# Patient Record
Sex: MALE | Race: WHITE | Employment: UNEMPLOYED | URBAN - NONMETROPOLITAN AREA
[De-identification: names, ages, dates, MRNs, and addresses within clinical notes are randomized per-mention and may not be internally consistent; named-entity substitution may affect disease eponyms.]

---

## 2020-05-04 ENCOUNTER — HOSPITAL ENCOUNTER (EMERGENCY)
Age: 60
Discharge: HOME | End: 2020-05-04
Attending: EMERGENCY MEDICINE

## 2020-05-04 VITALS
DIASTOLIC BLOOD PRESSURE: 72 MMHG | TEMPERATURE: 96.5 F | OXYGEN SATURATION: 100 % | WEIGHT: 185 LBS | BODY MASS INDEX: 25.9 KG/M2 | HEIGHT: 71 IN | HEART RATE: 99 BPM | SYSTOLIC BLOOD PRESSURE: 118 MMHG | RESPIRATION RATE: 16 BRPM

## 2020-05-04 PROCEDURE — 99282 EMERGENCY DEPT VISIT SF MDM: CPT

## 2020-05-04 ASSESSMENT — ENCOUNTER SYMPTOMS
EYE PAIN: 0
SHORTNESS OF BREATH: 0
RHINORRHEA: 0
ABDOMINAL PAIN: 0

## 2020-05-04 ASSESSMENT — PAIN SCALES - GENERAL
PAINLEVEL_OUTOF10: 0
PAINLEVEL_OUTOF10: 1

## 2020-05-04 ASSESSMENT — PAIN DESCRIPTION - LOCATION: LOCATION: MOUTH

## 2020-05-04 ASSESSMENT — PAIN DESCRIPTION - PAIN TYPE: TYPE: ACUTE PAIN

## 2020-05-04 NOTE — ED PROVIDER NOTES
Conjunctiva/sclera: Conjunctivae normal.   Neck:      Musculoskeletal: Neck supple. Cardiovascular:      Rate and Rhythm: Normal rate and regular rhythm. Pulmonary:      Effort: Pulmonary effort is normal. No respiratory distress. Breath sounds: No stridor. Abdominal:      General: There is no distension. Palpations: Abdomen is soft. Musculoskeletal:         General: No tenderness. Skin:     General: Skin is warm and dry. Findings: No erythema. Neurological:      Mental Status: He is alert and oriented to person, place, and time. Comments: Patient is alert and oriented ×3. Extraocular movements intact. Pupils are equal and reactive to light bilaterally. Speech is normal.  No word finding difficulties noted. Eyebrows raise symmetrically. Patient is able to puff out cheeks symmetrically with no issues. Tongue protrudes to midline. Shoulder shrug is symmetrical.  Right upper extremity: 5/5 strength with finger abduction, flexion and extension of elbow. Left upper extremity: 5/5 strength with finger abduction, flexion and extension of elbow. Sensation intact to upper extremities. Patient able to lift both legs off the bed and hold for 5 seconds without issues. 5/5 strength with big toe dorsiflexion bilaterally. Sensation to light touch intact to bilateral lower extremities. Normal gait         DIAGNOSTIC RESULTS     RADIOLOGY: (none if blank)   Interpretation per theRadiologist below, if available at the time of this note:    No orders to display       LABS:  Labs Reviewed - No data to display    All other labs were within normal range or not returned as of this dictation. EMERGENCY DEPARTMENT COURSE and Medical Decision Making: On evaluation, patient is in no distress. Patient has normal neuro examination with no loss of consciousness. Fall was from standing. No blood thinning medications. Patient does have multiple abrasions to the nose and lower lip.   There is 1

## 2020-12-04 ENCOUNTER — APPOINTMENT (OUTPATIENT)
Dept: GENERAL RADIOLOGY | Age: 60
DRG: 201 | End: 2020-12-04
Payer: MEDICARE

## 2020-12-04 ENCOUNTER — HOSPITAL ENCOUNTER (INPATIENT)
Age: 60
LOS: 1 days | Discharge: HOME | DRG: 201 | End: 2020-12-05
Attending: EMERGENCY MEDICINE | Admitting: FAMILY MEDICINE
Payer: MEDICARE

## 2020-12-04 PROBLEM — I48.91 NEW ONSET ATRIAL FIBRILLATION (HCC): Status: ACTIVE | Noted: 2020-12-04

## 2020-12-04 LAB
ABSOLUTE EOS #: 0.12 K/UL (ref 0–0.44)
ABSOLUTE IMMATURE GRANULOCYTE: 0.03 K/UL (ref 0–0.3)
ABSOLUTE LYMPH #: 2.82 K/UL (ref 1.1–3.7)
ABSOLUTE MONO #: 0.8 K/UL (ref 0.1–1.2)
ANION GAP SERPL CALCULATED.3IONS-SCNC: 6 MMOL/L (ref 9–17)
BASOPHILS # BLD: 1 % (ref 0–2)
BASOPHILS ABSOLUTE: 0.09 K/UL (ref 0–0.2)
BNP INTERPRETATION: ABNORMAL
BUN BLDV-MCNC: 24 MG/DL (ref 8–23)
BUN/CREAT BLD: 24 (ref 9–20)
CALCIUM SERPL-MCNC: 9.4 MG/DL (ref 8.6–10.4)
CHLORIDE BLD-SCNC: 103 MMOL/L (ref 98–107)
CO2: 26 MMOL/L (ref 20–31)
CREAT SERPL-MCNC: 1 MG/DL (ref 0.7–1.2)
D-DIMER QUANTITATIVE: 0.47 MG/L FEU (ref 0–0.59)
DIFFERENTIAL TYPE: NORMAL
EOSINOPHILS RELATIVE PERCENT: 1 % (ref 1–4)
GFR AFRICAN AMERICAN: >60 ML/MIN
GFR NON-AFRICAN AMERICAN: >60 ML/MIN
GFR SERPL CREATININE-BSD FRML MDRD: ABNORMAL ML/MIN/{1.73_M2}
GFR SERPL CREATININE-BSD FRML MDRD: ABNORMAL ML/MIN/{1.73_M2}
GLUCOSE BLD-MCNC: 106 MG/DL (ref 70–99)
HCT VFR BLD CALC: 48.5 % (ref 40.7–50.3)
HEMOGLOBIN: 15.4 G/DL (ref 13–17)
IMMATURE GRANULOCYTES: 0 %
INR BLD: 1.1
LYMPHOCYTES # BLD: 28 % (ref 24–43)
MCH RBC QN AUTO: 28.7 PG (ref 25.2–33.5)
MCHC RBC AUTO-ENTMCNC: 31.8 G/DL (ref 28.4–34.8)
MCV RBC AUTO: 90.3 FL (ref 82.6–102.9)
MONOCYTES # BLD: 8 % (ref 3–12)
NRBC AUTOMATED: 0 PER 100 WBC
PDW BLD-RTO: 13.1 % (ref 11.8–14.4)
PHOSPHORUS: 3.1 MG/DL (ref 2.5–4.5)
PLATELET # BLD: 276 K/UL (ref 138–453)
PLATELET ESTIMATE: NORMAL
PMV BLD AUTO: 9.1 FL (ref 8.1–13.5)
POTASSIUM SERPL-SCNC: 4.2 MMOL/L (ref 3.7–5.3)
PRO-BNP: 1387 PG/ML
PROTHROMBIN TIME: 14 SEC (ref 11.5–14.2)
RBC # BLD: 5.37 M/UL (ref 4.21–5.77)
RBC # BLD: NORMAL 10*6/UL
SEG NEUTROPHILS: 62 % (ref 36–65)
SEGMENTED NEUTROPHILS ABSOLUTE COUNT: 6.07 K/UL (ref 1.5–8.1)
SODIUM BLD-SCNC: 135 MMOL/L (ref 135–144)
TROPONIN INTERP: NORMAL
TROPONIN INTERP: NORMAL
TROPONIN T: NORMAL NG/ML
TROPONIN T: NORMAL NG/ML
TROPONIN, HIGH SENSITIVITY: 9 NG/L (ref 0–22)
TROPONIN, HIGH SENSITIVITY: 9 NG/L (ref 0–22)
TSH SERPL DL<=0.05 MIU/L-ACNC: 1.19 MIU/L (ref 0.3–5)
WBC # BLD: 9.9 K/UL (ref 3.5–11.3)
WBC # BLD: NORMAL 10*3/UL

## 2020-12-04 PROCEDURE — 84100 ASSAY OF PHOSPHORUS: CPT

## 2020-12-04 PROCEDURE — 80048 BASIC METABOLIC PNL TOTAL CA: CPT

## 2020-12-04 PROCEDURE — 2500000003 HC RX 250 WO HCPCS: Performed by: EMERGENCY MEDICINE

## 2020-12-04 PROCEDURE — 96374 THER/PROPH/DIAG INJ IV PUSH: CPT

## 2020-12-04 PROCEDURE — 6370000000 HC RX 637 (ALT 250 FOR IP): Performed by: EMERGENCY MEDICINE

## 2020-12-04 PROCEDURE — 84443 ASSAY THYROID STIM HORMONE: CPT

## 2020-12-04 PROCEDURE — 6360000002 HC RX W HCPCS: Performed by: EMERGENCY MEDICINE

## 2020-12-04 PROCEDURE — 83880 ASSAY OF NATRIURETIC PEPTIDE: CPT

## 2020-12-04 PROCEDURE — 85379 FIBRIN DEGRADATION QUANT: CPT

## 2020-12-04 PROCEDURE — G0378 HOSPITAL OBSERVATION PER HR: HCPCS

## 2020-12-04 PROCEDURE — 71046 X-RAY EXAM CHEST 2 VIEWS: CPT

## 2020-12-04 PROCEDURE — 99284 EMERGENCY DEPT VISIT MOD MDM: CPT

## 2020-12-04 PROCEDURE — 1200000000 HC SEMI PRIVATE

## 2020-12-04 PROCEDURE — 85025 COMPLETE CBC W/AUTO DIFF WBC: CPT

## 2020-12-04 PROCEDURE — 96376 TX/PRO/DX INJ SAME DRUG ADON: CPT

## 2020-12-04 PROCEDURE — 93005 ELECTROCARDIOGRAM TRACING: CPT | Performed by: EMERGENCY MEDICINE

## 2020-12-04 PROCEDURE — 85610 PROTHROMBIN TIME: CPT

## 2020-12-04 PROCEDURE — 96372 THER/PROPH/DIAG INJ SC/IM: CPT

## 2020-12-04 PROCEDURE — 84484 ASSAY OF TROPONIN QUANT: CPT

## 2020-12-04 PROCEDURE — 36415 COLL VENOUS BLD VENIPUNCTURE: CPT

## 2020-12-04 RX ORDER — DILTIAZEM HYDROCHLORIDE 5 MG/ML
10 INJECTION INTRAVENOUS ONCE
Status: COMPLETED | OUTPATIENT
Start: 2020-12-04 | End: 2020-12-04

## 2020-12-04 RX ORDER — ONDANSETRON 2 MG/ML
4 INJECTION INTRAMUSCULAR; INTRAVENOUS EVERY 6 HOURS PRN
Status: DISCONTINUED | OUTPATIENT
Start: 2020-12-04 | End: 2020-12-05 | Stop reason: HOSPADM

## 2020-12-04 RX ORDER — SODIUM CHLORIDE 0.9 % (FLUSH) 0.9 %
10 SYRINGE (ML) INJECTION EVERY 12 HOURS SCHEDULED
Status: DISCONTINUED | OUTPATIENT
Start: 2020-12-05 | End: 2020-12-05 | Stop reason: HOSPADM

## 2020-12-04 RX ORDER — ASPIRIN 81 MG/1
324 TABLET, CHEWABLE ORAL ONCE
Status: COMPLETED | OUTPATIENT
Start: 2020-12-04 | End: 2020-12-04

## 2020-12-04 RX ORDER — DILTIAZEM HYDROCHLORIDE 5 MG/ML
5 INJECTION INTRAVENOUS ONCE
Status: COMPLETED | OUTPATIENT
Start: 2020-12-04 | End: 2020-12-04

## 2020-12-04 RX ORDER — PROMETHAZINE HYDROCHLORIDE 25 MG/1
12.5 TABLET ORAL EVERY 6 HOURS PRN
Status: DISCONTINUED | OUTPATIENT
Start: 2020-12-04 | End: 2020-12-05 | Stop reason: HOSPADM

## 2020-12-04 RX ORDER — METOPROLOL TARTRATE 5 MG/5ML
5 INJECTION INTRAVENOUS EVERY 6 HOURS PRN
Status: DISCONTINUED | OUTPATIENT
Start: 2020-12-04 | End: 2020-12-05 | Stop reason: HOSPADM

## 2020-12-04 RX ORDER — POLYETHYLENE GLYCOL 3350 17 G/17G
17 POWDER, FOR SOLUTION ORAL DAILY PRN
Status: DISCONTINUED | OUTPATIENT
Start: 2020-12-04 | End: 2020-12-05 | Stop reason: HOSPADM

## 2020-12-04 RX ORDER — DILTIAZEM HYDROCHLORIDE 120 MG/1
120 CAPSULE, COATED, EXTENDED RELEASE ORAL DAILY
Status: DISCONTINUED | OUTPATIENT
Start: 2020-12-05 | End: 2020-12-05 | Stop reason: HOSPADM

## 2020-12-04 RX ORDER — SODIUM CHLORIDE 0.9 % (FLUSH) 0.9 %
10 SYRINGE (ML) INJECTION PRN
Status: DISCONTINUED | OUTPATIENT
Start: 2020-12-04 | End: 2020-12-05 | Stop reason: HOSPADM

## 2020-12-04 RX ORDER — ACETAMINOPHEN 650 MG/1
650 SUPPOSITORY RECTAL EVERY 6 HOURS PRN
Status: DISCONTINUED | OUTPATIENT
Start: 2020-12-04 | End: 2020-12-05 | Stop reason: HOSPADM

## 2020-12-04 RX ORDER — ACETAMINOPHEN 325 MG/1
650 TABLET ORAL EVERY 6 HOURS PRN
Status: DISCONTINUED | OUTPATIENT
Start: 2020-12-04 | End: 2020-12-05 | Stop reason: HOSPADM

## 2020-12-04 RX ADMIN — DILTIAZEM HYDROCHLORIDE 5 MG: 5 INJECTION INTRAVENOUS at 20:57

## 2020-12-04 RX ADMIN — ENOXAPARIN SODIUM 90 MG: 100 INJECTION SUBCUTANEOUS at 20:33

## 2020-12-04 RX ADMIN — DILTIAZEM HYDROCHLORIDE 10 MG: 5 INJECTION INTRAVENOUS at 19:50

## 2020-12-04 RX ADMIN — ASPIRIN 81 MG CHEWABLE TABLET 324 MG: 81 TABLET CHEWABLE at 19:50

## 2020-12-04 ASSESSMENT — ENCOUNTER SYMPTOMS
SHORTNESS OF BREATH: 0
CHEST TIGHTNESS: 0
COLOR CHANGE: 0
NAUSEA: 0
VOMITING: 0
ABDOMINAL PAIN: 0
RHINORRHEA: 0

## 2020-12-04 ASSESSMENT — PAIN SCALES - GENERAL: PAINLEVEL_OUTOF10: 0

## 2020-12-05 VITALS
RESPIRATION RATE: 16 BRPM | DIASTOLIC BLOOD PRESSURE: 90 MMHG | HEART RATE: 92 BPM | OXYGEN SATURATION: 99 % | BODY MASS INDEX: 26 KG/M2 | HEIGHT: 71 IN | SYSTOLIC BLOOD PRESSURE: 133 MMHG | TEMPERATURE: 97.5 F | WEIGHT: 185.7 LBS

## 2020-12-05 PROBLEM — I10 ESSENTIAL HYPERTENSION: Status: ACTIVE | Noted: 2020-12-05

## 2020-12-05 LAB
ALBUMIN SERPL-MCNC: 4.1 G/DL (ref 3.5–5.2)
ALBUMIN/GLOBULIN RATIO: 1.6 (ref 1–2.5)
ALP BLD-CCNC: 72 U/L (ref 40–129)
ALT SERPL-CCNC: 16 U/L (ref 5–41)
ANION GAP SERPL CALCULATED.3IONS-SCNC: 8 MMOL/L (ref 9–17)
AST SERPL-CCNC: 20 U/L
BILIRUB SERPL-MCNC: 0.63 MG/DL (ref 0.3–1.2)
BNP INTERPRETATION: ABNORMAL
BUN BLDV-MCNC: 21 MG/DL (ref 8–23)
BUN/CREAT BLD: 23 (ref 9–20)
CALCIUM SERPL-MCNC: 9 MG/DL (ref 8.6–10.4)
CHLORIDE BLD-SCNC: 100 MMOL/L (ref 98–107)
CO2: 26 MMOL/L (ref 20–31)
CREAT SERPL-MCNC: 0.92 MG/DL (ref 0.7–1.2)
EKG ATRIAL RATE: 120 BPM
EKG ATRIAL RATE: 138 BPM
EKG Q-T INTERVAL: 344 MS
EKG Q-T INTERVAL: 376 MS
EKG QRS DURATION: 86 MS
EKG QRS DURATION: 88 MS
EKG QTC CALCULATION (BAZETT): 474 MS
EKG QTC CALCULATION (BAZETT): 501 MS
EKG R AXIS: 23 DEGREES
EKG R AXIS: 46 DEGREES
EKG T AXIS: 32 DEGREES
EKG T AXIS: 44 DEGREES
EKG VENTRICULAR RATE: 107 BPM
EKG VENTRICULAR RATE: 114 BPM
GFR AFRICAN AMERICAN: >60 ML/MIN
GFR NON-AFRICAN AMERICAN: >60 ML/MIN
GFR SERPL CREATININE-BSD FRML MDRD: ABNORMAL ML/MIN/{1.73_M2}
GFR SERPL CREATININE-BSD FRML MDRD: ABNORMAL ML/MIN/{1.73_M2}
GLUCOSE BLD-MCNC: 91 MG/DL (ref 70–99)
HCT VFR BLD CALC: 43.9 % (ref 40.7–50.3)
HEMOGLOBIN: 14.1 G/DL (ref 13–17)
MCH RBC QN AUTO: 29.3 PG (ref 25.2–33.5)
MCHC RBC AUTO-ENTMCNC: 32.1 G/DL (ref 28.4–34.8)
MCV RBC AUTO: 91.3 FL (ref 82.6–102.9)
NRBC AUTOMATED: 0 PER 100 WBC
PDW BLD-RTO: 13.3 % (ref 11.8–14.4)
PLATELET # BLD: 247 K/UL (ref 138–453)
PMV BLD AUTO: 9.3 FL (ref 8.1–13.5)
POTASSIUM SERPL-SCNC: 4 MMOL/L (ref 3.7–5.3)
PRO-BNP: 1438 PG/ML
RBC # BLD: 4.81 M/UL (ref 4.21–5.77)
SODIUM BLD-SCNC: 134 MMOL/L (ref 135–144)
TOTAL PROTEIN: 6.7 G/DL (ref 6.4–8.3)
TROPONIN INTERP: NORMAL
TROPONIN T: NORMAL NG/ML
TROPONIN, HIGH SENSITIVITY: 9 NG/L (ref 0–22)
WBC # BLD: 7.3 K/UL (ref 3.5–11.3)

## 2020-12-05 PROCEDURE — 85027 COMPLETE CBC AUTOMATED: CPT

## 2020-12-05 PROCEDURE — 6370000000 HC RX 637 (ALT 250 FOR IP): Performed by: FAMILY MEDICINE

## 2020-12-05 PROCEDURE — 93010 ELECTROCARDIOGRAM REPORT: CPT | Performed by: INTERNAL MEDICINE

## 2020-12-05 PROCEDURE — G0378 HOSPITAL OBSERVATION PER HR: HCPCS

## 2020-12-05 PROCEDURE — 2580000003 HC RX 258: Performed by: FAMILY MEDICINE

## 2020-12-05 PROCEDURE — 99254 IP/OBS CNSLTJ NEW/EST MOD 60: CPT | Performed by: INTERNAL MEDICINE

## 2020-12-05 PROCEDURE — 6360000002 HC RX W HCPCS: Performed by: FAMILY MEDICINE

## 2020-12-05 PROCEDURE — 84484 ASSAY OF TROPONIN QUANT: CPT

## 2020-12-05 PROCEDURE — 96372 THER/PROPH/DIAG INJ SC/IM: CPT

## 2020-12-05 PROCEDURE — 80053 COMPREHEN METABOLIC PANEL: CPT

## 2020-12-05 PROCEDURE — 6370000000 HC RX 637 (ALT 250 FOR IP): Performed by: INTERNAL MEDICINE

## 2020-12-05 PROCEDURE — 36415 COLL VENOUS BLD VENIPUNCTURE: CPT

## 2020-12-05 PROCEDURE — 83880 ASSAY OF NATRIURETIC PEPTIDE: CPT

## 2020-12-05 PROCEDURE — 93005 ELECTROCARDIOGRAM TRACING: CPT | Performed by: FAMILY MEDICINE

## 2020-12-05 RX ORDER — DILTIAZEM HYDROCHLORIDE 120 MG/1
120 CAPSULE, COATED, EXTENDED RELEASE ORAL DAILY
Qty: 30 CAPSULE | Refills: 3 | Status: SHIPPED | OUTPATIENT
Start: 2020-12-06 | End: 2020-12-07 | Stop reason: ALTCHOICE

## 2020-12-05 RX ADMIN — Medication 10 ML: at 09:30

## 2020-12-05 RX ADMIN — DILTIAZEM HYDROCHLORIDE 120 MG: 120 CAPSULE, COATED, EXTENDED RELEASE ORAL at 08:18

## 2020-12-05 RX ADMIN — ENOXAPARIN SODIUM 80 MG: 80 INJECTION SUBCUTANEOUS at 08:18

## 2020-12-05 RX ADMIN — METOPROLOL TARTRATE 25 MG: 25 TABLET, FILM COATED ORAL at 11:14

## 2020-12-05 RX ADMIN — RIVAROXABAN 20 MG: 20 TABLET, FILM COATED ORAL at 11:14

## 2020-12-05 ASSESSMENT — PAIN SCALES - GENERAL: PAINLEVEL_OUTOF10: 0

## 2020-12-05 NOTE — PLAN OF CARE
Problem: Activity:  Goal: Ability to tolerate increased activity will improve  Description: Ability to tolerate increased activity will improve  12/5/2020 1130 by Darrel Alvarez RN  Outcome: Ongoing  12/5/2020 0321 by Mukund Rudolph RN  Outcome: Ongoing  Note: Patient moves around independently in room and tolerates well. Will continue to monitor. Goal: Expression of feelings of increased energy will increase  Description: Expression of feelings of increased energy will increase  12/5/2020 0321 by Mukund Rudolph RN  Outcome: Ongoing     Problem: Cardiac:  Goal: Ability to maintain an adequate cardiac output will improve  Description: Ability to maintain an adequate cardiac output will improve  12/5/2020 1130 by Darrel Alvarez RN  Outcome: Ongoing  12/5/2020 0321 by Mukund Rudolph RN  Outcome: Ongoing  Goal: Complications related to the disease process, condition or treatment will be avoided or minimized  Description: Complications related to the disease process, condition or treatment will be avoided or minimized  12/5/2020 0321 by Mukund Rudolph RN  Outcome: Ongoing     Problem: Coping:  Goal: Level of anxiety will decrease  Description: Level of anxiety will decrease  12/5/2020 1130 by Darrel Alvarez RN  Outcome: Ongoing  12/5/2020 0321 by Mukund Rudolph RN  Outcome: Ongoing  Note: Patient expresses concern over being in a hospital with covid patients. Educated patient on all the precautions in place to prevent transmission of virus to otherwise healthy patient. Patient verbalized feeling less anxious about the situation.    Goal: General experience of comfort will improve  Description: General experience of comfort will improve  12/5/2020 0321 by Mukund Rudolph RN  Outcome: Ongoing     Problem: Health Behavior:  Goal: Ability to manage health-related needs will improve  Description: Ability to manage health-related needs will improve  12/5/2020 1130 by Darrel Alvarez RN  Outcome: Ongoing  12/5/2020 0321 by Mackenzie Garland RN  Outcome: Ongoing     Problem: Safety:  Goal: Ability to remain free from injury will improve  Description: Ability to remain free from injury will improve  12/5/2020 1130 by Isabella Hayes RN  Outcome: Ongoing  12/5/2020 0321 by Mackenzie Garland RN  Outcome: Ongoing  Note: Patient free from injury at this time.

## 2020-12-05 NOTE — CONSULTS
Patient: Pina Waller  : 1960  Date of Admission: 2020  Today's Date: 2020    REASON FOR CONSULTATION: Irregular Heart Beat (Patient was at pcp today and they suspect that he has afib)      HPI: I had the pleasure of seeing Pina Waller in consultation today. As you know, Mr. Chasity Whalen is a 56 y.o. male who was admitted with new onset atrial fibrillation with RVR leading to this consultation. Mr. Chasity Whalen does report a history of having palpitations for the past 6 months. He went to an urgent care and found to have atrial fibrillation with rapid ventricular response so he was directed to our emergency room. With regard to his symptoms over the past 6-month, he reported feeling tired and fatigue along with the palpitations. Minimal dizziness and minimal shortness of breath on exertion. He absolutely denies having any chest pain, pressure or tightness. His breathing stayed stable over the past several weeks. No orthopnea or PND but when I asked him about possible symptoms of obstructive sleep apnea syndrome he said he is likely have it. He has poor sleep and wakes up tired in the morning and some times requires naps during the day. I told him we will arrange for sleep apnea study as an outpatient. He denies any presyncope or syncopal episodes. No fever or cough. No abdominal pain, nausea or vomiting. He reported having history of borderline hypertension but he does not have a primary care doctor. He denied any history of diabetes or dyslipidemia. He is lifelong non-smoker. With regard to his family history, he reported that his father and mother had a heart attack in their old age. I carefully reviewed the chart including chest x-ray that showed no acute cardiopulmonary abnormalities apart from cardiomegaly. Blood work is good except for elevated BNP of 1300. Serum troponin negative x2. EKG is reviewed, atrial fibrillation with rapid ventricular response. No ischemic changes. His heart rate is fairly controlled on oral diltiazem 120 mg daily. Mr. Axel Weaver denies any known history of other heart problems in the past including a heart attack, heart failure or other arrhythmias. He denies symptoms of palpitations, lightheadedness, dizziness, syncope or near-syncope. Mr. Axel Weaver denies any known history of bleeding problems including blood in his stool, black tarry stools, blood in his urine, or other bleeding problems. He is feeling really good this morning. He said he has no complaint. He is really anxious to get discharged. I told him it is probably safe to discharge him on rate control and anticoagulation but he will need extensive work-up to figure out why he went in atrial fibrillation. I told him we can discharge him today and I will arrange for an echo Monday and an office visit to discuss further management. I asked the nurse to give him my cell phone number so he can call me if he has any concern about medications or symptoms. I extremely expressed my concern about the fact that although atrial fibrillation is usually a benign illness it can be only a sign of a severe underlying heart disease. I even offered him to stay in the hospital and do this work as an inpatient but he does not want want to stay in the hospital around sick people during this COVID-19 pandemic and he prefers to do the work-up as an outpatient. Past Medical History:   Diagnosis Date    Hypertension        CURRENT ALLERGIES: Pcn [penicillins] REVIEW OF SYSTEMS: 14 systems were reviewed. Pertinent positives and negatives as above, all else negative. History reviewed. No pertinent surgical history.  Social History:  Social History     Tobacco Use    Smoking status: Never Smoker    Smokeless tobacco: Never Used   Substance Use Topics    Alcohol use: Yes     Binge frequency: Less than monthly     Comment: rarely    Drug use: Never        CURRENT MEDICATIONS:  Outpatient Medications Marked as Taking for the 12/4/20 encounter [facility] Encounter)   Medication Sig Dispense Refill    rivaroxaban (XARELTO) 20 MG TABS tablet Take 1 tablet by mouth daily (with breakfast) 30 tablet 0    metoprolol tartrate (LOPRESSOR) 25 MG tablet Take 1 tablet by mouth 2 times daily 60 tablet 3    [START ON 12/6/2020] dilTIAZem (CARDIZEM CD) 120 MG extended release capsule Take 1 capsule by mouth daily 30 capsule 3       FAMILY HISTORY: family history includes Coronary Art Dis in his father and mother. PHYSICAL EXAM:   [ INSTRUCTIONS:  \"[x]\" Indicates a positive item  \"[]\" Indicates a negative item   Vital Signs: (As obtained by patient/caregiver or practitioner observation)    Blood pressure-  Heart rate-    Respiratory rate-    Temperature-  Pulse oximetry-     Constitutional: [x] Appears well-developed and well-nourished [x] No apparent distress      [] Abnormal-   Mental status  [x] Alert and awake  [x] Oriented to person/place/time [x]Able to follow commands      Eyes:  EOM    [x]  Normal  [] Abnormal-  Sclera  [x]  Normal  [] Abnormal -         Discharge [x]  None visible  [] Abnormal -    HENT:   [x] Normocephalic, atraumatic.   [] Abnormal   [] Mouth/Throat: Mucous membranes are moist.     External Ears [x] Normal  [] Abnormal-     Neck: [x] No visualized mass     Pulmonary/Chest: [x] Respiratory effort normal.  [x] No visualized signs of difficulty breathing or respiratory distress        [] Abnormal-     Neurological:        [x] No Facial Asymmetry (Cranial nerve 7 motor function) (limited exam to video visit)          [] No gaze palsy        [] Abnormal-         Skin:        [x] No significant exanthematous lesions or discoloration noted on facial skin         [] Abnormal-            Psychiatric:       [x] Normal Affect [] No Hallucinations        [] Abnormal-     Other pertinent observable physical exam findings: None      MOST RECENT LABS ON RECORD:   Lab Results   Component Value Date    WBC 7.3 12/05/2020    HGB 14.1 12/05/2020    HCT 43.9 12/05/2020     12/05/2020    ALT 16 12/05/2020    AST 20 12/05/2020     (L) 12/05/2020    K 4.0 12/05/2020     12/05/2020    CREATININE 0.92 12/05/2020    BUN 21 12/05/2020    CO2 26 12/05/2020    TSH 1.19 12/04/2020    INR 1.1 12/04/2020         ASSESSMENT:  Patient Active Problem List    Diagnosis Date Noted    Essential hypertension 12/05/2020     Priority: Low    New onset atrial fibrillation ([facility] [address] 12/04/2020     Priority: Low       PLAN:  I had an extensive discussion with the patient regarding the management of his new onset atrial fibrillation. He has stable symptoms of palpitations, fatigue and tiredness and minimal shortness of breath on exertion for the past several weeks. He thinks that his heart is out of rhythm for about 6 months. He does not have a primary care doctor and finally decided to go to an urgent care for an evaluation. Found to have atrial fibrillation leading to this current admission. He absolutely denies any chest pain, pressure or tightness. He works part-time 6 hours a day and there is no change in his physical activities over the past several months. He has borderline hypertension. No history of dyslipidemia or diabetes. He is lifelong non-smoker. Family history of heart disease in both mother and father at old age. I reviewed his EKGs, atrial fibrillation with rapid ventricular response. No acute ischemic changes. According to his vitals, heart rate is in the low 100s beats per minute only on oral diltiazem 120 mg daily. He is feeling great this morning. I did explain to him that atrial fibrillation has multiple etiologies, sometimes it is idiopathic, sometimes it is caused by thyroid abnormalities but his TSH is normal.  It can be also caused by stimulant like caffeinated beverages and sometimes by alcohol intake.       The most important part of  atrial fibrillation is that it can be caused by heart muscle disease or heart valve disease. Although unusual, it can also be caused by coronary artery disease, this is less likely in his case giving absence of chest pain or ischemic ECG changes. Also his only risk factors for CAD are borderline hypertension and questionable family history of heart disease. Despite these facts, it is a mandatory work-up for atrial fibrillation to include an echo to assess heart function, valvular abnormalities and left atrial size and possibly Lexiscan stress test.      I told him since he is feeling fine today we can discharge him on rate control medications and oral anticoagulation and I can bring him back Monday for an echo and an office visit to discuss further plan of care. Patient preferred this because he does not want to stay in the hospital just for an observation over the weekend particularly during this pandemic and pandemic. He is insisting that his symptoms are going on for the past several months and are not getting any worse. He just got worried because of the COVID-19 pandemic and ended up in urgent care for evaluation. I asked the nurse to give him my cell phone number to call me if he has any symptoms and I stressed the fact that if his symptoms got any worse he must come to the emergency room immediately because we do not know why he developed atrial fibrillation. Discussed possible side effects of the current medications with particular attention to bleeding risk from the Xarelto 20 mg daily. This is given because we may end up doing a cardioversion for him next week or after 4 weeks. I did explain to him that he has an elevated BNP and this can be an indication of weak heart muscle secondary to the tachycardia. That is why we are arranging for an echo as early as Monday morning. Sincerely,  Parvin Stratton MD, MS, F.A.C.C.   [facility]) [facility], [address]  [address], [place], [address] [place] [address]  Phone: [phone], Fax: [phone]      Nila Plasencia is a 56 y.o. male being evaluated by a Virtual Visit (video visit) encounter to address concerns as mentioned above. A caregiver was present when appropriate. Due to this being a TeleHealth encounter ([facility]), evaluation of the following organ systems was limited: Vitals/Constitutional/EENT/Resp/CV/GI//MS/Neuro/Skin/Heme-Lymph-Imm. Pursuant to the emergency declaration under the [address] [place] [address] authority and the [employer] and [employer] Act, this Virtual Visit was conducted with patient's (and/or legal guardian's) consent, to reduce the patient's risk of exposure to COVID-19 and provide necessary medical care. The patient (and/or legal guardian) has also been advised to contact this office for worsening conditions or problems, and seek emergency medical treatment and/or call 911 if deemed necessary. Services were provided through a video synchronous discussion virtually to substitute for in-person visit. Mr. Wade Go was located in the patients hospital room in [address] [place] [address] performed the visit from my home in [facility]    --Cheri Daniels MD on 12/5/2020 at 11:05 AM    An electronic signature was used to authenticate this note.        I believe the potential risk of significant complications, morbidity, or mortality from the patients current medical problems are: high

## 2020-12-05 NOTE — ED PROVIDER NOTES
use: Yes     Comment: rarely    Drug use: Never    Sexual activity: Not on file   Lifestyle    Physical activity     Days per week: Not on file     Minutes per session: Not on file    Stress: Not on file   Relationships    Social connections     Talks on phone: Not on file     Gets together: Not on file     Attends [employer] service: Not on file     Active member of club or organization: Not on file     Attends meetings of clubs or organizations: Not on file     Relationship status: Not on file    Intimate partner violence     Fear of current or ex partner: Not on file     Emotionally abused: Not on file     Physically abused: Not on file     Forced sexual activity: Not on file   Other Topics Concern    Not on file   Social History Narrative    Not on file       History reviewed. No pertinent family history. Allergies:  Pcn [penicillins]    Home Medications:  Prior to Admission medications    Not on File       REVIEW OF SYSTEMS    (2-9 systems for level 4, 10 or more for level 5)      Review of Systems   Constitutional: Negative for chills and fever. HENT: Negative for congestion and rhinorrhea. Respiratory: Negative for chest tightness and shortness of breath. Cardiovascular: Positive for palpitations. Negative for chest pain. Gastrointestinal: Negative for abdominal pain, nausea and vomiting. Genitourinary: Negative for dysuria. Skin: Negative for color change. Neurological: Negative for weakness and numbness. Psychiatric/Behavioral: Negative for confusion. PHYSICAL EXAM   (up to 7 for level 4, 8 or more for level 5)      INITIAL VITALS:   BP (!) 136/94   Pulse 85   Temp 97.6 °F (36.4 °C) (Temporal)   Resp 16   Ht 5' 11\" (1.803 m)   Wt 185 lb 11.2 oz (84.2 kg)   SpO2 98%   BMI 25.90 kg/m²     Physical Exam  Vitals signs reviewed. Constitutional:       General: He is not in acute distress. Appearance: He is well-developed.    HENT:      Head: Normocephalic and atraumatic. Eyes:      General:         Right eye: No discharge. Left eye: No discharge. Conjunctiva/sclera: Conjunctivae normal.   Cardiovascular:      Rate and Rhythm: Tachycardia present. Rhythm irregular. Heart sounds: Normal heart sounds. No murmur. No friction rub. No gallop. Pulmonary:      Effort: Pulmonary effort is normal. No respiratory distress. Breath sounds: Normal breath sounds. No wheezing or rales. Abdominal:      General: There is no distension. Palpations: Abdomen is soft. Tenderness: There is no abdominal tenderness. There is no guarding or rebound. Musculoskeletal:         General: No swelling or tenderness. Comments: No asymmetrical leg swelling, no calf tenderness on exam.   Skin:     General: Skin is warm and dry. Findings: No erythema.          DIFFERENTIAL  DIAGNOSIS     PLAN (LABS / IMAGING / EKG):  Orders Placed This Encounter   Procedures    XR CHEST (2 VW)    CBC Auto Differential    Basic Metabolic Panel w/ Reflex to MG    Troponin    Brain Natriuretic Peptide    Protime-INR    TSH with Reflex    Phosphorus    D-Dimer, Quantitative    Comprehensive Metabolic Panel    CBC    DIET LOW SODIUM 2 GM;    Vital signs per unit routine    Telemetry monitoring - 24 hour duration    Up as tolerated    Full Code    EKG 12 Lead    PATIENT STATUS (FROM ED OR OR/PROCEDURAL) Inpatient       MEDICATIONS ORDERED:  Orders Placed This Encounter   Medications    aspirin chewable tablet 324 mg    dilTIAZem injection 10 mg    enoxaparin (LOVENOX) injection 90 mg    DISCONTD: enoxaparin (LOVENOX) injection 90 mg    dilTIAZem injection 5 mg    sodium chloride flush 0.9 % injection 10 mL    sodium chloride flush 0.9 % injection 10 mL    OR Linked Order Group     acetaminophen (TYLENOL) tablet 650 mg     acetaminophen (TYLENOL) suppository 650 mg    polyethylene glycol (GLYCOLAX) packet 17 g    OR Linked Order Group    [facility] promethazine (PHENERGAN) tablet 12.5 mg     ondansetron (ZOFRAN) injection 4 mg    metoprolol (LOPRESSOR) injection 5 mg    dilTIAZem (CARDIZEM CD) extended release capsule 120 mg    enoxaparin (LOVENOX) injection 80 mg       DDX: A. fib with RVR versus electrolyte dysfunction versus NSTEMI versus atypical ACS versus PE    DIAGNOSTIC RESULTS / EMERGENCY DEPARTMENT COURSE / MDM   :  Results for orders placed or performed during the hospital encounter of 12/04/20   CBC Auto Differential   Result Value Ref Range    WBC 9.9 3.5 - 11.3 k/uL    RBC 5.37 4.21 - 5.77 m/uL    Hemoglobin 15.4 13.0 - 17.0 g/dL    Hematocrit 48.5 40.7 - 50.3 %    MCV 90.3 82.6 - 102.9 fL    MCH 28.7 25.2 - 33.5 pg    MCHC 31.8 28.4 - 34.8 g/dL    RDW 13.1 11.8 - 14.4 %    Platelets 407 134 - 443 k/uL    MPV 9.1 8.1 - 13.5 fL    NRBC Automated 0.0 0.0 per 100 WBC    Differential Type NOT REPORTED     Seg Neutrophils 62 36 - 65 %    Lymphocytes 28 24 - 43 %    Monocytes 8 3 - 12 %    Eosinophils % 1 1 - 4 %    Basophils 1 0 - 2 %    Immature Granulocytes 0 0 %    Segs Absolute 6.07 1.50 - 8.10 k/uL    Absolute Lymph # 2.82 1.10 - 3.70 k/uL    Absolute Mono # 0.80 0.10 - 1.20 k/uL    Absolute Eos # 0.12 0.00 - 0.44 k/uL    Basophils Absolute 0.09 0.00 - 0.20 k/uL    Absolute Immature Granulocyte 0.03 0.00 - 0.30 k/uL    WBC Morphology NOT REPORTED     RBC Morphology NOT REPORTED     Platelet Estimate NOT REPORTED    Basic Metabolic Panel w/ Reflex to MG   Result Value Ref Range    Glucose 106 (H) 70 - 99 mg/dL    BUN 24 (H) 8 - 23 mg/dL    CREATININE 1.00 0.70 - 1.20 mg/dL    Bun/Cre Ratio 24 (H) 9 - 20    Calcium 9.4 8.6 - 10.4 mg/dL    Sodium 135 135 - 144 mmol/L    Potassium 4.2 3.7 - 5.3 mmol/L    Chloride 103 98 - 107 mmol/L    CO2 26 20 - 31 mmol/L    Anion Gap 6 (L) 9 - 17 mmol/L    GFR Non-African American >60 >60 mL/min    GFR African American >60 >60 mL/min    GFR Comment          GFR Staging         Troponin   Result Value Ref Range Troponin, High Sensitivity 9 0 - 22 ng/L    Troponin T NOT REPORTED <0.03 ng/mL    Troponin Interp NOT REPORTED    Troponin   Result Value Ref Range    Troponin, High Sensitivity 9 0 - 22 ng/L    Troponin T NOT REPORTED <0.03 ng/mL    Troponin Interp NOT REPORTED    Brain Natriuretic Peptide   Result Value Ref Range    Pro-BNP 1,387 (H) <300 pg/mL    BNP Interpretation Pro-BNP Reference Range:    Protime-INR   Result Value Ref Range    Protime 14.0 11.5 - 14.2 sec    INR 1.1    TSH with Reflex   Result Value Ref Range    TSH 1.19 0.30 - 5.00 mIU/L   Phosphorus   Result Value Ref Range    Phosphorus 3.1 2.5 - 4.5 mg/dL   D-Dimer, Quantitative   Result Value Ref Range    D-Dimer, Quant 0.47 0.00 - 0.59 mg/L FEU   EKG 12 Lead   Result Value Ref Range    Ventricular Rate 114 BPM    Atrial Rate 138 BPM    QRS Duration 88 ms    Q-T Interval 344 ms    QTc Calculation (Bazett) 474 ms    R Axis 46 degrees    T Axis 32 degrees       IMPRESSION: 25-year-old male who presents to the emergency department secondary to palpitations that have been ongoing for a few days. He does present with A. fib with RVR, heart rate mostly in the 120s to 130s. No chest pain shortness of breath difficulty breathing, pulse ox of 100% which decreases clinical concern for a pulmonary embolus at this time. Plan to slow down the heart rate, give a dose of diltiazem, start Lovenox, do a cardiac work-up including CBC, BMP, troponin, chest x-ray and reassess. RADIOLOGY:    Xr Chest (2 Vw)    Result Date: 12/4/2020  EXAMINATION: TWO XRAY VIEWS OF THE CHEST 12/4/2020 4:59 pm COMPARISON: None. HISTORY: ORDERING SYSTEM PROVIDED HISTORY: Chest pain TECHNOLOGIST PROVIDED HISTORY: Chest pain FINDINGS: Lungs are clear. No pneumothorax or pleural effusion. Cardiomegaly. Age-indeterminate compression fracture at T5. Degenerative changes throughout the thoracic spine. No acute pulmonary findings.  Cardiomegaly, the stability of which is unable to be determined in the absence of prior comparison imaging. Age-indeterminate compression fracture at T5. Correlate for any clinical symptoms. EKG    EKG Interpretation    Interpreted by me    Rhythm: A fib   Rate: Tachy 114  Axis: normal  Ectopy: none  Conduction: normal  ST Segments: no acute change  T Waves: no acute change  Q Waves: none    Clinical Impression: A fib RVR    All EKG's are interpreted by the Emergency Department Physician who either signs or Co-signs this chart in the absence of a cardiologist.    EMERGENCY DEPARTMENT COURSE:    Given 2 separate boluses of Cardizem, after which the patient was rate controlled. I also gave him a dose of Lovenox 1 mg/kg. I talked with cardiology on-call Dr. Diego Rey, at this time he is leaning towards keeping the patient admitted to the hospital for A. fib with RVR and plans on starting the patient on Xarelto and 120 mg of Cardizem extended release. Discussed with Dr. Chelsea French, patient accepted to his service. PROCEDURES:  None    CONSULTS:  None    CRITICAL CARE:  None    FINAL IMPRESSION      1. Atrial fibrillation with RVR ([facility])          DISPOSITION / PLAN     DISPOSITION  Admission      PATIENT REFERRED TO:  No follow-up provider specified. DISCHARGE MEDICATIONS:  There are no discharge medications for this patient.       Morgan Mullins MD  Emergency Medicine Attending    (Please note that portions of thisnote were completed with a voice recognition program.  Efforts were made to edit the dictations but occasionally words are mis-transcribed.)       Morgan Mullins MD  12/05/20 1223

## 2020-12-05 NOTE — PROGRESS NOTES
Dr. Abigail Velez updated on new orders received from both Dr. Seamus Brown and Dr. Daren Espinoza. Also made aware of cardiology plan of care for patient and ok to discharge home today, follow up as outpatient on Monday.

## 2020-12-05 NOTE — PROGRESS NOTES
Pt arrived to floor from ED at 2300, admitted by Dr. Lanita Schwab as hospitalist. Pt transferred to bed independently upon arrival. Pt oriented to room and call light. Pt is A&Ox4, answers questions appropriately. VS obtained and assessment done as charted in flow sheet. Manual /96, will have next nurse recheck throughout the shift. Pt denies any pain. Telemetry placed, Afib noted with rate range of . Navigator completed. PHI form signed, no restrictions. Dr. Lanita Schwab contacted, orders received. [place] ([facility]) meal and water provided for patient. Call light within reach. Pt denies any other needs at this time. Pt updated that new nurse would be taking over at midnight, verbalizes understanding. Will monitor.

## 2020-12-05 NOTE — DISCHARGE SUMMARY
Physician Discharge Summary  Richard Barone MD       Patient ID:  Gae Faster  [id number]  1960    Admission date: 12/4/2020    Discharge date: 12/5/2020     Admitting Physician: Chepe Akbar MD     Primary Care Physician: No primary care provider on file. Primary Discharge Diagnoses:   Patient Active Problem List    Diagnosis Date Noted    Essential hypertension 12/05/2020    New onset atrial fibrillation ([address] 12/04/2020       Additional Diagnoses:       Diagnosis Date    Hypertension          Review of Systems:  Constitutional: negative for fevers or chills  Eyes: negative for visual disturbance   ENT: negative for sore throat or nasal congestion  Respiratory: negative for shortness of breath or cough  Cardiovascular: negative for chest pain ,palpitations,pnd,syncope  Gastrointestinal: negative for abd pain, nausea, vomiting, diarrhea , constipation,hemetemesis,mario alberto,blood in stool  Genitourinary: negative for dysuria, urgency ,frequency,hematuria  Integument/breast: negative for skin rash or lesions  Neurological: negative for unilateral weakness, numbness or tingling. Skeletal Muscular: no joint pain,jont swelling,back pain              Physical exam:      -----------------------------------------------------------------  Exam:  GEN:   A & O x3, no apparent distress  EYES: No gross abnormalities. NECK: normal, supple, no lymphadenopathy,  no carotid bruits  PULM: clear to auscultation bilaterally- no wheezes, rales or rhonchi, normal air movement, no respiratory distress  COR: no murmurs, no gallops and irregularly irregular,rate in 90's  ABD:  soft, non-tender, non-distended, normal bowel sounds, no masses or organomegaly  EXT:   no cyanosis, clubbing or edema present    NEURO: negative  SKIN:  no rashes or significant lesions  -----------------------------------------------------------------          Hospital Course: The patient was admitted for the above.   He was treated with cardizem and improved over the course of his hospitalization. His heart rate in 90's,remains in afib but has no shortness of breath. Cardiology consult obtained and discharged home on oral meds    Consultants:    · cardiology    Procedures:    · none    Complications:   · none    Significant Diagnostic Studies:   Xr Chest (2 Vw)    Result Date: 12/4/2020  EXAMINATION: TWO XRAY VIEWS OF THE CHEST 12/4/2020 4:59 pm COMPARISON: None. HISTORY: ORDERING SYSTEM PROVIDED HISTORY: Chest pain TECHNOLOGIST PROVIDED HISTORY: Chest pain FINDINGS: Lungs are clear. No pneumothorax or pleural effusion. Cardiomegaly. Age-indeterminate compression fracture at T5. Degenerative changes throughout the thoracic spine. No acute pulmonary findings. Cardiomegaly, the stability of which is unable to be determined in the absence of prior comparison imaging. Age-indeterminate compression fracture at T5. Correlate for any clinical symptoms.      Recent Results (from the past 96 hour(s))   EKG 12 Lead    Collection Time: 12/04/20  6:45 PM   Result Value Ref Range    Ventricular Rate 114 BPM    Atrial Rate 138 BPM    QRS Duration 88 ms    Q-T Interval 344 ms    QTc Calculation (Bazett) 474 ms    R Axis 46 degrees    T Axis 32 degrees   CBC Auto Differential    Collection Time: 12/04/20  7:38 PM   Result Value Ref Range    WBC 9.9 3.5 - 11.3 k/uL    RBC 5.37 4.21 - 5.77 m/uL    Hemoglobin 15.4 13.0 - 17.0 g/dL    Hematocrit 48.5 40.7 - 50.3 %    MCV 90.3 82.6 - 102.9 fL    MCH 28.7 25.2 - 33.5 pg    MCHC 31.8 28.4 - 34.8 g/dL    RDW 13.1 11.8 - 14.4 %    Platelets 680 888 - 641 k/uL    MPV 9.1 8.1 - 13.5 fL    NRBC Automated 0.0 0.0 per 100 WBC    Differential Type NOT REPORTED     Seg Neutrophils 62 36 - 65 %    Lymphocytes 28 24 - 43 %    Monocytes 8 3 - 12 %    Eosinophils % 1 1 - 4 %    Basophils 1 0 - 2 %    Immature Granulocytes 0 0 %    Segs Absolute 6.07 1.50 - 8.10 k/uL    Absolute Lymph # 2.82 1.10 - 3.70 k/uL    Absolute Mono # 0. 80 0.10 - 1.20 k/uL    Absolute Eos # 0.12 0.00 - 0.44 k/uL    Basophils Absolute 0.09 0.00 - 0.20 k/uL    Absolute Immature Granulocyte 0.03 0.00 - 0.30 k/uL    WBC Morphology NOT REPORTED     RBC Morphology NOT REPORTED     Platelet Estimate NOT REPORTED    Basic Metabolic Panel w/ Reflex to MG    Collection Time: 12/04/20  7:38 PM   Result Value Ref Range    Glucose 106 (H) 70 - 99 mg/dL    BUN 24 (H) 8 - 23 mg/dL    CREATININE 1.00 0.70 - 1.20 mg/dL    Bun/Cre Ratio 24 (H) 9 - 20    Calcium 9.4 8.6 - 10.4 mg/dL    Sodium 135 135 - 144 mmol/L    Potassium 4.2 3.7 - 5.3 mmol/L    Chloride 103 98 - 107 mmol/L    CO2 26 20 - 31 mmol/L    Anion Gap 6 (L) 9 - 17 mmol/L    GFR Non-African American >60 >60 mL/min    GFR African American >60 >60 mL/min    GFR Comment          GFR Staging         Troponin    Collection Time: 12/04/20  7:38 PM   Result Value Ref Range    Troponin, High Sensitivity 9 0 - 22 ng/L    Troponin T NOT REPORTED <0.03 ng/mL    Troponin Interp NOT REPORTED    Brain Natriuretic Peptide    Collection Time: 12/04/20  7:38 PM   Result Value Ref Range    Pro-BNP 1,387 (H) <300 pg/mL    BNP Interpretation Pro-BNP Reference Range:    Protime-INR    Collection Time: 12/04/20  7:38 PM   Result Value Ref Range    Protime 14.0 11.5 - 14.2 sec    INR 1.1    TSH with Reflex    Collection Time: 12/04/20  7:38 PM   Result Value Ref Range    TSH 1.19 0.30 - 5.00 mIU/L   Phosphorus    Collection Time: 12/04/20  7:38 PM   Result Value Ref Range    Phosphorus 3.1 2.5 - 4.5 mg/dL   D-Dimer, Quantitative    Collection Time: 12/04/20  7:38 PM   Result Value Ref Range    D-Dimer, Quant 0.47 0.00 - 0.59 mg/L FEU   Troponin    Collection Time: 12/04/20 10:18 PM   Result Value Ref Range    Troponin, High Sensitivity 9 0 - 22 ng/L    Troponin T NOT REPORTED <0.03 ng/mL    Troponin Interp NOT REPORTED    Comprehensive Metabolic Panel    Collection Time: 12/05/20  7:29 AM   Result Value Ref Range    Glucose 91 70 - 99 mg/dL BUN 21 8 - 23 mg/dL    CREATININE 0.92 0.70 - 1.20 mg/dL    Bun/Cre Ratio 23 (H) 9 - 20    Calcium 9.0 8.6 - 10.4 mg/dL    Sodium 134 (L) 135 - 144 mmol/L    Potassium 4.0 3.7 - 5.3 mmol/L    Chloride 100 98 - 107 mmol/L    CO2 26 20 - 31 mmol/L    Anion Gap 8 (L) 9 - 17 mmol/L    Alkaline Phosphatase 72 40 - 129 U/L    ALT 16 5 - 41 U/L    AST 20 <40 U/L    Total Bilirubin 0.63 0.3 - 1.2 mg/dL    Total Protein 6.7 6.4 - 8.3 g/dL    Alb 4.1 3.5 - 5.2 g/dL    Albumin/Globulin Ratio 1.6 1.0 - 2.5    GFR Non-African American >60 >60 mL/min    GFR African American >60 >60 mL/min    GFR Comment          GFR Staging         CBC    Collection Time: 12/05/20  7:29 AM   Result Value Ref Range    WBC 7.3 3.5 - 11.3 k/uL    RBC 4.81 4.21 - 5.77 m/uL    Hemoglobin 14.1 13.0 - 17.0 g/dL    Hematocrit 43.9 40.7 - 50.3 %    MCV 91.3 82.6 - 102.9 fL    MCH 29.3 25.2 - 33.5 pg    MCHC 32.1 28.4 - 34.8 g/dL    RDW 13.3 11.8 - 14.4 %    Platelets 139 208 - 071 k/uL    MPV 9.3 8.1 - 13.5 fL    NRBC Automated 0.0 0.0 per 100 WBC   EKG 12 Lead    Collection Time: 12/05/20  9:43 AM   Result Value Ref Range    Ventricular Rate 107 BPM    Atrial Rate 120 BPM    QRS Duration 86 ms    Q-T Interval 376 ms    QTc Calculation (Bazett) 501 ms    R Axis 23 degrees    T Axis 44 degrees     ·     Discharge Condition:   · stable    Disposition:   · home    Discharge Medications:     Suni Alford   Home Medication Instructions GVO:[id number]    Printed on:12/05/20 1030   Medication Information                      dilTIAZem (CARDIZEM CD) 120 MG extended release capsule  Take 1 capsule by mouth daily             metoprolol tartrate (LOPRESSOR) 25 MG tablet  Take 1 tablet by mouth 2 times daily             rivaroxaban (XARELTO) 20 MG TABS tablet  Take 1 tablet by mouth daily (with breakfast)                 · Resume all home medications unless otherwise directed  · Add as above  · Stop taking     Patient Instructions:   · Activity: activity as tolerated  · Diet: cardiac diet  · Wound Care: none needed  · Other:   · Follow up with Dr Daren Espinoza in 2 days as directed      Time Spent on discharge services is 25 minutes in the examination, evaluation, counseling and review of medications and discharge plan.     Signed:  Kait Cummings M.D.  12/5/2020  10:30 AM

## 2020-12-05 NOTE — PROGRESS NOTES
Dr. Ollie Alvarado contacted at this time regarding consult, message left per Dr. Stacey Balderas request. Dr. Ollie Alvarado previously consulted per ED for this patient. Awaiting response.

## 2020-12-05 NOTE — PROGRESS NOTES
Discharge instructions reviewed with patient. Education provided regarding new medications, plan of care, and follow up appointments. Patient voices understanding of all, denies questions or concerns at this time.

## 2020-12-05 NOTE — PLAN OF CARE
Problem: Activity:  Goal: Ability to tolerate increased activity will improve  Description: Ability to tolerate increased activity will improve  Outcome: Ongoing  Note: Patient moves around independently in room and tolerates well. Will continue to monitor. Goal: Expression of feelings of increased energy will increase  Description: Expression of feelings of increased energy will increase  Outcome: Ongoing     Problem: Cardiac:  Goal: Ability to maintain an adequate cardiac output will improve  Description: Ability to maintain an adequate cardiac output will improve  Outcome: Ongoing  Goal: Complications related to the disease process, condition or treatment will be avoided or minimized  Description: Complications related to the disease process, condition or treatment will be avoided or minimized  Outcome: Ongoing     Problem: Coping:  Goal: Level of anxiety will decrease  Description: Level of anxiety will decrease  Outcome: Ongoing  Note: Patient expresses concern over being in a hospital with covid patients. Educated patient on all the precautions in place to prevent transmission of virus to otherwise healthy patient. Patient verbalized feeling less anxious about the situation. Goal: General experience of comfort will improve  Description: General experience of comfort will improve  Outcome: Ongoing     Problem: Health Behavior:  Goal: Ability to manage health-related needs will improve  Description: Ability to manage health-related needs will improve  Outcome: Ongoing     Problem: Safety:  Goal: Ability to remain free from injury will improve  Description: Ability to remain free from injury will improve  Outcome: Ongoing  Note: Patient free from injury at this time.

## 2020-12-05 NOTE — H&P
°F (36.4 °C)  BP: (!) 133/90  Resp: 16  Pulse: 92  SpO2: 99 %  -----------------------------------------------------------------  Exam:  GEN:    Awake, alert and oriented x3. EYES:  EOMI, pupils equal   NECK: Supple. No lymphadenopathy. No carotid bruit  CVS:    irregularly irregular, no audible murmur, rate in 90's to 110  PULM:  CTA, no wheezes, rales or rhonchi, no acute respiratory distress  ABD:    Bowels sounds normal.  Abdomen is soft. No distention. no tenderness to palpation. EXT:   no edema bilaterally . No calf tenderness. NEURO: Moves all extremities. Motor and sensory are grossly intact  SKIN:  No rashes. No skin lesions.    -----------------------------------------------------------------  Diagnostic Data:   · All diagnostic data was reviewed  Lab Results   Component Value Date    WBC 7.3 12/05/2020    HGB 14.1 12/05/2020    MCV 91.3 12/05/2020     12/05/2020      Lab Results   Component Value Date    GLUCOSE 91 12/05/2020    BUN 21 12/05/2020    CREATININE 0.92 12/05/2020     (L) 12/05/2020    K 4.0 12/05/2020    CALCIUM 9.0 12/05/2020     12/05/2020    CO2 26 12/05/2020     No results found for: [address], RBCUA, EPITHUA, LEUKOCYTESUR, SPECGRAV, GLUCOSEU, KETUA, PROTEINU, HGBUR, CASTUA, CRYSTUA, BACTERIA, YEAST    Assessment:     Active Problems:    New onset atrial fibrillation ([address]    Essential hypertension  Resolved Problems:    * No resolved hospital problems.  *  [id number] Score for Atrial Fibrillation Stroke Risk   Risk   Factors  Component Value   C CHF No 0   H HTN Yes 1   A2 Age >= 76 No,  (62 y.o.) 0   D DM No 0   S2 Prior Stroke/TIA No 0   V Vascular Disease No 0   A Age 74-69 No,  (62 y.o.) 0   Sc Sex male 0    [id number]  Score  1   Score last updated 12/5/20 3:95 AM EST    Click here for a link to the UpToDate guideline \"Atrial Fibrillation: Anticoagulation therapy to prevent embolization    Disclaimer: Risk Score calculation is dependent on accuracy of patient problem list and past encounter diagnosis. Plan:     · This patient requires overnight observation because of new onset of afib for rate control,anticoagulation. His score is 1 -will have cardiology input for long term anticoagulation  · Factors affecting the medical complexity of this patient include htn  · Estimated length of stay is 1 days  · Discussed patient's symptoms and data results including labs and imaging studies with the ER MD at time of admission  · High risk drug monitoring: none  · Cardiology consult    CORE MEASURES  · DVT prophylaxis: Lovenox  · Decubitus ulcer present on admission: No  · CODE STATUS: FULL CODE  · Nutrition Status: good   · Physical therapy: NA   · Old Charts reviewed: Yes  · EKG Reviewed:  Yes  · Advance Directive Addressed: Yes    Adam Armstrong M.D.  12/5/2020  9:08 AM

## 2020-12-07 ENCOUNTER — HOSPITAL ENCOUNTER (OUTPATIENT)
Dept: NON INVASIVE DIAGNOSTICS | Age: 60
Discharge: HOME | End: 2020-12-07
Payer: MEDICARE

## 2020-12-07 ENCOUNTER — OFFICE VISIT (OUTPATIENT)
Dept: CARDIOLOGY | Age: 60
End: 2020-12-07
Payer: MEDICARE

## 2020-12-07 VITALS
RESPIRATION RATE: 18 BRPM | BODY MASS INDEX: 26.32 KG/M2 | WEIGHT: 188 LBS | SYSTOLIC BLOOD PRESSURE: 122 MMHG | DIASTOLIC BLOOD PRESSURE: 89 MMHG | HEIGHT: 71 IN | OXYGEN SATURATION: 90 % | HEART RATE: 63 BPM

## 2020-12-07 LAB
LV EF: 40 %
LVEF MODALITY: NORMAL

## 2020-12-07 PROCEDURE — 93306 TTE W/DOPPLER COMPLETE: CPT

## 2020-12-07 PROCEDURE — 1111F DSCHRG MED/CURRENT MED MERGE: CPT | Performed by: INTERNAL MEDICINE

## 2020-12-07 PROCEDURE — G8484 FLU IMMUNIZE NO ADMIN: HCPCS | Performed by: INTERNAL MEDICINE

## 2020-12-07 PROCEDURE — A9500 TC99M SESTAMIBI: HCPCS | Performed by: INTERNAL MEDICINE

## 2020-12-07 PROCEDURE — 93017 CV STRESS TEST TRACING ONLY: CPT

## 2020-12-07 PROCEDURE — 6360000004 HC RX CONTRAST MEDICATION: Performed by: INTERNAL MEDICINE

## 2020-12-07 PROCEDURE — G8419 CALC BMI OUT NRM PARAM NOF/U: HCPCS | Performed by: INTERNAL MEDICINE

## 2020-12-07 PROCEDURE — 3430000000 HC RX DIAGNOSTIC RADIOPHARMACEUTICAL: Performed by: INTERNAL MEDICINE

## 2020-12-07 PROCEDURE — G8427 DOCREV CUR MEDS BY ELIG CLIN: HCPCS | Performed by: INTERNAL MEDICINE

## 2020-12-07 PROCEDURE — 78452 HT MUSCLE IMAGE SPECT MULT: CPT

## 2020-12-07 PROCEDURE — 1036F TOBACCO NON-USER: CPT | Performed by: INTERNAL MEDICINE

## 2020-12-07 PROCEDURE — 6360000002 HC RX W HCPCS: Performed by: INTERNAL MEDICINE

## 2020-12-07 PROCEDURE — 99214 OFFICE O/P EST MOD 30 MIN: CPT | Performed by: INTERNAL MEDICINE

## 2020-12-07 PROCEDURE — 3017F COLORECTAL CA SCREEN DOC REV: CPT | Performed by: INTERNAL MEDICINE

## 2020-12-07 RX ORDER — DIGOXIN 125 MCG
125 TABLET ORAL DAILY
Qty: 90 TABLET | Refills: 3 | Status: ON HOLD | OUTPATIENT
Start: 2020-12-07 | End: 2021-01-14 | Stop reason: HOSPADM

## 2020-12-07 RX ORDER — METOPROLOL SUCCINATE 50 MG/1
50 TABLET, EXTENDED RELEASE ORAL DAILY
Qty: 90 TABLET | Refills: 3 | Status: SHIPPED | OUTPATIENT
Start: 2020-12-07 | End: 2021-01-22 | Stop reason: DRUGHIGH

## 2020-12-07 RX ORDER — LOSARTAN POTASSIUM 25 MG/1
25 TABLET ORAL DAILY
Qty: 90 TABLET | Refills: 3 | Status: SHIPPED | OUTPATIENT
Start: 2020-12-07 | End: 2021-12-14

## 2020-12-07 RX ADMIN — REGADENOSON 0.4 MG: 0.08 INJECTION, SOLUTION INTRAVENOUS at 11:06

## 2020-12-07 RX ADMIN — Medication 30 MILLICURIE: at 11:07

## 2020-12-07 RX ADMIN — Medication 10 MILLICURIE: at 09:32

## 2020-12-07 RX ADMIN — PERFLUTREN 2.2 MG: 6.52 INJECTION, SUSPENSION INTRAVENOUS at 09:30

## 2020-12-07 NOTE — PROGRESS NOTES
Alexx Barrera am scribing for and in the presence of Eddie Irene MD, F.A.C.C. Patient: Ernesto Nelson  : 1960  Date of Admission: (Not on file)  Today's Date: 2020    REASON FOR CONSULTATION: Follow-Up from Hospital (HX: New Onset A-Fib, HTN. Pt was in hospital on  for irregular HB. C/o: lighteaded/dizzines. Denies: Cp, Palpitations, SOB.)      HPI: I had the pleasure of seeing Ernesto Nelson in consultation today. As you know, Mr. Rinku Jarvis is a 56 y.o. male who was admitted on 2020 with new onset atrial fibrillation with RVR leading to this consultation. Mr. Rinku Jarvis does report a history of having palpitations for the past 6 months. He went to an urgent care and found to have atrial fibrillation with rapid ventricular response so he was directed to our emergency room. With regard to his symptoms over the past 6-month, he reported feeling tired and fatigue along with the palpitations. Minimal dizziness and minimal shortness of breath on exertion. He absolutely denies having any chest pain, pressure or tightness. His breathing stayed stable over the past several weeks. No orthopnea or PND but when I asked him about possible symptoms of obstructive sleep apnea syndrome he said he is likely have it. He has poor sleep and wakes up tired in the morning and some times requires naps during the day. I told him we will arrange for sleep apnea study as an outpatient. He denies any presyncope or syncopal episodes. No fever or cough. No abdominal pain, nausea or vomiting. He reported having history of borderline hypertension but he does not have a primary care doctor. He denied any history of diabetes or dyslipidemia. He is lifelong non-smoker. With regard to his family history, he reported that his father and mother had a heart attack in their old age.       Mr. Rinku Jarvis denies any known history of other heart problems in the past including a heart attack, heart failure or other arrhythmias. He denies any syncope or near-syncope in the past.    Mr. Ulices Robledo is here today for follow up after his hospital visit from 12/4/2020 for an new onset atrial fibrillation. Today he had his stress and echo done to better assess the reasons why his heart went into atrial fibrillation. I reviewed these test results with him in detail as below. His echo showed ejection fraction of 40%. No regional wall motion abnormalities. There is some shadowing in the apex but I think this is origin of the papillary muscles. He is anticoagulated anyway. Lexiscan stress test showed low ejection fraction on gated SPECT with ejection fraction being 42% but no perfusion abnormalities favoring nonischemic cardiomyopathy. Again he denies any chest pain, pressure or tightness. He feels better after starting his heart rate control agents. He is taking his Xarelto and I gave him a card and samples today. He does not sleep as well as he thinks he should. He lives alone and is not sure if he snore at night however he is tired through out his day. He does have two parrots that keep him company at his house. He avoids eating late at night to prevent any acid reflux issues that he has had in the past.  He does get mild dizziness at times however no falls or near falls. He does think he keeps himself well hydrated. He works part-time 6 hours a day and there is no change in his physical activities over the past several months. Exercise Tolerance: Mr. Ulices Robledo reports that he has a fairly good exercise tolerance. His says that he could walk 1 mile without developing chest discomfort or significant shortness of breath. Bleeding Risks: Mr. Ulices Robledo denies any current or recent bleeding problems including a history of a GI bleed, ulcers, recent or upcoming surgeries, blood in his stool or black tarry stools or blood in his urine.      Past Medical History:   Diagnosis Date    Hypertension CURRENT ALLERGIES: Pcn [penicillins] REVIEW OF SYSTEMS: 14 systems were reviewed. Pertinent positives and negatives as above, all else negative. No past surgical history on file. Social History:  Social History     Tobacco Use    Smoking status: Never Smoker    Smokeless tobacco: Never Used   Substance Use Topics    Alcohol use: Yes     Binge frequency: Less than monthly     Comment: rarely    Drug use: Never        CURRENT MEDICATIONS:  Outpatient Medications Marked as Taking for the 12/7/20 encounter (Office Visit) with Paige Olvera MD   Medication Sig Dispense Refill    rivaroxaban (XARELTO) 20 MG TABS tablet Take 1 tablet by mouth daily (with breakfast) 30 tablet 0    metoprolol tartrate (LOPRESSOR) 25 MG tablet Take 1 tablet by mouth 2 times daily 60 tablet 3    dilTIAZem (CARDIZEM CD) 120 MG extended release capsule Take 1 capsule by mouth daily 30 capsule 3       FAMILY HISTORY: family history includes Coronary Art Dis in his father and mother. PHYSICAL EXAM:   Physical Examination:     /89 (Site: Left Upper Arm, Position: Sitting, Cuff Size: Medium Adult)   Pulse 63   Resp 18   Ht 5' 11\" (1.803 m)   Wt 188 lb (85.3 kg)   SpO2 90%   BMI 26.22 kg/m²  Body mass index is 26.22 kg/m². Constitutional: He appeared oriented to person and place. He appears well-developed and well-nourished. In no acute distress. HEENT: Normocephalic and atraumatic. No JVD present. Carotid bruit is not present. No mass and no thyromegaly present. No lymphadenopathy noted. Cardiovascular: Normal rate, regularly irregular rhythm, normal heart sounds. Exam reveals no gallop and no friction rubs. No murmur was heard. Pulmonary/Chest: Effort normal and breath sounds normal. No respiratory distress. He has no wheezes, rhonchi or rales. Abdominal: Soft, non-tender. He exhibits no organomegaly, mass or bruit. Extremities: No edema. No cyanosis or clubbing. 2+ radial and carotid pulses.  Distal advised her to consider undergoing a sleep apnea screening which she agreed to do. Therefore I ordered home sleep study for him. FOLLOW UP:   I told Mr. Beard Yoli to call my office if he had any problems, but otherwise told him to Return in about 4 weeks (around 1/4/2021). However, I would be happy to see him sooner should the need arise. Sincerely,  Holly Rivera MD, MS, F.A.C.C. [facility], [address] [facility], [facility], [address], [address] [facility]  Phone: [phone], Fax: [phone]    I believe that the risk of significant morbidity and mortality related to the patient's current medical conditions are: intermediate-high. 25 minutes were spent with the patient and all of their questions were answered. The documentation recorded by the scribe, accurately and completely reflects the services I personally performed and the decisions made by me. Holly Rivera MD, F.A.C.C.  December 7, 2020

## 2020-12-07 NOTE — PATIENT INSTRUCTIONS
SURVEY:    You may be receiving a survey from [employer] regarding your visit today. Please complete the survey to enable us to provide the highest quality of care to you and your family. If you cannot score us a very good on any question, please call the office to discuss how we could have made your experience a very good one. Thank you.

## 2020-12-07 NOTE — PROCEDURES
[address] [facility], [address] [facility]                              CARDIAC STRESS TEST    PATIENT NAME: Lux Hurst                  :        1960  MED REC NO:   [id number]                              ROOM:  ACCOUNT NO:   [de-identified]                           ADMIT DATE: 2020  PROVIDER:     Markell Orourke    CARDIOVASCULAR DIAGNOSTIC DEPARTMENT    DATE OF STUDY:  2020    ORDERING PROVIDER:  Roderick Rangel. Kaya Vasquez MD    INTERPRETING PHYSICIAN:  Rafaela Vasquez MD    PHARMACOLOGIC MYOCARDIAL PERFUSION STRESS TESTING    Rest/Stress single isotope SPECT imaging with exercise stress and gated  SPECT imaging. INDICATIONS:  Assessment of a cardiac cause:  Dyspnea. Atrial fibrillation. CLINICAL HISTORY:  The patient is a 53-year-old man with no known  coronary artery disease. Previous cardiac history includes:  None. Other previous history includes:  Palpitations, diaphoresis, fatigue,  dyspnea, indigestion, heartburn, hypertension. Symptoms just prior to testing include:  None. Relevant medications:  Cardizem. PROCEDURE:  The heart rate was 95 at baseline and iris to 123 beats per  minute during the regadenoson infusion. The rest blood pressure was  141/107 mm/Hg and decreased to 88/64 mm/Hg. The patient did not  complain of any significant symptoms following infusion. Pharmacologic stress testing was performed with regadenoson at a dose of  0.4 mg. Additionally, low level exercise using hand compressions were  performed along with vasodilator infusion. MYOCARDIAL PERFUSION IMAGING:  Imaging was performed at rest 30-45  minutes following the injection of 10 mCi of sestamibi. Approximately  10 seconds after Lexiscan injection, the patient was injected with 30  mCi of sestamibi. Gating post-stress tomographic imaging was performed  30-45 minutes after stress.     STRESS ECG RESULTS:  The resting electrocardiogram demonstrated atrial  fibrillation without significant ST-segment abnormalities that may  impair accurate ECG detection of stress induced cardiac ischemia. During vasodilator infusion and during recovery, the patient developed:    No significant ST segment changes suggestive of myocardial ischemia with  no premature ventricular contractions (PVCs). NUCLEAR IMAGING RESULTS:  The overall quality of the study is fair. No  significant attenuation artifact was seen. There is no evidence of  abnormal lung uptake. Additionally, the right ventricle appears normal.  The left ventricular cavity is noted to be enlarged in size on the  stress images. There is not evidence of transient ischemic dilatation  (TID) of the left ventricle. Gated SPECT imaging demonstrates global hypokinesis. The calculated left  ventricular ejection fraction was 42%. The rest images demonstrated a small perfusion abnormality of mild  intensity in the apical region which is most likely due to artifact. On stress imaging, a small perfusion abnormality of mild intensity in  the apical region which is most likely due to artifact. IMPRESSION:  1. Largely normal myocardial perfusion imaging with soft tissue  artifact, but without significant evidence of myocardial ischemia or  infarction. 2.  Global left ventricular systolic function was abnormal with an  ejection fraction of 42%, without regional wall motion abnormalities. 3.  No significant electrocardiographic evidence of myocardial ischemia  during EKG monitoring without significant associated arrhythmias. Overall, these results are most consistent with a low risk for  reversible ischemia. Although the patient's results were not completely normal, unless  clinical suspicion for significant ongoing coronary artery ischemia is  high, I would not suggest pursuing additional testing by coronary  angiography.     The sensitivity for detecting ischemia on this test may have been  reduced due to the patient being on a calcium channel blocker.           JOSE LUIS Lawson        D: 12/07/2020 13:26:52       T: 12/07/2020 13:28:16     CRISTY/KAYLA  Job#: [id number]     Doc#: Unknown

## 2020-12-21 ENCOUNTER — HOSPITAL ENCOUNTER (OUTPATIENT)
Dept: SLEEP CENTER | Age: 60
Discharge: HOME | End: 2020-12-21
Payer: MEDICARE

## 2020-12-21 PROCEDURE — 95806 SLEEP STUDY UNATT&RESP EFFT: CPT

## 2021-01-04 DIAGNOSIS — I48.91 NEW ONSET ATRIAL FIBRILLATION (HCC): Primary | ICD-10-CM

## 2021-01-05 DIAGNOSIS — G47.33 OSA (OBSTRUCTIVE SLEEP APNEA): Primary | ICD-10-CM

## 2021-01-05 LAB — STATUS: NORMAL

## 2021-01-06 ENCOUNTER — TELEPHONE (OUTPATIENT)
Dept: CARDIOLOGY | Age: 61
End: 2021-01-06

## 2021-01-06 NOTE — TELEPHONE ENCOUNTER
----- Message from Ainsley Nageotte, MD sent at 1/5/2021  9:28 PM EST -----  Mild sleep apnea. CPAP titration study ordered. Please call with questions and/or concerns.   Thank you

## 2021-01-07 ENCOUNTER — HOSPITAL ENCOUNTER (OUTPATIENT)
Dept: LAB | Age: 61
Discharge: HOME | End: 2021-01-07
Payer: MEDICARE

## 2021-01-07 ENCOUNTER — HOSPITAL ENCOUNTER (OUTPATIENT)
Dept: CT IMAGING | Age: 61
Discharge: HOME | End: 2021-01-09
Payer: MEDICARE

## 2021-01-07 ENCOUNTER — HOSPITAL ENCOUNTER (OUTPATIENT)
Dept: NON INVASIVE DIAGNOSTICS | Age: 61
Discharge: HOME | End: 2021-01-07
Payer: MEDICARE

## 2021-01-07 ENCOUNTER — OFFICE VISIT (OUTPATIENT)
Dept: CARDIOLOGY | Age: 61
End: 2021-01-07
Payer: MEDICARE

## 2021-01-07 VITALS
HEIGHT: 71 IN | SYSTOLIC BLOOD PRESSURE: 130 MMHG | WEIGHT: 196.8 LBS | OXYGEN SATURATION: 100 % | BODY MASS INDEX: 27.55 KG/M2 | RESPIRATION RATE: 18 BRPM | HEART RATE: 95 BPM | DIASTOLIC BLOOD PRESSURE: 90 MMHG

## 2021-01-07 DIAGNOSIS — I77.810 DILATED AORTIC ROOT (HCC): ICD-10-CM

## 2021-01-07 DIAGNOSIS — I48.19 ATRIAL FIBRILLATION, PERSISTENT (HCC): ICD-10-CM

## 2021-01-07 DIAGNOSIS — I10 ESSENTIAL HYPERTENSION: ICD-10-CM

## 2021-01-07 DIAGNOSIS — I42.8 NICM (NONISCHEMIC CARDIOMYOPATHY) (HCC): ICD-10-CM

## 2021-01-07 DIAGNOSIS — I48.19 ATRIAL FIBRILLATION, PERSISTENT (HCC): Primary | ICD-10-CM

## 2021-01-07 DIAGNOSIS — G47.33 OSA (OBSTRUCTIVE SLEEP APNEA): ICD-10-CM

## 2021-01-07 LAB
BUN BLDV-MCNC: 22 MG/DL (ref 8–23)
CREAT SERPL-MCNC: 0.98 MG/DL (ref 0.7–1.2)
GFR AFRICAN AMERICAN: >60 ML/MIN
GFR NON-AFRICAN AMERICAN: >60 ML/MIN
GFR SERPL CREATININE-BSD FRML MDRD: NORMAL ML/MIN/{1.73_M2}
GFR SERPL CREATININE-BSD FRML MDRD: NORMAL ML/MIN/{1.73_M2}

## 2021-01-07 PROCEDURE — 36415 COLL VENOUS BLD VENIPUNCTURE: CPT

## 2021-01-07 PROCEDURE — G8427 DOCREV CUR MEDS BY ELIG CLIN: HCPCS | Performed by: INTERNAL MEDICINE

## 2021-01-07 PROCEDURE — 3017F COLORECTAL CA SCREEN DOC REV: CPT | Performed by: INTERNAL MEDICINE

## 2021-01-07 PROCEDURE — 82565 ASSAY OF CREATININE: CPT

## 2021-01-07 PROCEDURE — G8419 CALC BMI OUT NRM PARAM NOF/U: HCPCS | Performed by: INTERNAL MEDICINE

## 2021-01-07 PROCEDURE — 6360000004 HC RX CONTRAST MEDICATION: Performed by: INTERNAL MEDICINE

## 2021-01-07 PROCEDURE — 99215 OFFICE O/P EST HI 40 MIN: CPT | Performed by: INTERNAL MEDICINE

## 2021-01-07 PROCEDURE — C8929 TTE W OR WO FOL WCON,DOPPLER: HCPCS

## 2021-01-07 PROCEDURE — 71260 CT THORAX DX C+: CPT

## 2021-01-07 PROCEDURE — 84520 ASSAY OF UREA NITROGEN: CPT

## 2021-01-07 PROCEDURE — 1036F TOBACCO NON-USER: CPT | Performed by: INTERNAL MEDICINE

## 2021-01-07 PROCEDURE — G8484 FLU IMMUNIZE NO ADMIN: HCPCS | Performed by: INTERNAL MEDICINE

## 2021-01-07 RX ADMIN — IOPAMIDOL 75 ML: 755 INJECTION, SOLUTION INTRAVENOUS at 14:02

## 2021-01-07 RX ADMIN — PERFLUTREN 1.65 MG: 6.52 INJECTION, SUSPENSION INTRAVENOUS at 12:13

## 2021-01-07 NOTE — PROGRESS NOTES
Jossie Quintanilla am scribing for and in the presence of Saranya Brian MD, F.A.C.C. Patient: Katie Wright  : 1960  Date of Admission: (Not on file)  Today's Date: 2021    REASON FOR CONSULTATION: 1 Month Follow-Up (Patient here for 4 week follow up. Hx of Afib, NICU, HTN, MICHELLE, Tiredness, GERD. Patient states he has occasional chest pain. Denies SOB, palpitations and dizziness. )      HPI: I had the pleasure of seeing Katie Wright in consultation today. As you know, Mr. David Billings is a 56 y.o. male who was admitted on 2020 with new onset atrial fibrillation with RVR leading to cardiac consultation and his most recent work-up. Mr. David Billings does report a history of having palpitations for the past 6 months. He went to an urgent care and found to have atrial fibrillation with rapid ventricular response so he was directed to our emergency room. With regard to his symptoms over the past 6-month, he reported feeling tired and fatigue along with the palpitations. Minimal dizziness and minimal shortness of breath on exertion. He absolutely denies having any chest pain, pressure or tightness. His breathing stayed stable over the past several weeks. No orthopnea or PND but when I asked him about possible symptoms of obstructive sleep apnea syndrome he said he is likely have it. He has poor sleep and wakes up tired in the morning and some times requires naps during the day. I told him we will arrange for sleep apnea study as an outpatient. He denies any presyncope or syncopal episodes. No fever or cough. No abdominal pain, nausea or vomiting. He reported having history of borderline hypertension but he does not have a primary care doctor. He denied any history of diabetes or dyslipidemia. He is lifelong non-smoker. With regard to his family history, he reported that his father and mother had a heart attack in their old age. Mr. Marvin Hassan denies any known history of other heart problems in the past including a heart attack, heart failure or other arrhythmias. He denies any syncope or near-syncope in the past.      His echo showed ejection fraction of 40%. No regional wall motion abnormalities. There is some shadowing in the apex but I think this is origin of the papillary muscles. He is anticoagulated anyway. Lexiscan stress test showed low ejection fraction on gated SPECT with ejection fraction being 42% but no perfusion abnormalities favoring nonischemic cardiomyopathy. He does not sleep as well as he thinks he should. He lives alone and is not sure if he snore at night however he is tired through out his day. He does have two parrots that keep him company at his house. He avoids eating late at night to prevent any acid reflux issues that he has had in the past.  He does get mild dizziness at times however no falls or near falls. He does think he keeps himself well hydrated. He works part-time 6 hours a day and there is no change in his physical activities over the past several months. Stress test done 12/7/2020: EF 42%  Largely normal myocardial perfusion imaging with soft tissue artifact, but without significant evidence of myocardial ischemia or infarction. Echo done 12/7/2020: EF 40% Mildly increased left ventricular wall thickness with a normal left ventricular cavity size. Moderate global hypokinesis with no regional abnormalities. The left atrium is moderately dilated (34-39) with a left atrial volume index of 34 ml/m2. Mild mitral and tricuspid regurgitation. Diastolic function cannot be properly assessed due to atrial fibrillation. The aortic root is moderately dilated (>4.5cm) when corrected for body  surface area. Sleep study done 12/21/2020 shows mild sleep apnea. Pending cpap titration. Mr. Pj Morales is here today for follow up of chest pain. He continues to have chest pain less frequent and not as bad as previous. Has headaches mostly in the evening while watching TV he attributes this to his head positioning while watching the TV. No lightheaded or dizziness. Denies blood in stool or urine. Denies nausea, stomach pain, vomiting, fever or cough. Bleeding Risks: Mr. Pj Morales denies any current or recent bleeding problems including a history of a GI bleed, ulcers, recent or upcoming surgeries, blood in his stool or black tarry stools or blood in his urine. Past Medical History:   Diagnosis Date    Hypertension    Persistent atrial fibrillation. Dilated aortic root. Compression fracture of T4 and T5.    CURRENT ALLERGIES: Pcn [penicillins] REVIEW OF SYSTEMS: 14 systems were reviewed. Pertinent positives and negatives as above, all else negative. No past surgical history on file. Social History:  Social History     Tobacco Use    Smoking status: Never Smoker    Smokeless tobacco: Never Used   Substance Use Topics    Alcohol use: Yes     Binge frequency: Less than monthly     Comment: rarely    Drug use: Never        CURRENT MEDICATIONS:  Outpatient Medications Marked as Taking for the 1/7/21 encounter (Office Visit) with Yasmeen Garsia MD   Medication Sig Dispense Refill    rivaroxaban (XARELTO) 20 MG TABS tablet Take 1 tablet by mouth daily (with breakfast) 90 tablet 3    metoprolol succinate (TOPROL XL) 50 MG extended release tablet Take 1 tablet by mouth daily 90 tablet 3    losartan (COZAAR) 25 MG tablet Take 1 tablet by mouth daily 90 tablet 3    digoxin (LANOXIN) 125 MCG tablet Take 1 tablet by mouth daily 90 tablet 3       FAMILY HISTORY: family history includes Coronary Art Dis in his father and mother.      PHYSICAL EXAM:   Physical Examination: BP (!) 130/90 (Site: Right Upper Arm, Position: Sitting, Cuff Size: Medium Adult)   Pulse 95   Resp 18   Ht 5' 11\" (1.803 m)   Wt 196 lb 12.8 oz (89.3 kg)   SpO2 100%   BMI 27.45 kg/m²  Body mass index is 27.45 kg/m². Constitutional: He appeared oriented to person and place. He appears well-developed and well-nourished. In no acute distress. HEENT: Normocephalic and atraumatic. No JVD present. Carotid bruit is not present. No mass and no thyromegaly present. No lymphadenopathy noted. Cardiovascular: Normal rate, regularly irregular rhythm, normal heart sounds. Exam reveals no gallop and no friction rubs. No murmur was heard. Pulmonary/Chest: Effort normal and breath sounds normal. No respiratory distress. He has no wheezes, rhonchi or rales. Abdominal: Soft, non-tender. He exhibits no organomegaly, mass or bruit. Extremities: No edema. No cyanosis or clubbing. 2+ radial and carotid pulses. Distal extremity pulses: 2+ bilaterally. Neurological: Alertness and orientation as per Constitutional exam. No evidence of gross cranial nerve deficit. Coordination appeared normal.   Skin: Skin is warm and dry. There is no rash or diaphoresis. Psychiatric: He has a normal mood and affect. His speech is normal and behavior is normal.        MOST RECENT LABS ON RECORD:   Lab Results   Component Value Date    WBC 7.3 12/05/2020    HGB 14.1 12/05/2020    HCT 43.9 12/05/2020     12/05/2020    ALT 16 12/05/2020    AST 20 12/05/2020     (L) 12/05/2020    K 4.0 12/05/2020     12/05/2020    CREATININE 0.92 12/05/2020    BUN 21 12/05/2020    CO2 26 12/05/2020    TSH 1.19 12/04/2020    INR 1.1 12/04/2020         ASSESSMENT:  Patient Active Problem List    Diagnosis Date Noted    Essential hypertension 12/05/2020    New onset atrial fibrillation ([address] 12/04/2020     Mayo was seen today for 1 month follow-up.     Diagnoses and all orders for this visit:    Dilated aortic root ([address] -     CT CHEST W CONTRAST; Future    Atrial fibrillation, persistent ([address]  -     Echo limited; Future  -     Referral to Cardiac Cath; Future  -     Cancel: CT CHEST W CONTRAST; Future    NICM (nonischemic cardiomyopathy) ([address]  -     Echo limited; Future  -     Referral to Cardiac Cath; Future  -     Cancel: CT CHEST W CONTRAST; Future    Essential hypertension  -     Echo limited; Future  -     Referral to Cardiac Cath; Future  -     Cancel: CT CHEST W CONTRAST; Future    MICHELLE (obstructive sleep apnea)  -     Echo limited; Future  -     Referral to Cardiac Cath; Future  -     Cancel: CT CHEST W CONTRAST; Future      PLAN:  Persistent Atrial Fibrillation: Rate Control, he has his Echo and Stress test done today and his EF in his Echo was 42% and his stress test showed no myocardial perfusion abnormalities. We did review the etiology of atrial fibrillation and went over his testing in detail. I answered all of his questions that he had as well. Beta Blocker: Continue Metoprolol succinate (Toprol XL) 50 mg daily. Calcium Channel Blocker:  Continue Digoxin 125 mcg daily. Stroke Risk: His CHADS2-VASc score is 1/9 (1.3% stroke risk)  Anticoagulation: Continue Riveroxiban (Xarelto) 20 mg daily. I also reminded him to watch for signs of blood in his stool or black tarry stools and stop the medication immediately if this develops as this could be life threatening. I told Mr. Bernard Yoli to call my office if he had any problems, but otherwise told him to Return in about 4 weeks (around 2/4/2021). However, I would be happy to see him sooner should the need arise. Sincerely,  Vikram Ying MD, MS, F.A.C.C. [facility]) [facility], [address], [facility], [address] [facility], [address] [facility]  Phone: [phone], Fax: [phone]    I believe that the risk of significant morbidity and mortality related to the patient's current medical conditions are: intermediate-high. 25 minutes were spent with the patient and all of their questions were answered. The documentation recorded by the scribe, accurately and completely reflects the services I personally performed and the decisions made by me. Vikram Ying MD, F.A.C.C.  January 7, 2021 January 7, 2021

## 2021-01-07 NOTE — PATIENT INSTRUCTIONS
SURVEY:    You may be receiving a survey from [employer] regarding your visit today. Please complete the survey to enable us to provide the highest quality of care to you and your family. If you cannot score us a very good on any question, please call the office to discuss how we could have made your experience a very good one. Thank you.

## 2021-01-08 ENCOUNTER — TELEPHONE (OUTPATIENT)
Dept: CARDIOLOGY | Age: 61
End: 2021-01-08

## 2021-01-08 NOTE — TELEPHONE ENCOUNTER
----- Message from Jb Escobedo MD sent at 1/7/2021  9:18 PM EST -----  Heart function slightly better. CT scan of the chest showed no significant aortic aneurysm. Just dilated aortic root which we will keep an eye on it. Continue current therapy and follow-up. Please call with questions and/or concerns.   Thank you thank you

## 2021-01-14 ENCOUNTER — ANESTHESIA EVENT (OUTPATIENT)
Dept: CARDIAC CATH/INVASIVE PROCEDURES | Age: 61
End: 2021-01-14
Payer: MEDICARE

## 2021-01-14 ENCOUNTER — ANESTHESIA (OUTPATIENT)
Dept: CARDIAC CATH/INVASIVE PROCEDURES | Age: 61
End: 2021-01-14
Payer: MEDICARE

## 2021-01-14 ENCOUNTER — HOSPITAL ENCOUNTER (OUTPATIENT)
Dept: CARDIAC CATH/INVASIVE PROCEDURES | Age: 61
Discharge: HOME | End: 2021-01-14
Attending: FAMILY MEDICINE | Admitting: FAMILY MEDICINE
Payer: MEDICARE

## 2021-01-14 VITALS
OXYGEN SATURATION: 99 % | RESPIRATION RATE: 16 BRPM | WEIGHT: 196 LBS | SYSTOLIC BLOOD PRESSURE: 144 MMHG | HEART RATE: 60 BPM | DIASTOLIC BLOOD PRESSURE: 87 MMHG | TEMPERATURE: 97.4 F | BODY MASS INDEX: 27.44 KG/M2 | HEIGHT: 71 IN

## 2021-01-14 VITALS — DIASTOLIC BLOOD PRESSURE: 71 MMHG | SYSTOLIC BLOOD PRESSURE: 146 MMHG | OXYGEN SATURATION: 99 %

## 2021-01-14 LAB
EKG ATRIAL RATE: 69 BPM
EKG P AXIS: 22 DEGREES
EKG P-R INTERVAL: 180 MS
EKG Q-T INTERVAL: 388 MS
EKG QRS DURATION: 92 MS
EKG QTC CALCULATION (BAZETT): 415 MS
EKG R AXIS: 10 DEGREES
EKG T AXIS: -33 DEGREES
EKG VENTRICULAR RATE: 69 BPM
SARS-COV-2, RAPID: NOT DETECTED
SARS-COV-2: NORMAL
SARS-COV-2: NORMAL
SOURCE: NORMAL

## 2021-01-14 PROCEDURE — 93005 ELECTROCARDIOGRAM TRACING: CPT

## 2021-01-14 PROCEDURE — 94761 N-INVAS EAR/PLS OXIMETRY MLT: CPT

## 2021-01-14 PROCEDURE — 92960 CARDIOVERSION ELECTRIC EXT: CPT | Performed by: INTERNAL MEDICINE

## 2021-01-14 PROCEDURE — 2580000003 HC RX 258: Performed by: FAMILY MEDICINE

## 2021-01-14 PROCEDURE — 3700000001 HC ADD 15 MINUTES (ANESTHESIA)

## 2021-01-14 PROCEDURE — U0002 COVID-19 LAB TEST NON-CDC: HCPCS

## 2021-01-14 PROCEDURE — 2500000003 HC RX 250 WO HCPCS: Performed by: NURSE ANESTHETIST, CERTIFIED REGISTERED

## 2021-01-14 PROCEDURE — 3700000000 HC ANESTHESIA ATTENDED CARE

## 2021-01-14 PROCEDURE — 6360000002 HC RX W HCPCS: Performed by: NURSE ANESTHETIST, CERTIFIED REGISTERED

## 2021-01-14 PROCEDURE — 2580000003 HC RX 258: Performed by: INTERNAL MEDICINE

## 2021-01-14 PROCEDURE — 6360000002 HC RX W HCPCS: Performed by: INTERNAL MEDICINE

## 2021-01-14 RX ORDER — AMIODARONE HYDROCHLORIDE 200 MG/1
200 TABLET ORAL DAILY
Qty: 30 TABLET | Refills: 3 | Status: SHIPPED | OUTPATIENT
Start: 2021-01-14 | End: 2021-05-19

## 2021-01-14 RX ORDER — SODIUM CHLORIDE 9 MG/ML
INJECTION, SOLUTION INTRAVENOUS CONTINUOUS
Status: DISCONTINUED | OUTPATIENT
Start: 2021-01-14 | End: 2021-01-14 | Stop reason: HOSPADM

## 2021-01-14 RX ORDER — SODIUM CHLORIDE 0.9 % (FLUSH) 0.9 %
10 SYRINGE (ML) INJECTION PRN
Status: DISCONTINUED | OUTPATIENT
Start: 2021-01-14 | End: 2021-01-14 | Stop reason: HOSPADM

## 2021-01-14 RX ORDER — SODIUM CHLORIDE 0.9 % (FLUSH) 0.9 %
10 SYRINGE (ML) INJECTION EVERY 12 HOURS SCHEDULED
Status: DISCONTINUED | OUTPATIENT
Start: 2021-01-14 | End: 2021-01-14 | Stop reason: HOSPADM

## 2021-01-14 RX ORDER — LIDOCAINE HYDROCHLORIDE 20 MG/ML
INJECTION, SOLUTION INFILTRATION; PERINEURAL PRN
Status: DISCONTINUED | OUTPATIENT
Start: 2021-01-14 | End: 2021-01-14 | Stop reason: SDUPTHER

## 2021-01-14 RX ORDER — PROPOFOL 10 MG/ML
INJECTION, EMULSION INTRAVENOUS PRN
Status: DISCONTINUED | OUTPATIENT
Start: 2021-01-14 | End: 2021-01-14 | Stop reason: SDUPTHER

## 2021-01-14 RX ADMIN — PROPOFOL 40 MG: 10 INJECTION, EMULSION INTRAVENOUS at 08:25

## 2021-01-14 RX ADMIN — LIDOCAINE HYDROCHLORIDE 60 MG: 20 INJECTION, SOLUTION INFILTRATION; PERINEURAL at 08:24

## 2021-01-14 RX ADMIN — SODIUM CHLORIDE: 9 INJECTION, SOLUTION INTRAVENOUS at 08:11

## 2021-01-14 RX ADMIN — PROPOFOL 40 MG: 10 INJECTION, EMULSION INTRAVENOUS at 08:26

## 2021-01-14 RX ADMIN — PROPOFOL 60 MG: 10 INJECTION, EMULSION INTRAVENOUS at 08:24

## 2021-01-14 RX ADMIN — AMIODARONE HYDROCHLORIDE 150 MG: 50 INJECTION, SOLUTION INTRAVENOUS at 08:42

## 2021-01-14 NOTE — ANESTHESIA PRE PROCEDURE
Department of Anesthesiology  Preprocedure Note       Name:  Santo Feliciano   Age:  56 y.o.  :  1960                                          MRN:  [id number]         Date:  2021      Surgeon: * Surgery not found *    Procedure:     Medications prior to admission:   Prior to Admission medications    Medication Sig Start Date End Date Taking? Authorizing Provider   rivaroxaban (XARELTO) 20 MG TABS tablet Take 1 tablet by mouth daily (with breakfast) 21  Yes Caitlin Hair MD   metoprolol succinate (TOPROL XL) 50 MG extended release tablet Take 1 tablet by mouth daily 20  Yes Caitlin Hair MD   losartan (COZAAR) 25 MG tablet Take 1 tablet by mouth daily 20  Yes Caitlin Hair MD   digoxin [facility]) 125 MCG tablet Take 1 tablet by mouth daily 20  Yes Caitlin Hair MD       Current medications:    Current Facility-Administered Medications   Medication Dose Route Frequency Provider Last Rate Last Admin    0.9 % sodium chloride infusion   Intravenous Continuous Cinthya White MD           Allergies: Allergies   Allergen Reactions    Pcn [Penicillins]        Problem List:    Patient Active Problem List   Diagnosis Code    New onset atrial fibrillation (HCC) I48.91    Essential hypertension I10       Past Medical History:        Diagnosis Date    Hyperlipidemia     Hypertension        Past Surgical History:  History reviewed. No pertinent surgical history.     Social History:    Social History     Tobacco Use    Smoking status: Never Smoker    Smokeless tobacco: Never Used   Substance Use Topics    Alcohol use: Yes     Binge frequency: Less than monthly     Comment: rarely                                Counseling given: Not Answered      Vital Signs (Current):   Vitals:    21 0737   BP: (!) 161/97   Pulse: 96   Resp: 18   Temp: 36.3 °C (97.4 °F)   TempSrc: Temporal   SpO2: 99%   Weight: 196 lb (88.9 kg)   Height: 5' 11\" (1.803 m) Pulmonary:normal exam  breath sounds clear to auscultation  (+) sleep apnea: on noncompliant,                             Cardiovascular:    (+) hypertension:, dysrhythmias: atrial fibrillation,       ECG reviewed          Cleared by cardiology              Neuro/Psych:   Negative Neuro/Psych ROS              GI/Hepatic/Renal:   (+) GERD: well controlled,           Endo/Other:    (+) blood dyscrasia: anticoagulation therapy:., .                 Abdominal:           Vascular: negative vascular ROS. Anesthesia Plan      general and TIVA     ASA 2       Induction: intravenous. Anesthetic plan and risks discussed with patient.                       JASPREET Villatoro - CRNA   1/14/2021

## 2021-01-14 NOTE — PROGRESS NOTES
IV push amiodarone completed per D. Claudene Saran RN at bedside. Patient remains in [facility] @ 60. Breakfast ordered. Friend at bedside.  Follow up appointment made for 1 week per Dr. Verdis Leyden request.

## 2021-01-14 NOTE — ANESTHESIA POSTPROCEDURE EVALUATION
Department of Anesthesiology  Postprocedure Note    Patient: Lizbeth Moran  MRN: [id number]  YOB: 1960  Date of evaluation: 1/14/2021  Time:  9:22 AM     Procedure Summary     Date: 01/14/21 Room / Location: [facility] CATH LAB    Anesthesia Start: 0820 Anesthesia Stop: 2902    Procedure: CARDIOVERSION Diagnosis:       Other persistent atrial fibrillation      Other cardiomyopathies      Essential (primary) hypertension      Obstructive sleep apnea (adult) (pediatric)    Scheduled Providers:  Responsible Provider: JASPREET Hope CRNA    Anesthesia Type: general, TIVA ASA Status: 2          Anesthesia Type: general, TIVA    Elaine Phase I:      Elaine Phase II:      Last vitals: Reviewed and per EMR flowsheets.        Anesthesia Post Evaluation    Patient location during evaluation: PACU  Patient participation: complete - patient participated  Level of consciousness: awake and alert  Pain score: 0  Airway patency: patent  Nausea & Vomiting: no nausea and no vomiting  Complications: no  Cardiovascular status: blood pressure returned to baseline  Respiratory status: acceptable  Hydration status: euvolemic

## 2021-01-14 NOTE — PROGRESS NOTES
Patient returned to post procedure area. Patient alert. IV push amiodarone being prepared by SAWYER Maria RN per Dr. Shu Huber instruction.

## 2021-01-14 NOTE — PROGRESS NOTES
Inpatients must meet criteria 1 through 7.   1. Minimum 30 minutes after last dose of sedative medication, minimum 120 minutes after last dose of reversal agent. Yes   2. Systolic BP stable within 20 mmHg for 30 minutes & systolic BP between 90 & 846 or within 10 mmHg of baseline. Yes   3. Pulse between 60 and 100 or within 10 bpm of baseline. Yes   4. Spontaneous respiratory rate >/= 10 per minute. Yes   5. SaO2 >/= 95 or >/= baseline. Yes   6. Able to cough and swallow or return to baseline function. Yes   7. Alert and oriented or return to baseline mental status. Yes   8. Demonstrates controlled, coordinated movements, ambulates with steady gait, or return to baseline activity function. Yes   9. Minimal or no pain or nausea, or at a level tolerable and acceptable to patient. Yes   10. Takes and retains oral fluids as allowed. Yes   11. Procedural / perioperative site stable. Minimal or no bleeding. Yes   12. If GI endoscopy procedure, minimal or no abdominal distention or passing flatus. N/A   13. Written discharge instructions and emergency telephone number provided. Yes   14. Accompanied by a responsible adult. Yes   Adult patient discharged from facility without responsible person meets above criteria plus the following:   a) remains awake without stimulus for 30 minutes   b) oriented appropriate for age   c) all vital signs stable   d) no significant risk of losing protective reflexes   e) able to maintain pre-procedure mobility without assistance   f) no nausea or dizziness   g) transportation arrangements that do not require patient to operate motor Vehicle.    N/A

## 2021-01-14 NOTE — PROGRESS NOTES
Discharge instructions reviewed with patient and friend. Both report understanding of restrictions, medications and follow up. Patient ate breakfast without problem. Fully awake. IV removed. Patient dressed and discharged home with belongings and friend.

## 2021-01-14 NOTE — OP NOTE
Operative Note      Mr. Pj Morales   YOB: 1960   [id number]    Procedure: Cardioversion    Pre-operative Diagnosis: Persistent atrial fibrillation, symptomatic    Post-operative Diagnosis: Successful cardioversion to NSR with 250J biphasic    Anesthesia: [facility]    Surgeons/Assistants: Kiki    Estimated Blood Loss: None    Complications: None    Post cardioversion, patient has short runs of atrial tachycardia. We are giving him 150 mg of intravenous amiodarone over 10 minutes and I will start him on oral amiodarone. Plan to see him back in 1 week for reevaluation. We may consider ablation if he did not maintain sinus rhythm. Andres Valera MD, MS, F.A.C.C.   [facility]) [facility], [address] [facility], [address]  Phone: [phone], Fax: [phone]'

## 2021-01-22 ENCOUNTER — OFFICE VISIT (OUTPATIENT)
Dept: CARDIOLOGY | Age: 61
End: 2021-01-22
Payer: MEDICARE

## 2021-01-22 VITALS
RESPIRATION RATE: 17 BRPM | DIASTOLIC BLOOD PRESSURE: 79 MMHG | BODY MASS INDEX: 27.38 KG/M2 | HEIGHT: 71 IN | HEART RATE: 54 BPM | SYSTOLIC BLOOD PRESSURE: 136 MMHG | OXYGEN SATURATION: 100 % | WEIGHT: 195.6 LBS

## 2021-01-22 DIAGNOSIS — I42.8 NICM (NONISCHEMIC CARDIOMYOPATHY) (HCC): ICD-10-CM

## 2021-01-22 DIAGNOSIS — I48.0 PAF (PAROXYSMAL ATRIAL FIBRILLATION) (HCC): Primary | ICD-10-CM

## 2021-01-22 DIAGNOSIS — G47.33 OSA (OBSTRUCTIVE SLEEP APNEA): ICD-10-CM

## 2021-01-22 DIAGNOSIS — I10 ESSENTIAL HYPERTENSION: ICD-10-CM

## 2021-01-22 PROCEDURE — 1036F TOBACCO NON-USER: CPT | Performed by: INTERNAL MEDICINE

## 2021-01-22 PROCEDURE — 99214 OFFICE O/P EST MOD 30 MIN: CPT | Performed by: INTERNAL MEDICINE

## 2021-01-22 PROCEDURE — 3017F COLORECTAL CA SCREEN DOC REV: CPT | Performed by: INTERNAL MEDICINE

## 2021-01-22 PROCEDURE — G8419 CALC BMI OUT NRM PARAM NOF/U: HCPCS | Performed by: INTERNAL MEDICINE

## 2021-01-22 PROCEDURE — G8427 DOCREV CUR MEDS BY ELIG CLIN: HCPCS | Performed by: INTERNAL MEDICINE

## 2021-01-22 PROCEDURE — G8484 FLU IMMUNIZE NO ADMIN: HCPCS | Performed by: INTERNAL MEDICINE

## 2021-01-22 PROCEDURE — 93000 ELECTROCARDIOGRAM COMPLETE: CPT | Performed by: INTERNAL MEDICINE

## 2021-01-22 RX ORDER — METOPROLOL SUCCINATE 25 MG/1
25 TABLET, EXTENDED RELEASE ORAL DAILY
Qty: 90 TABLET | Refills: 3 | Status: SHIPPED | OUTPATIENT
Start: 2021-01-22 | End: 2022-02-14

## 2021-01-22 NOTE — PROGRESS NOTES
Princess Hudson am scribing for and in the presence of Tess Monaco MD, F.A.C.C. Patient: Kayli Vernon  : 1960  Date of Admission: (Not on file)  Today's Date: 2021    REASON FOR CONSULTATION: Follow-up (Hx: Afib, NICM,HTN,MICHELLE,Dilated Aortic Root. Cardioversion on . He has been doing good. BP high at home. Denies: CP, SOB, Lighteaded/dizziness, Palpitations. )      HPI: I had the pleasure of seeing Kayli Vernon in consultation today. As you know, Mr. Brady Moraes is a 56 y.o. male who was admitted on 2020 with new onset atrial fibrillation with RVR leading to cardiac consultation and his most recent work-up. Mr. Brady Moraes does report a history of having palpitations for the past 6 months. He went to an urgent care and found to have atrial fibrillation with rapid ventricular response so he was directed to our emergency room. With regard to his symptoms over the past 6-month, he reported feeling tired and fatigue along with the palpitations. Minimal dizziness and minimal shortness of breath on exertion. He absolutely denies having any chest pain, pressure or tightness. His breathing stayed stable over the past several weeks. No orthopnea or PND but when I asked him about possible symptoms of obstructive sleep apnea syndrome he said he is likely have it. He has poor sleep and wakes up tired in the morning and some times requires naps during the day. I told him we will arrange for sleep apnea study as an outpatient. He denies any presyncope or syncopal episodes. No fever or cough. No abdominal pain, nausea or vomiting. He reported having history of borderline hypertension but he does not have a primary care doctor. He denied any history of diabetes or dyslipidemia. He is lifelong non-smoker. With regard to his family history, he reported that his father and mother had a heart attack in their old age. Bleeding Risks: Mr. Duane Burton denies any current or recent bleeding problems including a history of a GI bleed, ulcers, recent or upcoming surgeries, blood in his stool or black tarry stools or blood in his urine. Past Medical History:   Diagnosis Date    Hyperlipidemia     Hypertension    Persistent atrial fibrillation. Dilated aortic root. Compression fracture of T4 and T5.    CURRENT ALLERGIES: Pcn [penicillins] REVIEW OF SYSTEMS: 14 systems were reviewed. Pertinent positives and negatives as above, all else negative. No past surgical history on file. Social History:  Social History     Tobacco Use    Smoking status: Never Smoker    Smokeless tobacco: Never Used   Substance Use Topics    Alcohol use: Yes     Binge frequency: Less than monthly     Comment: rarely    Drug use: Never        CURRENT MEDICATIONS:  Outpatient Medications Marked as Taking for the 1/22/21 encounter (Office Visit) with Essence Freitas MD   Medication Sig Dispense Refill    amiodarone (CORDARONE) 200 MG tablet Take 1 tablet by mouth daily 30 tablet 3    rivaroxaban (XARELTO) 20 MG TABS tablet Take 1 tablet by mouth daily (with breakfast) 90 tablet 3    metoprolol succinate (TOPROL XL) 50 MG extended release tablet Take 1 tablet by mouth daily 90 tablet 3    losartan (COZAAR) 25 MG tablet Take 1 tablet by mouth daily 90 tablet 3       FAMILY HISTORY: family history includes Coronary Art Dis in his father and mother. PHYSICAL EXAM:   Physical Examination:     BP (!) 155/86 (Site: Right Upper Arm, Position: Sitting, Cuff Size: Medium Adult)   Pulse 55   Resp 17   Ht 5' 11\" (1.803 m)   Wt 195 lb 9.6 oz (88.7 kg)   SpO2 100%   BMI 27.28 kg/m²  Body mass index is 27.28 kg/m². Constitutional: He appeared oriented to person and place. He appears well-developed and well-nourished. In no acute distress. HEENT: Normocephalic and atraumatic. No JVD present. Carotid bruit is not present. No mass and no thyromegaly present. No lymphadenopathy noted. Cardiovascular: Bradycardic rate, regular rhythm, normal heart sounds. Exam reveals no gallop and no friction rubs. No murmur was heard. Pulmonary/Chest: Effort normal and breath sounds normal. No respiratory distress. He has no wheezes, rhonchi or rales. Abdominal: Soft, non-tender. He exhibits no organomegaly, mass or bruit. Extremities: No edema. No cyanosis or clubbing. 2+ radial and carotid pulses. Distal extremity pulses: 2+ bilaterally. Neurological: Alertness and orientation as per Constitutional exam. No evidence of gross cranial nerve deficit. Coordination appeared normal.   Skin: Skin is warm and dry. There is no rash or diaphoresis. Psychiatric: He has a normal mood and affect. His speech is normal and behavior is normal.        MOST RECENT LABS ON RECORD:   Lab Results   Component Value Date    WBC 7.3 12/05/2020    HGB 14.1 12/05/2020    HCT 43.9 12/05/2020     12/05/2020    ALT 16 12/05/2020    AST 20 12/05/2020     (L) 12/05/2020    K 4.0 12/05/2020     12/05/2020    CREATININE 0.98 01/07/2021    BUN 22 01/07/2021    CO2 26 12/05/2020    TSH 1.19 12/04/2020    INR 1.1 12/04/2020         ASSESSMENT:  Patient Active Problem List    Diagnosis Date Noted    Essential hypertension 12/05/2020    New onset atrial fibrillation ([facility] 12/04/2020     Mayo was seen today for follow-up. Diagnoses and all orders for this visit:    PAF (paroxysmal atrial fibrillation) ([facility]  -     EKG 12 lead    NICM (nonischemic cardiomyopathy) (HCC)    Essential hypertension  -     EKG 12 lead    MICHELLE (obstructive sleep apnea)    Other orders  -     metoprolol succinate (TOPROL XL) 25 MG extended release tablet;  Take 1 tablet by mouth daily      PLAN:    Paroxysmal Atrial Fibrillation: Rhythm Control S/P Successful Cardioversion on 1/14/2021 Beta Blocker: DECREASE to Metoprolol succinate (Toprol XL) 25 mg daily. Stroke Risk: His CHADS2-VASc score is 1/9 (1.3% stroke risk)   Anti-Arrhythmic: amiodarone (Pacerone) 200 mg daily. Monitoring: Since they will being maintained on Amiodarone, I told them that we will need to closely monitor them for potential side effects. These include monitoring LFTs and TSH at least every 6 months as well as chest x-rays, pulmonary function tests, and eye exams at least yearly. Anticoagulation: Continue Riveroxiban (Xarelto) 20 mg daily. I also reminded him to watch for signs of blood in his stool or black tarry stools and stop the medication immediately if this develops as this could be life threatening. Giving the fact that he has a chads vascular score of 1, we may stop Xarelto and start him on aspirin next visit. ? Nonischemic Cardiomyopathy: Echo done on 1/7/2021 showed an EF of 45-50%. ? Beta Blocker: DECREASE to Metoprolol succinate (Toprol XL) 25 mg daily. ? ACE Inibitor/ARB: Continue losartan (Cozaar) 25 mg daily. · Essential Hypertension: Controlled  ? Beta Blocker: DECREASE to Metoprolol succinate (Toprol XL) 25 mg daily. ? ACE Inibitor/ARB: Continue losartan (Cozaar) 25 mg daily. · Mild Obstructive Sleep Apnea:   · Pending CPAP titration study ordered      Finally, I recommended that he continue his other medications and follow up with you as previously scheduled. FOLLOW UP:   I told Mr. Amanda Larsen to call my office if he had any problems, but otherwise told him to Return in about 4 weeks (around 2/19/2021). However, I would be happy to see him sooner should the need arise. Sincerely,  Mehreen Linares MD, MS, F.A.C.C.   [facility]) [facility], [address] [facility], [address]  Phone: [phone], Fax: [phone] I believe that the risk of significant morbidity and mortality related to the patient's current medical conditions are: intermediate-high. 25 minutes were spent with the patient and all of their questions were answered. The documentation recorded by the scribe, accurately and completely reflects the services I personally performed and the decisions made by me. Kimberly Crews MD, F.A.C.C.  January 22, 2021 January 22, 2021

## 2021-01-26 ENCOUNTER — TELEPHONE (OUTPATIENT)
Dept: CARDIOLOGY | Age: 61
End: 2021-01-26

## 2021-01-26 NOTE — TELEPHONE ENCOUNTER
Patient has been having difficulty sleeping. He wanted to know if he could take Benedryl or if it interfered with any of his medications.

## 2021-02-03 ENCOUNTER — HOSPITAL ENCOUNTER (OUTPATIENT)
Dept: LAB | Age: 61
Setting detail: SPECIMEN
Discharge: HOME | End: 2021-02-03
Payer: MEDICARE

## 2021-02-03 DIAGNOSIS — Z01.818 PREOPERATIVE TESTING: Primary | ICD-10-CM

## 2021-02-03 DIAGNOSIS — Z01.818 PREOPERATIVE TESTING: ICD-10-CM

## 2021-02-03 PROCEDURE — U0005 INFEC AGEN DETEC AMPLI PROBE: HCPCS

## 2021-02-03 PROCEDURE — U0003 INFECTIOUS AGENT DETECTION BY NUCLEIC ACID (DNA OR RNA); SEVERE ACUTE RESPIRATORY SYNDROME CORONAVIRUS 2 (SARS-COV-2) (CORONAVIRUS DISEASE [COVID-19]), AMPLIFIED PROBE TECHNIQUE, MAKING USE OF HIGH THROUGHPUT TECHNOLOGIES AS DESCRIBED BY CMS-2020-01-R: HCPCS

## 2021-02-03 PROCEDURE — C9803 HOPD COVID-19 SPEC COLLECT: HCPCS

## 2021-02-04 LAB
SARS-COV-2, RAPID: NORMAL
SARS-COV-2: NORMAL
SARS-COV-2: NOT DETECTED
SOURCE: NORMAL

## 2021-02-05 ENCOUNTER — TELEPHONE (OUTPATIENT)
Dept: PRIMARY CARE CLINIC | Age: 61
End: 2021-02-05

## 2021-02-08 ENCOUNTER — HOSPITAL ENCOUNTER (OUTPATIENT)
Dept: SLEEP CENTER | Age: 61
Discharge: HOME | End: 2021-02-08
Payer: MEDICARE

## 2021-02-08 DIAGNOSIS — G47.33 OSA (OBSTRUCTIVE SLEEP APNEA): ICD-10-CM

## 2021-02-08 PROCEDURE — 95811 POLYSOM 6/>YRS CPAP 4/> PARM: CPT

## 2021-02-09 LAB — STATUS: NORMAL

## 2021-02-22 ENCOUNTER — HOSPITAL ENCOUNTER (OUTPATIENT)
Dept: NON INVASIVE DIAGNOSTICS | Age: 61
Discharge: HOME | End: 2021-02-22
Payer: MEDICARE

## 2021-02-22 ENCOUNTER — OFFICE VISIT (OUTPATIENT)
Dept: CARDIOLOGY | Age: 61
End: 2021-02-22
Payer: MEDICARE

## 2021-02-22 VITALS
HEIGHT: 71 IN | RESPIRATION RATE: 18 BRPM | BODY MASS INDEX: 27.58 KG/M2 | SYSTOLIC BLOOD PRESSURE: 148 MMHG | WEIGHT: 197 LBS | OXYGEN SATURATION: 99 % | DIASTOLIC BLOOD PRESSURE: 85 MMHG | HEART RATE: 62 BPM

## 2021-02-22 DIAGNOSIS — I11.9 HYPERTENSIVE HEART DISEASE WITHOUT HEART FAILURE: ICD-10-CM

## 2021-02-22 DIAGNOSIS — R42 DIZZINESS: ICD-10-CM

## 2021-02-22 DIAGNOSIS — I48.0 PAF (PAROXYSMAL ATRIAL FIBRILLATION) (HCC): Primary | ICD-10-CM

## 2021-02-22 DIAGNOSIS — Z79.899 ON AMIODARONE THERAPY: ICD-10-CM

## 2021-02-22 DIAGNOSIS — Z79.01 CHRONIC ANTICOAGULATION: ICD-10-CM

## 2021-02-22 DIAGNOSIS — I10 ESSENTIAL HYPERTENSION: ICD-10-CM

## 2021-02-22 DIAGNOSIS — I42.8 NICM (NONISCHEMIC CARDIOMYOPATHY) (HCC): ICD-10-CM

## 2021-02-22 DIAGNOSIS — I48.0 PAF (PAROXYSMAL ATRIAL FIBRILLATION) (HCC): ICD-10-CM

## 2021-02-22 PROCEDURE — 93247 EXT ECG>7D<15D SCAN A/R: CPT

## 2021-02-22 PROCEDURE — G8419 CALC BMI OUT NRM PARAM NOF/U: HCPCS | Performed by: INTERNAL MEDICINE

## 2021-02-22 PROCEDURE — G8427 DOCREV CUR MEDS BY ELIG CLIN: HCPCS | Performed by: INTERNAL MEDICINE

## 2021-02-22 PROCEDURE — 1036F TOBACCO NON-USER: CPT | Performed by: INTERNAL MEDICINE

## 2021-02-22 PROCEDURE — 99214 OFFICE O/P EST MOD 30 MIN: CPT | Performed by: INTERNAL MEDICINE

## 2021-02-22 PROCEDURE — 93000 ELECTROCARDIOGRAM COMPLETE: CPT | Performed by: INTERNAL MEDICINE

## 2021-02-22 PROCEDURE — 3017F COLORECTAL CA SCREEN DOC REV: CPT | Performed by: INTERNAL MEDICINE

## 2021-02-22 PROCEDURE — G8484 FLU IMMUNIZE NO ADMIN: HCPCS | Performed by: INTERNAL MEDICINE

## 2021-02-22 PROCEDURE — 93246 EXT ECG>7D<15D RECORDING: CPT

## 2021-02-22 NOTE — PATIENT INSTRUCTIONS
SURVEY:    You may be receiving a survey from [employer] regarding your visit today. Please complete the survey to enable us to provide the highest quality of care to you and your family. If you cannot score us a very good on any question, please call the office to discuss how we could have made your experience a very good one. Thank you.

## 2021-02-22 NOTE — PROGRESS NOTES
Guanakito Henderson am scribing for and in the presence of Dylon Mauro MD, F.A.C.C. Patient: Jose Guadalupe Vasquez  : 1960  Date of Admission: (Not on file)  Today's Date: 2021    REASON FOR CONSULTATION: 1 Month Follow-Up (HX: Persistent A-Fib S/p Cardiovserion, NICM, HTN. Pt states he is doing ok. C/o: Occahsional Dizziness and Headaches usually at night. No falls. Denies: CP, Palpitaitons, SOB. )      HPI: I had the pleasure of seeing Jose Guadalupe Vasquez in consultation today. As you know, Mr. Wesley Ronquillo is a 56 y.o. male who was admitted on 2020 with new onset atrial fibrillation with RVR leading to cardiac consultation and his most recent work-up. Mr. Wesley Ronquillo has history of intermittent palpitations and history suggestive of obstructive sleep apnea syndrome. He reported having history of borderline hypertension. He denied any history of diabetes or dyslipidemia. He is lifelong non-smoker. With regard to his family history, he reported that his father and mother had a heart attack in their old age. His echo showed ejection fraction of 40%. No regional wall motion abnormalities. There is some shadowing in the apex but I think this is origin of the papillary muscles. He is anticoagulated anyway. Lexiscan stress test showed low ejection fraction on gated SPECT with ejection fraction being 42% but no perfusion abnormalities favoring nonischemic cardiomyopathy. Stress test done 2020: EF 42%  Largely normal myocardial perfusion imaging with soft tissue artifact, but without significant evidence of myocardial ischemia or infarction. Echo done 2020: EF 40% Mildly increased left ventricular wall thickness with a normal left ventricular cavity size. Moderate global hypokinesis with no regional abnormalities. The left atrium is moderately dilated (34-39) with a left atrial volume index of 34 ml/m2. Mild mitral and tricuspid regurgitation.  Diastolic function cannot be properly assessed due to atrial fibrillation. Sleep study done 12/21/2020 shows mild sleep apnea. Pending cpap titration. Cardioversion done on 1/14/2021: Successful but did show short runs of atrial tachycardia, patient started on amiodarone orally on discharge. His digoxin was discontinued. EKG done in office on 1/22/2021: Sinus bradycardia otherwise normal    Mr. Wing Diaz is here today for a follow up. He reports doing well. He has not been very active due to not wanting to over due it. He does not have any shortness of breath however at times he can feel a bit like he at times he has to take a deep breath. It is random and once he takes his full deep breathing he is ok. He does have tinnitus in his ears. He has dizziness randomly. No falls at all. He also reports having some headaches that he feels like it is a pressure feeling and he usually feels this at night when he is laying down. He denies any chest pain, pressure or tightness. He denies any palpitations. No abdominal pain, nausea or vomiting. He denies any bleeding problems, bowel issues, problems with medications or any other concerns at this time. No cough or fever. No falls, near falls or syncopal episodes. He did get tested for sleep apnea and he has his test scheduled for in the hospital to be done. EKG done today (2/22/2021) in office showed him in normal sinus rhythm with a HR of 61 bpm.    Past Medical History:   Diagnosis Date    Hyperlipidemia     Hypertension    Persistent atrial fibrillation. Dilated aortic root. Compression fracture of T4 and T5.    CURRENT ALLERGIES: Pcn [penicillins] REVIEW OF SYSTEMS: 14 systems were reviewed. Pertinent positives and negatives as above, all else negative. No past surgical history on file.  Social History:  Social History     Tobacco Use    Smoking status: Never Smoker    Smokeless tobacco: Never Used   Substance Use Topics    Alcohol use: Yes     Binge frequency: Less than monthly     Comment: rarely    Drug use: Never        CURRENT MEDICATIONS:  Outpatient Medications Marked as Taking for the 2/22/21 encounter (Office Visit) with Kelly Ch MD   Medication Sig Dispense Refill    metoprolol succinate (TOPROL XL) 25 MG extended release tablet Take 1 tablet by mouth daily 90 tablet 3    amiodarone (CORDARONE) 200 MG tablet Take 1 tablet by mouth daily 30 tablet 3    rivaroxaban (XARELTO) 20 MG TABS tablet Take 1 tablet by mouth daily (with breakfast) 90 tablet 3    losartan (COZAAR) 25 MG tablet Take 1 tablet by mouth daily 90 tablet 3       FAMILY HISTORY: family history includes Coronary Art Dis in his father and mother. PHYSICAL EXAM:   Physical Examination:     BP (!) 153/83 (Site: Right Upper Arm, Position: Sitting, Cuff Size: Medium Adult)   Pulse 62   Resp 18   Ht 5' 11\" (1.803 m)   Wt 197 lb (89.4 kg)   SpO2 99%   BMI 27.48 kg/m²  Body mass index is 27.48 kg/m². Constitutional: He appeared oriented to person and place. He appears well-developed and well-nourished. In no acute distress. HEENT: Normocephalic and atraumatic. No JVD present. Carotid bruit is not present. No mass and no thyromegaly present. No lymphadenopathy noted. Cardiovascular: Normal rate, regular rhythm, normal heart sounds. Exam reveals no gallop and no friction rubs. No murmur was heard. Pulmonary/Chest: Effort normal and breath sounds normal. No respiratory distress. He has no wheezes, rhonchi or rales. Abdominal: Soft, non-tender. He exhibits no organomegaly, mass or bruit. Extremities: No edema. No cyanosis or clubbing. 2+ radial and carotid pulses. Distal extremity pulses: 2+ bilaterally. Neurological: Alertness and orientation as per Constitutional exam. No evidence of gross cranial nerve deficit. Coordination appeared normal.   Skin: Skin is warm and dry. There is no rash or diaphoresis. Psychiatric: He has a normal mood and affect.  His speech is normal and behavior is normal.        MOST RECENT LABS ON RECORD:   Lab Results   Component Value Date    WBC 7.3 12/05/2020    HGB 14.1 12/05/2020    HCT 43.9 12/05/2020     12/05/2020    ALT 16 12/05/2020    AST 20 12/05/2020     (L) 12/05/2020    K 4.0 12/05/2020     12/05/2020    CREATININE 0.98 01/07/2021    BUN 22 01/07/2021    CO2 26 12/05/2020    TSH 1.19 12/04/2020    INR 1.1 12/04/2020         ASSESSMENT:  Patient Active Problem List    Diagnosis Date Noted    Essential hypertension 12/05/2020    New onset atrial fibrillation ([facility] 12/04/2020     Mayo was seen today for 1 month follow-up. Diagnoses and all orders for this visit:    PAF (paroxysmal atrial fibrillation) ([facility]  -     EKG 12 lead  -     Cancel: Longterm Continuous Cardiac Event Monitor; Future  -     TSH with Reflex  -     Hepatic Function Panel  -     Lipid Panel; Future  -     Continuous cardiac monitoring, >2 up to 14 days; Future    Dizziness    On amiodarone therapy  -     TSH with Reflex  -     Hepatic Function Panel  -     Continuous cardiac monitoring, >2 up to 14 days; Future    Chronic anticoagulation  -     Continuous cardiac monitoring, >2 up to 14 days; Future    NICM (nonischemic cardiomyopathy) ([facility]  -     Echo 2D w doppler w color complete; Future  -     TSH with Reflex  -     Hepatic Function Panel  -     Lipid Panel; Future  -     Continuous cardiac monitoring, >2 up to 14 days; Future    Hypertensive heart disease without heart failure  -     TSH with Reflex  -     Hepatic Function Panel  -     Lipid Panel; Future  -     Continuous cardiac monitoring, >2 up to 14 days; Future    Essential hypertension  -     Continuous cardiac monitoring, >2 up to 14 days; Future      PLAN:  Paroxysmal Atrial Fibrillation: Rhythm Control S/P Successful Cardioversion on 1/14/2021. Does report having some dizziness however no falls or near falls. Will get further testing as below.      Beta Blocker: Continue Metoprolol succinate (Toprol XL) 25 mg daily. Stroke Risk: His CHADS2-VASc score is 1/9 (1.3% stroke risk)   Anti-Arrhythmic: Continue amiodarone (Pacerone) 200 mg daily. Monitoring: Since they will being maintained on Amiodarone, I told them that we will need to closely monitor them for potential side effects. These include monitoring LFTs and TSH at least every 6 months as well as chest x-rays, pulmonary function tests, and eye exams at least yearly. Anticoagulation: Continue Riveroxiban (Xarelto) 20 mg daily. I also reminded him to watch for signs of blood in his stool or black tarry stools and stop the medication immediately if this develops as this could be life threatening. Giving the fact that he has a chads vascular score of 1, we may stop Xarelto and start him on aspirin next visit and as long as there is no further atrial fibrillation on his CAM monitor. Additional Testing: I Ordered an Echocardiogram to assess Mr. Edison Chase ejection fraction and to look for significant valvular heart disease as a source of Mr. Olena May symptoms   Additional Testing: I Ordered a CAM monitor to try and pinpoint the etiology of Mr. Edison Chase symptoms. Laboratory Testing: Also I ordered a Lipid Panel, Hepatic Function Panel and a TSH to reassess his levels at this time.  Nonischemic Cardiomyopathy/ Hypertensive Heart Disease Without Heart Failure: Echo done on 1/7/2021 showed an EF of 45-50%.  Beta Blocker: Continue Metoprolol succinate (Toprol XL) 25 mg daily.  ACE Inibitor/ARB: Continue losartan (Cozaar) 25 mg daily.  Ordered a repeat Echo as above. · Essential Hypertension: Controlled   Beta Blocker: Continue Metoprolol succinate (Toprol XL) 25 mg daily.  ACE Inibitor/ARB: Continue losartan (Cozaar) 25 mg daily.  I was also a little bit concerned about his blood pressure which was elevated on 2 checks today.  I told him to keep an eye on this and follow up with you as previously scheduled for continued monitoring and

## 2021-03-08 ENCOUNTER — HOSPITAL ENCOUNTER (OUTPATIENT)
Dept: NON INVASIVE DIAGNOSTICS | Age: 61
Discharge: HOME | End: 2021-03-08
Payer: MEDICARE

## 2021-03-08 DIAGNOSIS — I42.8 NICM (NONISCHEMIC CARDIOMYOPATHY) (HCC): ICD-10-CM

## 2021-03-08 LAB
LV EF: 48 %
LVEF MODALITY: NORMAL

## 2021-03-08 PROCEDURE — 93306 TTE W/DOPPLER COMPLETE: CPT

## 2021-03-12 ENCOUNTER — TELEPHONE (OUTPATIENT)
Dept: CARDIOLOGY | Age: 61
End: 2021-03-12

## 2021-03-12 NOTE — TELEPHONE ENCOUNTER
----- Message from Siddhartha Macario MD sent at 3/12/2021 11:17 AM EST -----  Heart function slightly better compared to prior echo. Continue current therapy and follow-up. Please call with questions and/or concerns.   Thank you

## 2021-03-17 ENCOUNTER — TELEPHONE (OUTPATIENT)
Dept: CARDIOLOGY | Age: 61
End: 2021-03-17

## 2021-03-17 NOTE — TELEPHONE ENCOUNTER
----- Message from Ca Mendoza MD sent at 3/17/2021 12:13 PM EDT -----  Heart monitor is good. No significant atrial fibrillation but he has few abnormal heartbeats. He is already on good medicine. Continue current therapy and follow-up. Please call with questions and/or concerns.   Thank you

## 2021-03-22 ENCOUNTER — OFFICE VISIT (OUTPATIENT)
Dept: CARDIOLOGY | Age: 61
End: 2021-03-22
Payer: MEDICARE

## 2021-03-22 VITALS
SYSTOLIC BLOOD PRESSURE: 136 MMHG | RESPIRATION RATE: 18 BRPM | BODY MASS INDEX: 27.58 KG/M2 | HEIGHT: 71 IN | DIASTOLIC BLOOD PRESSURE: 75 MMHG | WEIGHT: 197 LBS | HEART RATE: 60 BPM | OXYGEN SATURATION: 99 %

## 2021-03-22 DIAGNOSIS — I42.8 NICM (NONISCHEMIC CARDIOMYOPATHY) (HCC): ICD-10-CM

## 2021-03-22 DIAGNOSIS — I10 ESSENTIAL HYPERTENSION: ICD-10-CM

## 2021-03-22 DIAGNOSIS — I48.0 PAROXYSMAL ATRIAL FIBRILLATION (HCC): Primary | ICD-10-CM

## 2021-03-22 PROCEDURE — G8484 FLU IMMUNIZE NO ADMIN: HCPCS | Performed by: INTERNAL MEDICINE

## 2021-03-22 PROCEDURE — 99214 OFFICE O/P EST MOD 30 MIN: CPT | Performed by: INTERNAL MEDICINE

## 2021-03-22 PROCEDURE — 3017F COLORECTAL CA SCREEN DOC REV: CPT | Performed by: INTERNAL MEDICINE

## 2021-03-22 PROCEDURE — G8427 DOCREV CUR MEDS BY ELIG CLIN: HCPCS | Performed by: INTERNAL MEDICINE

## 2021-03-22 PROCEDURE — G8419 CALC BMI OUT NRM PARAM NOF/U: HCPCS | Performed by: INTERNAL MEDICINE

## 2021-03-22 PROCEDURE — 1036F TOBACCO NON-USER: CPT | Performed by: INTERNAL MEDICINE

## 2021-03-22 RX ORDER — ASPIRIN 325 MG
325 TABLET, DELAYED RELEASE (ENTERIC COATED) ORAL DAILY
Qty: 90 TABLET | Refills: 3 | COMMUNITY
Start: 2021-03-22 | End: 2021-09-29 | Stop reason: ALTCHOICE

## 2021-03-22 NOTE — PATIENT INSTRUCTIONS
SURVEY:    You may be receiving a survey from [employer] regarding your visit today. Please complete the survey to enable us to provide the highest quality of care to you and your family. If you cannot score us a very good on any question, please call the office to discuss how we could have made your experience a very good one. Thank you. Jaime Zarco was your MA today!

## 2021-03-22 NOTE — PROGRESS NOTES
Theodore Elkins am scribing for and in the presence of Ishan Santizo MD, F.A.C.C. Patient: Santo Feliciano  : 1960  Date of Admission: (Not on file)  Today's Date: 3/22/2021    REASON FOR CONSULTATION: 1 Month Follow-Up (HX: HTN, New Onset A-Fib. Echo done on 3/8 CAM done on . C/o; COVID shot side effects. Denies: CP, Palpitaitons, Lighteaded/dizziness, SOB. )      HPI: I had the pleasure of seeing Santo Feliciano in consultation today. As you know, Mr. Yoanna Rice is a 56 y.o. male who was admitted on 2020 with new onset atrial fibrillation with RVR leading to cardiac consultation and his most recent work-up. Mr. Yoanna Rice has history of intermittent palpitations and history suggestive of obstructive sleep apnea syndrome. He reported having history of borderline hypertension. He denied any history of diabetes or dyslipidemia. He is lifelong non-smoker. With regard to his family history, he reported that his father and mother had a heart attack in their old age. His echo showed ejection fraction of 40%. No regional wall motion abnormalities. There is some shadowing in the apex but I think this is origin of the papillary muscles. He is anticoagulated anyway. Lexiscan stress test showed low ejection fraction on gated SPECT with ejection fraction being 42% but no perfusion abnormalities favoring nonischemic cardiomyopathy. Stress test done 2020: EF 42%  Largely normal myocardial perfusion imaging with soft tissue artifact, but without significant evidence of myocardial ischemia or infarction. Echo done 2020: EF 40% Mildly increased left ventricular wall thickness with a normal left ventricular cavity size. Moderate global hypokinesis with no regional abnormalities. The left atrium is moderately dilated (34-39) with a left atrial volume index of 34 ml/m2. Mild mitral and tricuspid regurgitation.  Diastolic function cannot be properly assessed due to atrial fibrillation. Sleep study done 12/21/2020 shows mild sleep apnea. Pending cpap titration. Cardioversion done on 1/14/2021: Successful but did show short runs of atrial tachycardia, patient started on amiodarone orally on discharge. His digoxin was discontinued. EKG done in office on 1/22/2021: Sinus bradycardia otherwise normal    EKG done on (2/22/2021) in office showed him in normal sinus rhythm with a HR of 61 bpm.    Echo done on 3/8/2021: Global left ventricular systolic function appears mildly reduced with an estimated ejection fraction of 45-50%. Mildly increased left ventricular wall thickness with a normal left ventricular cavity size. The left atrium is moderately dilated (34-39) with a left atrial volume index of 35 ml/m2. Mild mitral and tricuspid regurgitation. Evidence of mild diastolic dysfunction is seen. The aortic root is moderately dilated (>4.5cm) when corrected for body surface area. CAM done on 3/17/2021: Baseline rhythm is sinus with average heart rate of 63 bpm, ranging between 47 and 109 bpm. Rare PACs and PVCs with 2 runs of ectopic atrial tachycardia, the longest and fastest was 7 beats at heart rate of 110 bpm. No ventricular runs. Patient-activated events correlated with sinus rhythm and sinus bradycardia. No atrial fibrillation recorded. Mr. Wesley Ronquillo is here today for follow up after his testing as above. He reports doing well. He denies any chest pain, pressure or tightness. He denies any palpitations. No lightheaded or dizziness. No abdominal pain, nausea or vomiting. He denies any bleeding problems, bowel issues, problems with medications or any other concerns at this time. No cough or fever. No falls, near falls or syncopal episodes.       Past Medical History:   Diagnosis Date    Abnormal patient-activated cardiac event monitor 02/22/2021    CAM 13 days 8 hrs Baseline sinus ave HR of 63 bpm rang between 47 adn 109 bpm  Rare PAC and PVC with 2 runs of ectopic atrial tachy longest fastest was 7 beats hr 110 bpm No VT runs no AFib    H/O echocardiogram 03/08/2021    EF 45-50% Mild increased LV wallthickness LA is mod dilated 34-39 with LA volume index of 35 ml/m2 Mild mitral and tricuspid regurg Midl diastolic dysfunction seen Aortic root is mod dilated >4.5cm when corrected for body suface area    Hyperlipidemia     Hypertension    Persistent atrial fibrillation. Dilated aortic root. Compression fracture of T4 and T5.    CURRENT ALLERGIES: Pcn [penicillins] REVIEW OF SYSTEMS: 14 systems were reviewed. Pertinent positives and negatives as above, all else negative. No past surgical history on file. Social History:  Social History     Tobacco Use    Smoking status: Never Smoker    Smokeless tobacco: Never Used   Substance Use Topics    Alcohol use: Yes     Binge frequency: Less than monthly     Comment: rarely    Drug use: Never        CURRENT MEDICATIONS:  Outpatient Medications Marked as Taking for the 3/22/21 encounter (Office Visit) with Junior Selvin MD   Medication Sig Dispense Refill    metoprolol succinate (TOPROL XL) 25 MG extended release tablet Take 1 tablet by mouth daily 90 tablet 3    amiodarone (CORDARONE) 200 MG tablet Take 1 tablet by mouth daily 30 tablet 3    rivaroxaban (XARELTO) 20 MG TABS tablet Take 1 tablet by mouth daily (with breakfast) 90 tablet 3    losartan (COZAAR) 25 MG tablet Take 1 tablet by mouth daily 90 tablet 3       FAMILY HISTORY: family history includes Coronary Art Dis in his father and mother. PHYSICAL EXAM:   Physical Examination:     /75 (Site: Left Upper Arm, Position: Sitting, Cuff Size: Medium Adult)   Pulse 60   Resp 18   Ht 5' 11\" (1.803 m)   Wt 197 lb (89.4 kg)   SpO2 99%   BMI 27.48 kg/m²  Body mass index is 27.48 kg/m². Constitutional: He appeared oriented to person and place. He appears well-developed and well-nourished. In no acute distress. HEENT: Normocephalic and atraumatic.  No JVD present. Carotid bruit is not present. No mass and no thyromegaly present. No lymphadenopathy noted. Cardiovascular: Normal rate, regular rhythm, normal heart sounds. Exam reveals no gallop and no friction rubs. No murmur was heard. Pulmonary/Chest: Effort normal and breath sounds normal. No respiratory distress. He has no wheezes, rhonchi or rales. Abdominal: Soft, non-tender. He exhibits no organomegaly, mass or bruit. Extremities: No edema. No cyanosis or clubbing. 2+ radial and carotid pulses. Distal extremity pulses: 2+ bilaterally. Neurological: Alertness and orientation as per Constitutional exam. No evidence of gross cranial nerve deficit. Coordination appeared normal.   Skin: Skin is warm and dry. There is no rash or diaphoresis. Psychiatric: He has a normal mood and affect. His speech is normal and behavior is normal.        MOST RECENT LABS ON RECORD:   Lab Results   Component Value Date    WBC 7.3 12/05/2020    HGB 14.1 12/05/2020    HCT 43.9 12/05/2020     12/05/2020    ALT 16 12/05/2020    AST 20 12/05/2020     (L) 12/05/2020    K 4.0 12/05/2020     12/05/2020    CREATININE 0.98 01/07/2021    BUN 22 01/07/2021    CO2 26 12/05/2020    TSH 1.19 12/04/2020    INR 1.1 12/04/2020         ASSESSMENT:  Meenu Bradford was seen today for 1 month follow-up. Diagnoses and all orders for this visit:    Paroxysmal atrial fibrillation ([address]    NICM (nonischemic cardiomyopathy) ([address]    Essential hypertension      PLAN:  Paroxysmal Atrial Fibrillation: Rhythm Control S/P Successful Cardioversion on 1/14/2021. Currently maintaining sinus rhythm on physical examination. Recent CAM monitor showed very short episodes of atrial tachycardia, no atrial fibrillation.   I went over all the details of his heart monitor and explained to him that since he has no further atrial fibrillation and also because his chads vascular score is low we can discontinue Xarelto and start him on a full dose aspirin instead. We will reevaluate in 3 months. Counseled patient extensively to call me if he has palpitations, dizziness, shortness of breath or chest pain. Planning to get a repeat echo on follow-up. I think his low ejection fraction secondary to tachycardia induced cardiomyopathy from atrial fibrillation giving his completely normal myocardial perfusion stress testing. Beta Blocker: Continue Metoprolol succinate (Toprol XL) 25 mg daily. Stroke Risk: His CHADS2-VASc score is 1/9 (1.3% stroke risk)   Anti-Arrhythmic: Continue amiodarone (Pacerone) 200 mg daily. Monitoring: Since they will being maintained on Amiodarone, I told them that we will need to closely monitor them for potential side effects. These include monitoring LFTs and TSH at least every 6 months as well as chest x-rays, pulmonary function tests, and eye exams at least yearly. CHADS2-VASc score: 1/9 (1.3% stroke risk/year)  Anticoagulation: STOP Riveroxiban (Xarelto) 20 mg daily. And START  mg daily due to his CAM recorder showing no atrial fibrillation at this time. · Nonischemic Cardiomyopathy/ Hypertensive Heart Disease Without Heart Failure: Echo done on 3/8/2021 showed an EF of 45-50%.  Beta Blocker: Continue Metoprolol succinate (Toprol XL) 25 mg daily.  ACE Inibitor/ARB: Continue losartan (Cozaar) 25 mg daily. · Essential Hypertension: Controlled   Beta Blocker: Continue Metoprolol succinate (Toprol XL) 25 mg daily.  ACE Inibitor/ARB: Continue losartan (Cozaar) 25 mg daily. Finally, I recommended that he continue his other medications and follow up with you as previously scheduled. FOLLOW UP:   I told Mr. Chaparrita Lux to call my office if he had any problems, but otherwise told him to Return in about 3 months (around 6/22/2021). However, I would be happy to see him sooner should the need arise. Sincerely,  Gracia Bruno MD, MS, F.A.C.C.   [facility]) [facility], [address], [facility], [address], [address] [facility]  Phone: [phone], Fax: [phone]    I believe that the risk of significant morbidity and mortality related to the patient's current medical conditions are: Intermediate. >30 minutes were spent during prep work, discussion and exam of the patient, and follow up documentation and all of their questions were answered. The documentation recorded by the scribe, accurately and completely reflects the services I personally performed and the decisions made by me. Rose Wing MD, F.A.C.C.  March 22, 2021 March 22, 2021

## 2021-05-06 ENCOUNTER — HOSPITAL ENCOUNTER (OUTPATIENT)
Age: 61
Setting detail: SPECIMEN
Discharge: HOME | End: 2021-05-06
Payer: MEDICARE

## 2021-05-06 LAB
CHOLESTEROL, FASTING: 195 MG/DL
CHOLESTEROL/HDL RATIO: 4.3
HDLC SERPL-MCNC: 45 MG/DL
LDL CHOLESTEROL: 121 MG/DL (ref 0–130)
PROSTATE SPECIFIC ANTIGEN: 2.36 UG/L
TRIGLYCERIDE, FASTING: 147 MG/DL
TSH SERPL DL<=0.05 MIU/L-ACNC: 1.81 MIU/L (ref 0.3–5)
VLDLC SERPL CALC-MCNC: NORMAL MG/DL (ref 1–30)

## 2021-05-13 ENCOUNTER — HOSPITAL ENCOUNTER (OUTPATIENT)
Age: 61
Setting detail: SPECIMEN
Discharge: HOME | End: 2021-05-13
Payer: MEDICARE

## 2021-05-13 LAB
-: NORMAL
REASON FOR REJECTION: NORMAL
ZZ NTE CLEAN UP: ORDERED TEST: NORMAL
ZZ NTE WITH NAME CLEAN UP: SPECIMEN SOURCE: NORMAL

## 2021-05-14 ENCOUNTER — HOSPITAL ENCOUNTER (OUTPATIENT)
Age: 61
Setting detail: SPECIMEN
Discharge: HOME | End: 2021-05-14
Payer: MEDICARE

## 2021-05-14 LAB
SEX HORMONE BINDING GLOBULIN: 45 NMOL/L (ref 11–80)
TESTOSTERONE FREE-NONMALE: 69 PG/ML (ref 47–244)
TESTOSTERONE TOTAL: 412 NG/DL (ref 220–1000)

## 2021-06-02 ENCOUNTER — HOSPITAL ENCOUNTER (EMERGENCY)
Age: 61
Discharge: HOME | End: 2021-06-02
Payer: MEDICARE

## 2021-06-02 ENCOUNTER — TELEPHONE (OUTPATIENT)
Dept: CARDIOLOGY | Age: 61
End: 2021-06-02

## 2021-06-02 VITALS
RESPIRATION RATE: 18 BRPM | HEART RATE: 64 BPM | OXYGEN SATURATION: 100 % | SYSTOLIC BLOOD PRESSURE: 154 MMHG | TEMPERATURE: 97.3 F | DIASTOLIC BLOOD PRESSURE: 99 MMHG

## 2021-06-02 DIAGNOSIS — M79.673 PAIN OF FOOT, UNSPECIFIED LATERALITY: Primary | ICD-10-CM

## 2021-06-02 DIAGNOSIS — T14.8XXA BLISTER: Primary | ICD-10-CM

## 2021-06-02 PROCEDURE — 99283 EMERGENCY DEPT VISIT LOW MDM: CPT

## 2021-06-02 ASSESSMENT — ENCOUNTER SYMPTOMS
RHINORRHEA: 0
WHEEZING: 0
COUGH: 0
BACK PAIN: 0
NAUSEA: 0
CHEST TIGHTNESS: 0
ABDOMINAL PAIN: 0
VOMITING: 0
EYE REDNESS: 0
DIARRHEA: 0
SORE THROAT: 0
SHORTNESS OF BREATH: 0
BLOOD IN STOOL: 0
EYE DISCHARGE: 0
CONSTIPATION: 0

## 2021-06-02 NOTE — TELEPHONE ENCOUNTER
Mr. Agustín Mcdowell called in to report that starting a couple days ago, he noticed a crack/sore on the bottom of his heel - there is no pain present with this. Just this morning he woke up with a swollen ankle/foot (the same foot of the sore). He isn't sure if this is due to any cardiac medications that he is on? What do you advise he do since he does not have a PCP?

## 2021-06-02 NOTE — ED PROVIDER NOTES
immunocompromised state. Neurological: Negative for dizziness, syncope, weakness and light-headedness. Hematological: Negative for adenopathy. Does not bruise/bleed easily. Psychiatric/Behavioral: Negative for behavioral problems and suicidal ideas. The patient is not nervous/anxious. Except as noted above the remainder of the review of systems was reviewed and negative. PAST MEDICAL HISTORY     Past Medical History:   Diagnosis Date    Abnormal patient-activated cardiac event monitor 02/22/2021    CAM 13 days 8 hrs Baseline sinus ave HR of 63 bpm rang between 47 adn 109 bpm  Rare PAC and PVC with 2 runs of ectopic atrial tachy longest fastest was 7 beats hr 110 bpm No VT runs no AFib    H/O echocardiogram 03/08/2021    EF 45-50% Mild increased LV wallthickness LA is mod dilated 34-39 with LA volume index of 35 ml/m2 Mild mitral and tricuspid regurg Midl diastolic dysfunction seen Aortic root is mod dilated >4.5cm when corrected for body suface area    Hyperlipidemia     Hypertension          SURGICALHISTORY     History reviewed. No pertinent surgical history.       CURRENT MEDICATIONS       Previous Medications    AMIODARONE (CORDARONE) 200 MG TABLET    take 1 tablet by mouth once daily    ASPIRIN 325 MG EC TABLET    Take 1 tablet by mouth daily    LOSARTAN (COZAAR) 25 MG TABLET    Take 1 tablet by mouth daily    METOPROLOL SUCCINATE (TOPROL XL) 25 MG EXTENDED RELEASE TABLET    Take 1 tablet by mouth daily         ALLERGIES   Pcn [penicillins]    FAMILY HISTORY       Family History   Problem Relation Age of Onset    Coronary Art Dis Mother     Coronary Art Dis Father           SOCIAL HISTORY       Social History     Socioeconomic History    Marital status: Single     Spouse name: None    Number of children: None    Years of education: None    Highest education level: None   Occupational History    None   Tobacco Use    Smoking status: Never Smoker    Smokeless tobacco: Never Used Vaping Use    Vaping Use: Never used   Substance and Sexual Activity    Alcohol use: Yes     Comment: rarely    Drug use: Never    Sexual activity: None   Other Topics Concern    None   Social History Narrative    None     Social Determinants of Health     Financial Resource Strain:     Difficulty of Paying Living Expenses:    Food Insecurity:     Worried About Running Out of Food in the Last Year:     [address] in the Last Year:    Transportation Needs:     Lack of Transportation (Medical):  Lack of Transportation (Non-Medical):    Physical Activity:     Days of Exercise per Week:     Minutes of Exercise per Session:    Stress:     Feeling of Stress :    Social Connections:     Frequency of Communication with Friends and Family:     Frequency of Social Gatherings with Friends and Family:     Attends Zoroastrianism Services:     Active Member of Clubs or Organizations:     Attends Club or Organization Meetings:     Marital Status:    Intimate Partner Violence:     Fear of Current or Ex-Partner:     Emotionally Abused:     Physically Abused:     Sexually Abused:        SCREENINGS             PHYSICAL EXAM    (up to 7 for level 4, 8 or more for level 5)     ED Triage Vitals [06/02/21 1338]   BP Temp Temp src Pulse Resp SpO2 Height Weight   (!) 154/99 97.3 °F (36.3 °C) -- 64 18 100 % -- --       Physical Exam  Vitals and nursing note reviewed. Constitutional:       General: He is not in acute distress. Appearance: He is well-developed. He is not ill-appearing, toxic-appearing or diaphoretic. HENT:      Head: Normocephalic and atraumatic. Right Ear: External ear normal.      Left Ear: External ear normal.   Eyes:      General: No scleral icterus. Right eye: No discharge. Left eye: No discharge. Conjunctiva/sclera: Conjunctivae normal.   Neck:      Trachea: No tracheal deviation. Cardiovascular:      Rate and Rhythm: Normal rate.    Pulmonary:      Effort: Pulmonary effort is normal. No respiratory distress. Breath sounds: No stridor. Musculoskeletal:         General: No tenderness or deformity. Normal range of motion. Cervical back: Normal range of motion and neck supple. Skin:     General: Skin is warm and dry. Capillary Refill: Capillary refill takes less than 2 seconds. Findings: No erythema or rash. Comments: Small open blister noted to the right heel that is less than 1 cm. Depth is less than 1 to 2 mm. There is no surrounding erythema there is no warmth there is no induration there is no fluctuance. Intact distal pulses sensation cap refills less than 2 seconds. Neurological:      General: No focal deficit present. Mental Status: He is alert and oriented to person, place, and time. Cranial Nerves: No cranial nerve deficit. Motor: No abnormal muscle tone. Deep Tendon Reflexes: Reflexes are normal and symmetric. Psychiatric:         Mood and Affect: Mood normal.         Behavior: Behavior normal.         DIAGNOSTIC RESULTS     EKG: All EKG's are interpreted by the Emergency Department Physician who either signs orCo-signs this chart in the absence of a cardiologist.      RADIOLOGY:   Non-plainfilm images such as CT, Ultrasound and MRI are read by the radiologist. Plain radiographic images are visualized and preliminarily interpreted by the emergency physician with the below findings:      Interpretationper the Radiologist below, if available at the time of this note:    No orders to display         ED BEDSIDE ULTRASOUND:   Performed by ED Physician - none    LABS:  Labs Reviewed - No data to display    All other labs were within normal range or not returned as of this dictation.     EMERGENCY DEPARTMENT COURSE and DIFFERENTIAL DIAGNOSIS/MDM:   Vitals:    Vitals:    06/02/21 1338   BP: (!) 154/99   Pulse: 64   Resp: 18   Temp: 97.3 °F (36.3 °C)   SpO2: 100%         MDM  73-year-old male who presents secondary to a small blister to the back of his right heel that started 2 days ago. He states his friend is diabetic although he is not diabetic he just wanted to get it checked. On exam he has a less than a centimeter superficial blister that is already open. There is no palpable abscess there is no induration there is no tenderness there is no erythema. Remainder of the foot is without any erythema no swelling intact pulses sensation. There is no skin necrosis. Strict and specific return 7 given. He will follow primary care on Monday he will otherwise return ER thenars complaints. Procedures    FINAL IMPRESSION      1. Blister        DISPOSITION/PLAN   DISPOSITION Decision To Discharge 06/02/2021 01:45:35 PM      PATIENT REFERRED TO:  [facility] ED  [address]  [address]  [phone]    If symptoms worsen, As needed    [address]  [phone]          DISCHARGE MEDICATIONS:  New Prescriptions    No medications on file              Summation      Patient Course:      ED Medications administered this visit:  Medications - No data to display    New Prescriptions from this visit:    New Prescriptions    No medications on file       Follow-up:  [facility] ED  [address]  [phone]    If symptoms worsen, As needed    [address] [place] [place]   [phone]            Final Impression:   1.  Blister               (Please note that portions of this note were completed with a voice recognition program.  Efforts were made to edit the dictations but occasionally words are mis-transcribed.)           Coco Zimmerman PA-C  06/02/21 [id number]

## 2021-06-04 NOTE — TELEPHONE ENCOUNTER
Left message for Karlie Aponte letting him know this information & placed referral to Keara Jordan CNP. Provided him with his office number and let him know to call our office back with any further questions.

## 2021-06-04 NOTE — TELEPHONE ENCOUNTER
None of his cardiac medication can cause unilateral symptoms like the one he is describing. Please refer him to Ruth hong for evaluation. I am sure he will be able to accommodate him and see him sooner but I will be glad to see him anytime if needed.

## 2021-06-15 ENCOUNTER — TELEPHONE (OUTPATIENT)
Dept: CARDIOLOGY | Age: 61
End: 2021-06-15

## 2021-06-15 NOTE — TELEPHONE ENCOUNTER
Mr. Escobar Arteaga called into the office today and wanted to let us know that his CPAP machine has been recalled. He uses [employer] and it has been recalled. He has followed up with the [address] [place], [place] [address] St here in [facility] to see what alliterative he could use because he does not feel comfortable not being able to use his machine. His machine has helped his sleep hydergine tremoundosly. He wanted to let you know this information. I did ask that he keep us updated on what kandi or GRADY suggest him to do. Thanks!

## 2021-06-25 ENCOUNTER — HOSPITAL ENCOUNTER (OUTPATIENT)
Dept: NON INVASIVE DIAGNOSTICS | Age: 61
Discharge: HOME | End: 2021-06-25
Payer: MEDICARE

## 2021-06-25 ENCOUNTER — OFFICE VISIT (OUTPATIENT)
Dept: CARDIOLOGY | Age: 61
End: 2021-06-25
Payer: MEDICARE

## 2021-06-25 VITALS
WEIGHT: 204 LBS | OXYGEN SATURATION: 100 % | RESPIRATION RATE: 18 BRPM | HEART RATE: 54 BPM | BODY MASS INDEX: 28.56 KG/M2 | SYSTOLIC BLOOD PRESSURE: 138 MMHG | DIASTOLIC BLOOD PRESSURE: 80 MMHG | HEIGHT: 71 IN

## 2021-06-25 DIAGNOSIS — I10 ESSENTIAL HYPERTENSION: ICD-10-CM

## 2021-06-25 DIAGNOSIS — G47.33 OSA ON CPAP: ICD-10-CM

## 2021-06-25 DIAGNOSIS — I42.8 NICM (NONISCHEMIC CARDIOMYOPATHY) (HCC): ICD-10-CM

## 2021-06-25 DIAGNOSIS — Z99.89 OSA ON CPAP: ICD-10-CM

## 2021-06-25 DIAGNOSIS — I48.0 PAF (PAROXYSMAL ATRIAL FIBRILLATION) (HCC): ICD-10-CM

## 2021-06-25 DIAGNOSIS — I48.0 PAF (PAROXYSMAL ATRIAL FIBRILLATION) (HCC): Primary | ICD-10-CM

## 2021-06-25 LAB
LV EF: 55 %
LVEF MODALITY: NORMAL

## 2021-06-25 PROCEDURE — G8427 DOCREV CUR MEDS BY ELIG CLIN: HCPCS | Performed by: INTERNAL MEDICINE

## 2021-06-25 PROCEDURE — 99214 OFFICE O/P EST MOD 30 MIN: CPT | Performed by: INTERNAL MEDICINE

## 2021-06-25 PROCEDURE — 93306 TTE W/DOPPLER COMPLETE: CPT

## 2021-06-25 PROCEDURE — 3017F COLORECTAL CA SCREEN DOC REV: CPT | Performed by: INTERNAL MEDICINE

## 2021-06-25 PROCEDURE — 1036F TOBACCO NON-USER: CPT | Performed by: INTERNAL MEDICINE

## 2021-06-25 PROCEDURE — 93000 ELECTROCARDIOGRAM COMPLETE: CPT | Performed by: INTERNAL MEDICINE

## 2021-06-25 PROCEDURE — G8419 CALC BMI OUT NRM PARAM NOF/U: HCPCS | Performed by: INTERNAL MEDICINE

## 2021-06-25 NOTE — PATIENT INSTRUCTIONS
SURVEY:    You may be receiving a survey from [employer] regarding your visit today. Please complete the survey to enable us to provide the highest quality of care to you and your family. If you cannot score us a very good on any question, please call the office to discuss how we could have made your experience a very good one. Thank you. Javier Cornell was your MA today!

## 2021-06-25 NOTE — PROGRESS NOTES
Marita George am scribing for and in the presence of Henok Adams MD, F.A.C.C. Patient: Yin Robin  : 1960  Date of Admission: (Not on file)  Today's Date: 2021    REASON FOR CONSULTATION: 3 Month Follow-Up (HX: PAF, NICM, HTN. Pt states he is doing ok. C/o: Tingling in his legs. Headahces once a week. Lighteaded/dizziness no falls. Valarie: CP, Palptioatns, SOB)      HPI: I had the pleasure of seeing Yin Robin in consultation today. As you know, Mr. Reinaldo Dunn is a 56 y.o. male who was admitted on 2020 with new onset atrial fibrillation with RVR leading to cardiac consultation and his most recent work-up. Mr. Reinaldo Dunn has history of intermittent palpitations and history suggestive of obstructive sleep apnea syndrome. He reported having history of borderline hypertension. He denied any history of diabetes or dyslipidemia. He is lifelong non-smoker. With regard to his family history, he reported that his father and mother had a heart attack in their old age. His echo showed ejection fraction of 40%. No regional wall motion abnormalities. There is some shadowing in the apex but I think this is origin of the papillary muscles. He is anticoagulated anyway. Lexiscan stress test showed low ejection fraction on gated SPECT with ejection fraction being 42% but no perfusion abnormalities favoring nonischemic cardiomyopathy. Stress test done 2020: EF 42%  Largely normal myocardial perfusion imaging with soft tissue artifact, but without significant evidence of myocardial ischemia or infarction. Echo done 2020: EF 40% Mildly increased left ventricular wall thickness with a normal left ventricular cavity size. Moderate global hypokinesis with no regional abnormalities. The left atrium is moderately dilated (34-39) with a left atrial volume index of 34 ml/m2. Mild mitral and tricuspid regurgitation.  Diastolic function cannot be properly assessed due to atrial fibrillation. Sleep study done 12/21/2020 shows mild sleep apnea. Pending cpap titration. Cardioversion done on 1/14/2021: Successful but did show short runs of atrial tachycardia, patient started on amiodarone orally on discharge. His digoxin was discontinued. EKG done in office on 1/22/2021: Sinus bradycardia otherwise normal    EKG done on (2/22/2021) in office showed him in normal sinus rhythm with a HR of 61 bpm.    Echo done on 3/8/2021: Global left ventricular systolic function appears mildly reduced with an estimated ejection fraction of 45-50%. Mildly increased left ventricular wall thickness with a normal left ventricular cavity size. The left atrium is moderately dilated (34-39) with a left atrial volume index of 35 ml/m2. Mild mitral and tricuspid regurgitation. Evidence of mild diastolic dysfunction is seen. The aortic root is moderately dilated (>4.5cm) when corrected for body surface area. CAM done on 3/17/2021: Baseline rhythm is sinus with average heart rate of 63 bpm, ranging between 47 and 109 bpm. Rare PACs and PVCs with 2 runs of ectopic atrial tachycardia, the longest and fastest was 7 beats at heart rate of 110 bpm. No ventricular runs. Patient-activated events correlated with sinus rhythm and sinus bradycardia. No atrial fibrillation recorded. Mr. John Tovar is here today for follow up. He reports doing fairly well. He has had some tingling in his legs that comes and goes. This is new for him since his heart issues started. He has had 2-3 episodes of lightheadedness as well however no falls or near falls. He has been experiencing some headaches as well about once a week. He does take ASA when he gets these however he does not seem to help. He does sleep well better at night since starting to use his CPAP machine. He denies any chest pain, pressure or tightness. He denies any palpitations. No abdominal pain, nausea or vomiting.  He denies any bleeding problems, bowel issues, problems with medications or any other concerns at this time. No cough or fever. No falls, near falls or syncopal episodes. EKG done today (6/25/2021): Sinus shalonda with a HR of 56 bpm.    Past Medical History:   Diagnosis Date    Abnormal patient-activated cardiac event monitor 02/22/2021    CAM 13 days 8 hrs Baseline sinus ave HR of 63 bpm rang between 47 adn 109 bpm  Rare PAC and PVC with 2 runs of ectopic atrial tachy longest fastest was 7 beats hr 110 bpm No VT runs no AFib    H/O echocardiogram 03/08/2021    EF 45-50% Mild increased LV wallthickness LA is mod dilated 34-39 with LA volume index of 35 ml/m2 Mild mitral and tricuspid regurg Midl diastolic dysfunction seen Aortic root is mod dilated >4.5cm when corrected for body suface area    Hyperlipidemia     Hypertension    Persistent atrial fibrillation. Dilated aortic root. Compression fracture of T4 and T5.    CURRENT ALLERGIES: Pcn [penicillins] REVIEW OF SYSTEMS: 14 systems were reviewed. Pertinent positives and negatives as above, all else negative. No past surgical history on file. Social History:  Social History     Tobacco Use    Smoking status: Never Smoker    Smokeless tobacco: Never Used   Vaping Use    Vaping Use: Never used   Substance Use Topics    Alcohol use: Yes     Comment: rarely    Drug use: Never        CURRENT MEDICATIONS:  Outpatient Medications Marked as Taking for the 6/25/21 encounter (Office Visit) with Anish Fu MD   Medication Sig Dispense Refill    amiodarone (CORDARONE) 200 MG tablet take 1 tablet by mouth once daily 90 tablet 3    aspirin 325 MG EC tablet Take 1 tablet by mouth daily 90 tablet 3    metoprolol succinate (TOPROL XL) 25 MG extended release tablet Take 1 tablet by mouth daily 90 tablet 3    losartan (COZAAR) 25 MG tablet Take 1 tablet by mouth daily 90 tablet 3       FAMILY HISTORY: family history includes Coronary Art Dis in his father and mother.      PHYSICAL EXAM:   Physical (nonischemic cardiomyopathy) ([facility] [address]  -     EKG 12 lead  -     ECHO Complete 2D W Doppler W Color; Future  -     Cancel: EKG 12 lead    Essential hypertension  -     EKG 12 lead  -     ECHO Complete 2D W Doppler W Color; Future  -     Cancel: EKG 12 lead    MICHELLE on CPAP  -     EKG 12 lead  -     ECHO Complete 2D W Doppler W Color; Future  -     Cancel: EKG 12 lead      PLAN:  Paroxysmal Atrial Fibrillation: Rhythm Control S/P Successful Cardioversion on 1/14/2021. Currently maintaining sinus rhythm on physical examination and EKG that was done today. He did mention leg tingling and I did let him know that Amiodarone could cause Neuropathy however it is intermit and not consistent at this time. I will as below get a repeat Echo today to assess his heart function and if needed we can stop this Amiodarone and or start him on a different mediation if needed. Beta Blocker: Continue Metoprolol succinate (Toprol XL) 25 mg daily. Stroke Risk: His CHADS2-VASc score is 1/9 (1.3% stroke risk)   Anti-Arrhythmic: Continue amiodarone (Pacerone) 200 mg daily. Monitoring: Since they will being maintained on Amiodarone, I told them that we will need to closely monitor them for potential side effects. These include monitoring LFTs and TSH at least every 6 months as well as chest x-rays, pulmonary function tests, and eye exams at least yearly. [id number] Score for Atrial Fibrillation Stroke Risk   Risk   Factors  Component Value   C CHF No 0   H HTN Yes 1   A2 Age >= 76 No,  (62 y.o.) 0   D DM No 0   S2 Prior Stroke/TIA No 0   V Vascular Disease No 0   A Age 74-69 No,  (62 y.o.) 0   Sc Sex male 0    [id number]  Score  1   Score last updated 6/88/96 5:78 AM EDT  Click here for a link to the UpToDate guideline \"Atrial Fibrillation: Anticoagulation therapy to prevent embolization  Disclaimer: Risk Score calculation is dependent on accuracy of patient problem list and past encounter diagnosis.   CHADS2-VASc score: 1/9 (1.3% stroke risk/year)    · Nonischemic Cardiomyopathy/ Hypertensive Heart Disease Without Heart Failure: Echo done on 3/8/2021 showed an EF of 45-50%.  Beta Blocker: Continue Metoprolol succinate (Toprol XL) 25 mg daily.  ACE Inibitor/ARB: Continue losartan (Cozaar) 25 mg daily.  Because of his known history of reduced left ventricular systolic function, I ordered an echocardiogram today to reassess his ejection fraction. Hopefully this will help determine whether his current maximal medical management is working effectively or not and whether we need to treat more aggressively. · Essential Hypertension: Controlled   Beta Blocker: Continue Metoprolol succinate (Toprol XL) 25 mg daily.  ACE Inibitor/ARB: Continue losartan (Cozaar) 25 mg daily.  Obstructive Sleep Apnea: He reported sleeping much better with using CPAP therapy. Encouraged him use CPAP machine every night. Finally, I recommended that he continue his other medications and follow up with you as previously scheduled. FOLLOW UP:   I told Mr. Jorge Rodgers to call my office if he had any problems, but otherwise told him to Return in about 3 months (around 9/25/2021). However, I would be happy to see him sooner should the need arise. Sincerely,  Jenifer Adorno MD, MS, F.A.C.C. [facility], [address] [place] [address]  Phone: [phone], Fax: [phone]    I believe that the risk of significant morbidity and mortality related to the patient's current medical conditions are: Intermediate. The documentation recorded by the scribe, accurately and completely reflects the services I personally performed and the decisions made by me. Jenifer Adorno MD, F.A.C.C. June 26, 2021 June 26, 2021     I reviewed his echo that was done yesterday. Ejection fraction is now normal.  Continue Toprol-XL and losartan. Please stop amiodarone, suspecting side effects causing tingling and lightheadedness.   We will reevaluate him in 3 months.

## 2021-06-28 ENCOUNTER — TELEPHONE (OUTPATIENT)
Dept: CARDIOLOGY | Age: 61
End: 2021-06-28

## 2021-06-28 NOTE — TELEPHONE ENCOUNTER
----- Message from Avril Amor MD sent at 6/26/2021  8:50 AM EDT -----  Heart function  and mitral valve leakage improved. Heart function is now normal.Please have him stop amiodarone and we will see him back in 3 months for reevaluation. Continue all other cardiac medications. Please call with questions and/or concerns.   Thank you

## 2021-07-21 ENCOUNTER — TELEPHONE (OUTPATIENT)
Dept: CARDIOLOGY | Age: 61
End: 2021-07-21

## 2021-07-21 NOTE — TELEPHONE ENCOUNTER
Patient forgot to take all of his medications yesterday. States he feels well. He didn't know if he should be seen or if he will be ok. He did take today's medications this morning.

## 2021-08-10 ENCOUNTER — TELEPHONE (OUTPATIENT)
Dept: CARDIOLOGY | Age: 61
End: 2021-08-10

## 2021-08-11 NOTE — TELEPHONE ENCOUNTER
It is safe for him to start Viagra if deemed necessary by his primary care doctor or urology.   Thank you

## 2021-08-11 NOTE — TELEPHONE ENCOUNTER
I let Mr. Demario Kamaljit know that per Dr. Kasey Almonte it would be safe for him to start on Viagra if deemed necessary by his PCP or Urology. Pt verbalized understanding. Thanks!

## 2021-09-29 ENCOUNTER — OFFICE VISIT (OUTPATIENT)
Dept: CARDIOLOGY | Age: 61
End: 2021-09-29
Payer: MEDICARE

## 2021-09-29 VITALS
DIASTOLIC BLOOD PRESSURE: 76 MMHG | OXYGEN SATURATION: 99 % | SYSTOLIC BLOOD PRESSURE: 113 MMHG | HEIGHT: 71 IN | RESPIRATION RATE: 16 BRPM | BODY MASS INDEX: 28.08 KG/M2 | WEIGHT: 200.6 LBS | HEART RATE: 72 BPM

## 2021-09-29 DIAGNOSIS — I48.0 PAF (PAROXYSMAL ATRIAL FIBRILLATION) (HCC): Primary | ICD-10-CM

## 2021-09-29 DIAGNOSIS — I10 ESSENTIAL HYPERTENSION: ICD-10-CM

## 2021-09-29 DIAGNOSIS — I42.8 NICM (NONISCHEMIC CARDIOMYOPATHY) (HCC): ICD-10-CM

## 2021-09-29 DIAGNOSIS — G47.33 OSA (OBSTRUCTIVE SLEEP APNEA): ICD-10-CM

## 2021-09-29 PROCEDURE — 3017F COLORECTAL CA SCREEN DOC REV: CPT | Performed by: INTERNAL MEDICINE

## 2021-09-29 PROCEDURE — G8427 DOCREV CUR MEDS BY ELIG CLIN: HCPCS | Performed by: INTERNAL MEDICINE

## 2021-09-29 PROCEDURE — 1036F TOBACCO NON-USER: CPT | Performed by: INTERNAL MEDICINE

## 2021-09-29 PROCEDURE — 99214 OFFICE O/P EST MOD 30 MIN: CPT | Performed by: INTERNAL MEDICINE

## 2021-09-29 PROCEDURE — G8419 CALC BMI OUT NRM PARAM NOF/U: HCPCS | Performed by: INTERNAL MEDICINE

## 2021-09-29 RX ORDER — HYDROXYZINE 50 MG/1
TABLET, FILM COATED ORAL
COMMUNITY
Start: 2021-09-27

## 2021-09-29 RX ORDER — FLUTICASONE PROPIONATE 50 MCG
2 SPRAY, SUSPENSION (ML) NASAL DAILY PRN
COMMUNITY
Start: 2021-08-26

## 2021-09-29 RX ORDER — LORATADINE 10 MG/1
TABLET ORAL
COMMUNITY
Start: 2021-09-17

## 2021-09-29 NOTE — PROGRESS NOTES
regional abnormalities. The left atrium is moderately dilated (34-39) with a left atrial volume index of 34 ml/m2. Mild mitral and tricuspid regurgitation. Diastolic function cannot be properly assessed due to atrial fibrillation. Sleep study done 12/21/2020 shows mild sleep apnea. Pending cpap titration. Cardioversion done on 1/14/2021: Successful but did show short runs of atrial tachycardia, patient started on amiodarone orally on discharge. His digoxin was discontinued. EKG done in office on 1/22/2021: Sinus bradycardia otherwise normal    EKG done on (2/22/2021) in office showed him in normal sinus rhythm with a HR of 61 bpm.    Echo done on 3/8/2021: Global left ventricular systolic function appears mildly reduced with an estimated ejection fraction of 45-50%. Mildly increased left ventricular wall thickness with a normal left ventricular cavity size. The left atrium is moderately dilated (34-39) with a left atrial volume index of 35 ml/m2. Mild mitral and tricuspid regurgitation. Evidence of mild diastolic dysfunction is seen. The aortic root is moderately dilated (>4.5cm) when corrected for body surface area. CAM done on 3/17/2021: Baseline rhythm is sinus with average heart rate of 63 bpm, ranging between 47 and 109 bpm. Rare PACs and PVCs with 2 runs of ectopic atrial tachycardia, the longest and fastest was 7 beats at heart rate of 110 bpm. No ventricular runs. Patient-activated events correlated with sinus rhythm and sinus bradycardia. No atrial fibrillation recorded. EKG done (6/25/2021): Sinus shalonda with a HR of 56 bpm.    Echocardiogram on 6/25/2021: Global left ventricular systolic function appears preserved with an estimated ejection fraction of 55%. Mildly increased left ventricular wall thickness with a normal left ventricular cavity size. The left atrium is moderately dilated (34-39) with a left atrial volume index of 39 ml/m2.  Normal mitral valve structure with trivial mitral regurgitation. Evidence of mild diastolic dysfunction is seen. The aortic root is mildly dilated. Compared to the previous study of 3/8/21, the patients EF appars to have improved from 45-50% to 55%. Mr. Wade Go reports doing fairly well since last visit other than increased allergies/cold symptoms that he has been taking medication for. His exercise tolerance is still fairly well and is still active as much as he can, and says that it has stayed about the same since last visit. He does sleep well better at night since starting to use his CPAP machine. He does report having chest pain but says he thinks this is related to his cough and says this is only on occasion. He denies any palpitations. No abdominal pain, nausea or vomiting. He denies any bleeding problems, bowel issues, problems with medications or any other concerns at this time. No fever. No falls, near falls or syncopal episodes. The last week or so he did have some trouble with constipation but had treated this with magnesium citrate which did help. Down 4 pounds since last visit   Wt Readings from Last 3 Encounters:   09/29/21 200 lb 9.6 oz (91 kg)   06/25/21 204 lb (92.5 kg)   03/22/21 197 lb (89.4 kg)       Past Medical History:   Diagnosis Date    Abnormal patient-activated cardiac event monitor 02/22/2021    CAM 13 days 8 hrs Baseline sinus ave HR of 63 bpm rang between 47 adn 109 bpm  Rare PAC and PVC with 2 runs of ectopic atrial tachy longest fastest was 7 beats hr 110 bpm No VT runs no AFib    H/O echocardiogram 03/08/2021    EF 45-50% Mild increased LV wallthickness LA is mod dilated 34-39 with LA volume index of 35 ml/m2 Mild mitral and tricuspid regurg Midl diastolic dysfunction seen Aortic root is mod dilated >4.5cm when corrected for body suface area    Hyperlipidemia     Hypertension    Persistent atrial fibrillation. Dilated aortic root.   Compression fracture of T4 and T5.    CURRENT ALLERGIES: Pcn [penicillins] REVIEW OF SYSTEMS: 14 systems were reviewed. Pertinent positives and negatives as above, all else negative. No past surgical history on file. Social History:  Social History     Tobacco Use    Smoking status: Never Smoker    Smokeless tobacco: Never Used   Vaping Use    Vaping Use: Never used   Substance Use Topics    Alcohol use: Yes     Comment: rarely    Drug use: Never        CURRENT MEDICATIONS:  Outpatient Medications Marked as Taking for the 9/29/21 encounter (Office Visit) with Luis Yin MD   Medication Sig Dispense Refill    hydrOXYzine (ATARAX) 50 MG tablet take 1 tablet by mouth at bedtime if needed for sleep      loratadine (CLARITIN) 10 MG tablet take 1 tablet by mouth daily if needed for allergies      fluticasone (FLONASE) 50 MCG/ACT nasal spray instill 2 sprays into each nostril once daily      aspirin 325 MG EC tablet Take 1 tablet by mouth daily 90 tablet 3    metoprolol succinate (TOPROL XL) 25 MG extended release tablet Take 1 tablet by mouth daily 90 tablet 3    losartan (COZAAR) 25 MG tablet Take 1 tablet by mouth daily 90 tablet 3       FAMILY HISTORY: family history includes Coronary Art Dis in his father and mother. PHYSICAL EXAM:   Physical Examination:     /76 (Site: Right Upper Arm, Position: Sitting, Cuff Size: Medium Adult)   Pulse 72   Resp 16   Ht 5' 11\" (1.803 m)   Wt 200 lb 9.6 oz (91 kg)   SpO2 99%   BMI 27.98 kg/m²  Body mass index is 27.98 kg/m². Constitutional: He appeared oriented to person and place. He appears well-developed and well-nourished. In no acute distress. HEENT: Normocephalic and atraumatic. No JVD present. Carotid bruit is not present. No mass and no thyromegaly present. No lymphadenopathy noted. Cardiovascular: Normal rate, regular rhythm, normal heart sounds. Exam reveals no gallop and no friction rubs. No murmur was heard. Pulmonary/Chest: Effort normal and breath sounds normal. No respiratory distress.  He has no wheezes, rhonchi or rales. Abdominal: Soft, non-tender. He exhibits no organomegaly, mass or bruit. Extremities: No edema. No cyanosis or clubbing. 2+ radial and carotid pulses. Distal extremity pulses: 2+ bilaterally. Neurological: Alertness and orientation as per Constitutional exam. No evidence of gross cranial nerve deficit. Coordination appeared normal.   Skin: Skin is warm and dry. There is no rash or diaphoresis. Psychiatric: He has a normal mood and affect. His speech is normal and behavior is normal.        MOST RECENT LABS ON RECORD:   Lab Results   Component Value Date    WBC 7.3 12/05/2020    HGB 14.1 12/05/2020    HCT 43.9 12/05/2020     12/05/2020    HDL 45 05/06/2021    ALT 16 12/05/2020    AST 20 12/05/2020     (L) 12/05/2020    K 4.0 12/05/2020     12/05/2020    CREATININE 0.98 01/07/2021    BUN 22 01/07/2021    CO2 26 12/05/2020    TSH 1.81 05/06/2021    PSA 2.36 05/06/2021    INR 1.1 12/04/2020          Diagnosis Orders   1. PAF (paroxysmal atrial fibrillation) ([facility] [address]     2. NICM (nonischemic cardiomyopathy) ([facility] [address]     3. Essential hypertension     4. MICHELLE (obstructive sleep apnea)       PLAN:  Paroxysmal Atrial Fibrillation: Rhythm Control S/P Successful Cardioversion on 1/14/2021. Currently maintaining sinus rhythm on physical examination-Ejection fraction is now normal.  Continue Toprol-XL and losartan. No longer on amiodarone due to suspecting side effects causing tingling and lightheadedness. Beta Blocker: Continue Metoprolol succinate (Toprol XL) 25 mg daily.   Stroke Risk: His CHADS2-VASc score is 2/9 (2.2% stroke risk)   [id number] Score for Atrial Fibrillation Stroke Risk   Risk   Factors  Component Value   C  Yes 1   H HTN Yes 1   A2 Age >= 75 No,  (62 y.o.) 0   D DM No 0   S2 Prior Stroke/TIA No 0   V Vascular Disease No 0   A Age 74-69 No,  (62 y.o.) 0   Sc Sex male 0    [id number]  Score  2   Score last updated 7/54/87 5:29 AM EDT  Click here for a link to the UpToDate guideline \"Atrial Fibrillation: Anticoagulation therapy to prevent embolization  Disclaimer: Risk Score calculation is dependent on accuracy of patient problem list and past encounter diagnosis. CHADS2-VASc score: 2/9 (2.2% stroke risk)   Anticoagulation: START Riveroxaban (Xarelto): 20 mg once daily with largest meal (typically dinner). I also reminded them to watch for signs of bloody or black tarry stools and stop the medication immediately if this develops as this could be life threatening. · Nonischemic Cardiomyopathy/ Hypertensive Heart Disease Without Heart Failure: Patient fraction improved.  Beta Blocker: Continue Metoprolol succinate (Toprol XL) 25 mg daily.  ACE Inibitor/ARB: Continue losartan (Cozaar) 25 mg daily. · Essential Hypertension: Controlled   Beta Blocker: Continue Metoprolol succinate (Toprol XL) 25 mg daily.  ACE Inibitor/ARB: Continue losartan (Cozaar) 25 mg daily.  Obstructive Sleep Apnea: He reported sleeping much better with using CPAP therapy. Encouraged him use CPAP machine every night. Finally, I recommended that he continue his other medications and follow up with you as previously scheduled. FOLLOW UP:   I told Mr. Gwendolyn Whittington to call my office if he had any problems, but otherwise told him to Return in about 6 months (around 3/29/2022). However, I would be happy to see him sooner should the need arise. Sincerely,  Arnulfo Sullivan MD, MS, F.A.C.C. [facility]) [facility], [address] [place] [address]  Phone: [phone], Fax: [phone]    I believe that the risk of significant morbidity and mortality related to the patient's current medical conditions are: Intermediate. The documentation recorded by the scribe, accurately and completely reflects the services I personally performed and the decisions made by me. Arnulfo Sullivan MD, F.A.C.C.  September 29, 2021

## 2021-09-29 NOTE — PATIENT INSTRUCTIONS
SURVEY:    You may be receiving a survey from [employer] regarding your visit today. Please complete the survey to enable us to provide the highest quality of care to you and your family. If you cannot score us a very good on any question, please call the office to discuss how we could have made your experience a very good one. Thank you.

## 2021-10-10 ENCOUNTER — APPOINTMENT (OUTPATIENT)
Dept: CT IMAGING | Age: 61
End: 2021-10-10
Payer: MEDICARE

## 2021-10-10 ENCOUNTER — APPOINTMENT (OUTPATIENT)
Dept: GENERAL RADIOLOGY | Age: 61
End: 2021-10-10
Payer: MEDICARE

## 2021-10-10 ENCOUNTER — HOSPITAL ENCOUNTER (EMERGENCY)
Age: 61
Discharge: ANOTHER HOSPITAL | End: 2021-10-11
Payer: MEDICARE

## 2021-10-10 DIAGNOSIS — K57.32 DIVERTICULITIS OF COLON: ICD-10-CM

## 2021-10-10 DIAGNOSIS — K57.20 COLONIC DIVERTICULAR ABSCESS: Primary | ICD-10-CM

## 2021-10-10 DIAGNOSIS — R10.30 LOWER ABDOMINAL PAIN: ICD-10-CM

## 2021-10-10 LAB
ABSOLUTE EOS #: 0.04 K/UL (ref 0–0.44)
ABSOLUTE IMMATURE GRANULOCYTE: 0.07 K/UL (ref 0–0.3)
ABSOLUTE LYMPH #: 1.55 K/UL (ref 1.1–3.7)
ABSOLUTE MONO #: 1.34 K/UL (ref 0.1–1.2)
ALBUMIN SERPL-MCNC: 3.7 G/DL (ref 3.5–5.2)
ALBUMIN/GLOBULIN RATIO: 0.9 (ref 1–2.5)
ALP BLD-CCNC: 96 U/L (ref 40–129)
ALT SERPL-CCNC: 7 U/L (ref 5–41)
ANION GAP SERPL CALCULATED.3IONS-SCNC: 13 MMOL/L (ref 9–17)
AST SERPL-CCNC: 9 U/L
BASOPHILS # BLD: 0 % (ref 0–2)
BASOPHILS ABSOLUTE: 0.07 K/UL (ref 0–0.2)
BILIRUB SERPL-MCNC: 0.34 MG/DL (ref 0.3–1.2)
BUN BLDV-MCNC: 21 MG/DL (ref 8–23)
BUN/CREAT BLD: 21 (ref 9–20)
CALCIUM SERPL-MCNC: 8.9 MG/DL (ref 8.6–10.4)
CHLORIDE BLD-SCNC: 100 MMOL/L (ref 98–107)
CO2: 24 MMOL/L (ref 20–31)
CREAT SERPL-MCNC: 1.02 MG/DL (ref 0.7–1.2)
DIFFERENTIAL TYPE: ABNORMAL
EOSINOPHILS RELATIVE PERCENT: 0 % (ref 1–4)
GFR AFRICAN AMERICAN: >60 ML/MIN
GFR NON-AFRICAN AMERICAN: >60 ML/MIN
GFR SERPL CREATININE-BSD FRML MDRD: ABNORMAL ML/MIN/{1.73_M2}
GFR SERPL CREATININE-BSD FRML MDRD: ABNORMAL ML/MIN/{1.73_M2}
GLUCOSE BLD-MCNC: 115 MG/DL (ref 70–99)
HCT VFR BLD CALC: 42.4 % (ref 40.7–50.3)
HEMOGLOBIN: 13.5 G/DL (ref 13–17)
IMMATURE GRANULOCYTES: 0 %
LACTIC ACID: 0.8 MMOL/L (ref 0.5–2.2)
LIPASE: 15 U/L (ref 13–60)
LYMPHOCYTES # BLD: 9 % (ref 24–43)
MCH RBC QN AUTO: 29.1 PG (ref 25.2–33.5)
MCHC RBC AUTO-ENTMCNC: 31.8 G/DL (ref 28.4–34.8)
MCV RBC AUTO: 91.4 FL (ref 82.6–102.9)
MONOCYTES # BLD: 8 % (ref 3–12)
NRBC AUTOMATED: 0 PER 100 WBC
PDW BLD-RTO: 12.5 % (ref 11.8–14.4)
PLATELET # BLD: 416 K/UL (ref 138–453)
PLATELET ESTIMATE: ABNORMAL
PMV BLD AUTO: 8.6 FL (ref 8.1–13.5)
POTASSIUM SERPL-SCNC: 4.1 MMOL/L (ref 3.7–5.3)
RBC # BLD: 4.64 M/UL (ref 4.21–5.77)
RBC # BLD: ABNORMAL 10*6/UL
SEG NEUTROPHILS: 83 % (ref 36–65)
SEGMENTED NEUTROPHILS ABSOLUTE COUNT: 13.47 K/UL (ref 1.5–8.1)
SODIUM BLD-SCNC: 137 MMOL/L (ref 135–144)
TOTAL PROTEIN: 8 G/DL (ref 6.4–8.3)
WBC # BLD: 16.5 K/UL (ref 3.5–11.3)
WBC # BLD: ABNORMAL 10*3/UL

## 2021-10-10 PROCEDURE — 2580000003 HC RX 258: Performed by: PHYSICIAN ASSISTANT

## 2021-10-10 PROCEDURE — 36415 COLL VENOUS BLD VENIPUNCTURE: CPT

## 2021-10-10 PROCEDURE — 81001 URINALYSIS AUTO W/SCOPE: CPT

## 2021-10-10 PROCEDURE — 74018 RADEX ABDOMEN 1 VIEW: CPT

## 2021-10-10 PROCEDURE — 93005 ELECTROCARDIOGRAM TRACING: CPT | Performed by: EMERGENCY MEDICINE

## 2021-10-10 PROCEDURE — 6360000004 HC RX CONTRAST MEDICATION: Performed by: PHYSICIAN ASSISTANT

## 2021-10-10 PROCEDURE — 99283 EMERGENCY DEPT VISIT LOW MDM: CPT

## 2021-10-10 PROCEDURE — 6360000002 HC RX W HCPCS: Performed by: EMERGENCY MEDICINE

## 2021-10-10 PROCEDURE — 83690 ASSAY OF LIPASE: CPT

## 2021-10-10 PROCEDURE — 83605 ASSAY OF LACTIC ACID: CPT

## 2021-10-10 PROCEDURE — 80053 COMPREHEN METABOLIC PANEL: CPT

## 2021-10-10 PROCEDURE — 85025 COMPLETE CBC W/AUTO DIFF WBC: CPT

## 2021-10-10 PROCEDURE — 74177 CT ABD & PELVIS W/CONTRAST: CPT

## 2021-10-10 RX ORDER — CIPROFLOXACIN 2 MG/ML
400 INJECTION, SOLUTION INTRAVENOUS ONCE
Status: COMPLETED | OUTPATIENT
Start: 2021-10-10 | End: 2021-10-11

## 2021-10-10 RX ORDER — 0.9 % SODIUM CHLORIDE 0.9 %
500 INTRAVENOUS SOLUTION INTRAVENOUS ONCE
Status: COMPLETED | OUTPATIENT
Start: 2021-10-10 | End: 2021-10-10

## 2021-10-10 RX ADMIN — CIPROFLOXACIN 400 MG: 2 INJECTION, SOLUTION INTRAVENOUS at 23:18

## 2021-10-10 RX ADMIN — SODIUM CHLORIDE 500 ML: 9 INJECTION, SOLUTION INTRAVENOUS at 21:59

## 2021-10-10 RX ADMIN — IOPAMIDOL 75 ML: 755 INJECTION, SOLUTION INTRAVENOUS at 22:23

## 2021-10-10 ASSESSMENT — PAIN DESCRIPTION - DESCRIPTORS: DESCRIPTORS: CRAMPING

## 2021-10-10 ASSESSMENT — PAIN DESCRIPTION - LOCATION: LOCATION: ABDOMEN

## 2021-10-10 ASSESSMENT — PAIN SCALES - GENERAL: PAINLEVEL_OUTOF10: 2

## 2021-10-10 ASSESSMENT — PAIN DESCRIPTION - PAIN TYPE: TYPE: ACUTE PAIN

## 2021-10-11 ENCOUNTER — HOSPITAL ENCOUNTER (INPATIENT)
Age: 61
LOS: 4 days | Discharge: HOME | DRG: 244 | End: 2021-10-15
Attending: INTERNAL MEDICINE | Admitting: FAMILY MEDICINE
Payer: MEDICARE

## 2021-10-11 VITALS
RESPIRATION RATE: 20 BRPM | TEMPERATURE: 97.5 F | OXYGEN SATURATION: 98 % | BODY MASS INDEX: 28 KG/M2 | SYSTOLIC BLOOD PRESSURE: 143 MMHG | WEIGHT: 200 LBS | DIASTOLIC BLOOD PRESSURE: 72 MMHG | HEART RATE: 83 BPM | HEIGHT: 71 IN

## 2021-10-11 DIAGNOSIS — K57.32 SIGMOID DIVERTICULITIS: Primary | ICD-10-CM

## 2021-10-11 PROBLEM — K57.20 COLONIC DIVERTICULAR ABSCESS: Status: ACTIVE | Noted: 2021-10-11

## 2021-10-11 PROBLEM — I48.20 CHRONIC ATRIAL FIBRILLATION (HCC): Status: ACTIVE | Noted: 2021-10-11

## 2021-10-11 LAB
-: ABNORMAL
AMORPHOUS: ABNORMAL
BACTERIA: ABNORMAL
BILIRUBIN URINE: ABNORMAL
CASTS UA: ABNORMAL /LPF
COLOR: YELLOW
COMMENT UA: ABNORMAL
CRYSTALS, UA: ABNORMAL /HPF
EKG ATRIAL RATE: 75 BPM
EKG P AXIS: 23 DEGREES
EKG P-R INTERVAL: 170 MS
EKG Q-T INTERVAL: 398 MS
EKG QRS DURATION: 90 MS
EKG QTC CALCULATION (BAZETT): 444 MS
EKG R AXIS: 13 DEGREES
EKG T AXIS: 1 DEGREES
EKG VENTRICULAR RATE: 75 BPM
EPITHELIAL CELLS UA: ABNORMAL /HPF (ref 0–5)
GLUCOSE URINE: NEGATIVE
KETONES, URINE: ABNORMAL
LEUKOCYTE ESTERASE, URINE: NEGATIVE
MUCUS: ABNORMAL
NITRITE, URINE: NEGATIVE
OTHER OBSERVATIONS UA: ABNORMAL
PH UA: 7 (ref 5–9)
PROTEIN UA: NEGATIVE
RBC UA: ABNORMAL /HPF (ref 0–2)
RENAL EPITHELIAL, UA: ABNORMAL /HPF
SPECIFIC GRAVITY UA: 1.01 (ref 1.01–1.02)
TRICHOMONAS: ABNORMAL
TURBIDITY: CLEAR
URINE HGB: NEGATIVE
UROBILINOGEN, URINE: NORMAL
WBC UA: ABNORMAL /HPF (ref 0–5)
YEAST: ABNORMAL

## 2021-10-11 PROCEDURE — 87077 CULTURE AEROBIC IDENTIFY: CPT

## 2021-10-11 PROCEDURE — 2500000003 HC RX 250 WO HCPCS: Performed by: NURSE PRACTITIONER

## 2021-10-11 PROCEDURE — 6360000002 HC RX W HCPCS: Performed by: EMERGENCY MEDICINE

## 2021-10-11 PROCEDURE — 93010 ELECTROCARDIOGRAM REPORT: CPT | Performed by: INTERNAL MEDICINE

## 2021-10-11 PROCEDURE — 87075 CULTR BACTERIA EXCEPT BLOOD: CPT

## 2021-10-11 PROCEDURE — 2500000003 HC RX 250 WO HCPCS: Performed by: EMERGENCY MEDICINE

## 2021-10-11 PROCEDURE — APPSS45 APP SPLIT SHARED TIME 31-45 MINUTES: Performed by: NURSE PRACTITIONER

## 2021-10-11 PROCEDURE — 6360000002 HC RX W HCPCS: Performed by: SURGERY

## 2021-10-11 PROCEDURE — 96365 THER/PROPH/DIAG IV INF INIT: CPT

## 2021-10-11 PROCEDURE — 1200000000 HC SEMI PRIVATE

## 2021-10-11 PROCEDURE — 87070 CULTURE OTHR SPECIMN AEROBIC: CPT

## 2021-10-11 PROCEDURE — 87186 SC STD MICRODIL/AGAR DIL: CPT

## 2021-10-11 PROCEDURE — 96375 TX/PRO/DX INJ NEW DRUG ADDON: CPT

## 2021-10-11 PROCEDURE — 96367 TX/PROPH/DG ADDL SEQ IV INF: CPT

## 2021-10-11 PROCEDURE — 87205 SMEAR GRAM STAIN: CPT

## 2021-10-11 PROCEDURE — 6370000000 HC RX 637 (ALT 250 FOR IP): Performed by: NURSE PRACTITIONER

## 2021-10-11 PROCEDURE — 99222 1ST HOSP IP/OBS MODERATE 55: CPT | Performed by: NURSE PRACTITIONER

## 2021-10-11 PROCEDURE — 6370000000 HC RX 637 (ALT 250 FOR IP): Performed by: FAMILY MEDICINE

## 2021-10-11 PROCEDURE — 6360000002 HC RX W HCPCS: Performed by: NURSE PRACTITIONER

## 2021-10-11 RX ORDER — SODIUM CHLORIDE 0.9 % (FLUSH) 0.9 %
10 SYRINGE (ML) INJECTION PRN
Status: DISCONTINUED | OUTPATIENT
Start: 2021-10-11 | End: 2021-10-15 | Stop reason: HOSPADM

## 2021-10-11 RX ORDER — ONDANSETRON 4 MG/1
4 TABLET, ORALLY DISINTEGRATING ORAL EVERY 8 HOURS PRN
Status: DISCONTINUED | OUTPATIENT
Start: 2021-10-11 | End: 2021-10-11 | Stop reason: SDUPTHER

## 2021-10-11 RX ORDER — SODIUM CHLORIDE 0.9 % (FLUSH) 0.9 %
5-40 SYRINGE (ML) INJECTION EVERY 12 HOURS SCHEDULED
Status: DISCONTINUED | OUTPATIENT
Start: 2021-10-11 | End: 2021-10-15 | Stop reason: HOSPADM

## 2021-10-11 RX ORDER — PROMETHAZINE HYDROCHLORIDE 25 MG/ML
12.5 INJECTION, SOLUTION INTRAMUSCULAR; INTRAVENOUS EVERY 6 HOURS PRN
Status: DISCONTINUED | OUTPATIENT
Start: 2021-10-11 | End: 2021-10-15 | Stop reason: HOSPADM

## 2021-10-11 RX ORDER — MORPHINE SULFATE 2 MG/ML
2 INJECTION, SOLUTION INTRAMUSCULAR; INTRAVENOUS ONCE
Status: COMPLETED | OUTPATIENT
Start: 2021-10-11 | End: 2021-10-11

## 2021-10-11 RX ORDER — SODIUM CHLORIDE 9 MG/ML
25 INJECTION, SOLUTION INTRAVENOUS PRN
Status: DISCONTINUED | OUTPATIENT
Start: 2021-10-11 | End: 2021-10-15 | Stop reason: HOSPADM

## 2021-10-11 RX ORDER — ONDANSETRON 2 MG/ML
4 INJECTION INTRAMUSCULAR; INTRAVENOUS EVERY 6 HOURS PRN
Status: DISCONTINUED | OUTPATIENT
Start: 2021-10-11 | End: 2021-10-11 | Stop reason: SDUPTHER

## 2021-10-11 RX ORDER — MORPHINE SULFATE 4 MG/ML
4 INJECTION, SOLUTION INTRAMUSCULAR; INTRAVENOUS EVERY 4 HOURS PRN
Status: DISCONTINUED | OUTPATIENT
Start: 2021-10-11 | End: 2021-10-15 | Stop reason: HOSPADM

## 2021-10-11 RX ORDER — ONDANSETRON 2 MG/ML
4 INJECTION INTRAMUSCULAR; INTRAVENOUS ONCE
Status: COMPLETED | OUTPATIENT
Start: 2021-10-11 | End: 2021-10-11

## 2021-10-11 RX ORDER — SILDENAFIL 50 MG/1
1 TABLET, FILM COATED ORAL PRN
COMMUNITY

## 2021-10-11 RX ORDER — POTASSIUM CHLORIDE 7.45 MG/ML
10 INJECTION INTRAVENOUS PRN
Status: DISCONTINUED | OUTPATIENT
Start: 2021-10-11 | End: 2021-10-15 | Stop reason: HOSPADM

## 2021-10-11 RX ORDER — METOPROLOL SUCCINATE 25 MG/1
25 TABLET, EXTENDED RELEASE ORAL DAILY
Status: DISCONTINUED | OUTPATIENT
Start: 2021-10-11 | End: 2021-10-15 | Stop reason: HOSPADM

## 2021-10-11 RX ORDER — POTASSIUM CHLORIDE 20 MEQ/1
40 TABLET, EXTENDED RELEASE ORAL PRN
Status: DISCONTINUED | OUTPATIENT
Start: 2021-10-11 | End: 2021-10-15 | Stop reason: HOSPADM

## 2021-10-11 RX ORDER — POLYETHYLENE GLYCOL 3350 17 G/17G
17 POWDER, FOR SOLUTION ORAL DAILY PRN
Status: DISCONTINUED | OUTPATIENT
Start: 2021-10-11 | End: 2021-10-15 | Stop reason: HOSPADM

## 2021-10-11 RX ORDER — PROMETHAZINE HYDROCHLORIDE 25 MG/1
25 TABLET ORAL EVERY 6 HOURS PRN
Status: DISCONTINUED | OUTPATIENT
Start: 2021-10-11 | End: 2021-10-15 | Stop reason: HOSPADM

## 2021-10-11 RX ORDER — ACETAMINOPHEN 325 MG/1
650 TABLET ORAL EVERY 6 HOURS PRN
Status: DISCONTINUED | OUTPATIENT
Start: 2021-10-11 | End: 2021-10-15 | Stop reason: HOSPADM

## 2021-10-11 RX ORDER — MAGNESIUM SULFATE 1 G/100ML
1000 INJECTION INTRAVENOUS PRN
Status: DISCONTINUED | OUTPATIENT
Start: 2021-10-11 | End: 2021-10-15 | Stop reason: HOSPADM

## 2021-10-11 RX ORDER — MORPHINE SULFATE 2 MG/ML
2 INJECTION, SOLUTION INTRAMUSCULAR; INTRAVENOUS EVERY 4 HOURS PRN
Status: DISCONTINUED | OUTPATIENT
Start: 2021-10-11 | End: 2021-10-15 | Stop reason: HOSPADM

## 2021-10-11 RX ORDER — CIPROFLOXACIN 2 MG/ML
400 INJECTION, SOLUTION INTRAVENOUS EVERY 12 HOURS
Status: DISCONTINUED | OUTPATIENT
Start: 2021-10-11 | End: 2021-10-15

## 2021-10-11 RX ORDER — PROMETHAZINE HYDROCHLORIDE 25 MG/ML
12.5 INJECTION, SOLUTION INTRAMUSCULAR; INTRAVENOUS EVERY 6 HOURS PRN
Status: DISCONTINUED | OUTPATIENT
Start: 2021-10-11 | End: 2021-10-11

## 2021-10-11 RX ORDER — METOPROLOL TARTRATE 5 MG/5ML
5 INJECTION INTRAVENOUS EVERY 4 HOURS PRN
Status: DISCONTINUED | OUTPATIENT
Start: 2021-10-11 | End: 2021-10-15 | Stop reason: HOSPADM

## 2021-10-11 RX ORDER — LOSARTAN POTASSIUM 25 MG/1
25 TABLET ORAL DAILY
Status: DISCONTINUED | OUTPATIENT
Start: 2021-10-11 | End: 2021-10-15 | Stop reason: HOSPADM

## 2021-10-11 RX ORDER — ACETAMINOPHEN 650 MG/1
650 SUPPOSITORY RECTAL EVERY 6 HOURS PRN
Status: DISCONTINUED | OUTPATIENT
Start: 2021-10-11 | End: 2021-10-15 | Stop reason: HOSPADM

## 2021-10-11 RX ORDER — DEXTROSE, SODIUM CHLORIDE, AND POTASSIUM CHLORIDE 5; .45; .15 G/100ML; G/100ML; G/100ML
INJECTION INTRAVENOUS CONTINUOUS
Status: DISCONTINUED | OUTPATIENT
Start: 2021-10-11 | End: 2021-10-15 | Stop reason: HOSPADM

## 2021-10-11 RX ADMIN — ENOXAPARIN SODIUM 40 MG: 40 INJECTION SUBCUTANEOUS at 09:50

## 2021-10-11 RX ADMIN — CIPROFLOXACIN 400 MG: 2 INJECTION, SOLUTION INTRAVENOUS at 23:39

## 2021-10-11 RX ADMIN — POTASSIUM CHLORIDE, DEXTROSE MONOHYDRATE AND SODIUM CHLORIDE: 150; 5; 450 INJECTION, SOLUTION INTRAVENOUS at 07:34

## 2021-10-11 RX ADMIN — CIPROFLOXACIN 400 MG: 2 INJECTION, SOLUTION INTRAVENOUS at 11:47

## 2021-10-11 RX ADMIN — ONDANSETRON 4 MG: 2 INJECTION INTRAMUSCULAR; INTRAVENOUS at 02:50

## 2021-10-11 RX ADMIN — MORPHINE SULFATE 2 MG: 2 INJECTION, SOLUTION INTRAMUSCULAR; INTRAVENOUS at 02:52

## 2021-10-11 RX ADMIN — METRONIDAZOLE 500 MG: 500 INJECTION, SOLUTION INTRAVENOUS at 09:50

## 2021-10-11 RX ADMIN — MORPHINE SULFATE 4 MG: 4 INJECTION, SOLUTION INTRAMUSCULAR; INTRAVENOUS at 07:32

## 2021-10-11 RX ADMIN — POTASSIUM CHLORIDE, DEXTROSE MONOHYDRATE AND SODIUM CHLORIDE: 150; 5; 450 INJECTION, SOLUTION INTRAVENOUS at 17:19

## 2021-10-11 RX ADMIN — METRONIDAZOLE 500 MG: 500 INJECTION, SOLUTION INTRAVENOUS at 17:19

## 2021-10-11 RX ADMIN — PROMETHAZINE HYDROCHLORIDE 12.5 MG: 25 INJECTION INTRAMUSCULAR; INTRAVENOUS at 13:54

## 2021-10-11 RX ADMIN — METRONIDAZOLE 500 MG: 500 INJECTION, SOLUTION INTRAVENOUS at 00:36

## 2021-10-11 RX ADMIN — ONDANSETRON 4 MG: 4 TABLET, ORALLY DISINTEGRATING ORAL at 11:47

## 2021-10-11 RX ADMIN — METOPROLOL SUCCINATE 25 MG: 25 TABLET, EXTENDED RELEASE ORAL at 09:50

## 2021-10-11 RX ADMIN — LOSARTAN POTASSIUM 25 MG: 25 TABLET, FILM COATED ORAL at 09:50

## 2021-10-11 ASSESSMENT — ENCOUNTER SYMPTOMS
PHOTOPHOBIA: 0
SINUS PAIN: 0
NAUSEA: 1
DIARRHEA: 1
WHEEZING: 0
SINUS PRESSURE: 0
EYE REDNESS: 0
ABDOMINAL PAIN: 1
SHORTNESS OF BREATH: 0

## 2021-10-11 ASSESSMENT — PAIN DESCRIPTION - LOCATION
LOCATION: ABDOMEN
LOCATION: ABDOMEN

## 2021-10-11 ASSESSMENT — PAIN - FUNCTIONAL ASSESSMENT: PAIN_FUNCTIONAL_ASSESSMENT: ACTIVITIES ARE NOT PREVENTED

## 2021-10-11 ASSESSMENT — PAIN SCALES - GENERAL
PAINLEVEL_OUTOF10: 2
PAINLEVEL_OUTOF10: 7
PAINLEVEL_OUTOF10: 0
PAINLEVEL_OUTOF10: 5
PAINLEVEL_OUTOF10: 5

## 2021-10-11 ASSESSMENT — PAIN DESCRIPTION - DESCRIPTORS: DESCRIPTORS: ACHING;DISCOMFORT

## 2021-10-11 ASSESSMENT — PAIN DESCRIPTION - ONSET: ONSET: ON-GOING

## 2021-10-11 ASSESSMENT — PAIN DESCRIPTION - FREQUENCY: FREQUENCY: INTERMITTENT

## 2021-10-11 ASSESSMENT — PAIN DESCRIPTION - ORIENTATION: ORIENTATION: LOWER

## 2021-10-11 ASSESSMENT — PAIN DESCRIPTION - PROGRESSION: CLINICAL_PROGRESSION: NOT CHANGED

## 2021-10-11 ASSESSMENT — PAIN DESCRIPTION - PAIN TYPE
TYPE: ACUTE PAIN
TYPE: ACUTE PAIN

## 2021-10-11 NOTE — CONSULTS
General Surgery:  Consult Note        PATIENT NAME: Sharonda Azar OF BIRTH: 1960    ADMISSION DATE: 10/11/2021  5:49 AM     Admitting Provider: Dr. Alli Shipman Physician: Angle Bills DATE: 10/11/2021    Chief Complaint:  Abdominal pain  Consult Regarding:  Acute Diverticulitis     HISTORY OF PRESENT ILLNESS:  The patient is a 56 y.o. male with history of hypertension, chronic constipation and atrial fibrillation who was admitted on 10/11/2021 with abdominal pain associated with nausea last 4 days. Patient initially thought his constipation was acting up and took mag citrate. Pain was not relieved. Patient had associated chills and decreased appetite. Last bowel movement was yesterday in the [place] emergency department which was hard in nature. Patient denies any urinary symptoms including pneumaturia or dysuria. Medical history includes atrial fibrillation which was diagnosed in December. He was cardioverted at that time and placed on Xarelto. Last dose of Xarelto taken at 9 AM yesterday. He has history of hypertension and takes losartan and metoprolol. Patient has not had any abdominal surgeries. No history of colonoscopy due to issues with insurance. Denies any family history of ulcerative colitis or Crohn's disease. He is a non-smoker and does not drink alcohol. Patient works as a  and lives independently in a mobile home.       Past Medical History:        Diagnosis Date    A-fib ([address]     Abnormal patient-activated cardiac event monitor 02/22/2021    CAM 13 days 8 hrs Baseline sinus ave HR of 63 bpm rang between 47 adn 109 bpm  Rare PAC and PVC with 2 runs of ectopic atrial tachy longest fastest was 7 beats hr 110 bpm No VT runs no AFib    H/O echocardiogram 03/08/2021    EF 45-50% Mild increased LV wallthickness LA is mod dilated 34-39 with LA volume index of 35 ml/m2 Mild mitral and tricuspid regurg Midl diastolic dysfunction seen Aortic root is mod dilated >4.5cm when corrected for body suface area    History of cardioversion     Hyperlipidemia     Hypertension     Sleep apnea     Home CPAP       Past Surgical History:    No past surgical history on file. Medications Prior to Admission:   Medications Prior to Admission: Sildenafil Citrate (VIAGRA PO), Take by mouth  rivaroxaban (XARELTO) 20 MG TABS tablet, Take 1 tablet by mouth daily (with breakfast)  metoprolol succinate (TOPROL XL) 25 MG extended release tablet, Take 1 tablet by mouth daily  losartan (COZAAR) 25 MG tablet, Take 1 tablet by mouth daily  hydrOXYzine (ATARAX) 50 MG tablet, take 1 tablet by mouth at bedtime if needed for sleep  loratadine (CLARITIN) 10 MG tablet, take 1 tablet by mouth daily if needed for allergies  fluticasone (FLONASE) 50 MCG/ACT nasal spray, instill 2 sprays into each nostril once daily    Allergies:  Pcn [penicillins]    Social History:   Social History     Socioeconomic History    Marital status: Single     Spouse name: Not on file    Number of children: Not on file    Years of education: Not on file    Highest education level: Not on file   Occupational History    Not on file   Tobacco Use    Smoking status: Never Smoker    Smokeless tobacco: Never Used   Vaping Use    Vaping Use: Never used   Substance and Sexual Activity    Alcohol use: Yes     Comment: rarely    Drug use: Never    Sexual activity: Not on file   Other Topics Concern    Not on file   Social History Narrative    Not on file     Social Determinants of Health     Financial Resource Strain:     Difficulty of Paying Living Expenses:    Food Insecurity:     Worried About Running Out of Food in the Last Year:     Ran Out of Food in the Last Year:    Transportation Needs:     Lack of Transportation (Medical):      Lack of Transportation (Non-Medical):    Physical Activity:     Days of Exercise per Week:     Minutes of Exercise per Session:    Stress:     Feeling of Stress :    Social Connections:     Frequency of Communication with Friends and Family:     Frequency of Social Gatherings with Friends and Family:     Attends Sabianism Services:     Active Member of Clubs or Organizations:     Attends Club or Organization Meetings:     Marital Status:    Intimate Partner Violence:     Fear of Current or Ex-Partner:     Emotionally Abused:     Physically Abused:     Sexually Abused:        Family History:       Problem Relation Age of Onset    Coronary Art Dis Mother     Coronary Art Dis Father        REVIEW OF SYSTEMS:    CONSTITUTIONAL: Positive for fatigue chills  HEENT: Denies rhinorrhea, dysphagia, odynphagia. CARDIOVASCULAR: Denies history of MI, recent chest pain. RESPIRATORY: Denies recent history of shortness of breath or history of PE. GASTROINTESTINAL: Positive history of constipation, abdominal pain and nausea  GENITOURINARY: Denies increased frequency or dysuria. HEMATOLOGIC/LYMPHATIC: Denies history of anemia or DVTs. ENDOCRINE: Denies history of thyroid problems or diabetes. NEURO: Denies history of CVA, TIA. Review of systems negative unless listed above. PHYSICAL EXAM:    VITALS:  /79   Pulse 79   Temp 99.1 °F (37.3 °C) (Oral)   Resp 17   Ht 5' 11\" (1.803 m)   Wt 200 lb (90.7 kg)   SpO2 100%   BMI 27.89 kg/m²   INTAKE/OUTPUT:   No intake or output data in the 24 hours ending 10/11/21 0941    CONSTITUTIONAL:  awake, alert, not distressed and normal weight  HEENT: Normocephalic/atraumatic, without obvious abnormality. NECK:  Supple, symmetrical, trachea midline   CARDIOVASCULAR: Regular rate and rhythm   LUNGS: Clear to auscultation bilaterally without evidence of wheezing or tachypnea. ABDOMEN: Soft, nondistended, focally tender to palpation in left lower quadrant and suprapubic area, no rebound tenderness or guarding. MUSCULOSKELETAL: Muscle strength intact in all extremities bilaterally.   NEUROLOGIC:  Gross motor intact without focal Flagyl  4. Diet: Okay for clear liquid diet today  5. Continue to monitor with serial abdominal examinations.       Electronically signed by Jean Paul Cuevas MD  on 10/11/2021 at 9:41 AM

## 2021-10-11 NOTE — CARE COORDINATION
Case Management Initial Discharge Plan  Mychal Hood,             Met with:patient to discuss discharge plans. Information verified: address, contacts, phone number, , insurance Yes  PCP: JASPREET Aguilar NP  Date of last visit:     Insurance Provider: [employer]    Discharge Planning    Living Arrangements:  Alone   Support Systems:  Family Members    Home has 1 story mobile home  3 stairs to climb to get into front door, 0 stairs to climb to reach second floor  Location of bedroom/bathroom in home  - main floor    Patient able to perform ADL's:Independent    Current Services (outpatient & in home) none  DME equipment: CPAP  DME provider: [facility] in [facility] [address] [facility] [place]    Potential Assistance Needed:  N/A    Patient agreeable to home care: No  [place] of choice provided:  n/a    Prior SNF/Rehab Placement and Facility: No  Agreeable to SNF/Rehab: No  [place] of choice provided: n/a   Evaluation: n/a    Expected Discharge date:  10/14/21  Patient expects to be discharged to:   home  Follow Up Appointment: Best Day/ Time:      Transportation provider: friend  Transportation arrangements needed for discharge: No    Readmission Risk              Risk of Unplanned Readmission:  11             Does patient have a readmission risk score greater than 14?: No  If yes, follow-up appointment must be made within 7 days of discharge. Goal of Care:       Discharge Plan: Met with patient at bedside. Lives alone in mobile home, independent and drives. Transferred this AM from [facility]. Has been having abd pain and pt stated thought he had a bowel blockage. Admitted for complicated diverticulitis with pericolonic abscess. Pt is on Xarelto for history of Afib and is scheduled for aspiration of intra abd abscess 10-12. Currently on IV Cipro and Flagyl. Surgery consulted.   Continue to follow plan of care and pt is agreeable to home care if necessary.              Electronically signed by Daphne Gilman RN on 10/11/21 at 10:20 AM EDT

## 2021-10-11 NOTE — ED PROVIDER NOTES
[facility] ED  eMERGENCY dEPARTMENT eNCOUnter      Pt Name: Ewa Hewitt  MRN: [id number]  [place] 1960  Date of evaluation: 10/10/21      CHIEF COMPLAINT       Chief Complaint   Patient presents with    Constipation     X 4 days         HISTORY OF PRESENT ILLNESS    Ewa Hewitt is a 56 y.o. male who presents complaining of abdomen abdominal pain and he feels like he is constipated. The history is provided by the patient. Abdominal Pain  Pain location:  LLQ and RLQ  Pain quality: aching    Pain radiates to:  Does not radiate  Pain severity:  Moderate  Onset quality:  Sudden  Duration:  3 days  Timing:  Intermittent  Progression:  Waxing and waning  Chronicity:  New  Context comment:  States he feels like he has to have a bowel movement but he is moving his bowels but not much is coming out. He has not had any fever chills. Relieved by:  Nothing  Worsened by:  Nothing  Ineffective treatments:  None tried  Associated symptoms: no chills, no diarrhea, no fever, no hematemesis, no hematochezia, no melena and no vomiting        REVIEW OF SYSTEMS       Review of Systems   Constitutional: Negative for chills and fever. Gastrointestinal: Positive for abdominal pain. Negative for diarrhea, hematemesis, hematochezia, melena and vomiting. All other systems reviewed and are negative.       PAST MEDICAL HISTORY     Past Medical History:   Diagnosis Date    A-fib ([facility] [address]     Abnormal patient-activated cardiac event monitor 02/22/2021    CAM 13 days 8 hrs Baseline sinus ave HR of 63 bpm rang between 47 adn 109 bpm  Rare PAC and PVC with 2 runs of ectopic atrial tachy longest fastest was 7 beats hr 110 bpm No VT runs no AFib    H/O echocardiogram 03/08/2021    EF 45-50% Mild increased LV wallthickness LA is mod dilated 34-39 with LA volume index of 35 ml/m2 Mild mitral and tricuspid regurg Midl diastolic dysfunction seen Aortic root is mod dilated >4.5cm when corrected for body suface area    History of cardioversion     Hyperlipidemia     Hypertension     Sleep apnea     Home CPAP       SURGICAL HISTORY       Past Surgical History:   Procedure Laterality Date    CT ABSCESS DRAIN SUBCUTANEOUS  10/12/2021    CT ABSCESS DRAIN SUBCUTANEOUS 10/12/2021 [facility] CT SCAN       CURRENT MEDICATIONS       Discharge Medication List as of 10/11/2021  4:06 AM      CONTINUE these medications which have NOT CHANGED    Details   hydrOXYzine (ATARAX) 50 MG tablet take 1 tablet by mouth at bedtime if needed for sleepHistorical Med      rivaroxaban (XARELTO) 20 MG TABS tablet Take 1 tablet by mouth daily (with breakfast), Disp-90 tablet, R-3Normal      metoprolol succinate (TOPROL XL) 25 MG extended release tablet Take 1 tablet by mouth daily, Disp-90 tablet, R-3Normal      losartan (COZAAR) 25 MG tablet Take 1 tablet by mouth daily, Disp-90 tablet,R-3Normal      loratadine (CLARITIN) 10 MG tablet take 1 tablet by mouth daily if needed for allergiesHistorical Med      fluticasone (FLONASE) 50 MCG/ACT nasal spray instill 2 sprays into each nostril once dailyHistorical Med             ALLERGIES     is allergic to pcn [penicillins]. FAMILY HISTORY     He indicated that the status of his mother is unknown. He indicated that the status of his father is unknown. SOCIAL HISTORY      reports that he has never smoked. He has never used smokeless tobacco. He reports current alcohol use. He reports that he does not use drugs. PHYSICAL EXAM     INITIAL VITALS: BP (!) 143/72   Pulse 83   Temp 97.5 °F (36.4 °C) (Tympanic)   Resp 20   Ht 5' 11\" (1.803 m)   Wt 200 lb (90.7 kg)   SpO2 98%   BMI 27.89 kg/m²      Physical Exam  Vitals and nursing note reviewed. Constitutional:       Appearance: He is well-developed. HENT:      Head: Normocephalic and atraumatic. Eyes:      Pupils: Pupils are equal, round, and reactive to light. Cardiovascular:      Rate and Rhythm: Normal rate and regular rhythm.       Heart sounds: Normal heart sounds. No murmur heard. Pulmonary:      Effort: Pulmonary effort is normal.      Breath sounds: Normal breath sounds. Abdominal:      General: Bowel sounds are normal.      Palpations: Abdomen is soft. There is no mass. Tenderness: There is abdominal tenderness. There is guarding. Hernia: No hernia is present. Musculoskeletal:         General: Normal range of motion. Cervical back: Normal range of motion. Skin:     General: Skin is warm and dry. Capillary Refill: Capillary refill takes less than 2 seconds. Neurological:      Mental Status: He is alert and oriented to person, place, and time. MEDICAL DECISION MAKING:     Patient with  What feels to be constipation x-ray shows no constipation shows nonspecific bowel gas pattern. While in the emergency department we will check some labs will do CT scan of the abdomen pelvis I did offer him some pain medication he declines at this time. His signs and symptoms are consistent with inflammation of the lower intestine. Will await CT scan results. Will likely sign the patient out to Dr. Jed Bumpers who is the attending physician on this evening. DIAGNOSTIC RESULTS     EKG: All EKG's are interpreted by the Emergency Department Physician who either signs or Co-signs this chart in the absence of acardiologist.        RADIOLOGY:Allplain film, CT, MRI, and formal ultrasound images (except ED bedside ultrasound) are read by the radiologist and the images and interpretations are directly viewed by the emergency physician. LABS:All lab results were reviewed by myself, and all abnormals are listed below.   Labs Reviewed   CBC WITH AUTO DIFFERENTIAL - Abnormal; Notable for the following components:       Result Value    WBC 16.5 (*)     Seg Neutrophils 83 (*)     Lymphocytes 9 (*)     Eosinophils % 0 (*)     Segs Absolute 13.47 (*)     Absolute Mono # 1.34 (*)     All other components within normal limits   COMPREHENSIVE METABOLIC PANEL W/ REFLEX TO MG FOR LOW K - Abnormal; Notable for the following components:    Glucose 115 (*)     Bun/Cre Ratio 21 (*)     Albumin/Globulin Ratio 0.9 (*)     All other components within normal limits   URINE RT REFLEX TO CULTURE - Abnormal; Notable for the following components:    Bilirubin Urine SMALL (*)     Ketones, Urine 2+ (*)     All other components within normal limits   MICROSCOPIC URINALYSIS - Abnormal; Notable for the following components:    Bacteria, UA TRACE (*)     Amorphous, UA 1+ (*)     All other components within normal limits   LIPASE   LACTIC ACID         EMERGENCY DEPARTMENT COURSE:   Vitals:    Vitals:    10/11/21 0300 10/11/21 0315 10/11/21 0330 10/11/21 0345   BP: 112/80 (!) 140/75 (!) 140/75 (!) 143/72   Pulse:       Resp:       Temp:       TempSrc:       SpO2: 99% 97% 98% 98%   Weight:       Height:           The patient was given the following medications while in the emergency department:  Orders Placed This Encounter   Medications    0.9 % sodium chloride bolus    iopamidol (ISOVUE-370) 76 % injection 75 mL    ciprofloxacin (CIPRO) IVPB 400 mg     Order Specific Question:   Antimicrobial Indications     Answer:   Intra-Abdominal Infection     Order Specific Question:   Suspected Organism(s)     Answer:   diverticulitis    metronidazole (FLAGYL) 500 mg in NaCl 100 mL IVPB premix     Order Specific Question:   Antimicrobial Indications     Answer:   Intra-Abdominal Infection     Order Specific Question:   Suspected Organism(s)     Answer:   diverticulitis    ondansetron (ZOFRAN) injection 4 mg    morphine (PF) injection 2 mg       -------------------------      CRITICAL CARE:     CONSULTS:  None    PROCEDURES:  Procedures    FINAL IMPRESSION      1. Colonic diverticular abscess    2. Diverticulitis of colon    3.  Lower abdominal pain          DISPOSITION/PLAN   DISPOSITION        PATIENT REFERREDTO:  JASPREET Hein - SUNITA  [address] [place] [id number]  [phone]    Schedule an appointment as soon as possible for a visit in 2 days      [facility] ED  [address] [facility]  [phone]    If symptoms worsen      DISCHARGEMEDICATIONS:  Discharge Medication List as of 10/11/2021  4:06 AM          (Please note that portions of this note were completed with a voice recognition program.  Efforts were made to edit thedictations but occasionally words are mis-transcribed.)    JOHNNIE Smith PA-C  10/22/21 [address] JOHNNIE [address]bolledo  10/22/21 1050

## 2021-10-11 NOTE — H&P
[facility]  Office: [address], , Author Solo DO, Danyelle Hernandez DO, Samira Perry DO, Dipti Hamilton MD, Seth Parr MD, Jason Guzman MD, Carolan Schilder, MD, Tammy Freeman MD, Estela James MD, Marcus Armstrong MD, Jerrod Angelo DO, Hillary Cagle DO, Maegan Brizuela MD,  Chandrika Rodriguez DO, Ashly Meyer MD, Sean Membreno MD, Tate Sanchez MD, Heriberto Chau MD, Rosa Fontaine MD, Aldo Ray MD, Bong Kelly, [facility], [facility], CNP, Vinnie Earl, CNP, Mika Singer, CNS, Brian Saleh, CNP, Spring Kelley, CNP, Marcial Yoon, CNP, Elenita Stark, CNP, Yared France, CNP, Uriel Trevino PA-C, Edwin Krishnan, [facility], Reggie Jluien, CNP, Lysbeth Angelucci, CNP, Vita Jenkins, CNP, Vahid Chapin, CNP, Keara Silverman, CNP, Sue Herring, CNP, Neisha Arenas, [facility]    HISTORY AND PHYSICAL EXAMINATION            Date:   10/11/2021  Patient name:  Ewa Hewitt  Date of admission:  10/11/2021  5:49 AM  MRN:   [id number]  Account:  [de-identified]  YOB: 1960  PCP:    JASPREET Grewal NP  Room:   2003/2003-02  Code Status:    Full Code    Chief Complaint:     Abdominal pain    History Obtained From:     patient    History of Present Illness:     Ewa Hewitt is a 56 y.o. Non- / non  male who presents with abdominal pain and is admitted to the hospital for the management of Colonic diverticular abscess. Patient reported to outside facility for abdominal pain. Patient reports that approximately 48 hours ago he developed with moderate left lower quadrant abdominal discomfort and nausea. Patient reports a diarrhea developed shortly thereafter. Patient attempted rest and hydration at home with no relief of symptoms. Patient reported to the emergency department where extensive work-up was completed and a diverticular abscess was identified consistent with diverticulitis. Patient was found to have an elevated white count as well. General surgery and interventional radiology recommended and these were not originally available at the outlying facility therefore he was transported to [facility]. At the time my exam patient is resting comfortably. He reports that his fatigue and diarrhea have resolved with hydration. Patient continues to endorse mild left lower quadrant abdominal discomfort. Patient has a history of persistent A. fib and therefore is on Xarelto. As a result patient cannot undergo abscess drainage until 10/12. Past Medical History:     Past Medical History:   Diagnosis Date    A-fib ([address]     Abnormal patient-activated cardiac event monitor 02/22/2021    CAM 13 days 8 hrs Baseline sinus ave HR of 63 bpm rang between 47 adn 109 bpm  Rare PAC and PVC with 2 runs of ectopic atrial tachy longest fastest was 7 beats hr 110 bpm No VT runs no AFib    H/O echocardiogram 03/08/2021    EF 45-50% Mild increased LV wallthickness LA is mod dilated 34-39 with LA volume index of 35 ml/m2 Mild mitral and tricuspid regurg Midl diastolic dysfunction seen Aortic root is mod dilated >4.5cm when corrected for body suface area    History of cardioversion     Hyperlipidemia     Hypertension     Sleep apnea     Home CPAP        Past Surgical History:     No past surgical history on file. Medications Prior to Admission:     Prior to Admission medications    Medication Sig Start Date End Date Taking?  Authorizing Provider   Sildenafil Citrate (VIAGRA PO) Take by mouth   Yes Historical Provider, MD   rivaroxaban (XARELTO) 20 MG TABS tablet Take 1 tablet by mouth daily (with breakfast) 9/29/21  Yes Felix Calvin MD   metoprolol succinate (TOPROL XL) 25 MG extended release tablet Take 1 tablet by mouth daily 1/22/21  Yes Felix Calvin MD   losartan (COZAAR) 25 MG tablet Take 1 tablet by mouth daily 12/7/20  Yes Felix Calvin MD   hydrOXYzine (ATARAX) 50 MG tablet take 1 tablet hours. No intake or output data in the 24 hours ending 10/11/21 1149    Physical Exam  Constitutional:       General: He is not in acute distress. Appearance: Normal appearance. He is normal weight. He is not toxic-appearing. HENT:      Head: Normocephalic and atraumatic. Nose: Nose normal.      Mouth/Throat:      Mouth: Mucous membranes are dry. Eyes:      Extraocular Movements: Extraocular movements intact. Pupils: Pupils are equal, round, and reactive to light. Cardiovascular:      Rate and Rhythm: Normal rate and regular rhythm. Pulses: Normal pulses. Heart sounds: Normal heart sounds. No murmur heard. Pulmonary:      Effort: Pulmonary effort is normal. No respiratory distress. Breath sounds: Normal breath sounds. Abdominal:      General: Abdomen is flat. Bowel sounds are normal. There is distension. Palpations: Abdomen is soft. Tenderness: There is abdominal tenderness (LLQ). There is guarding. Musculoskeletal:         General: Normal range of motion. Cervical back: Normal range of motion and neck supple. Skin:     General: Skin is warm and dry. Capillary Refill: Capillary refill takes less than 2 seconds. Neurological:      General: No focal deficit present. Mental Status: He is alert and oriented to person, place, and time.    Psychiatric:         Mood and Affect: Mood normal.         Behavior: Behavior normal.         Investigations:      Laboratory Testing:  Recent Results (from the past 24 hour(s))   CBC Auto Differential    Collection Time: 10/10/21  9:50 PM   Result Value Ref Range    WBC 16.5 (H) 3.5 - 11.3 k/uL    RBC 4.64 4.21 - 5.77 m/uL    Hemoglobin 13.5 13.0 - 17.0 g/dL    Hematocrit 42.4 40.7 - 50.3 %    MCV 91.4 82.6 - 102.9 fL    MCH 29.1 25.2 - 33.5 pg    MCHC 31.8 28.4 - 34.8 g/dL    RDW 12.5 11.8 - 14.4 %    Platelets 052 829 - 743 k/uL    MPV 8.6 8.1 - 13.5 fL    NRBC Automated 0.0 0.0 per 100 WBC    Differential Type NOT REPORTED     Seg Neutrophils 83 (H) 36 - 65 %    Lymphocytes 9 (L) 24 - 43 %    Monocytes 8 3 - 12 %    Eosinophils % 0 (L) 1 - 4 %    Basophils 0 0 - 2 %    Immature Granulocytes 0 0 %    Segs Absolute 13.47 (H) 1.50 - 8.10 k/uL    Absolute Lymph # 1.55 1.10 - 3.70 k/uL    Absolute Mono # 1.34 (H) 0.10 - 1.20 k/uL    Absolute Eos # 0.04 0.00 - 0.44 k/uL    Basophils Absolute 0.07 0.00 - 0.20 k/uL    Absolute Immature Granulocyte 0.07 0.00 - 0.30 k/uL    WBC Morphology NOT REPORTED     RBC Morphology NOT REPORTED     Platelet Estimate NOT REPORTED    Comprehensive Metabolic Panel w/ Reflex to MG    Collection Time: 10/10/21  9:50 PM   Result Value Ref Range    Glucose 115 (H) 70 - 99 mg/dL    BUN 21 8 - 23 mg/dL    CREATININE 1.02 0.70 - 1.20 mg/dL    Bun/Cre Ratio 21 (H) 9 - 20    Calcium 8.9 8.6 - 10.4 mg/dL    Sodium 137 135 - 144 mmol/L    Potassium 4.1 3.7 - 5.3 mmol/L    Chloride 100 98 - 107 mmol/L    CO2 24 20 - 31 mmol/L    Anion Gap 13 9 - 17 mmol/L    Alkaline Phosphatase 96 40 - 129 U/L    ALT 7 5 - 41 U/L    AST 9 <40 U/L    Total Bilirubin 0.34 0.3 - 1.2 mg/dL    Total Protein 8.0 6.4 - 8.3 g/dL    Albumin 3.7 3.5 - 5.2 g/dL    Albumin/Globulin Ratio 0.9 (L) 1.0 - 2.5    GFR Non-African American >60 >60 mL/min    GFR African American >60 >60 mL/min    GFR Comment          GFR Staging         Lipase    Collection Time: 10/10/21  9:50 PM   Result Value Ref Range    Lipase 15 13 - 60 U/L   Lactic Acid    Collection Time: 10/10/21  9:50 PM   Result Value Ref Range    Lactic Acid 0.8 0.5 - 2.2 mmol/L   EKG 12 Lead    Collection Time: 10/10/21 11:38 PM   Result Value Ref Range    Ventricular Rate 75 BPM    Atrial Rate 75 BPM    P-R Interval 170 ms    QRS Duration 90 ms    Q-T Interval 398 ms    QTc Calculation (Bazett) 444 ms    P Axis 23 degrees    R Axis 13 degrees    T Axis 1 degrees       Imaging/Diagnostics:  XR ABDOMEN (KUB) (SINGLE AP VIEW)    Result Date: 10/10/2021  1.  Nonspecific nonobstructive bowel gas pattern 2. No plain film findings to suggest constipation     CT ABDOMEN PELVIS W IV CONTRAST Additional Contrast? None    Result Date: 10/10/2021  1. Complicated acute sigmoid diverticulitis, with a 4 x 4.1 cm pericolonic abscess, abutting the dome of the urinary bladder, placing the patient at risk for fistula formation between the bladder and sigmoid diverticulitis. No free air is present. 2. Cholelithiasis, without evidence of cholecystitis       Assessment :      Hospital Problems         Last Modified POA    * (Principal) Colonic diverticular abscess 10/11/2021 Yes    Hypertension 10/11/2021 Yes    Chronic atrial fibrillation ([address] 10/11/2021 Yes    Hyperlipidemia 10/11/2021 Yes          Plan:     Patient status inpatient in the [place] [address]    1. Colonic diverticular abscess secondary to diverticulitis  1. IV Cipro and Flagyl as ordered  2. General surgery and IR consultation  1. Collaboratively agree for IR abscess drainage and surgery on standby  3. Clear liquid diet today  2. Chronic A. Fib  1. Hold Xarelto for anticipated procedure on 10/12  3. Hypertension with hyperlipidemia  1. Home medications as ordered    Consultations:   IP CONSULT TO RESPIRATORY CARE  IP CONSULT TO INTERVENTIONAL RADIOLOGY  IP CONSULT TO GENERAL SURGERY    Patient is admitted as inpatient status because of co-morbidities listed above, severity of signs and symptoms as outlined, requirement for current medical therapies and most importantly because of direct risk to patient if care not provided in a hospital setting. Expected length of stay > 48 hours.     JASPREET Hoyt NP  10/11/2021  11:49 AM    Copy sent to JASPREET Godoy NP

## 2021-10-11 NOTE — ED PROVIDER NOTES
[facility] ED  EMERGENCY DEPARTMENT ENCOUNTER      Pt Name: Nithin Archuleta  MRN: [id number]  [place] 1960  Date of evaluation: 10/10/2021  Provider: Darien Graves MD    [address] [place] [address]       Chief Complaint   Patient presents with    Constipation     X 4 days         HISTORY OF PRESENT ILLNESS   (Location/Symptom, Timing/Onset, Context/Setting, Quality, Duration, Modifying Factors, Severity)  Note limiting factors. Nithin Archuleta is a 56 y.o. male who presents to the emergency department      Received from Ester Vogel PA-C. Please refer to his dictation for complete history physical and review of systems. Nursing Notes were reviewed. REVIEW OF SYSTEMS    (2-9 systems for level 4, 10 or more for level 5)     Review of Systems    Except as noted above the remainder of the review of systems was reviewed and negative. PAST MEDICAL HISTORY     Past Medical History:   Diagnosis Date    Abnormal patient-activated cardiac event monitor 02/22/2021    CAM 13 days 8 hrs Baseline sinus ave HR of 63 bpm rang between 47 adn 109 bpm  Rare PAC and PVC with 2 runs of ectopic atrial tachy longest fastest was 7 beats hr 110 bpm No VT runs no AFib    H/O echocardiogram 03/08/2021    EF 45-50% Mild increased LV wallthickness LA is mod dilated 34-39 with LA volume index of 35 ml/m2 Mild mitral and tricuspid regurg Midl diastolic dysfunction seen Aortic root is mod dilated >4.5cm when corrected for body suface area    Hyperlipidemia     Hypertension          SURGICAL HISTORY     History reviewed. No pertinent surgical history.       CURRENT MEDICATIONS       Previous Medications    FLUTICASONE (FLONASE) 50 MCG/ACT NASAL SPRAY    instill 2 sprays into each nostril once daily    HYDROXYZINE (ATARAX) 50 MG TABLET    take 1 tablet by mouth at bedtime if needed for sleep    LORATADINE (CLARITIN) 10 MG TABLET    take 1 tablet by mouth daily if needed for allergies    LOSARTAN (COZAAR) 25 MG TABLET Take 1 tablet by mouth daily    METOPROLOL SUCCINATE (TOPROL XL) 25 MG EXTENDED RELEASE TABLET    Take 1 tablet by mouth daily    RIVAROXABAN (XARELTO) 20 MG TABS TABLET    Take 1 tablet by mouth daily (with breakfast)       ALLERGIES     Pcn [penicillins]    FAMILY HISTORY       Family History   Problem Relation Age of Onset    Coronary Art Dis Mother     Coronary Art Dis Father           SOCIAL HISTORY       Social History     Socioeconomic History    Marital status: Single     Spouse name: None    Number of children: None    Years of education: None    Highest education level: None   Occupational History    None   Tobacco Use    Smoking status: Never Smoker    Smokeless tobacco: Never Used   Vaping Use    Vaping Use: Never used   Substance and Sexual Activity    Alcohol use: Yes     Comment: rarely    Drug use: Never    Sexual activity: None   Other Topics Concern    None   Social History Narrative    None     Social Determinants of Health     Financial Resource Strain:     Difficulty of Paying Living Expenses:    Food Insecurity:     Worried About Running Out of Food in the Last Year:     Ran Out of Food in the Last Year:    Transportation Needs:     Lack of Transportation (Medical):      Lack of Transportation (Non-Medical):    Physical Activity:     Days of Exercise per Week:     Minutes of Exercise per Session:    Stress:     Feeling of Stress :    Social Connections:     Frequency of Communication with Friends and Family:     Frequency of Social Gatherings with Friends and Family:     Attends Alevism Services:     Active Member of Clubs or Organizations:     Attends Club or Organization Meetings:     Marital Status:    Intimate Partner Violence:     Fear of Current or Ex-Partner:     Emotionally Abused:     Physically Abused:     Sexually Abused:        SCREENINGS                        PHYSICAL EXAM    (up to 7 for level 4, 8 or more for level 5)     ED Triage Vitals   BP Temp Temp Source Pulse Resp SpO2 Height Weight   10/10/21 1835 10/10/21 1835 10/10/21 1835 10/10/21 1835 10/10/21 1835 10/10/21 1835 10/10/21 2037 10/10/21 2037   126/86 100.2 °F (37.9 °C) Tympanic 83 20 98 % 5' 11\" (1.803 m) 200 lb (90.7 kg)       Physical Exam    DIAGNOSTIC RESULTS     EKG: All EKG's are interpreted by the Emergency Department Physician who either signs or Co-signs this chart in the absence of a cardiologist.  Sinus rhythm rate of 75 bpm.  Normal conduction. Normal intervals. Normal ST segments and T waves. Normal EKG. RADIOLOGY:   Non-plain film images such as CT, Ultrasound and MRI are read by the radiologist. Plain radiographic images are visualized and preliminarily interpreted by the emergency physician with the below findings:        Interpretation per the Radiologist below, if available at the time of this note:    CT ABDOMEN PELVIS W IV CONTRAST Additional Contrast? None   Final Result   1. Complicated acute sigmoid diverticulitis, with a 4 x 4.1 cm pericolonic   abscess, abutting the dome of the urinary bladder, placing the patient at   risk for fistula formation between the bladder and sigmoid diverticulitis. No free air is present. 2. Cholelithiasis, without evidence of cholecystitis         XR ABDOMEN (KUB) (SINGLE AP VIEW)   Final Result   1. Nonspecific nonobstructive bowel gas pattern   2.  No plain film findings to suggest constipation               ED BEDSIDE ULTRASOUND:   Performed by ED Physician - none    LABS:  Labs Reviewed   CBC WITH AUTO DIFFERENTIAL - Abnormal; Notable for the following components:       Result Value    WBC 16.5 (*)     Seg Neutrophils 83 (*)     Lymphocytes 9 (*)     Eosinophils % 0 (*)     Segs Absolute 13.47 (*)     Absolute Mono # 1.34 (*)     All other components within normal limits   COMPREHENSIVE METABOLIC PANEL W/ REFLEX TO MG FOR LOW K - Abnormal; Notable for the following components:    Glucose 115 (*)     Bun/Cre Ratio 21 (*) Albumin/Globulin Ratio 0.9 (*)     All other components within normal limits   URINE RT REFLEX TO CULTURE - Abnormal; Notable for the following components:    Bilirubin Urine SMALL (*)     Ketones, Urine 2+ (*)     All other components within normal limits   MICROSCOPIC URINALYSIS - Abnormal; Notable for the following components:    Bacteria, UA TRACE (*)     Amorphous, UA 1+ (*)     All other components within normal limits   LIPASE   LACTIC ACID       All other labs were within normal range or not returned as of this dictation. EMERGENCY DEPARTMENT COURSE and DIFFERENTIAL DIAGNOSIS/MDM:   Vitals:    Vitals:    10/11/21 0250 10/11/21 0300 10/11/21 0315 10/11/21 0330   BP: (!) 161/91 112/80 (!) 140/75 (!) 140/75   Pulse:       Resp:       Temp:       TempSrc:       SpO2: 99% 99% 97% 98%   Weight:       Height:               MDM  Number of Diagnoses or Management Options  Colonic diverticular abscess  Diverticulitis of colon  Lower abdominal pain  Diagnosis management comments: 58-year-old male with abdominal pain. CT results show mild diverticulitis with pericolonic abscess. IV antibiotics ordered. Discussed via telephone with Dr. Juan Combs and who recommended the patient be transferred to facility where interventional radiology is available. Discussed with hospitalist at [facility] and patient was accepted. Patient updated on plan of care. MIPS       REASSESSMENT          CRITICAL CARE TIME   Total Critical Care time was  minutes, excluding separately reportable procedures. There was a high probability of clinically significant/life threatening deterioration in the patient's condition which required my urgent intervention. CONSULTS:  None    PROCEDURES:  Unless otherwise noted below, none     Procedures        FINAL IMPRESSION      1. Colonic diverticular abscess    2. Diverticulitis of colon    3.  Lower abdominal pain          DISPOSITION/PLAN   DISPOSITION Decision To Transfer 10/10/2021 11:21:05 PM      PATIENT REFERRED TO:  JASPREET Kathleen NP  [address]  [phone]    Schedule an appointment as soon as possible for a visit in 2 days      [facility] ED  [address] [facility]  [phone]    If symptoms worsen      DISCHARGE MEDICATIONS:  New Prescriptions    No medications on file     Controlled Substances Monitoring:     No flowsheet data found.     (Please note that portions of this note were completed with a voice recognition program.  Efforts were made to edit the dictations but occasionally words are mis-transcribed.)    Guero Stark MD (electronically signed)  Attending Emergency Physician             Guero Stark MD  10/11/21 6035

## 2021-10-11 NOTE — PROGRESS NOTES
IR called. Unable to drain abscess today as pt had xarelto 10/10 am. Will drain 10/12. Dr. Everett Hinton notified.

## 2021-10-11 NOTE — ED NOTES
[facility] called to initiate transfer for a facility that has a med surg bed and has Interventional Radiology capability. [facility], [facility] not have beds. She will try [facility], [facility], and [facility] to start.      Jeramie Ralph  10/10/21 4155

## 2021-10-11 NOTE — PROGRESS NOTES
Patient admitted to room 2003 from [facility] ED via transport. Vitals Stable. Alert and oriented. 2/4 bed rails up. Oriented to room and call remote. 2/10 lower abdominal pain stated. Notified On Call NP for comprehensive orders and med rec. Will continue to monitor patient.

## 2021-10-11 NOTE — ACP (ADVANCE CARE PLANNING)
Advance Care Planning     Advance Care Planning Activator (Inpatient)  Conversation Note      Date of ACP Conversation: 10/11/2021     Conversation Conducted with: Patient with Decision Making Capacity    ACP Activator: Radha Mixon RN    {When Decision Maker makes decisions on behalf of the incapacitated patient: Decision Maker is asked to consider and make decisions based on patient values, known preferences, or best interests. Health Care Decision Maker:     Current Designated Health Care Decision Maker:   Rosa Palma - friend  Phone - [phone]    Click here to complete Healthcare Decision Makers including section of the Healthcare Decision Maker Relationship (ie \"Primary\")  Care Preferences    Ventilation: \"If you were in your present state of health and suddenly became very ill and were unable to breathe on your own, what would your preference be about the use of a ventilator (breathing machine) if it were available to you? \"      Would the patient desire the use of ventilator (breathing machine)?: yes    \"If your health worsens and it becomes clear that your chance of recovery is unlikely, what would your preference be about the use of a ventilator (breathing machine) if it were available to you? \"     Would the patient desire the use of ventilator (breathing machine)?: No      Resuscitation  \"CPR works best to restart the heart when there is a sudden event, like a heart attack, in someone who is otherwise healthy. Unfortunately, CPR does not typically restart the heart for people who have serious health conditions or who are very sick. \"    \"In the event your heart stopped as a result of an underlying serious health condition, would you want attempts to be made to restart your heart (answer \"yes\" for attempt to resuscitate) or would you prefer a natural death (answer \"no\" for do not attempt to resuscitate)? \" yes       [] Yes   [x] No   Educated Patient / Decision Maker regarding differences between Advance Directives and portable DNR orders.     Length of ACP Conversation in minutes:  5    Conversation Outcomes:  [x] ACP discussion completed  [] Existing advance directive reviewed with patient; no changes to patient's previously recorded wishes  [] New Advance Directive completed  [] Portable Do Not Rescitate prepared for Provider review and signature  [] POLST/POST/MOLST/MOST prepared for Provider review and signature      Follow-up plan:    [] Schedule follow-up conversation to continue planning  [] Referred individual to Provider for additional questions/concerns   [] Advised patient/agent/surrogate to review completed ACP document and update if needed with changes in condition, patient preferences or care setting    [] This note routed to one or more involved healthcare providers

## 2021-10-11 NOTE — ED NOTES
[facility] called with Renato Bray NP at [facility] and connected with Dr Ronal Hanna  10/11/21 0036

## 2021-10-12 ENCOUNTER — APPOINTMENT (OUTPATIENT)
Dept: CT IMAGING | Age: 61
DRG: 244 | End: 2021-10-12
Attending: INTERNAL MEDICINE
Payer: MEDICARE

## 2021-10-12 PROBLEM — Z79.01 CHRONIC ANTICOAGULATION: Status: ACTIVE | Noted: 2021-10-12

## 2021-10-12 PROBLEM — K57.32 SIGMOID DIVERTICULITIS: Status: ACTIVE | Noted: 2021-10-12

## 2021-10-12 LAB
ALBUMIN SERPL-MCNC: 3 G/DL (ref 3.5–5.2)
ALBUMIN/GLOBULIN RATIO: ABNORMAL (ref 1–2.5)
ALP BLD-CCNC: 74 U/L (ref 40–129)
ALT SERPL-CCNC: 6 U/L (ref 5–41)
AMYLASE: 42 U/L (ref 28–100)
ANION GAP SERPL CALCULATED.3IONS-SCNC: 8 MMOL/L (ref 9–17)
AST SERPL-CCNC: 8 U/L
BILIRUB SERPL-MCNC: 0.3 MG/DL (ref 0.3–1.2)
BUN BLDV-MCNC: 10 MG/DL (ref 8–23)
BUN/CREAT BLD: 9 (ref 9–20)
CALCIUM SERPL-MCNC: 8.2 MG/DL (ref 8.6–10.4)
CHLORIDE BLD-SCNC: 103 MMOL/L (ref 98–107)
CO2: 26 MMOL/L (ref 20–31)
CREAT SERPL-MCNC: 1.1 MG/DL (ref 0.7–1.2)
GFR AFRICAN AMERICAN: >60 ML/MIN
GFR NON-AFRICAN AMERICAN: >60 ML/MIN
GFR SERPL CREATININE-BSD FRML MDRD: ABNORMAL ML/MIN/{1.73_M2}
GFR SERPL CREATININE-BSD FRML MDRD: ABNORMAL ML/MIN/{1.73_M2}
GLUCOSE BLD-MCNC: 133 MG/DL (ref 70–99)
HCT VFR BLD CALC: 38.5 % (ref 40.7–50.3)
HEMOGLOBIN: 11.9 G/DL (ref 13–17)
INR BLD: 1.2
LIPASE: 14 U/L (ref 13–60)
MAGNESIUM: 2.3 MG/DL (ref 1.6–2.6)
MCH RBC QN AUTO: 28.5 PG (ref 25.2–33.5)
MCHC RBC AUTO-ENTMCNC: 30.9 G/DL (ref 28.4–34.8)
MCV RBC AUTO: 92.3 FL (ref 82.6–102.9)
NRBC AUTOMATED: 0 PER 100 WBC
PDW BLD-RTO: 12.7 % (ref 11.8–14.4)
PHOSPHORUS: 2.3 MG/DL (ref 2.5–4.5)
PLATELET # BLD: 370 K/UL (ref 138–453)
PMV BLD AUTO: 8.8 FL (ref 8.1–13.5)
POTASSIUM SERPL-SCNC: 4.4 MMOL/L (ref 3.7–5.3)
PROTHROMBIN TIME: 15 SEC (ref 11.5–14.2)
RBC # BLD: 4.17 M/UL (ref 4.21–5.77)
SODIUM BLD-SCNC: 137 MMOL/L (ref 135–144)
TOTAL PROTEIN: 6.3 G/DL (ref 6.4–8.3)
WBC # BLD: 11 K/UL (ref 3.5–11.3)

## 2021-10-12 PROCEDURE — 2500000003 HC RX 250 WO HCPCS: Performed by: RADIOLOGY

## 2021-10-12 PROCEDURE — 83735 ASSAY OF MAGNESIUM: CPT

## 2021-10-12 PROCEDURE — 6360000002 HC RX W HCPCS: Performed by: NURSE PRACTITIONER

## 2021-10-12 PROCEDURE — 99232 SBSQ HOSP IP/OBS MODERATE 35: CPT | Performed by: INTERNAL MEDICINE

## 2021-10-12 PROCEDURE — 83690 ASSAY OF LIPASE: CPT

## 2021-10-12 PROCEDURE — 85027 COMPLETE CBC AUTOMATED: CPT

## 2021-10-12 PROCEDURE — 84100 ASSAY OF PHOSPHORUS: CPT

## 2021-10-12 PROCEDURE — 80053 COMPREHEN METABOLIC PANEL: CPT

## 2021-10-12 PROCEDURE — 2709999900 CT ABSCESS DRAINAGE

## 2021-10-12 PROCEDURE — 1200000000 HC SEMI PRIVATE

## 2021-10-12 PROCEDURE — APPSS45 APP SPLIT SHARED TIME 31-45 MINUTES: Performed by: NURSE PRACTITIONER

## 2021-10-12 PROCEDURE — 6360000002 HC RX W HCPCS: Performed by: SURGERY

## 2021-10-12 PROCEDURE — 6370000000 HC RX 637 (ALT 250 FOR IP): Performed by: FAMILY MEDICINE

## 2021-10-12 PROCEDURE — 82150 ASSAY OF AMYLASE: CPT

## 2021-10-12 PROCEDURE — 0W9G3ZX DRAINAGE OF PERITONEAL CAVITY, PERCUTANEOUS APPROACH, DIAGNOSTIC: ICD-10-PCS | Performed by: RADIOLOGY

## 2021-10-12 PROCEDURE — 85610 PROTHROMBIN TIME: CPT

## 2021-10-12 PROCEDURE — 2500000003 HC RX 250 WO HCPCS: Performed by: NURSE PRACTITIONER

## 2021-10-12 PROCEDURE — 36415 COLL VENOUS BLD VENIPUNCTURE: CPT

## 2021-10-12 PROCEDURE — 6360000002 HC RX W HCPCS: Performed by: RADIOLOGY

## 2021-10-12 RX ORDER — LIDOCAINE HYDROCHLORIDE 10 MG/ML
INJECTION, SOLUTION INFILTRATION; PERINEURAL
Status: COMPLETED | OUTPATIENT
Start: 2021-10-12 | End: 2021-10-12

## 2021-10-12 RX ORDER — FENTANYL CITRATE 50 UG/ML
INJECTION, SOLUTION INTRAMUSCULAR; INTRAVENOUS
Status: COMPLETED | OUTPATIENT
Start: 2021-10-12 | End: 2021-10-12

## 2021-10-12 RX ADMIN — PROMETHAZINE HYDROCHLORIDE 12.5 MG: 25 INJECTION INTRAMUSCULAR; INTRAVENOUS at 01:35

## 2021-10-12 RX ADMIN — MORPHINE SULFATE 4 MG: 4 INJECTION, SOLUTION INTRAMUSCULAR; INTRAVENOUS at 06:01

## 2021-10-12 RX ADMIN — POTASSIUM CHLORIDE, DEXTROSE MONOHYDRATE AND SODIUM CHLORIDE: 150; 5; 450 INJECTION, SOLUTION INTRAVENOUS at 23:04

## 2021-10-12 RX ADMIN — METRONIDAZOLE 500 MG: 500 INJECTION, SOLUTION INTRAVENOUS at 01:35

## 2021-10-12 RX ADMIN — LOSARTAN POTASSIUM 25 MG: 25 TABLET, FILM COATED ORAL at 08:23

## 2021-10-12 RX ADMIN — CIPROFLOXACIN 400 MG: 2 INJECTION, SOLUTION INTRAVENOUS at 13:32

## 2021-10-12 RX ADMIN — POTASSIUM CHLORIDE, DEXTROSE MONOHYDRATE AND SODIUM CHLORIDE: 150; 5; 450 INJECTION, SOLUTION INTRAVENOUS at 12:25

## 2021-10-12 RX ADMIN — METOPROLOL SUCCINATE 25 MG: 25 TABLET, EXTENDED RELEASE ORAL at 08:23

## 2021-10-12 RX ADMIN — METRONIDAZOLE 500 MG: 500 INJECTION, SOLUTION INTRAVENOUS at 18:07

## 2021-10-12 RX ADMIN — Medication 50 MCG: at 11:40

## 2021-10-12 RX ADMIN — LIDOCAINE HYDROCHLORIDE 5 ML: 10 INJECTION, SOLUTION INFILTRATION; PERINEURAL at 11:40

## 2021-10-12 RX ADMIN — MORPHINE SULFATE 2 MG: 2 INJECTION, SOLUTION INTRAMUSCULAR; INTRAVENOUS at 18:40

## 2021-10-12 RX ADMIN — METRONIDAZOLE 500 MG: 500 INJECTION, SOLUTION INTRAVENOUS at 09:49

## 2021-10-12 RX ADMIN — MORPHINE SULFATE 2 MG: 2 INJECTION, SOLUTION INTRAMUSCULAR; INTRAVENOUS at 12:25

## 2021-10-12 ASSESSMENT — PAIN SCALES - GENERAL
PAINLEVEL_OUTOF10: 4
PAINLEVEL_OUTOF10: 0
PAINLEVEL_OUTOF10: 2
PAINLEVEL_OUTOF10: 4
PAINLEVEL_OUTOF10: 7
PAINLEVEL_OUTOF10: 0

## 2021-10-12 ASSESSMENT — PAIN DESCRIPTION - LOCATION
LOCATION: ABDOMEN
LOCATION: ABDOMEN

## 2021-10-12 ASSESSMENT — PAIN - FUNCTIONAL ASSESSMENT: PAIN_FUNCTIONAL_ASSESSMENT: ACTIVITIES ARE NOT PREVENTED

## 2021-10-12 ASSESSMENT — PAIN DESCRIPTION - FREQUENCY: FREQUENCY: INTERMITTENT

## 2021-10-12 ASSESSMENT — PAIN DESCRIPTION - PAIN TYPE
TYPE: ACUTE PAIN
TYPE: ACUTE PAIN

## 2021-10-12 ASSESSMENT — PAIN DESCRIPTION - ORIENTATION: ORIENTATION: LOWER

## 2021-10-12 ASSESSMENT — PAIN DESCRIPTION - PROGRESSION: CLINICAL_PROGRESSION: NOT CHANGED

## 2021-10-12 ASSESSMENT — PAIN DESCRIPTION - DESCRIPTORS: DESCRIPTORS: ACHING;DISCOMFORT

## 2021-10-12 ASSESSMENT — PAIN DESCRIPTION - ONSET: ONSET: ON-GOING

## 2021-10-12 NOTE — BRIEF OP NOTE
Brief Postoperative Note    Junior Phlegm  YOB: 1960  [id number]    Pre-operative Diagnosis: Pericolonic abscess    Post-operative Diagnosis: Same    Procedure: CT guided aspiration    Anesthesia: Local    Surgeons/Assistants: Collin    Estimated Blood Loss: less than 50     Complications: None    Specimens: Was Obtained: 15 ml of thick tan/purulent material    Electronically signed by Юлия Crowley MD on 10/12/2021 at 12:50 PM

## 2021-10-12 NOTE — PROGRESS NOTES
General Surgery:  Daily Progress Note                    PATIENT NAME: Anton Mckenna     TODAY'S DATE: 10/12/2021, 8:02 AM  CC:  Abdominal pain    SUBJECTIVE:     Pt seen and examined at bedside. No acute overnight events, afebrile vital signs. States his pain is improved since yesterday. Denies nausea and vomiting. Patient continues to pass flatus, no bowel movements overnight. OBJECTIVE:   VITALS:  /73   Pulse 65   Temp 98.2 °F (36.8 °C) (Oral)   Resp 17   Ht 5' 11\" (1.803 m)   Wt 200 lb (90.7 kg)   SpO2 99%   BMI 27.89 kg/m²      INTAKE/OUTPUT:      Intake/Output Summary (Last 24 hours) at 10/12/2021 0802  Last data filed at 10/12/2021 3350  Gross per 24 hour   Intake 3433.42 ml   Output --   Net 3433.42 ml       PHYSICAL EXAM:  General Appearance: awake, alert, oriented, in no acute distress  HEENT:  Normocephalic, atraumatic, mucus membranes moist   Heart: Heart regular rate and rhythm  Lungs: Normal expansion. Clear to auscultation. No rales, rhonchi, or wheezing. Abdomen: Soft, mild tenderness to palpation in suprapubic area, no rebound tenderness, nonperitoneal   Extremities: No cyanosis, pitting edema, rashes noted. Skin: Skin color, texture, turgor normal. No rashes or lesions.     IV Access:  PIV    Data:  CBC with Differential:    Lab Results   Component Value Date    WBC 11.0 10/12/2021    RBC 4.17 10/12/2021    HGB 11.9 10/12/2021    HCT 38.5 10/12/2021     10/12/2021    MCV 92.3 10/12/2021    MCH 28.5 10/12/2021    MCHC 30.9 10/12/2021    RDW 12.7 10/12/2021    LYMPHOPCT 9 10/10/2021    MONOPCT 8 10/10/2021    BASOPCT 0 10/10/2021    MONOSABS 1.34 10/10/2021    LYMPHSABS 1.55 10/10/2021    EOSABS 0.04 10/10/2021    BASOSABS 0.07 10/10/2021    DIFFTYPE NOT REPORTED 10/10/2021     BMP:    Lab Results   Component Value Date     10/12/2021    K 4.4 10/12/2021     10/12/2021    CO2 26 10/12/2021    BUN 10 10/12/2021    LABALBU 3.0 10/12/2021    CREATININE 1.10 10/12/2021    CALCIUM 8.2 10/12/2021    GFRAA >60 10/12/2021    LABGLOM >60 10/12/2021    GLUCOSE 133 10/12/2021       Radiology Review:    IR ABSCESS DRAINAGE PERC    (Results Pending)         ASSESSMENT:  Active Hospital Problems    Diagnosis Date Noted    Colonic diverticular abscess [K57.20] 10/11/2021    Chronic atrial fibrillation ([address] [I48.20] 10/11/2021    Hyperlipidemia [E78.5]     Hypertension [I10] 12/05/2020       Noah Rodriguez is a 77-year-old male with history of hypertension, atrial fibrillation, and chronic constipation who presents with complicated diverticulitis with 4 x 4 x 1 cm abscess. Plan:  1. Continue medical and supportive care per primary  2. Patient is complicated diverticulitis with 4 x 4 by 1 cm abscess. Plan for interventional radiology to place drain today. 3. Continue IV antibiotics Cipro and Flagyl  4. Patient okay for clear liquid diet following IR drainage of abscess. 5. Continue to monitor serial abdominal examinations.     Electronically signed by Jose Odom MD  on 10/12/2021 at 8:02 AM

## 2021-10-12 NOTE — PROGRESS NOTES
Patient reported to the emergency department where extensive work-up was completed and a diverticular abscess was identified consistent with diverticulitis. Patient was found to have an elevated white count as well. General surgery and interventional radiology recommended and these were not originally available at the outlying facility therefore he was transported to [facility]. 10/12 - Lab work improved, no fever, generally feels much better. IR scheduled for this afternoon for abscess drainage, further hospital course to be determined following this procedure. Review of Systems:     Constitutional:  negative for chills, fevers, sweats  Respiratory:  negative for cough, dyspnea on exertion, shortness of breath, wheezing  Cardiovascular:  negative for chest pain, chest pressure/discomfort, lower extremity edema, palpitations  Gastrointestinal:  negative for abdominal pain, constipation, diarrhea, nausea, vomiting  Neurological:  negative for dizziness, headache    Medications: Allergies:     Allergies   Allergen Reactions    Pcn [Penicillins]        Current Meds:   Scheduled Meds:    influenza virus vaccine  0.5 mL IntraMUSCular Prior to discharge    sodium chloride flush  5-40 mL IntraVENous 2 times per day    enoxaparin  40 mg SubCUTAneous Daily    ciprofloxacin  400 mg IntraVENous Q12H    metroNIDAZOLE  500 mg IntraVENous Q8H    losartan  25 mg Oral Daily    metoprolol succinate  25 mg Oral Daily     Continuous Infusions:    dextrose 5% and 0.45% NaCl with KCl 20 mEq 125 mL/hr at 10/11/21 1719    sodium chloride       PRN Meds: sodium chloride flush, sodium chloride, potassium chloride **OR** potassium alternative oral replacement **OR** potassium chloride, magnesium sulfate, acetaminophen **OR** acetaminophen, polyethylene glycol, morphine **OR** morphine, metoprolol, promethazine, promethazine    Data:     Past Medical History:   has a past medical history of A-fib ([address] Abnormal patient-activated cardiac event monitor, H/O echocardiogram, History of cardioversion, Hyperlipidemia, Hypertension, and Sleep apnea. Social History:   reports that he has never smoked. He has never used smokeless tobacco. He reports current alcohol use. He reports that he does not use drugs. Family History:   Family History   Problem Relation Age of Onset   Edgar Irwin Cancer Mother     Coronary Art Dis Mother     Cancer Father     Coronary Art Dis Father        Vitals:  BP (!) 97/57   Pulse 63   Temp 98.1 °F (36.7 °C) (Oral)   Resp 17   Ht 5' 11\" (1.803 m)   Wt 200 lb (90.7 kg)   SpO2 93%   BMI 27.89 kg/m²   Temp (24hrs), Av.6 °F (37 °C), Min:98.1 °F (36.7 °C), Max:99.7 °F (37.6 °C)    No results for input(s): POCGLU in the last 72 hours. I/O (24Hr):     Intake/Output Summary (Last 24 hours) at 10/12/2021 1118  Last data filed at 10/12/2021 0616  Gross per 24 hour   Intake 3433.42 ml   Output --   Net 3433.42 ml       Labs:  Hematology:  Recent Labs     10/10/21  2150 10/12/21  0627   WBC 16.5* 11.0   RBC 4.64 4.17*   HGB 13.5 11.9*   HCT 42.4 38.5*   MCV 91.4 92.3   MCH 29.1 28.5   MCHC 31.8 30.9   RDW 12.5 12.7    370   MPV 8.6 8.8   INR  --  1.2     Chemistry:  Recent Labs     10/10/21  2150 10/12/21  0627    137   K 4.1 4.4    103   CO2 24 26   GLUCOSE 115* 133*   BUN 21 10   CREATININE 1.02 1.10   MG  --  2.3   ANIONGAP 13 8*   LABGLOM >60 >60   GFRAA >60 >60   CALCIUM 8.9 8.2*   PHOS  --  2.3*     Recent Labs     10/10/21  2150 10/12/21  0627   PROT 8.0 6.3*   LABALBU 3.7 3.0*   AST 9 8   ALT 7 6   ALKPHOS 96 74   BILITOT 0.34 0.30   AMYLASE  --  42   LIPASE 15 14     ABG:No results found for: POCPH, PHART, PH, POCPCO2, JZE4FZD, PCO2, POCPO2, PO2ART, PO2, POCHCO3, FGE8TYC, HCO3, NBEA, PBEA, BEART, BE, THGBART, THB, ILC6YYA, UYKN9IKU, F3LSUPTY, O2SAT, FIO2  No results found for: SPECIAL  No results found for: CULTURE    Radiology:  XR ABDOMEN (KUB) (SINGLE AP VIEW)    Result Date: 10/10/2021  1. Nonspecific nonobstructive bowel gas pattern 2. No plain film findings to suggest constipation     CT ABDOMEN PELVIS W IV CONTRAST Additional Contrast? None    Result Date: 10/10/2021  1. Complicated acute sigmoid diverticulitis, with a 4 x 4.1 cm pericolonic abscess, abutting the dome of the urinary bladder, placing the patient at risk for fistula formation between the bladder and sigmoid diverticulitis. No free air is present. 2. Cholelithiasis, without evidence of cholecystitis       Physical Examination:        General appearance:  alert, cooperative and no distress  Mental Status:  oriented to person, place and time and normal affect  Lungs:  clear to auscultation bilaterally, normal effort  Heart:  regular rate and rhythm, no murmur  Abdomen:  soft, tender to palpation, nondistended, normal bowel sounds, no masses, hepatomegaly, splenomegaly  Extremities:  no edema, redness, tenderness in the calves  Skin:  no gross lesions, rashes, induration    Assessment:        Hospital Problems         Last Modified POA    * (Principal) Colonic diverticular abscess 10/11/2021 Yes    Hypertension 10/11/2021 Yes    Chronic atrial fibrillation ([address] 10/11/2021 Yes    Hyperlipidemia 10/11/2021 Yes          Plan:        1. Colonic diverticular abscess secondary to diverticulitis  1. IV Cipro and Flagyl as ordered  2. General surgery and IR consultation   1. Collaboratively agree for IR abscess drainage and surgery on standby  3. IR abscess drainage scheduled for this afternoon  2. Chronic A. Fib  1. Hold Xarelto and restart following procedure with IR recommendations  3. Hypertension with hyperlipidemia  1.  Home medications as ordered    JASPREET Barragan NP  10/12/2021  11:18 AM

## 2021-10-12 NOTE — PLAN OF CARE
Problem: Falls - Risk of:  Goal: Will remain free from falls  Description: Will remain free from falls  10/12/2021 1257 by Selena Ortiz RN  Outcome: Ongoing  10/12/2021 0030 by Uriel Van RN  Outcome: Ongoing  Goal: Absence of physical injury  Description: Absence of physical injury  10/12/2021 1257 by Selena Ortiz RN  Outcome: Ongoing  10/12/2021 0030 by Uriel Van RN  Outcome: Ongoing     Problem: Pain:  Goal: Pain level will decrease  Description: Pain level will decrease  10/12/2021 1257 by Selena Ortiz RN  Outcome: Ongoing  10/12/2021 0030 by Uriel Van RN  Outcome: Ongoing  Goal: Control of acute pain  Description: Control of acute pain  10/12/2021 1257 by Selena Ortiz RN  Outcome: Ongoing  10/12/2021 0030 by Uriel Van RN  Outcome: Ongoing  Goal: Control of chronic pain  Description: Control of chronic pain  10/12/2021 1257 by Selena Ortiz RN  Outcome: Ongoing  10/12/2021 0030 by Uriel Van RN  Outcome: Ongoing   Patient is a fall risk during this admission. Fall risk assessment was performed. Patient is absent of falls. Bed is in the lowest position. Wheels on the bed are locked. Call light and bed side table are within reach. Clutter is removed. Patient was educated to call out when needing assistance or wanting to get out of bed. Patient offered toileting assistance during rounding. Hourly rounds have been performed. Pt medicated with pain medication prn. Assessed all pain characteristics including level, type, location, frequency, and onset. Non-pharmacologic interventions offered to pt as well. Pt states pain is tolerable at this time.  Will continue to monitor

## 2021-10-12 NOTE — PLAN OF CARE
Problem: Falls - Risk of:  Goal: Will remain free from falls  Description: Will remain free from falls  10/12/2021 0030 by Yeison Arellano RN  Outcome: Ongoing  10/11/2021 1252 by Ray Brownlee RN  Outcome: Ongoing  Goal: Absence of physical injury  Description: Absence of physical injury  10/12/2021 0030 by Yeison Arellano RN  Outcome: Ongoing  10/11/2021 1252 by Ray Brownlee RN  Outcome: Ongoing     Problem: Pain:  Goal: Pain level will decrease  Description: Pain level will decrease  10/12/2021 0030 by Yeison Arellano RN  Outcome: Ongoing  10/11/2021 1252 by Ray Brownlee RN  Outcome: Ongoing  Goal: Control of acute pain  Description: Control of acute pain  10/12/2021 0030 by Yeison Arellano RN  Outcome: Ongoing  10/11/2021 1252 by Ray Brownlee RN  Outcome: Ongoing  Goal: Control of chronic pain  Description: Control of chronic pain  10/12/2021 0030 by Yeison Arellano RN  Outcome: Ongoing  10/11/2021 1252 by Ray Brownlee RN  Outcome: Ongoing

## 2021-10-12 NOTE — PROGRESS NOTES
Transitions of Care Pharmacy Service   Medication Review    The patient's list of current home medications has been reviewed. Source(s) of information: patient, kenneth    Based on information provided by the above source(s), I have updated the patient's home med list as described below. I changed or updated the following medications on the patient's home medication list:  Discontinued None      Added None      Adjusted   Viagra PO - added strength and sig 50mg (PRN)  Flonase - changed from 2QD to 2QD PRN rhinitis      Other Notes Hydroxyzine 50mg - Patient states he was only using medication PRN for sleep but had stopped a few days prior to admission - thought medication was causing issues that brought him to the hospital            Please feel free to call me with any questions about this encounter. Thank you. This note will be reviewed and co-signed by the Transitions of Care Pharmacist. The pharmacist will review inpatient orders and contact the physician about any discrepancies. Tanvir Beard, pharmacy technician  [facility] Service  Phone:  [phone]  Fax: [phone]      Electronically signed by Tanvir Beard on 10/12/2021 at 5:29 PM     Prior to Admission medications    Medication Sig Start Date End Date Taking?  Authorizing Provider   sildenafil (VIAGRA) 50 MG tablet Take 1 tablet by mouth as needed        hydrOXYzine (ATARAX) 50 MG tablet take 1 tablet by mouth at bedtime if needed for sleep       loratadine (CLARITIN) 10 MG tablet take 1 tablet by mouth daily if needed for allergies       fluticasone (FLONASE) 50 MCG/ACT nasal spray 2 sprays by Nasal route daily as needed for Rhinitis        rivaroxaban (XARELTO) 20 MG TABS tablet Take 1 tablet by mouth daily (with breakfast)       metoprolol succinate (TOPROL XL) 25 MG extended release tablet Take 1 tablet by mouth daily       losartan (COZAAR) 25 MG tablet Take 1 tablet by mouth daily

## 2021-10-12 NOTE — FLOWSHEET NOTE
[facility]  PROGRESS NOTE    Room # 2003/2003-02   Name: Augustina Madera              Reason for visit: Routine    I visited the patient. Admit Date & Time: 10/11/2021  5:49 AM    Assessment:  Augustina Madera is a 56 y.o. male in the hospital because \"surgery for abscess in colon. \" Upon entering the room patient was removing plastic bag used to keep IV area dry for shower. Intervention:  I introduced myself and my title as intern  I offered space for patient  to express feelings, needs, and concerns and provided a ministry presence. Patient shared that he is having surgery today. Patient shared that he has had health problems for the last two years including \"Afib\"      Outcome:  Offered ministry of presence and offered patient \"prayer for the sick\" card and business card for spiritual care. At patient's request, offered a brief prayer for healing. Plan:  Chaplains will remain available to offer spiritual and emotional support as needed. Electronically signed by Ervin Booker on 10/12/2021 at 11:48 AM.  Melissa         10/12/21 0900   Encounter Summary   Services provided to: Patient   Referral/Consult From: [employer] Friends/neighbors   Continue Visiting   (10/12/21)   Complexity of Encounter Low   Length of Encounter 15 minutes   Spiritual Assessment Completed Yes   Routine   Type Initial   Assessment Approachable; Anxious; Hopeful;Coping   Intervention Active listening;Nurtured hope;Explored feelings, thoughts, concerns;Prayer;Sustaining presence/ Ministry of presence   Outcome Comfort;Expressed gratitude;Engaged in conversation;Expressed feelings/needs/concerns; Less anxious, less agitated

## 2021-10-13 PROBLEM — K57.10 DUODENAL DIVERTICULUM: Status: ACTIVE | Noted: 2021-10-13

## 2021-10-13 PROCEDURE — 6370000000 HC RX 637 (ALT 250 FOR IP): Performed by: FAMILY MEDICINE

## 2021-10-13 PROCEDURE — APPNB30 APP NON BILLABLE TIME 0-30 MINS: Performed by: NURSE PRACTITIONER

## 2021-10-13 PROCEDURE — 2500000003 HC RX 250 WO HCPCS: Performed by: INTERNAL MEDICINE

## 2021-10-13 PROCEDURE — 94761 N-INVAS EAR/PLS OXIMETRY MLT: CPT

## 2021-10-13 PROCEDURE — APPSS45 APP SPLIT SHARED TIME 31-45 MINUTES: Performed by: NURSE PRACTITIONER

## 2021-10-13 PROCEDURE — 1200000000 HC SEMI PRIVATE

## 2021-10-13 PROCEDURE — 6370000000 HC RX 637 (ALT 250 FOR IP): Performed by: SURGERY

## 2021-10-13 PROCEDURE — 2500000003 HC RX 250 WO HCPCS: Performed by: NURSE PRACTITIONER

## 2021-10-13 PROCEDURE — 99232 SBSQ HOSP IP/OBS MODERATE 35: CPT | Performed by: INTERNAL MEDICINE

## 2021-10-13 PROCEDURE — 6370000000 HC RX 637 (ALT 250 FOR IP): Performed by: NURSE PRACTITIONER

## 2021-10-13 PROCEDURE — 6360000002 HC RX W HCPCS: Performed by: NURSE PRACTITIONER

## 2021-10-13 PROCEDURE — 6360000002 HC RX W HCPCS: Performed by: SURGERY

## 2021-10-13 RX ADMIN — PROMETHAZINE HYDROCHLORIDE 25 MG: 25 TABLET ORAL at 13:00

## 2021-10-13 RX ADMIN — MORPHINE SULFATE 2 MG: 2 INJECTION, SOLUTION INTRAMUSCULAR; INTRAVENOUS at 22:04

## 2021-10-13 RX ADMIN — PROMETHAZINE HYDROCHLORIDE 12.5 MG: 25 INJECTION INTRAMUSCULAR; INTRAVENOUS at 03:57

## 2021-10-13 RX ADMIN — CIPROFLOXACIN 400 MG: 2 INJECTION, SOLUTION INTRAVENOUS at 12:55

## 2021-10-13 RX ADMIN — LOSARTAN POTASSIUM 25 MG: 25 TABLET, FILM COATED ORAL at 08:46

## 2021-10-13 RX ADMIN — METRONIDAZOLE 500 MG: 500 INJECTION, SOLUTION INTRAVENOUS at 01:58

## 2021-10-13 RX ADMIN — METOPROLOL SUCCINATE 25 MG: 25 TABLET, EXTENDED RELEASE ORAL at 08:46

## 2021-10-13 RX ADMIN — POTASSIUM CHLORIDE, DEXTROSE MONOHYDRATE AND SODIUM CHLORIDE: 150; 5; 450 INJECTION, SOLUTION INTRAVENOUS at 08:46

## 2021-10-13 RX ADMIN — POTASSIUM CHLORIDE, DEXTROSE MONOHYDRATE AND SODIUM CHLORIDE: 150; 5; 450 INJECTION, SOLUTION INTRAVENOUS at 22:05

## 2021-10-13 RX ADMIN — CIPROFLOXACIN 400 MG: 2 INJECTION, SOLUTION INTRAVENOUS at 23:47

## 2021-10-13 RX ADMIN — RIVAROXABAN 20 MG: 20 TABLET, FILM COATED ORAL at 08:46

## 2021-10-13 RX ADMIN — CIPROFLOXACIN 400 MG: 2 INJECTION, SOLUTION INTRAVENOUS at 00:42

## 2021-10-13 RX ADMIN — MORPHINE SULFATE 2 MG: 2 INJECTION, SOLUTION INTRAMUSCULAR; INTRAVENOUS at 03:59

## 2021-10-13 RX ADMIN — METRONIDAZOLE 500 MG: 500 INJECTION, SOLUTION INTRAVENOUS at 17:07

## 2021-10-13 RX ADMIN — METRONIDAZOLE 500 MG: 500 INJECTION, SOLUTION INTRAVENOUS at 08:48

## 2021-10-13 RX ADMIN — MORPHINE SULFATE 2 MG: 2 INJECTION, SOLUTION INTRAMUSCULAR; INTRAVENOUS at 13:58

## 2021-10-13 RX ADMIN — PROMETHAZINE HYDROCHLORIDE 12.5 MG: 25 INJECTION INTRAMUSCULAR; INTRAVENOUS at 22:04

## 2021-10-13 ASSESSMENT — PAIN DESCRIPTION - ORIENTATION: ORIENTATION: LEFT;LOWER

## 2021-10-13 ASSESSMENT — PAIN DESCRIPTION - DESCRIPTORS
DESCRIPTORS: CONSTANT;DISCOMFORT
DESCRIPTORS: DISCOMFORT

## 2021-10-13 ASSESSMENT — PAIN SCALES - GENERAL
PAINLEVEL_OUTOF10: 5
PAINLEVEL_OUTOF10: 0
PAINLEVEL_OUTOF10: 2
PAINLEVEL_OUTOF10: 0
PAINLEVEL_OUTOF10: 4
PAINLEVEL_OUTOF10: 5

## 2021-10-13 ASSESSMENT — PAIN DESCRIPTION - FREQUENCY
FREQUENCY: CONTINUOUS
FREQUENCY: CONTINUOUS

## 2021-10-13 ASSESSMENT — PAIN DESCRIPTION - PAIN TYPE
TYPE: ACUTE PAIN
TYPE: ACUTE PAIN

## 2021-10-13 ASSESSMENT — PAIN DESCRIPTION - ONSET
ONSET: GRADUAL
ONSET: ON-GOING

## 2021-10-13 ASSESSMENT — PAIN DESCRIPTION - LOCATION
LOCATION: ABDOMEN
LOCATION: ABDOMEN

## 2021-10-13 ASSESSMENT — PAIN DESCRIPTION - PROGRESSION: CLINICAL_PROGRESSION: GRADUALLY WORSENING

## 2021-10-13 ASSESSMENT — PAIN - FUNCTIONAL ASSESSMENT: PAIN_FUNCTIONAL_ASSESSMENT: ACTIVITIES ARE NOT PREVENTED

## 2021-10-13 NOTE — FLOWSHEET NOTE
[facility]  PROGRESS NOTE    Room # 2003/2003-02   Name: Bladimir Madrid              Reason for visit: Routine    I visited the patient. Admit Date & Time: 10/11/2021  5:49 AM    Assessment:  Bladimir Madrid is a 56 y.o. male in the hospital because \"abscess. \" Upon entering the room patient was sitting in dark room, but was upright and alert. Patient invited  into room. Shared that procedure yesterday \"went better than expected\"      Intervention:  I introduced myself and my title as  I offered space for patient  to express feelings, needs, and concerns and provided a ministry presence.  shared scripture with patient:  Do not fear for I am with you, do not be dismayed for I am your God; I will give you strength. I will help you. Cecilia Hartley 41:10   gave patient Prayer for [employer] card. Outcome:  Patient expressed hope for healing and expressed gratitude for visit. Plan:  Chaplains will remain available to offer spiritual and emotional support as needed. Electronically signed by Annemarie Cortes on 10/13/2021 at 11:04 AM.  Melissa             10/13/21 1102   Encounter Summary   Services provided to: Patient   [facility]Cons[facility] Friends/neighbors   Continue Visiting   (10/13/21)   Complexity of Encounter Low   Length of Encounter 15 minutes   Routine   Type Follow up   Assessment Calm; Approachable;Coping   Intervention Active listening;Sustaining presence/ Ministry of presence;Scripture;Provided reading materials/devotional materials   Outcome Expressed gratitude

## 2021-10-13 NOTE — PROGRESS NOTES
[facility]  Office: [address], DO, Janis Rebollar, DO, Jono Yoon, DO, Carie Jeffers Blood, DO, Ruth Garsia MD, Jeff Flores MD, Timothy Ryan MD, Satish Plaza MD, Anya Merida MD, Georgie Dickerson MD, Debby Hussein MD, Jeffrey Leonard MD, Allyssa Caraballo, DO, Susan Lucas, DO, Leatha Grace MD,  Rohith Boland DO, Mel Velasquez MD, Cecilio Alvarado MD, Blayne Hanna MD, Carlos Gutiérrez MD, Anahi Bailey MD, Jessika Benedict MD, Selene [facility], [facility], CNP, Carlin Edwards, CNP, [facility] , CNS, Jen Hernandez, CNP, Juan Manuel Flanagan, CNP, Jenni Jaime, CNP, Lobo Serrato, CNP, Phuc Cook, CNP, Wing Pabon, CNP, EDVIN ValdezC, Anna Arredondo, [facility], Eyad Lin, CNP, Naa Zuniga, CNP, Shaan Santana, CNP, Rolando Gomez, CNP, Tulio Flanagan, CNP, Shannon Medina, CNP, Shravan [facility]    Progress Note    10/13/2021    10:38 AM    Name:   Layton Brown  MRN:     [id number]     Acct:      [de-identified]   Room:   2003/2003-02  IP Day:  2  Admit Date:  10/11/2021  5:49 AM    PCP:   JASPREET Viveros NP  Code Status:  Full Code    Subjective:     C/C: Abdominal pain  Interval History Status: improved. IR abscess drainage completed yesterday. Follow-up cultures. Continued on IV Cipro and Flagyl. Patient reports feeling much better. Diet advanced by surgery. May be stable for discharge in 24 hours pending hospital course and surgery agreement. Brief History:     10/11 - Patient reported to outside facility for abdominal pain. Patient reports that approximately 48 hours ago he developed with moderate left lower quadrant abdominal discomfort and nausea. Patient reports a diarrhea developed shortly thereafter. Patient attempted rest and hydration at home with no relief of symptoms.   Patient reported to the emergency department where extensive work-up was completed and a diverticular abscess was identified consistent with diverticulitis. Patient was found to have an elevated white count as well. General surgery and interventional radiology recommended and these were not originally available at the outlying facility therefore he was transported to [facility]. 10/12 - Lab work improved, no fever, generally feels much better. IR scheduled for this afternoon for abscess drainage, further hospital course to be determined following this procedure. 10/13 -IR completed abscess drainage yesterday, remains on IV antibiotics, general surgery advance diet. Review of Systems:     Constitutional:  negative for chills, fevers, sweats  Respiratory:  negative for cough, dyspnea on exertion, shortness of breath, wheezing  Cardiovascular:  negative for chest pain, chest pressure/discomfort, lower extremity edema, palpitations  Gastrointestinal:  negative for abdominal pain, constipation, diarrhea, nausea, vomiting  Neurological:  negative for dizziness, headache    Medications: Allergies:     Allergies   Allergen Reactions    Pcn [Penicillins]        Current Meds:   Scheduled Meds:    rivaroxaban  20 mg Oral Daily with breakfast    influenza virus vaccine  0.5 mL IntraMUSCular Prior to discharge    sodium chloride flush  5-40 mL IntraVENous 2 times per day    ciprofloxacin  400 mg IntraVENous Q12H    metroNIDAZOLE  500 mg IntraVENous Q8H    losartan  25 mg Oral Daily    metoprolol succinate  25 mg Oral Daily     Continuous Infusions:    dextrose 5% and 0.45% NaCl with KCl 20 mEq 125 mL/hr at 10/13/21 0846    sodium chloride       PRN Meds: sodium chloride flush, sodium chloride, potassium chloride **OR** potassium alternative oral replacement **OR** potassium chloride, magnesium sulfate, acetaminophen **OR** acetaminophen, polyethylene glycol, morphine **OR** morphine, metoprolol, promethazine, promethazine    Data:     Past Medical History:   has a past medical history of A-fib ([facility] [address] Abnormal patient-activated cardiac event monitor, H/O echocardiogram, History of cardioversion, Hyperlipidemia, Hypertension, and Sleep apnea. Social History:   reports that he has never smoked. He has never used smokeless tobacco. He reports current alcohol use. He reports that he does not use drugs. Family History:   Family History   Problem Relation Age of Onset   Hilary Singer Cancer Mother     Coronary Art Dis Mother     Cancer Father     Coronary Art Dis Father        Vitals:  /75   Pulse 63   Temp 98.1 °F (36.7 °C) (Oral)   Resp 16   Ht 5' 11\" (1.803 m)   Wt 195 lb 3 oz (88.5 kg)   SpO2 92%   BMI 27.22 kg/m²   Temp (24hrs), Av.3 °F (36.8 °C), Min:98.1 °F (36.7 °C), Max:99 °F (37.2 °C)    No results for input(s): POCGLU in the last 72 hours. I/O (24Hr):     Intake/Output Summary (Last 24 hours) at 10/13/2021 1038  Last data filed at 10/13/2021 0859  Gross per 24 hour   Intake 1446 ml   Output --   Net 1446 ml       Labs:  Hematology:  Recent Labs     10/10/21  2150 10/12/21  0627   WBC 16.5* 11.0   RBC 4.64 4.17*   HGB 13.5 11.9*   HCT 42.4 38.5*   MCV 91.4 92.3   MCH 29.1 28.5   MCHC 31.8 30.9   RDW 12.5 12.7    370   MPV 8.6 8.8   INR  --  1.2     Chemistry:  Recent Labs     10/10/21  2150 10/12/21  0627    137   K 4.1 4.4    103   CO2 24 26   GLUCOSE 115* 133*   BUN 21 10   CREATININE 1.02 1.10   MG  --  2.3   ANIONGAP 13 8*   LABGLOM >60 >60   GFRAA >60 >60   CALCIUM 8.9 8.2*   PHOS  --  2.3*     Recent Labs     10/10/21  2150 10/12/21  0627   PROT 8.0 6.3*   LABALBU 3.7 3.0*   AST 9 8   ALT 7 6   ALKPHOS 96 74   BILITOT 0.34 0.30   AMYLASE  --  42   LIPASE 15 14     ABG:No results found for: POCPH, PHART, PH, POCPCO2, DRD8TNU, PCO2, POCPO2, PO2ART, PO2, POCHCO3, KYY1VPA, HCO3, NBEA, PBEA, BEART, BE, THGBART, THB, IBW3YAG, SVJR3NWU, Y9OYDCGI, O2SAT, FIO2  Lab Results   Component Value Date/Time    SPECIAL NOT REPORTED 10/11/2021 11:00 AM     Lab Results   Component Value Date/Time    CULTURE PENDING 10/11/2021 11:00 AM       Radiology:  XR ABDOMEN (KUB) (SINGLE AP VIEW)    Result Date: 10/10/2021  1. Nonspecific nonobstructive bowel gas pattern 2. No plain film findings to suggest constipation     CT ABDOMEN PELVIS W IV CONTRAST Additional Contrast? None    Result Date: 10/10/2021  1. Complicated acute sigmoid diverticulitis, with a 4 x 4.1 cm pericolonic abscess, abutting the dome of the urinary bladder, placing the patient at risk for fistula formation between the bladder and sigmoid diverticulitis. No free air is present. 2. Cholelithiasis, without evidence of cholecystitis       Physical Examination:        General appearance:  alert, cooperative and no distress  Mental Status:  oriented to person, place and time and normal affect  Lungs:  clear to auscultation bilaterally, normal effort  Heart:  regular rate and rhythm, no murmur  Abdomen:  soft, tender to palpation, nondistended, normal bowel sounds, no masses, hepatomegaly, splenomegaly  Extremities:  no edema, redness, tenderness in the calves  Skin:  no gross lesions, rashes, induration    Assessment:        Hospital Problems         Last Modified POA    * (Principal) Colonic diverticular abscess 10/11/2021 Yes    Primary hypertension 10/13/2021 Yes    Chronic atrial fibrillation ([address] 10/11/2021 Yes    Hyperlipidemia 10/11/2021 Yes    Chronic anticoagulation 10/12/2021 Yes    Sigmoid diverticulitis 10/12/2021 Yes    Duodenal diverticulum 10/13/2021 Yes          Plan:        1. Colonic diverticular abscess secondary to diverticulitis  1. IV Cipro and Flagyl as ordered, potential discharge in 24 hours  2. General surgery and IR consultation   1. Collaboratively agree for IR abscess drainage and surgery on standby  3. IR abscess drainage completed 10/12 -follow cultures  2. Chronic A. Fib  1. Restart Xarelto  3. Hypertension with hyperlipidemia  1.  Home medications as ordered    JASPREET Monreal NP  10/13/2021  10:38 AM

## 2021-10-13 NOTE — PROGRESS NOTES
General Surgery:  Daily Progress Note                    PATIENT NAME: Scott E Sherren Notch DATE: 10/13/2021, 6:39 AM  CC:  Abdominal pain    SUBJECTIVE:     Patient seen and evaluated at bedside. No acute events overnight. Afebrile with normal vital signs. Patient underwent IR guided drainage of pericolonic abscess. 15 cc of purulent fluid was aspirated. No drain was left. Patient states overall he feels better than yesterday. Mild abdominal pain to procedure site. Negative bowel movement or flatus since IR drainage. OBJECTIVE:   VITALS:  /79   Pulse 69   Temp 99 °F (37.2 °C) (Oral)   Resp 17   Ht 5' 11\" (1.803 m)   Wt 200 lb (90.7 kg)   SpO2 99%   BMI 27.89 kg/m²      INTAKE/OUTPUT:      Intake/Output Summary (Last 24 hours) at 10/13/2021 8844  Last data filed at 10/12/2021 1808  Gross per 24 hour   Intake 1326 ml   Output --   Net 1326 ml       PHYSICAL EXAM:  General Appearance: awake, alert, oriented, in no acute distress  HEENT:  Normocephalic, atraumatic, mucus membranes moist   Heart: Heart regular rate and rhythm  Lungs: Normal respiratory effort, no acute respiratory distress  Abdomen: Soft, mild tenderness to palpation in suprapubic area, no rebound tenderness, nonperitoneal, aspiration site clean dry and intact  Extremities: No cyanosis, pitting edema, rashes noted. Skin: Skin color, texture, turgor normal. No rashes or lesions.     IV Access:  PIV    Data:  CBC with Differential:    Lab Results   Component Value Date    WBC 11.0 10/12/2021    RBC 4.17 10/12/2021    HGB 11.9 10/12/2021    HCT 38.5 10/12/2021     10/12/2021    MCV 92.3 10/12/2021    MCH 28.5 10/12/2021    MCHC 30.9 10/12/2021    RDW 12.7 10/12/2021    LYMPHOPCT 9 10/10/2021    MONOPCT 8 10/10/2021    BASOPCT 0 10/10/2021    MONOSABS 1.34 10/10/2021    LYMPHSABS 1.55 10/10/2021    EOSABS 0.04 10/10/2021    BASOSABS 0.07 10/10/2021    DIFFTYPE NOT REPORTED 10/10/2021     BMP:    Lab Results Component Value Date     10/12/2021    K 4.4 10/12/2021     10/12/2021    CO2 26 10/12/2021    BUN 10 10/12/2021    LABALBU 3.0 10/12/2021    CREATININE 1.10 10/12/2021    CALCIUM 8.2 10/12/2021    GFRAA >60 10/12/2021    LABGLOM >60 10/12/2021    GLUCOSE 133 10/12/2021       Radiology Review:    CT ABSCESS DRAINAGE   Final Result   Successful CT guided aspiration of the pericolonic/perisigmoid abscess. 15   cc of purulent material was aspirated. .               ASSESSMENT:  Active Hospital Problems    Diagnosis Date Noted    Chronic anticoagulation [Z79.01] 10/12/2021    Sigmoid diverticulitis [K57.32] 10/12/2021    Colonic diverticular abscess [K57.20] 10/11/2021    Chronic atrial fibrillation ([address] [I48.20] 10/11/2021    Hyperlipidemia [E78.5]     Hypertension [I10] 12/05/2020       Yobani Brown is a 64-year-old male with history of hypertension, atrial fibrillation, and chronic constipation who presents with complicated diverticulitis with 4 x 4 x 1 cm abscess. Plan:  1. Continue medical and supportive care per primary  2. Patient is complicated diverticulitis with 4 x 4 by 1 cm abscess, status post IR guided aspiration. 3. Continue IV antibiotics Cipro and Flagyl  4. Continue clear liquid diet today  5. Continue to monitor serial abdominal examinations.     Electronically signed by Erna Walker MD  on 10/13/2021 at 6:39 AM

## 2021-10-14 LAB
ABSOLUTE EOS #: 0.34 K/UL (ref 0–0.44)
ABSOLUTE IMMATURE GRANULOCYTE: 0.1 K/UL (ref 0–0.3)
ABSOLUTE LYMPH #: 1.87 K/UL (ref 1.1–3.7)
ABSOLUTE MONO #: 1.25 K/UL (ref 0.1–1.2)
ANION GAP SERPL CALCULATED.3IONS-SCNC: 8 MMOL/L (ref 9–17)
BASOPHILS # BLD: 1 % (ref 0–2)
BASOPHILS ABSOLUTE: 0.08 K/UL (ref 0–0.2)
BUN BLDV-MCNC: 6 MG/DL (ref 8–23)
BUN/CREAT BLD: 6 (ref 9–20)
CALCIUM SERPL-MCNC: 8.6 MG/DL (ref 8.6–10.4)
CHLORIDE BLD-SCNC: 104 MMOL/L (ref 98–107)
CO2: 25 MMOL/L (ref 20–31)
CREAT SERPL-MCNC: 0.95 MG/DL (ref 0.7–1.2)
DIFFERENTIAL TYPE: ABNORMAL
EOSINOPHILS RELATIVE PERCENT: 3 % (ref 1–4)
GFR AFRICAN AMERICAN: >60 ML/MIN
GFR NON-AFRICAN AMERICAN: >60 ML/MIN
GFR SERPL CREATININE-BSD FRML MDRD: ABNORMAL ML/MIN/{1.73_M2}
GFR SERPL CREATININE-BSD FRML MDRD: ABNORMAL ML/MIN/{1.73_M2}
GLUCOSE BLD-MCNC: 120 MG/DL (ref 70–99)
HCT VFR BLD CALC: 38.4 % (ref 40.7–50.3)
HEMOGLOBIN: 12 G/DL (ref 13–17)
IMMATURE GRANULOCYTES: 1 %
LYMPHOCYTES # BLD: 17 % (ref 24–43)
MCH RBC QN AUTO: 28.4 PG (ref 25.2–33.5)
MCHC RBC AUTO-ENTMCNC: 31.3 G/DL (ref 28.4–34.8)
MCV RBC AUTO: 91 FL (ref 82.6–102.9)
MONOCYTES # BLD: 12 % (ref 3–12)
NRBC AUTOMATED: 0 PER 100 WBC
PDW BLD-RTO: 12.6 % (ref 11.8–14.4)
PLATELET # BLD: 367 K/UL (ref 138–453)
PLATELET ESTIMATE: ABNORMAL
PMV BLD AUTO: 8.6 FL (ref 8.1–13.5)
POTASSIUM SERPL-SCNC: 4.2 MMOL/L (ref 3.7–5.3)
RBC # BLD: 4.22 M/UL (ref 4.21–5.77)
RBC # BLD: ABNORMAL 10*6/UL
SEG NEUTROPHILS: 66 % (ref 36–65)
SEGMENTED NEUTROPHILS ABSOLUTE COUNT: 7.23 K/UL (ref 1.5–8.1)
SODIUM BLD-SCNC: 137 MMOL/L (ref 135–144)
WBC # BLD: 10.9 K/UL (ref 3.5–11.3)
WBC # BLD: ABNORMAL 10*3/UL

## 2021-10-14 PROCEDURE — 80048 BASIC METABOLIC PNL TOTAL CA: CPT

## 2021-10-14 PROCEDURE — 6370000000 HC RX 637 (ALT 250 FOR IP): Performed by: FAMILY MEDICINE

## 2021-10-14 PROCEDURE — 6360000002 HC RX W HCPCS: Performed by: NURSE PRACTITIONER

## 2021-10-14 PROCEDURE — 2500000003 HC RX 250 WO HCPCS: Performed by: INTERNAL MEDICINE

## 2021-10-14 PROCEDURE — 36415 COLL VENOUS BLD VENIPUNCTURE: CPT

## 2021-10-14 PROCEDURE — 6370000000 HC RX 637 (ALT 250 FOR IP): Performed by: SURGERY

## 2021-10-14 PROCEDURE — 2500000003 HC RX 250 WO HCPCS: Performed by: NURSE PRACTITIONER

## 2021-10-14 PROCEDURE — 99232 SBSQ HOSP IP/OBS MODERATE 35: CPT | Performed by: NURSE PRACTITIONER

## 2021-10-14 PROCEDURE — 6370000000 HC RX 637 (ALT 250 FOR IP): Performed by: NURSE PRACTITIONER

## 2021-10-14 PROCEDURE — 1200000000 HC SEMI PRIVATE

## 2021-10-14 PROCEDURE — 85025 COMPLETE CBC W/AUTO DIFF WBC: CPT

## 2021-10-14 PROCEDURE — 6370000000 HC RX 637 (ALT 250 FOR IP): Performed by: STUDENT IN AN ORGANIZED HEALTH CARE EDUCATION/TRAINING PROGRAM

## 2021-10-14 RX ORDER — FAMOTIDINE 20 MG/1
20 TABLET, FILM COATED ORAL 2 TIMES DAILY
Status: DISCONTINUED | OUTPATIENT
Start: 2021-10-14 | End: 2021-10-15 | Stop reason: HOSPADM

## 2021-10-14 RX ORDER — CALCIUM CARBONATE 200(500)MG
500 TABLET,CHEWABLE ORAL 3 TIMES DAILY PRN
Status: DISCONTINUED | OUTPATIENT
Start: 2021-10-14 | End: 2021-10-15 | Stop reason: HOSPADM

## 2021-10-14 RX ADMIN — FAMOTIDINE 20 MG: 20 TABLET, FILM COATED ORAL at 09:04

## 2021-10-14 RX ADMIN — PROMETHAZINE HYDROCHLORIDE 25 MG: 25 TABLET ORAL at 04:29

## 2021-10-14 RX ADMIN — FAMOTIDINE 20 MG: 20 TABLET, FILM COATED ORAL at 20:58

## 2021-10-14 RX ADMIN — RIVAROXABAN 20 MG: 20 TABLET, FILM COATED ORAL at 09:04

## 2021-10-14 RX ADMIN — CIPROFLOXACIN 400 MG: 2 INJECTION, SOLUTION INTRAVENOUS at 23:58

## 2021-10-14 RX ADMIN — POTASSIUM CHLORIDE, DEXTROSE MONOHYDRATE AND SODIUM CHLORIDE: 150; 5; 450 INJECTION, SOLUTION INTRAVENOUS at 18:40

## 2021-10-14 RX ADMIN — METRONIDAZOLE 500 MG: 500 INJECTION, SOLUTION INTRAVENOUS at 09:04

## 2021-10-14 RX ADMIN — CIPROFLOXACIN 400 MG: 2 INJECTION, SOLUTION INTRAVENOUS at 13:09

## 2021-10-14 RX ADMIN — MORPHINE SULFATE 2 MG: 2 INJECTION, SOLUTION INTRAMUSCULAR; INTRAVENOUS at 15:09

## 2021-10-14 RX ADMIN — LOSARTAN POTASSIUM 25 MG: 25 TABLET, FILM COATED ORAL at 09:04

## 2021-10-14 RX ADMIN — METOPROLOL SUCCINATE 25 MG: 25 TABLET, EXTENDED RELEASE ORAL at 09:04

## 2021-10-14 RX ADMIN — METRONIDAZOLE 500 MG: 500 INJECTION, SOLUTION INTRAVENOUS at 17:26

## 2021-10-14 RX ADMIN — METRONIDAZOLE 500 MG: 500 INJECTION, SOLUTION INTRAVENOUS at 01:31

## 2021-10-14 RX ADMIN — Medication 500 MG: at 09:03

## 2021-10-14 ASSESSMENT — PAIN SCALES - GENERAL
PAINLEVEL_OUTOF10: 4
PAINLEVEL_OUTOF10: 0

## 2021-10-14 NOTE — PLAN OF CARE
Problem: Falls - Risk of:  Goal: Will remain free from falls  Description: Will remain free from falls  10/14/2021 0020 by Kriss Peabody, RN  Outcome: Ongoing     Problem: Falls - Risk of:  Goal: Absence of physical injury  Description: Absence of physical injury  10/14/2021 0020 by Kriss Peabody, RN  Outcome: Ongoing     Problem: Pain:  Goal: Pain level will decrease  Description: Pain level will decrease  10/14/2021 0020 by Kriss Peabody, RN  Outcome: Ongoing     Problem: Pain:  Goal: Control of acute pain  Description: Control of acute pain  10/14/2021 0020 by Kriss Peabody, RN  Outcome: Ongoing     Problem: Pain:  Goal: Control of chronic pain  Description: Control of chronic pain  10/14/2021 0020 by Kriss Peabody, RN  Outcome: Ongoing     Problem: Bowel/Gastric:  Goal: Bowel function will improve  Description: Bowel function will improve  10/14/2021 0020 by Kriss Peabody, RN  Outcome: Ongoing     Problem:  Bowel/Gastric:  Goal: Complications related to the disease process, condition or treatment will be avoided or minimized  Description: Complications related to the disease process, condition or treatment will be avoided or minimized  10/14/2021 0020 by Kriss Peabody, RN  Outcome: Ongoing     Problem: Fluid Volume:  Goal: Will maintain adequate fluid volume  Description: Will maintain adequate fluid volume  10/14/2021 0020 by Kriss Peabody, RN  Outcome: Ongoing     Problem: Nutritional:  Goal: Nutritional status will improve  Description: Nutritional status will improve  10/14/2021 0020 by Kriss Peabody, RN  Outcome: Ongoing

## 2021-10-14 NOTE — PROGRESS NOTES
General Surgery:  Daily Progress Note                    PATIENT NAME: Mayo Rizo DATE: 10/14/2021, 6:22 AM  CC:  Abdominal pain    SUBJECTIVE:     Patient seen and examined at bedside. Afebrile with normal vital signs overnight. No acute events. Patient planes of mild suprapubic and left lower abdominal pain, improved from yesterday. OBJECTIVE:   VITALS:  /70   Pulse 64   Temp 98.4 °F (36.9 °C) (Oral)   Resp 16   Ht 5' 11\" (1.803 m)   Wt 195 lb 1 oz (88.5 kg)   SpO2 98%   BMI 27.21 kg/m²      INTAKE/OUTPUT:      Intake/Output Summary (Last 24 hours) at 10/14/2021 0622  Last data filed at 10/13/2021 1701  Gross per 24 hour   Intake 3041 ml   Output --   Net 3041 ml       PHYSICAL EXAM:  General Appearance: awake, alert, oriented, in no acute distress  HEENT:  Normocephalic, atraumatic  Heart: Heart regular rate and rhythm  Lungs: Normal respiratory effort, symmetrical rise and fall of chest  Abdomen: Soft, mild tenderness to palpation in suprapubic area, no rebound tenderness, nonperitoneal, aspiration site clean dry and intact   Extremities: No cyanosis, pitting edema, rashes noted. Skin: Skin color, texture, turgor normal. No rashes or lesions.     IV Access:  PIV    Data:  CBC with Differential:    Lab Results   Component Value Date    WBC 11.0 10/12/2021    RBC 4.17 10/12/2021    HGB 11.9 10/12/2021    HCT 38.5 10/12/2021     10/12/2021    MCV 92.3 10/12/2021    MCH 28.5 10/12/2021    MCHC 30.9 10/12/2021    RDW 12.7 10/12/2021    LYMPHOPCT 9 10/10/2021    MONOPCT 8 10/10/2021    BASOPCT 0 10/10/2021    MONOSABS 1.34 10/10/2021    LYMPHSABS 1.55 10/10/2021    EOSABS 0.04 10/10/2021    BASOSABS 0.07 10/10/2021    DIFFTYPE NOT REPORTED 10/10/2021     BMP:    Lab Results   Component Value Date     10/12/2021    K 4.4 10/12/2021     10/12/2021    CO2 26 10/12/2021    BUN 10 10/12/2021    LABALBU 3.0 10/12/2021    CREATININE 1.10 10/12/2021    CALCIUM 8.2 10/12/2021    GFRAA >60 10/12/2021    LABGLOM >60 10/12/2021    GLUCOSE 133 10/12/2021       Radiology Review:    CT ABSCESS DRAINAGE   Final Result   Successful CT guided aspiration of the pericolonic/perisigmoid abscess. 15   cc of purulent material was aspirated. .               ASSESSMENT:  Active Hospital Problems    Diagnosis Date Noted    Duodenal diverticulum [K57.10] 10/13/2021    Chronic anticoagulation [Z79.01] 10/12/2021    Sigmoid diverticulitis [K57.32] 10/12/2021    Colonic diverticular abscess [K57.20] 10/11/2021    Chronic atrial fibrillation ([address] [I48.20] 10/11/2021    Hyperlipidemia [E78.5]     Primary hypertension [I10] 12/05/2020       Mina Blackman is a 42-year-old male with history of hypertension, atrial fibrillation, and chronic constipation who presents with complicated diverticulitis with 4 x 4 x 1 cm abscess. Plan:  1. Continue medical and supportive care per primary  2. Patient is complicated diverticulitis with 4 x 4 by 1 cm abscess, status post IR guided aspiration. 3. Continue IV antibiotics Cipro and Flagyl  4. Advance to full liquid diet today. 5. Continue to monitor serial abdominal examinations.     Electronically signed by Gloria Mireles MD  on 10/14/2021 at 6:22 AM

## 2021-10-14 NOTE — PROGRESS NOTES
[facility]  Office: [address], DO, Marilyn True, DO, Candi Miller, DO, Alfredo Cuenca Blood, DO, Chloé Ignacio MD, Alize Szymanski MD, Liberty Stoddard MD, Anton Morin MD, Mason Kauffman MD, Tamiko Choudhury MD, Hanna Johns MD, Rin Alexander MD, Jesusita Correia, DO, Brandon Soto DO, Christina Cagle MD,  Sourav Diaz DO, Jia Reddy MD, Josselin Brooks MD, Roxy Jerry MD, Opal Hdez MD, Jeanine Espinoza MD, Supriya Montilla MD, Nicole Velasquez, [facility], [facility], CNP, Eliel Mata, CNP, Marcelina Almonte, CNS, Jan Scott, CNP, Lor Almonte, CNP, Corby Monsivais, CNP, Caridad Fan, [facility], Jessica Bazzi, [facility], Ana Ludwig, CNP, Rowan Oppenheim, PA-C, Bello Rubio, [facility], Iam Nava, CNP, Mario Holly, CNP, Delmi Rodriguez, CNP, Negra Russo, CNP, Armando Guevara, CNP, Norris Gallegos, CNP, Lux Stout, [facility]    Progress Note    10/14/2021    10:03 AM    Name:   Kerry Babin  MRN:     [id number]     Acct:      [de-identified]   Room:   2003/2003-02  IP Day:  3  Admit Date:  10/11/2021  5:49 AM    PCP:   JASPREET Dietrich NP  Code Status:  Full Code    Subjective:     C/C: Abdominal pain  Interval History Status: improved. IR abscess drainage completed yesterday. Follow-up cultures. Continued on IV Cipro and Flagyl. Patient reports feeling much better. Diet advanced by surgery. May be stable for discharge in 24 hours pending hospital course and surgery agreement. Brief History:     10/11 - Patient reported to outside facility for abdominal pain. Patient reports that approximately 48 hours ago he developed with moderate left lower quadrant abdominal discomfort and nausea. Patient reports a diarrhea developed shortly thereafter. Patient attempted rest and hydration at home with no relief of symptoms.   Patient reported to the emergency department where extensive work-up was completed and a diverticular abscess was identified consistent with diverticulitis. Patient was found to have an elevated white count as well. General surgery and interventional radiology recommended and these were not originally available at the outlying facility therefore he was transported to [facility]. 10/12 - Lab work improved, no fever, generally feels much better. IR scheduled for this afternoon for abscess drainage, further hospital course to be determined following this procedure. 10/13 -IR completed abscess drainage yesterday, remains on IV antibiotics, general surgery advance diet. Review of Systems:     Constitutional:  negative for chills, fevers, sweats  Respiratory:  negative for cough, dyspnea on exertion, shortness of breath, wheezing  Cardiovascular:  negative for chest pain, chest pressure/discomfort, lower extremity edema, palpitations  Gastrointestinal:  negative for abdominal pain, constipation, diarrhea, nausea, vomiting  Neurological:  negative for dizziness, headache    Medications: Allergies:     Allergies   Allergen Reactions    Pcn [Penicillins]        Current Meds:   Scheduled Meds:    famotidine  20 mg Oral BID    rivaroxaban  20 mg Oral Daily with breakfast    influenza virus vaccine  0.5 mL IntraMUSCular Prior to discharge    sodium chloride flush  5-40 mL IntraVENous 2 times per day    ciprofloxacin  400 mg IntraVENous Q12H    metroNIDAZOLE  500 mg IntraVENous Q8H    losartan  25 mg Oral Daily    metoprolol succinate  25 mg Oral Daily     Continuous Infusions:    dextrose 5% and 0.45% NaCl with KCl 20 mEq 75 mL/hr at 10/13/21 2205    sodium chloride       PRN Meds: calcium carbonate, sodium chloride flush, sodium chloride, potassium chloride **OR** potassium alternative oral replacement **OR** potassium chloride, magnesium sulfate, acetaminophen **OR** acetaminophen, polyethylene glycol, morphine **OR** morphine, metoprolol, promethazine, promethazine    Data:     Past Medical History:   has a past medical history of A-fib ([address] Abnormal patient-activated cardiac event monitor, H/O echocardiogram, History of cardioversion, Hyperlipidemia, Hypertension, and Sleep apnea. Social History:   reports that he has never smoked. He has never used smokeless tobacco. He reports current alcohol use. He reports that he does not use drugs. Family History:   Family History   Problem Relation Age of Onset   [facility] Cancer Mother     Coronary Art Dis Mother     Cancer Father     Coronary Art Dis Father        Vitals:  /79   Pulse 70   Temp 97.7 °F (36.5 °C) (Oral)   Resp 16   Ht 5' 11\" (1.803 m)   Wt 195 lb 1 oz (88.5 kg)   SpO2 97%   BMI 27.21 kg/m²   Temp (24hrs), Av.3 °F (36.8 °C), Min:97.7 °F (36.5 °C), Max:98.6 °F (37 °C)    No results for input(s): POCGLU in the last 72 hours. I/O (24Hr):     Intake/Output Summary (Last 24 hours) at 10/14/2021 1003  Last data filed at 10/13/2021 1701  Gross per 24 hour   Intake 2921 ml   Output --   Net 2921 ml       Labs:  Hematology:  Recent Labs     10/12/21  0627 10/14/21  0612   WBC 11.0 10.9   RBC 4.17* 4.22   HGB 11.9* 12.0*   HCT 38.5* 38.4*   MCV 92.3 91.0   MCH 28.5 28.4   MCHC 30.9 31.3   RDW 12.7 12.6    367   MPV 8.8 8.6   INR 1.2  --      Chemistry:  Recent Labs     10/12/21  0627 10/14/21  0612    137   K 4.4 4.2    104   CO2 26 25   GLUCOSE 133* 120*   BUN 10 6*   CREATININE 1.10 0.95   MG 2.3  --    ANIONGAP 8* 8*   LABGLOM >60 >60   GFRAA >60 >60   CALCIUM 8.2* 8.6   PHOS 2.3*  --      Recent Labs     10/12/21  0627   PROT 6.3*   LABALBU 3.0*   AST 8   ALT 6   ALKPHOS 74   BILITOT 0.30   AMYLASE 42   LIPASE 14     ABG:No results found for: POCPH, PHART, PH, POCPCO2, NYM0WUB, PCO2, POCPO2, PO2ART, PO2, POCHCO3, YPF7YUZ, HCO3, NBEA, PBEA, BEART, BE, THGBART, THB, ZPP2PIF, CGHZ6QGW, Q6AHHGUA, O2SAT, FIO2  Lab Results   Component Value Date/Time    SPECIAL NOT REPORTED 10/11/2021 11:00 AM     Lab Results Component Value Date/Time    CULTURE LACTOSE FERMENTING GRAM NEGATIVE RODS HEAVY GROWTH (A) 10/11/2021 11:00 AM       Radiology:  XR ABDOMEN (KUB) (SINGLE AP VIEW)    Result Date: 10/10/2021  1. Nonspecific nonobstructive bowel gas pattern 2. No plain film findings to suggest constipation     CT ABDOMEN PELVIS W IV CONTRAST Additional Contrast? None    Result Date: 10/10/2021  1. Complicated acute sigmoid diverticulitis, with a 4 x 4.1 cm pericolonic abscess, abutting the dome of the urinary bladder, placing the patient at risk for fistula formation between the bladder and sigmoid diverticulitis. No free air is present. 2. Cholelithiasis, without evidence of cholecystitis       Physical Examination:        General appearance:  alert, cooperative and no distress  Mental Status:  oriented to person, place and time and normal affect  Lungs:  clear to auscultation bilaterally, normal effort  Heart:  regular rate and rhythm, no murmur  Abdomen:  soft, tender to palpation, nondistended, normal bowel sounds, no masses, hepatomegaly, splenomegaly  Extremities:  no edema, redness, tenderness in the calves  Skin:  no gross lesions, rashes, induration    Assessment:        Hospital Problems         Last Modified POA    * (Principal) Colonic diverticular abscess 10/11/2021 Yes    Primary hypertension 10/13/2021 Yes    Chronic atrial fibrillation ([address] 10/11/2021 Yes    Hyperlipidemia 10/11/2021 Yes    Chronic anticoagulation 10/12/2021 Yes    Sigmoid diverticulitis 10/12/2021 Yes    Duodenal diverticulum 10/13/2021 Yes          Plan:        1. Colonic diverticular abscess secondary to diverticulitis  1. IV Cipro and Flagyl as ordered  2. Case discussed with general surgery, plan is for diet advancement throughout the day and if he can tolerate a regular diet tomorrow morning discharged on oral antibiotics. 3. IR abscess drainage completed 10/12 -follow cultures  2. Chronic A. Fib  1. Xarelto  3.  Hypertension with hyperlipidemia  1.  Home medications as ordered    JASPREET Vera NP  10/14/2021  10:03 AM

## 2021-10-14 NOTE — PLAN OF CARE
Problem:  Bowel/Gastric:  Goal: Bowel function will improve  Description: Bowel function will improve  10/14/2021 1131 by Herb Medina RN  Outcome: Ongoing  10/14/2021 0020 by Suzy Farah RN  Outcome: Ongoing  Goal: Complications related to the disease process, condition or treatment will be avoided or minimized  Description: Complications related to the disease process, condition or treatment will be avoided or minimized  10/14/2021 1131 by Herb Medina RN  Outcome: Ongoing  10/14/2021 0020 by Suzy Farah RN  Outcome: Ongoing     Problem: Fluid Volume:  Goal: Will maintain adequate fluid volume  Description: Will maintain adequate fluid volume  10/14/2021 1131 by Herb Medina RN  Outcome: Ongoing  10/14/2021 0020 by Suzy Farah RN  Outcome: Ongoing

## 2021-10-15 VITALS
SYSTOLIC BLOOD PRESSURE: 112 MMHG | TEMPERATURE: 98.1 F | RESPIRATION RATE: 16 BRPM | BODY MASS INDEX: 26.91 KG/M2 | HEIGHT: 71 IN | WEIGHT: 192.2 LBS | DIASTOLIC BLOOD PRESSURE: 68 MMHG | OXYGEN SATURATION: 98 % | HEART RATE: 68 BPM

## 2021-10-15 LAB
CULTURE: ABNORMAL
DIRECT EXAM: ABNORMAL
Lab: ABNORMAL
SPECIMEN DESCRIPTION: ABNORMAL

## 2021-10-15 PROCEDURE — 6370000000 HC RX 637 (ALT 250 FOR IP): Performed by: NURSE PRACTITIONER

## 2021-10-15 PROCEDURE — 6370000000 HC RX 637 (ALT 250 FOR IP): Performed by: FAMILY MEDICINE

## 2021-10-15 PROCEDURE — G0008 ADMIN INFLUENZA VIRUS VAC: HCPCS | Performed by: FAMILY MEDICINE

## 2021-10-15 PROCEDURE — 2500000003 HC RX 250 WO HCPCS: Performed by: NURSE PRACTITIONER

## 2021-10-15 PROCEDURE — 99232 SBSQ HOSP IP/OBS MODERATE 35: CPT | Performed by: INTERNAL MEDICINE

## 2021-10-15 PROCEDURE — 6370000000 HC RX 637 (ALT 250 FOR IP): Performed by: INTERNAL MEDICINE

## 2021-10-15 PROCEDURE — 6360000002 HC RX W HCPCS: Performed by: NURSE PRACTITIONER

## 2021-10-15 PROCEDURE — 6370000000 HC RX 637 (ALT 250 FOR IP): Performed by: STUDENT IN AN ORGANIZED HEALTH CARE EDUCATION/TRAINING PROGRAM

## 2021-10-15 PROCEDURE — 6360000002 HC RX W HCPCS: Performed by: SURGERY

## 2021-10-15 PROCEDURE — 6360000002 HC RX W HCPCS: Performed by: FAMILY MEDICINE

## 2021-10-15 PROCEDURE — 90686 IIV4 VACC NO PRSV 0.5 ML IM: CPT | Performed by: FAMILY MEDICINE

## 2021-10-15 RX ORDER — TRAMADOL HYDROCHLORIDE 50 MG/1
50 TABLET ORAL EVERY 6 HOURS PRN
Status: DISCONTINUED | OUTPATIENT
Start: 2021-10-15 | End: 2021-10-15 | Stop reason: HOSPADM

## 2021-10-15 RX ORDER — CIPROFLOXACIN 500 MG/1
500 TABLET, FILM COATED ORAL EVERY 12 HOURS SCHEDULED
Status: DISCONTINUED | OUTPATIENT
Start: 2021-10-15 | End: 2021-10-15 | Stop reason: HOSPADM

## 2021-10-15 RX ORDER — CIPROFLOXACIN 500 MG/1
500 TABLET, FILM COATED ORAL EVERY 12 HOURS SCHEDULED
Qty: 20 TABLET | Refills: 0 | Status: SHIPPED | OUTPATIENT
Start: 2021-10-15 | End: 2021-10-25

## 2021-10-15 RX ORDER — METRONIDAZOLE 500 MG/1
500 TABLET ORAL EVERY 8 HOURS SCHEDULED
Status: DISCONTINUED | OUTPATIENT
Start: 2021-10-15 | End: 2021-10-15 | Stop reason: HOSPADM

## 2021-10-15 RX ORDER — TRAMADOL HYDROCHLORIDE 50 MG/1
50 TABLET ORAL EVERY 6 HOURS PRN
Qty: 20 TABLET | Refills: 0 | Status: SHIPPED | OUTPATIENT
Start: 2021-10-15 | End: 2021-10-20

## 2021-10-15 RX ORDER — METRONIDAZOLE 500 MG/1
500 TABLET ORAL EVERY 8 HOURS SCHEDULED
Qty: 30 TABLET | Refills: 0 | Status: SHIPPED | OUTPATIENT
Start: 2021-10-15 | End: 2021-10-25

## 2021-10-15 RX ADMIN — METRONIDAZOLE 500 MG: 500 INJECTION, SOLUTION INTRAVENOUS at 02:12

## 2021-10-15 RX ADMIN — TRAMADOL HYDROCHLORIDE 50 MG: 50 TABLET ORAL at 10:19

## 2021-10-15 RX ADMIN — CIPROFLOXACIN 500 MG: 500 TABLET ORAL at 10:29

## 2021-10-15 RX ADMIN — INFLUENZA A VIRUS A/VICTORIA/2570/2019 IVR-215 (H1N1) ANTIGEN (PROPIOLACTONE INACTIVATED), INFLUENZA A VIRUS A/CAMBODIA/E0826360/2020 IVR-224 (H3N2) ANTIGEN (PROPIOLACTONE INACTIVATED), INFLUENZA B VIRUS B/VICTORIA/705/2018 BVR-11 ANTIGEN (PROPIOLACTONE INACTIVATED), INFLUENZA B VIRUS B/PHUKET/3073/2013 BVR-1B ANTIGEN (PROPIOLACTONE INACTIVATED) 0.5 ML: 15; 15; 15; 15 INJECTION, SUSPENSION INTRAMUSCULAR at 13:20

## 2021-10-15 RX ADMIN — RIVAROXABAN 20 MG: 20 TABLET, FILM COATED ORAL at 10:29

## 2021-10-15 RX ADMIN — PROMETHAZINE HYDROCHLORIDE 12.5 MG: 25 INJECTION INTRAMUSCULAR; INTRAVENOUS at 01:13

## 2021-10-15 RX ADMIN — LOSARTAN POTASSIUM 25 MG: 25 TABLET, FILM COATED ORAL at 10:29

## 2021-10-15 RX ADMIN — METOPROLOL SUCCINATE 25 MG: 25 TABLET, EXTENDED RELEASE ORAL at 10:30

## 2021-10-15 RX ADMIN — Medication 500 MG: at 10:30

## 2021-10-15 RX ADMIN — METRONIDAZOLE 500 MG: 500 TABLET ORAL at 10:29

## 2021-10-15 RX ADMIN — MORPHINE SULFATE 2 MG: 2 INJECTION, SOLUTION INTRAMUSCULAR; INTRAVENOUS at 01:08

## 2021-10-15 RX ADMIN — FAMOTIDINE 20 MG: 20 TABLET, FILM COATED ORAL at 10:30

## 2021-10-15 ASSESSMENT — PAIN SCALES - GENERAL
PAINLEVEL_OUTOF10: 4
PAINLEVEL_OUTOF10: 4

## 2021-10-15 NOTE — PLAN OF CARE
Problem: Falls - Risk of:  Goal: Will remain free from falls  Description: Will remain free from falls  Outcome: Ongoing  Goal: Absence of physical injury  Description: Absence of physical injury  Outcome: Ongoing     Problem: Pain:  Goal: Pain level will decrease  Description: Pain level will decrease  Outcome: Ongoing  Goal: Control of acute pain  Description: Control of acute pain  Outcome: Ongoing  Goal: Control of chronic pain  Description: Control of chronic pain  Outcome: Ongoing     Problem:  Bowel/Gastric:  Goal: Bowel function will improve  Description: Bowel function will improve  10/15/2021 0024 by Nohemy Roth RN  Outcome: Ongoing  Goal: Complications related to the disease process, condition or treatment will be avoided or minimized  Description: Complications related to the disease process, condition or treatment will be avoided or minimized  10/15/2021 0024 by Nohemy Roth RN  Outcome: Ongoing    Problem: Fluid Volume:  Goal: Will maintain adequate fluid volume  Description: Will maintain adequate fluid volume  10/15/2021 0024 by Nohemy Roth RN  Outcome: Ongoing     Problem: Nutritional:  Goal: Nutritional status will improve  Description: Nutritional status will improve  Outcome: Ongoing

## 2021-10-15 NOTE — PLAN OF CARE
Problem: Pain:  Description: Pain management should include both nonpharmacologic and pharmacologic interventions. Goal: Pain level will decrease  Description: Pain level will decrease  10/15/2021 1112 by Landon Grimaldo RN  Outcome: Ongoing  10/15/2021 0024 by Nohemy Roth RN  Outcome: Ongoing  Goal: Control of acute pain  Description: Control of acute pain  10/15/2021 1112 by Landon Grimaldo RN  Outcome: Ongoing  10/15/2021 0024 by Nohemy Roth RN  Outcome: Ongoing  Goal: Control of chronic pain  Description: Control of chronic pain  10/15/2021 1112 by Landon Grimaldo RN  Outcome: Ongoing  10/15/2021 0024 by Nohemy Roth RN  Outcome: Ongoing     Problem:  Bowel/Gastric:  Goal: Bowel function will improve  Description: Bowel function will improve  10/15/2021 1112 by Landon Grimaldo RN  Outcome: Ongoing  10/15/2021 0024 by Nohemy Roth RN  Outcome: Ongoing  Goal: Complications related to the disease process, condition or treatment will be avoided or minimized  Description: Complications related to the disease process, condition or treatment will be avoided or minimized  10/15/2021 1112 by Landon Grimaldo RN  Outcome: Ongoing  10/15/2021 0024 by Nohemy Roth RN  Outcome: Ongoing

## 2021-10-15 NOTE — PROGRESS NOTES
[facility]  Office: [address], DO, Edgar Fisher, DO, Dimitrios Nelson, DO, Cecilia Jose Luis Perry, DO, Linnette Stevens MD, Taz Lanza MD, Rajesh Saavedra MD, Nestor Galarza MD, Toshia Koo MD, Juan Pablo Lopez MD, Rajni Obrien MD, Jillian Brown MD, Loli Dhillon, DO, Tonia Ochoa DO, Luli Yeager MD,  Aubrey Armstrong, DO, Martha Rudolph MD, Maria Eugenia Brady MD, Willow Nelson MD, Alfredo Leiva MD, Megan Hung MD, Severiano Border, MD, Deandre Okeefe, [facility], [facility], [facility], Michelle Vega, [facility], Jared Bartlett, CNS, Kindra Archuleta, CNP, Yoni Dorado, CNP, Eyad Maxwell, CNP, Bennett Fernández, CNP, Raissa Ingram, CNP, Arminda Vinson, CNP, Fiona Ray PA-C, Maurice Forbes, [facility], Whitney Jay, CNP, Maria Teresa Martell, CNP, Elvi Holguin, CNP, Angie Andersen, CNP, Maite Ochoa, CNP, Vitaly Luong, CNP, Maicol Salazar [facility]    Progress Note    10/15/2021    8:46 AM    Name:   Barb Everett  MRN:     [id number]     Acct:      [de-identified]   Room:   2003/2003-02  IP Day:  4  Admit Date:  10/11/2021  5:49 AM    PCP:   JASPREET Grider NP  Code Status:  Full Code    Subjective:     C/C:, Abdominal pain    Interval History Status: improved. Patient improving abdominal pain. Denies any chest pain, shortness of breath, nausea vomiting, fevers or chills or acute complaints. Brief History: This is a 67-year-old male who presents with lower abdominal pain with associated nausea. Upon evaluation is found to have diverticulitis with abscess abutting the dome of the urinary bladder. He underwent aspiration of the abscess with interventional radiology with drainage of approximately 15 to 20 mL of fluid. He was treated with IV Cipro and Flagyl has still been improving.     Review of Systems:     Constitutional:  negative for chills, fevers, sweats  Respiratory:  negative for cough, dyspnea on exertion, shortness of breath, Min:98.1 °F (36.7 °C), Max:98.2 °F (36.8 °C)    No results for input(s): POCGLU in the last 72 hours. I/O (24Hr): Intake/Output Summary (Last 24 hours) at 10/15/2021 0846  Last data filed at 10/15/2021 0455  Gross per 24 hour   Intake 2880 ml   Output --   Net 2880 ml       Labs:  Hematology:  Recent Labs     10/14/21  0612   WBC 10.9   RBC 4.22   HGB 12.0*   HCT 38.4*   MCV 91.0   MCH 28.4   MCHC 31.3   RDW 12.6      MPV 8.6     Chemistry:  Recent Labs     10/14/21  0612      K 4.2      CO2 25   GLUCOSE 120*   BUN 6*   CREATININE 0.95   ANIONGAP 8*   LABGLOM >60   GFRAA >60   CALCIUM 8.6   No results for input(s): PROT, LABALBU, LABA1C, S7VTWLT, P0GNDOI, FT4, TSH, AST, ALT, LDH, GGT, ALKPHOS, LABGGT, BILITOT, BILIDIR, AMMONIA, AMYLASE, LIPASE, LACTATE, CHOL, HDL, LDLCHOLESTEROL, CHOLHDLRATIO, TRIG, VLDL, RHG20XG, PHENYTOIN, PHENYF, URICACID, POCGLU in the last 72 hours. ABG:No results found for: POCPH, PHART, PH, POCPCO2, VNO1EWN, PCO2, POCPO2, PO2ART, PO2, POCHCO3, AKG3IZU, HCO3, NBEA, PBEA, BEART, BE, THGBART, THB, ZLX0ITX, WIZJ1XSS, O3UHNCZF, O2SAT, FIO2  Lab Results   Component Value Date/Time    SPECIAL NOT REPORTED 10/11/2021 11:00 AM     Lab Results   Component Value Date/Time    CULTURE LACTOSE FERMENTING GRAM NEGATIVE RODS HEAVY GROWTH (A) 10/11/2021 11:00 AM    CULTURE MIXED ANAEROBIC MAICOL (A) 10/11/2021 11:00 AM       Radiology:  XR ABDOMEN (KUB) (SINGLE AP VIEW)    Result Date: 10/10/2021  1. Nonspecific nonobstructive bowel gas pattern 2. No plain film findings to suggest constipation     CT ABDOMEN PELVIS W IV CONTRAST Additional Contrast? None    Result Date: 10/10/2021  1. Complicated acute sigmoid diverticulitis, with a 4 x 4.1 cm pericolonic abscess, abutting the dome of the urinary bladder, placing the patient at risk for fistula formation between the bladder and sigmoid diverticulitis. No free air is present.  2. Cholelithiasis, without evidence of cholecystitis     CT ABSCESS DRAINAGE    Result Date: 10/12/2021  Successful CT guided aspiration of the pericolonic/perisigmoid abscess. 15 cc of purulent material was aspirated. Lionel Tracy Physical Examination:        General appearance:  alert, cooperative and no distress  Mental Status:  oriented to person, place and time and normal affect  Lungs:  clear to auscultation bilaterally, normal effort  Heart:  regular rate and rhythm, no murmur  Abdomen:  soft, nontender, nondistended, normal bowel sounds, no masses, hepatomegaly, splenomegaly  Extremities:  no edema, redness, tenderness in the calves  Skin:  no gross lesions, rashes, induration    Assessment:        Hospital Problems         Last Modified POA    * (Principal) Colonic diverticular abscess 10/11/2021 Yes    Primary hypertension 10/13/2021 Yes    Chronic atrial fibrillation ([address] 10/11/2021 Yes    Hyperlipidemia 10/11/2021 Yes    Chronic anticoagulation 10/12/2021 Yes    Sigmoid diverticulitis 10/12/2021 Yes    Duodenal diverticulum 10/13/2021 Yes          Plan:        1. Continue Cipro and Flagyl, transition to oral  2. Advance diet  3. GI and DVT prophylaxis  4. Continue home anticoagulation with Xarelto and cardiac medications  5. Continue home statin therapy  6. Activity as tolerated  7.  Discharge home after lunch if tolerating diet    Regino Sun DO  10/15/2021  8:46 AM

## 2021-10-15 NOTE — PLAN OF CARE
Problem: Falls - Risk of:  Goal: Will remain free from falls  Description: Will remain free from falls  Outcome: Completed  Goal: Absence of physical injury  Description: Absence of physical injury  Outcome: Completed     Problem: Pain:  Description: Pain management should include both nonpharmacologic and pharmacologic interventions. Goal: Pain level will decrease  Description: Pain level will decrease  10/15/2021 1514 by Breonna Urena RN  Outcome: Completed  10/15/2021 1112 by Breonna Urena RN  Outcome: Ongoing  Goal: Control of acute pain  Description: Control of acute pain  10/15/2021 1514 by Breonna Urena RN  Outcome: Completed  10/15/2021 1112 by Breonna Urena RN  Outcome: Ongoing  Goal: Control of chronic pain  Description: Control of chronic pain  10/15/2021 1514 by Breonna Urena RN  Outcome: Completed  10/15/2021 1112 by Breonna Urena RN  Outcome: Ongoing     Problem:  Bowel/Gastric:  Goal: Bowel function will improve  Description: Bowel function will improve  10/15/2021 1514 by Breonna Urena RN  Outcome: Completed  10/15/2021 1112 by Breonna Urena RN  Outcome: Ongoing  Goal: Complications related to the disease process, condition or treatment will be avoided or minimized  Description: Complications related to the disease process, condition or treatment will be avoided or minimized  10/15/2021 1514 by Breonna Urena RN  Outcome: Completed  10/15/2021 1112 by Breonna Urena RN  Outcome: Ongoing     Problem: Fluid Volume:  Goal: Will maintain adequate fluid volume  Description: Will maintain adequate fluid volume  Outcome: Completed     Problem: Nutritional:  Goal: Nutritional status will improve  Description: Nutritional status will improve  Outcome: Completed

## 2021-10-15 NOTE — PROGRESS NOTES
The pt was discharged to home. The discharge instructions were given and reviewed with the pt. He was escorted to the exit per wheelchair in stable condition.

## 2021-10-15 NOTE — PROGRESS NOTES
General Surgery:  Daily Progress Note                    PATIENT NAME: Humberto Minaya     TODAY'S DATE: 10/15/2021, 7:15 AM  CC:  Abdominal pain    SUBJECTIVE:     Patient seen and evaluated, no acute events overnight. Patient is afebrile normal vital signs. Patient states abdominal pain is improved. Voiding spontaneously. Tolerated FLD. OBJECTIVE:   VITALS:  /73   Pulse 71   Temp 98.2 °F (36.8 °C) (Oral)   Resp 16   Ht 5' 11\" (1.803 m)   Wt 192 lb 3.2 oz (87.2 kg)   SpO2 98%   BMI 26.81 kg/m²      INTAKE/OUTPUT:      Intake/Output Summary (Last 24 hours) at 10/15/2021 0715  Last data filed at 10/15/2021 0455  Gross per 24 hour   Intake 2880 ml   Output --   Net 2880 ml       PHYSICAL EXAM:  General Appearance: awake, alert, oriented, in no acute distress  HEENT:  Normocephalic, atraumatic  Heart: Heart regular rate and rhythm  Lungs: Symmetrical rise and fall of chest, no respiratory distress  Abdomen: Soft, mild tenderness to palpation in suprapubic area, no rebound tenderness, nonperitoneal, aspiration site clean dry and intact   Extremities: No cyanosis, pitting edema, rashes noted. Skin: Skin color texture are normal, no lesions.     IV Access:  PIV    Data:  CBC with Differential:    Lab Results   Component Value Date    WBC 10.9 10/14/2021    RBC 4.22 10/14/2021    HGB 12.0 10/14/2021    HCT 38.4 10/14/2021     10/14/2021    MCV 91.0 10/14/2021    MCH 28.4 10/14/2021    MCHC 31.3 10/14/2021    RDW 12.6 10/14/2021    LYMPHOPCT 17 10/14/2021    MONOPCT 12 10/14/2021    BASOPCT 1 10/14/2021    MONOSABS 1.25 10/14/2021    LYMPHSABS 1.87 10/14/2021    EOSABS 0.34 10/14/2021    BASOSABS 0.08 10/14/2021    DIFFTYPE NOT REPORTED 10/14/2021     BMP:    Lab Results   Component Value Date     10/14/2021    K 4.2 10/14/2021     10/14/2021    CO2 25 10/14/2021    BUN 6 10/14/2021    LABALBU 3.0 10/12/2021    CREATININE 0.95 10/14/2021    CALCIUM 8.6 10/14/2021    GFRAA >60

## 2021-10-15 NOTE — DISCHARGE SUMMARY
[facility]  Office: [address], , Edgar Fisher, DO, Dimitrios Nelson, DO, Cecilia Perry, DO, Linnette Stevens MD, Taz Lanza MD, Rajesh Saavedra MD, Nestor Galarza MD, Toshia Koo MD, Juan Pablo Lopez MD, Rajni Obrien MD, Jillian Brown MD, Loli Dhillon, DO, Tonia Ochoa DO, Luli Yeager MD,  Aubrey Armstrong, DO, Martha Rudolph MD, Maria Eugenia Brady MD, Willow Nelson MD, Alfredo Leiva MD, Megan Hung MD, Severiano Border, MD, Deandre Okeefe [facility], [facility], [facility], Michelle Vega, [facility], Jared Bartlett, CNS, Kindra Archuleta, CNP, Yoni Dorado, CNP, Eyad Maxwell, CNP, Bennett Fernández, CNP, Raissa Ingram, CNP, Arminda Vinson, CNP, Fiona Ray PACHRYSTAL, Maurice Forbes, [facility], Whintey Jay, CNP, Maria Teresa Martell, CNP, Elvi Holguin, CNP, Angie Andersen, CNP, Maite Ochoa, CNP, Vitaly Luong, CNP, [facility]    Discharge Summary     Patient ID: Barb Everett  :  1960   MRN: [id number]     ACCOUNT:  [de-identified]   Patient's PCP: JASPREET Grider NP  Admit Date: 10/11/2021   Discharge Date: 10/15/2021     Length of Stay: 4  Code Status:  Full Code  Admitting Physician: Tracy Flores DO  Discharge Physician: Tracy Flores DO     Active Discharge Diagnoses:     Hospital Problem Lists:  Principal Problem:    Colonic diverticular abscess  Active Problems:    Primary hypertension    Chronic atrial fibrillation (HCC)    Hyperlipidemia    Chronic anticoagulation    Sigmoid diverticulitis    Duodenal diverticulum  Resolved Problems:    * No resolved hospital problems.  *      Admission Condition:  fair     Discharged Condition: stable    Hospital Stay:     Hospital Course:  Barb Everett is a 56 y.o. male who was admitted for the management of  Colonic diverticular abscess , presented to ER with abdominal pain    This is a 27-year-old male who presents with a complaint of left lower quadrant/suprapubic abdominal pain upon evaluation is found to have sigmoid diverticulitis with abscess. The abscess was 4 x 4 cm and above the dome of the bladder. He underwent aspiration of the abscess with interventional radiology with drainage of 15 to 20 mL of fluid. He was treated with IV Cipro and Flagyl and had steady improvement. To the position of the abscess and high risk for fistula formation he was treated conservatively with slow advancement of his diet and continued IV antibiotics for several days. Ultimately his diet was slowly advanced and tolerated by the 15th he was stable for discharge on oral antibiotics with a low residue diet. Significant therapeutic interventions: As above    Significant Diagnostic Studies:   Labs / Micro:  CBC:   Lab Results   Component Value Date    WBC 10.9 10/14/2021    RBC 4.22 10/14/2021    HGB 12.0 10/14/2021    HCT 38.4 10/14/2021    MCV 91.0 10/14/2021    MCH 28.4 10/14/2021    MCHC 31.3 10/14/2021    RDW 12.6 10/14/2021     10/14/2021     BMP:    Lab Results   Component Value Date    GLUCOSE 120 10/14/2021     10/14/2021    K 4.2 10/14/2021     10/14/2021    CO2 25 10/14/2021    ANIONGAP 8 10/14/2021    BUN 6 10/14/2021    CREATININE 0.95 10/14/2021    BUNCRER 6 10/14/2021    CALCIUM 8.6 10/14/2021    LABGLOM >60 10/14/2021    GFRAA >60 10/14/2021    GFR      10/14/2021    GFR NOT REPORTED 10/14/2021        Radiology:  XR ABDOMEN (KUB) (SINGLE AP VIEW)    Result Date: 10/10/2021  1. Nonspecific nonobstructive bowel gas pattern 2. No plain film findings to suggest constipation     CT ABDOMEN PELVIS W IV CONTRAST Additional Contrast? None    Result Date: 10/10/2021  1. Complicated acute sigmoid diverticulitis, with a 4 x 4.1 cm pericolonic abscess, abutting the dome of the urinary bladder, placing the patient at risk for fistula formation between the bladder and sigmoid diverticulitis. No free air is present.  2. Cholelithiasis, without evidence of cholecystitis     CT ABSCESS DRAINAGE    Result Date: 10/12/2021  Successful CT guided aspiration of the pericolonic/perisigmoid abscess. 15 cc of purulent material was aspirated. .       Consultations:    Consults:     Final Specialist Recommendations/Findings:   IP CONSULT TO RESPIRATORY CARE  IP CONSULT TO GENERAL SURGERY      The patient was seen and examined on day of discharge and this discharge summary is in conjunction with any daily progress note from day of discharge. Discharge plan:     Disposition: Home    Physician Follow Up:   PCP 1 week  Edward Elizabeth DO  [address] [facility] [id number] [phone]    In 1 week  re-evaluation       Requiring Further Evaluation/Follow Up POST HOSPITALIZATION/Incidental Findings: None    Diet: Low residue    Activity: As tolerated    Instructions to Patient: Take antibiotics as ordered    Discharge Medications:      Medication List      START taking these medications    ciprofloxacin 500 MG tablet  Commonly known as: CIPRO  Take 1 tablet by mouth every 12 hours for 10 days     metroNIDAZOLE 500 MG tablet  Commonly known as: FLAGYL  Take 1 tablet by mouth every 8 hours for 10 days     traMADol 50 MG tablet  Commonly known as: Ultram  Take 1 tablet by mouth every 6 hours as needed for Pain for up to 5 days. Intended supply: 5 days.  Take lowest dose possible to manage pain        CONTINUE taking these medications    fluticasone 50 MCG/ACT nasal spray  Commonly known as: FLONASE     hydrOXYzine 50 MG tablet  Commonly known as: ATARAX     loratadine 10 MG tablet  Commonly known as: CLARITIN     losartan 25 MG tablet  Commonly known as: COZAAR  Take 1 tablet by mouth daily     metoprolol succinate 25 MG extended release tablet  Commonly known as: TOPROL XL  Take 1 tablet by mouth daily     rivaroxaban 20 MG Tabs tablet  Commonly known as: Xarelto  Take 1 tablet by mouth daily (with breakfast)     Viagra 50 MG tablet  Generic drug: sildenafil Where to Get Your Medications      These medications were sent to [facility], [address] [facility] [phone] [facility] [place] [phone]  [address] [place] [address], [place] [address]    Phone: [phone]   · ciprofloxacin 500 MG tablet  · metroNIDAZOLE 500 MG tablet  · traMADol 50 MG tablet         No discharge procedures on file. Time Spent on discharge is  27 minutes in patient examination, evaluation, counseling as well as medication reconciliation, prescriptions for required medications, discharge plan and follow up. Electronically signed by   Wilber Correa DO  10/15/2021  9:36 AM      Thank you Dr. Marcia Bishop, APRN - NP for the opportunity to be involved in this patient's care.

## 2021-10-22 ASSESSMENT — ENCOUNTER SYMPTOMS
VOMITING: 0
ABDOMINAL PAIN: 1
HEMATOCHEZIA: 0
HEMATEMESIS: 0
DIARRHEA: 0

## 2021-11-11 ENCOUNTER — HOSPITAL ENCOUNTER (OUTPATIENT)
Dept: CT IMAGING | Age: 61
Discharge: HOME | End: 2021-11-13
Payer: MEDICARE

## 2021-11-11 DIAGNOSIS — K57.20 DIVERTICULITIS OF LARGE INTESTINE WITH PERFORATION WITHOUT BLEEDING: ICD-10-CM

## 2021-11-11 PROCEDURE — 6360000004 HC RX CONTRAST MEDICATION: Performed by: SURGERY

## 2021-11-11 PROCEDURE — 74177 CT ABD & PELVIS W/CONTRAST: CPT

## 2021-11-11 RX ADMIN — IOPAMIDOL 75 ML: 755 INJECTION, SOLUTION INTRAVENOUS at 14:17

## 2021-11-11 RX ADMIN — IOPAMIDOL 18 ML: 755 INJECTION, SOLUTION INTRAVENOUS at 14:17

## 2021-11-16 RX ORDER — SODIUM CHLORIDE, SODIUM LACTATE, POTASSIUM CHLORIDE, CALCIUM CHLORIDE 600; 310; 30; 20 MG/100ML; MG/100ML; MG/100ML; MG/100ML
1000 INJECTION, SOLUTION INTRAVENOUS CONTINUOUS
Status: CANCELLED | OUTPATIENT
Start: 2021-11-16

## 2021-11-17 ENCOUNTER — HOSPITAL ENCOUNTER (OUTPATIENT)
Dept: PREADMISSION TESTING | Age: 61
Discharge: HOME | End: 2021-11-21
Payer: MEDICARE

## 2021-11-17 VITALS
RESPIRATION RATE: 16 BRPM | HEIGHT: 71 IN | OXYGEN SATURATION: 100 % | BODY MASS INDEX: 24.78 KG/M2 | DIASTOLIC BLOOD PRESSURE: 72 MMHG | SYSTOLIC BLOOD PRESSURE: 107 MMHG | WEIGHT: 177 LBS | TEMPERATURE: 97 F | HEART RATE: 102 BPM

## 2021-11-17 LAB
ANION GAP SERPL CALCULATED.3IONS-SCNC: 17 MMOL/L (ref 9–17)
BUN BLDV-MCNC: 20 MG/DL (ref 8–23)
CHLORIDE BLD-SCNC: 101 MMOL/L (ref 98–107)
CO2: 18 MMOL/L (ref 20–31)
CREAT SERPL-MCNC: 1.15 MG/DL (ref 0.7–1.2)
GFR AFRICAN AMERICAN: >60 ML/MIN
GFR NON-AFRICAN AMERICAN: >60 ML/MIN
GFR SERPL CREATININE-BSD FRML MDRD: NORMAL ML/MIN/{1.73_M2}
GFR SERPL CREATININE-BSD FRML MDRD: NORMAL ML/MIN/{1.73_M2}
GLUCOSE BLD-MCNC: 127 MG/DL (ref 70–99)
HCT VFR BLD CALC: 42.4 % (ref 40.7–50.3)
HEMOGLOBIN: 13.1 G/DL (ref 13–17)
MCH RBC QN AUTO: 28.6 PG (ref 25.2–33.5)
MCHC RBC AUTO-ENTMCNC: 30.9 G/DL (ref 28.4–34.8)
MCV RBC AUTO: 92.6 FL (ref 82.6–102.9)
NRBC AUTOMATED: 0 PER 100 WBC
PDW BLD-RTO: 14.3 % (ref 11.8–14.4)
PLATELET # BLD: 479 K/UL (ref 138–453)
PMV BLD AUTO: 8.3 FL (ref 8.1–13.5)
POTASSIUM SERPL-SCNC: 4.9 MMOL/L (ref 3.7–5.3)
RBC # BLD: 4.58 M/UL (ref 4.21–5.77)
SODIUM BLD-SCNC: 136 MMOL/L (ref 135–144)
WBC # BLD: 10.6 K/UL (ref 3.5–11.3)

## 2021-11-17 PROCEDURE — 84520 ASSAY OF UREA NITROGEN: CPT

## 2021-11-17 PROCEDURE — 80051 ELECTROLYTE PANEL: CPT

## 2021-11-17 PROCEDURE — 82565 ASSAY OF CREATININE: CPT

## 2021-11-17 PROCEDURE — 82947 ASSAY GLUCOSE BLOOD QUANT: CPT

## 2021-11-17 PROCEDURE — 36415 COLL VENOUS BLD VENIPUNCTURE: CPT

## 2021-11-17 PROCEDURE — 85027 COMPLETE CBC AUTOMATED: CPT

## 2021-11-17 RX ORDER — SULFAMETHOXAZOLE AND TRIMETHOPRIM 800; 160 MG/1; MG/1
1 TABLET ORAL 2 TIMES DAILY
Status: ON HOLD | COMMUNITY
End: 2021-11-28 | Stop reason: HOSPADM

## 2021-11-17 NOTE — PROGRESS NOTES
Anesthesia Focused Assessment    Hx of anesthesia complications:  NO  Family hx of anesthesia complications:  NOT THAT HE IS AWARE OF       Prior + Covid-19 test? NO  Symptoms/Hospitalization?:  Patient vaccinated: YES      STOP-BANG Sleep Apnea Questionnaire    SNORE loudly (heard through closed doors)? No  TIRED, fatigued, sleepy during daytime? Yes  OBSERVED stopping breathing during sleep? Yes  High blood PRESSURE or being treated? Yes    BMI over 35? No  AGE over 48? Yes  NECK circumference over 16\"? No  GENDER (male)? Yes             Total 5  High risk 5-8  Intermediate risk 3-4  Low risk 0-2    ----------------------------------------------------------------------------------------------------------------------  MICHELLE:                                             YES  If yes, machine?:                          C-PAP, INSTRUCTED TO BRING WITH     Type 1 DM:                                   NO  T2DM:                                           NO    Coronary Artery Disease:             NO  Hypertension:                               YES  Defib / AICD / Pacemaker:           NO  HX P.A-FIB (s/p cardioverion), NON-ISCHEMIC CARDIOMYOPATHY, HTN, ON METOPROLOL, AND LOSARTAN, AND XARELTO  FOLLOWS WITH CARDIOLOGIST IN [place]. DR. SHARMA, last office note from 9/29/21 is on file in [employer]. Pt without cardiopulmonary complaints. I advised him to check with cardiologist about when to hold his Xarelto, pt states surgeon's office requested to hold x 3 days prior. Renal Failure:                               NO  If yes, on dialysis? :                           Active smoker:                              NO  Drinks Alcohol:                              NO  Illicit drugs:                                    NO    Dentition:                                      BROKEN     Past Medical History:   Diagnosis Date    Abnormal patient-activated cardiac event monitor 02/22/2021    CAM 13 days 8 hrs Baseline sinus ave HR of 63 bpm rang between 47 adn 109 bpm  Rare PAC and PVC with 2 runs of ectopic atrial tachy longest fastest was 7 beats hr 110 bpm No VT runs no AFib    Chronic anticoagulation     xarelto    Colonic diverticular abscess 10/2021    H/O echocardiogram 03/08/2021    EF 45-50% Mild increased LV wallthickness LA is mod dilated 34-39 with LA volume index of 35 ml/m2 Mild mitral and tricuspid regurg Midl diastolic dysfunction seen Aortic root is mod dilated >4.5cm when corrected for body suface area    History of cardioversion     Hyperlipidemia     Hypertension     Non-ischemic cardiomyopathy ([facility] [address]     Dr. Annita Bradley cardiology in [place], [place]    Paroxysmal A-fib [facility])     hx cardioversion    Sleep apnea     Home CPAP    Wellness examination     CARDIOLOGY, DR Kulwinder Hernandes, [place]; last visit 9/29/21    Wellness examination     PCP Chun Jeronimo CNP, [place] ([address]) last visit Aug 2021         Patient was evaluated in PAT & anesthesia guidelines were applied. NPO guidelines, medication instructions and scheduled arrival time were reviewed with patient. Anesthesia contacted:   Yes  I spoke with Dr. Kathy De Jessu. Patient history and pertinent findings reviewed (as documented above).    Medical or cardiac clearance ordered: JASPREET Gaxiola CNP   11/17/21  12:07 PM

## 2021-11-17 NOTE — PROGRESS NOTES
Spoke with Devan Cortes @ 's office about when patient should hold his Xarelto and she stated they always go with what the surgeon's have suggested which in this case is 3 days prior to surgery on 11/22/2021, patient was called and informed.

## 2021-11-21 ASSESSMENT — ENCOUNTER SYMPTOMS
ABDOMINAL DISTENTION: 0
EYE DISCHARGE: 0
APNEA: 0
DIARRHEA: 0
SHORTNESS OF BREATH: 0
CHOKING: 0
COLOR CHANGE: 0
PHOTOPHOBIA: 0
EYE PAIN: 0
ABDOMINAL PAIN: 1
BLOOD IN STOOL: 0

## 2021-11-21 NOTE — H&P
General Surgery  History and physical        PATIENT NAME: Mychal Hood   YOB: 1960    ADMISSION DATE: No admission date for patient encounter. Admitting physician: Dr Luzma Lisa: 11/22/2021    Reason for admission: Lower abdominal pain with pneumaturia    Chief complaint: Same    HISTORY OF PRESENT ILLNESS:  The patient is a 59 y.o. male  who presents with history of diverticulitis with localized abscess which was treated with aspiration per interventional radiology. Patient had extensive course of IV and oral antibiotics and subsequent return of symptoms with repeat CAT scan demonstrating reaccumulation of abscess near  bladder. Patient also now complaining of new onset pneumaturia. Patient presents for exploratory laparotomy with likely colectomy colostomy and closure of colovesical fistula. Discussion with interventional radiology revealed inability to access reaccumulation for drain placement.   Past Medical History:        Diagnosis Date    Abnormal patient-activated cardiac event monitor 02/22/2021    CAM 13 days 8 hrs Baseline sinus ave HR of 63 bpm rang between 47 adn 109 bpm  Rare PAC and PVC with 2 runs of ectopic atrial tachy longest fastest was 7 beats hr 110 bpm No VT runs no AFib    Chronic anticoagulation     xarelto    Colonic diverticular abscess 10/2021    H/O echocardiogram 03/08/2021    EF 45-50% Mild increased LV wallthickness LA is mod dilated 34-39 with LA volume index of 35 ml/m2 Mild mitral and tricuspid regurg Midl diastolic dysfunction seen Aortic root is mod dilated >4.5cm when corrected for body suface area    History of cardioversion     Hyperlipidemia     Hypertension     Non-ischemic cardiomyopathy ([address]     Dr. Delon Stephenson cardiology in [place], [place]    Paroxysmal A-fib [facility])     hx cardioversion    Sleep apnea     Home CPAP    Wellness examination     CARDIOLOGY, DR Juan Leos, [place]; last visit 9/29/21    Wellness examination     PCP eRma Yared Riley, EMMANUEL, LUCINDA [facility]) last visit Aug 2021       Past Surgical History:        Procedure Laterality Date    CARDIOVERSION  01/2021    CT ABSCESS DRAIN SUBCUTANEOUS  10/12/2021    CT ABSCESS DRAIN SUBCUTANEOUS 10/12/2021 [facility] CT SCAN       Medications Prior to Admission: See epic list on Xarelto  No medications prior to admission. Allergies:  Cipro [ciprofloxacin hcl] and Pcn [penicillins]    Social History:   Social History     Socioeconomic History    Marital status: Single     Spouse name: Not on file    Number of children: Not on file    Years of education: Not on file    Highest education level: Not on file   Occupational History    Not on file   Tobacco Use    Smoking status: Never Smoker    Smokeless tobacco: Never Used   Vaping Use    Vaping Use: Never used   Substance and Sexual Activity    Alcohol use: Not Currently    Drug use: Never    Sexual activity: Not on file   Other Topics Concern    Not on file   Social History Narrative    Not on file     Social Determinants of Health     Financial Resource Strain:     Difficulty of Paying Living Expenses: Not on file   Food Insecurity:     Worried About Running Out of Food in the Last Year: Not on file    Butch of Food in the Last Year: Not on file   Transportation Needs:     Lack of Transportation (Medical): Not on file    Lack of Transportation (Non-Medical):  Not on file   Physical Activity:     Days of Exercise per Week: Not on file    Minutes of Exercise per Session: Not on file   Stress:     Feeling of Stress : Not on file   Social Connections:     Frequency of Communication with Friends and Family: Not on file    Frequency of Social Gatherings with Friends and Family: Not on file    Attends Protestant Services: Not on file    Active Member of Clubs or Organizations: Not on file    Attends Club or Organization Meetings: Not on file    Marital Status: Not on file   Intimate Partner Violence:     Fear of Current or Ex-Partner: Not on file    Emotionally Abused: Not on file    Physically Abused: Not on file    Sexually Abused: Not on file   Housing Stability:     Unable to Pay for Housing in the Last Year: Not on file    Number of Places Lived in the Last Year: Not on file    Unstable Housing in the Last Year: Not on file       Family History:       Problem Relation Age of Onset    Cancer Mother     Coronary Art Dis Mother     Cancer Father     Coronary Art Dis Father        REVIEW OF SYSTEMS:    Review of Systems   Constitutional: Negative for activity change, appetite change and diaphoresis. HENT: Negative for congestion, drooling, ear pain and hearing loss. Eyes: Negative for photophobia, pain and discharge. Respiratory: Negative for apnea, choking and shortness of breath. Cardiovascular: Positive for palpitations (On chronic anticoagulation due to arrhythmia). Negative for chest pain and leg swelling. Gastrointestinal: Positive for abdominal pain (As per HPI suprapubic tenderness). Negative for abdominal distention, blood in stool and diarrhea. Endocrine: Negative for cold intolerance, heat intolerance and polyphagia. Genitourinary: Negative for enuresis, frequency and hematuria. Recent onset pneumaturia   Musculoskeletal: Negative for arthralgias, gait problem and myalgias. Skin: Negative for color change, rash and wound. Allergic/Immunologic: Negative for environmental allergies, food allergies and immunocompromised state. Neurological: Negative for dizziness, numbness and headaches. Hematological: Negative for adenopathy. Psychiatric/Behavioral: Negative for agitation, behavioral problems and confusion. PHYSICAL EXAM:    VITALS:  There were no vitals taken for this visit. INTAKE/OUTPUT: .No intake or output data in the 24 hours ending 11/21/21 1047    Physical Exam  Vitals and nursing note reviewed.    Constitutional:       General: He is not in acute distress. Appearance: Normal appearance. He is normal weight. HENT:      Head: Normocephalic and atraumatic. Right Ear: Tympanic membrane, ear canal and external ear normal.      Left Ear: Tympanic membrane, ear canal and external ear normal.      Nose: Nose normal. No congestion. Mouth/Throat:      Mouth: Mucous membranes are moist.      Pharynx: Oropharynx is clear. No oropharyngeal exudate. Eyes:      General: No scleral icterus. Extraocular Movements: Extraocular movements intact. Conjunctiva/sclera: Conjunctivae normal.      Pupils: Pupils are equal, round, and reactive to light. Cardiovascular:      Rate and Rhythm: Normal rate and regular rhythm. Pulses: Normal pulses. Pulmonary:      Effort: No respiratory distress. Breath sounds: Normal breath sounds. Abdominal:      General: Abdomen is flat. Palpations: Abdomen is soft. There is no mass. Tenderness: There is abdominal tenderness (Lower suprapubic). There is no rebound. Hernia: No hernia is present. Genitourinary:     Penis: Normal.       Testes: Normal.      Prostate: Normal.   Musculoskeletal:         General: No swelling or deformity. Normal range of motion. Cervical back: Normal range of motion. No rigidity. Skin:     General: Skin is warm and dry. Capillary Refill: Capillary refill takes less than 2 seconds. Coloration: Skin is not jaundiced. Neurological:      General: No focal deficit present. Mental Status: He is alert and oriented to person, place, and time. Cranial Nerves: No cranial nerve deficit. Psychiatric:         Mood and Affect: Mood normal.         Behavior: Behavior normal.         Thought Content:  Thought content normal.           CBC:   Lab Results   Component Value Date    WBC 10.6 11/17/2021    RBC 4.58 11/17/2021    HGB 13.1 11/17/2021    HCT 42.4 11/17/2021    MCV 92.6 11/17/2021    MCH 28.6 11/17/2021    MCHC 30.9 11/17/2021    RDW 14.3 11/17/2021     11/17/2021    MPV 8.3 11/17/2021     BMP:    Lab Results   Component Value Date     11/17/2021    K 4.9 11/17/2021     11/17/2021    CO2 18 11/17/2021    BUN 20 11/17/2021    LABALBU 3.0 10/12/2021    CREATININE 1.15 11/17/2021    CALCIUM 8.6 10/14/2021    GFRAA >60 11/17/2021    LABGLOM >60 11/17/2021    GLUCOSE 127 11/17/2021       Pertinent Radiology:    No orders to display   See prior CAT scan      ASSESSMENT   Diverticular abscess reaccumulated now with onset of pneumaturia    PLAN    1. Exploratory laparotomy with colectomy colostomy and repair/closure of colovesical fistula  2. Risks options and benefits of surgery discussed with patient who understands and agrees to proceed. Consent is signed and witnessed and questions are answered.         Electronically signed by Marck Hernandez DO  on 11/21/2021 at 10:47 AM

## 2021-11-22 ENCOUNTER — ANESTHESIA EVENT (OUTPATIENT)
Dept: OPERATING ROOM | Age: 61
DRG: 231 | End: 2021-11-22
Payer: MEDICARE

## 2021-11-22 ENCOUNTER — ANESTHESIA (OUTPATIENT)
Dept: OPERATING ROOM | Age: 61
DRG: 231 | End: 2021-11-22
Payer: MEDICARE

## 2021-11-22 ENCOUNTER — HOSPITAL ENCOUNTER (INPATIENT)
Age: 61
LOS: 7 days | Discharge: HOME HEALTH | DRG: 231 | End: 2021-11-29
Attending: SURGERY | Admitting: SURGERY
Payer: MEDICARE

## 2021-11-22 VITALS — TEMPERATURE: 97.4 F | SYSTOLIC BLOOD PRESSURE: 122 MMHG | OXYGEN SATURATION: 97 % | DIASTOLIC BLOOD PRESSURE: 89 MMHG

## 2021-11-22 DIAGNOSIS — G89.18 POST-OP PAIN: Primary | ICD-10-CM

## 2021-11-22 PROBLEM — N32.1 COLOVESICAL FISTULA: Status: ACTIVE | Noted: 2021-11-22

## 2021-11-22 PROCEDURE — 2500000003 HC RX 250 WO HCPCS: Performed by: NURSE ANESTHETIST, CERTIFIED REGISTERED

## 2021-11-22 PROCEDURE — 0DBM0ZZ EXCISION OF DESCENDING COLON, OPEN APPROACH: ICD-10-PCS | Performed by: SURGERY

## 2021-11-22 PROCEDURE — 88307 TISSUE EXAM BY PATHOLOGIST: CPT

## 2021-11-22 PROCEDURE — 6360000002 HC RX W HCPCS: Performed by: ANESTHESIOLOGY

## 2021-11-22 PROCEDURE — 6360000002 HC RX W HCPCS: Performed by: EMERGENCY MEDICINE

## 2021-11-22 PROCEDURE — 3700000001 HC ADD 15 MINUTES (ANESTHESIA): Performed by: SURGERY

## 2021-11-22 PROCEDURE — 2580000003 HC RX 258: Performed by: EMERGENCY MEDICINE

## 2021-11-22 PROCEDURE — 2709999900 HC NON-CHARGEABLE SUPPLY: Performed by: SURGERY

## 2021-11-22 PROCEDURE — 2580000003 HC RX 258: Performed by: SURGERY

## 2021-11-22 PROCEDURE — 6360000002 HC RX W HCPCS: Performed by: NURSE ANESTHETIST, CERTIFIED REGISTERED

## 2021-11-22 PROCEDURE — 0WQF0ZZ REPAIR ABDOMINAL WALL, OPEN APPROACH: ICD-10-PCS | Performed by: SURGERY

## 2021-11-22 PROCEDURE — 2580000003 HC RX 258: Performed by: ANESTHESIOLOGY

## 2021-11-22 PROCEDURE — 2580000003 HC RX 258: Performed by: NURSE ANESTHETIST, CERTIFIED REGISTERED

## 2021-11-22 PROCEDURE — 0DBN0ZZ EXCISION OF SIGMOID COLON, OPEN APPROACH: ICD-10-PCS | Performed by: SURGERY

## 2021-11-22 PROCEDURE — 3600000014 HC SURGERY LEVEL 4 ADDTL 15MIN: Performed by: SURGERY

## 2021-11-22 PROCEDURE — 0D1M0Z4 BYPASS DESCENDING COLON TO CUTANEOUS, OPEN APPROACH: ICD-10-PCS | Performed by: SURGERY

## 2021-11-22 PROCEDURE — 3700000000 HC ANESTHESIA ATTENDED CARE: Performed by: SURGERY

## 2021-11-22 PROCEDURE — 6370000000 HC RX 637 (ALT 250 FOR IP): Performed by: EMERGENCY MEDICINE

## 2021-11-22 PROCEDURE — 87086 URINE CULTURE/COLONY COUNT: CPT

## 2021-11-22 PROCEDURE — 7100000001 HC PACU RECOVERY - ADDTL 15 MIN: Performed by: SURGERY

## 2021-11-22 PROCEDURE — 6360000002 HC RX W HCPCS: Performed by: SURGERY

## 2021-11-22 PROCEDURE — 7100000000 HC PACU RECOVERY - FIRST 15 MIN: Performed by: SURGERY

## 2021-11-22 PROCEDURE — 2720000010 HC SURG SUPPLY STERILE: Performed by: SURGERY

## 2021-11-22 PROCEDURE — 2500000003 HC RX 250 WO HCPCS: Performed by: EMERGENCY MEDICINE

## 2021-11-22 PROCEDURE — 1200000000 HC SEMI PRIVATE

## 2021-11-22 PROCEDURE — 3600000004 HC SURGERY LEVEL 4 BASE: Performed by: SURGERY

## 2021-11-22 RX ORDER — MEPERIDINE HYDROCHLORIDE 50 MG/ML
12.5 INJECTION INTRAMUSCULAR; INTRAVENOUS; SUBCUTANEOUS EVERY 5 MIN PRN
Status: DISCONTINUED | OUTPATIENT
Start: 2021-11-22 | End: 2021-11-22 | Stop reason: HOSPADM

## 2021-11-22 RX ORDER — METHOCARBAMOL 750 MG/1
750 TABLET, FILM COATED ORAL 4 TIMES DAILY
Status: DISCONTINUED | OUTPATIENT
Start: 2021-11-22 | End: 2021-11-25

## 2021-11-22 RX ORDER — MAGNESIUM HYDROXIDE 1200 MG/15ML
LIQUID ORAL CONTINUOUS PRN
Status: COMPLETED | OUTPATIENT
Start: 2021-11-22 | End: 2021-11-22

## 2021-11-22 RX ORDER — NEOSTIGMINE METHYLSULFATE 5 MG/5 ML
SYRINGE (ML) INTRAVENOUS PRN
Status: DISCONTINUED | OUTPATIENT
Start: 2021-11-22 | End: 2021-11-22 | Stop reason: SDUPTHER

## 2021-11-22 RX ORDER — SODIUM CHLORIDE, SODIUM LACTATE, POTASSIUM CHLORIDE, CALCIUM CHLORIDE 600; 310; 30; 20 MG/100ML; MG/100ML; MG/100ML; MG/100ML
INJECTION, SOLUTION INTRAVENOUS CONTINUOUS
Status: DISCONTINUED | OUTPATIENT
Start: 2021-11-22 | End: 2021-11-23

## 2021-11-22 RX ORDER — HYDROCODONE BITARTRATE AND ACETAMINOPHEN 5; 325 MG/1; MG/1
1 TABLET ORAL
Status: DISCONTINUED | OUTPATIENT
Start: 2021-11-22 | End: 2021-11-22 | Stop reason: HOSPADM

## 2021-11-22 RX ORDER — ACETAMINOPHEN 500 MG
1000 TABLET ORAL EVERY 8 HOURS PRN
Status: DISCONTINUED | OUTPATIENT
Start: 2021-11-22 | End: 2021-11-23

## 2021-11-22 RX ORDER — ROCURONIUM BROMIDE 10 MG/ML
INJECTION, SOLUTION INTRAVENOUS PRN
Status: DISCONTINUED | OUTPATIENT
Start: 2021-11-22 | End: 2021-11-22 | Stop reason: SDUPTHER

## 2021-11-22 RX ORDER — ONDANSETRON 2 MG/ML
4 INJECTION INTRAMUSCULAR; INTRAVENOUS EVERY 6 HOURS PRN
Status: DISCONTINUED | OUTPATIENT
Start: 2021-11-22 | End: 2021-11-29 | Stop reason: HOSPADM

## 2021-11-22 RX ORDER — ONDANSETRON 4 MG/1
4 TABLET, ORALLY DISINTEGRATING ORAL EVERY 8 HOURS PRN
Status: DISCONTINUED | OUTPATIENT
Start: 2021-11-22 | End: 2021-11-29 | Stop reason: HOSPADM

## 2021-11-22 RX ORDER — OXYCODONE HYDROCHLORIDE 5 MG/1
5 TABLET ORAL EVERY 4 HOURS PRN
Status: DISCONTINUED | OUTPATIENT
Start: 2021-11-22 | End: 2021-11-29 | Stop reason: HOSPADM

## 2021-11-22 RX ORDER — SODIUM CHLORIDE, SODIUM LACTATE, POTASSIUM CHLORIDE, CALCIUM CHLORIDE 600; 310; 30; 20 MG/100ML; MG/100ML; MG/100ML; MG/100ML
1000 INJECTION, SOLUTION INTRAVENOUS CONTINUOUS
Status: DISCONTINUED | OUTPATIENT
Start: 2021-11-22 | End: 2021-11-22

## 2021-11-22 RX ORDER — HYDRALAZINE HYDROCHLORIDE 20 MG/ML
5 INJECTION INTRAMUSCULAR; INTRAVENOUS EVERY 10 MIN PRN
Status: DISCONTINUED | OUTPATIENT
Start: 2021-11-22 | End: 2021-11-22 | Stop reason: HOSPADM

## 2021-11-22 RX ORDER — MORPHINE SULFATE 2 MG/ML
2 INJECTION, SOLUTION INTRAMUSCULAR; INTRAVENOUS
Status: DISCONTINUED | OUTPATIENT
Start: 2021-11-22 | End: 2021-11-25

## 2021-11-22 RX ORDER — GENTAMICIN SULFATE 60 MG/50ML
120 INJECTION, SOLUTION INTRAVENOUS ONCE
Status: COMPLETED | OUTPATIENT
Start: 2021-11-22 | End: 2021-11-22

## 2021-11-22 RX ORDER — DIPHENHYDRAMINE HYDROCHLORIDE 50 MG/ML
12.5 INJECTION INTRAMUSCULAR; INTRAVENOUS
Status: DISCONTINUED | OUTPATIENT
Start: 2021-11-22 | End: 2021-11-22 | Stop reason: HOSPADM

## 2021-11-22 RX ORDER — PHENYLEPHRINE HCL IN 0.9% NACL 0.5 MG/5ML
SYRINGE (ML) INTRAVENOUS PRN
Status: DISCONTINUED | OUTPATIENT
Start: 2021-11-22 | End: 2021-11-22 | Stop reason: SDUPTHER

## 2021-11-22 RX ORDER — ONDANSETRON 2 MG/ML
INJECTION INTRAMUSCULAR; INTRAVENOUS PRN
Status: DISCONTINUED | OUTPATIENT
Start: 2021-11-22 | End: 2021-11-22 | Stop reason: SDUPTHER

## 2021-11-22 RX ORDER — PROMETHAZINE HYDROCHLORIDE 25 MG/ML
6.25 INJECTION, SOLUTION INTRAMUSCULAR; INTRAVENOUS
Status: DISCONTINUED | OUTPATIENT
Start: 2021-11-22 | End: 2021-11-22 | Stop reason: HOSPADM

## 2021-11-22 RX ORDER — SODIUM CHLORIDE 0.9 % (FLUSH) 0.9 %
5-40 SYRINGE (ML) INJECTION EVERY 12 HOURS SCHEDULED
Status: DISCONTINUED | OUTPATIENT
Start: 2021-11-22 | End: 2021-11-29 | Stop reason: HOSPADM

## 2021-11-22 RX ORDER — PROPOFOL 10 MG/ML
INJECTION, EMULSION INTRAVENOUS PRN
Status: DISCONTINUED | OUTPATIENT
Start: 2021-11-22 | End: 2021-11-22 | Stop reason: SDUPTHER

## 2021-11-22 RX ORDER — GLYCOPYRROLATE 1 MG/5 ML
SYRINGE (ML) INTRAVENOUS PRN
Status: DISCONTINUED | OUTPATIENT
Start: 2021-11-22 | End: 2021-11-22 | Stop reason: SDUPTHER

## 2021-11-22 RX ORDER — FENTANYL CITRATE 50 UG/ML
INJECTION, SOLUTION INTRAMUSCULAR; INTRAVENOUS PRN
Status: DISCONTINUED | OUTPATIENT
Start: 2021-11-22 | End: 2021-11-22 | Stop reason: SDUPTHER

## 2021-11-22 RX ORDER — CLINDAMYCIN PHOSPHATE 900 MG/50ML
900 INJECTION INTRAVENOUS
Status: COMPLETED | OUTPATIENT
Start: 2021-11-22 | End: 2021-11-22

## 2021-11-22 RX ORDER — SODIUM CHLORIDE 9 MG/ML
25 INJECTION, SOLUTION INTRAVENOUS PRN
Status: DISCONTINUED | OUTPATIENT
Start: 2021-11-22 | End: 2021-11-29 | Stop reason: HOSPADM

## 2021-11-22 RX ORDER — DEXAMETHASONE SODIUM PHOSPHATE 10 MG/ML
INJECTION INTRAMUSCULAR; INTRAVENOUS PRN
Status: DISCONTINUED | OUTPATIENT
Start: 2021-11-22 | End: 2021-11-22 | Stop reason: SDUPTHER

## 2021-11-22 RX ORDER — MIDAZOLAM HYDROCHLORIDE 1 MG/ML
INJECTION INTRAMUSCULAR; INTRAVENOUS PRN
Status: DISCONTINUED | OUTPATIENT
Start: 2021-11-22 | End: 2021-11-22 | Stop reason: SDUPTHER

## 2021-11-22 RX ORDER — LIDOCAINE HYDROCHLORIDE 10 MG/ML
INJECTION, SOLUTION EPIDURAL; INFILTRATION; INTRACAUDAL; PERINEURAL PRN
Status: DISCONTINUED | OUTPATIENT
Start: 2021-11-22 | End: 2021-11-22 | Stop reason: SDUPTHER

## 2021-11-22 RX ORDER — METOCLOPRAMIDE HYDROCHLORIDE 5 MG/ML
10 INJECTION INTRAMUSCULAR; INTRAVENOUS
Status: DISCONTINUED | OUTPATIENT
Start: 2021-11-22 | End: 2021-11-22 | Stop reason: HOSPADM

## 2021-11-22 RX ORDER — SODIUM CHLORIDE 0.9 % (FLUSH) 0.9 %
5-40 SYRINGE (ML) INJECTION PRN
Status: DISCONTINUED | OUTPATIENT
Start: 2021-11-22 | End: 2021-11-29 | Stop reason: HOSPADM

## 2021-11-22 RX ADMIN — FENTANYL CITRATE 50 MCG: 50 INJECTION, SOLUTION INTRAMUSCULAR; INTRAVENOUS at 14:33

## 2021-11-22 RX ADMIN — HYDROMORPHONE HYDROCHLORIDE 0.5 MG: 1 INJECTION, SOLUTION INTRAMUSCULAR; INTRAVENOUS; SUBCUTANEOUS at 15:30

## 2021-11-22 RX ADMIN — Medication 0.6 MG: at 14:46

## 2021-11-22 RX ADMIN — Medication 3 MG: at 14:46

## 2021-11-22 RX ADMIN — ONDANSETRON 4 MG: 2 INJECTION, SOLUTION INTRAMUSCULAR; INTRAVENOUS at 13:00

## 2021-11-22 RX ADMIN — SODIUM CHLORIDE, POTASSIUM CHLORIDE, SODIUM LACTATE AND CALCIUM CHLORIDE: 600; 310; 30; 20 INJECTION, SOLUTION INTRAVENOUS at 14:55

## 2021-11-22 RX ADMIN — SODIUM CHLORIDE, POTASSIUM CHLORIDE, SODIUM LACTATE AND CALCIUM CHLORIDE: 600; 310; 30; 20 INJECTION, SOLUTION INTRAVENOUS at 13:43

## 2021-11-22 RX ADMIN — HYDROMORPHONE HYDROCHLORIDE 0.25 MG: 1 INJECTION, SOLUTION INTRAMUSCULAR; INTRAVENOUS; SUBCUTANEOUS at 15:54

## 2021-11-22 RX ADMIN — FENTANYL CITRATE 100 MCG: 50 INJECTION, SOLUTION INTRAMUSCULAR; INTRAVENOUS at 13:00

## 2021-11-22 RX ADMIN — FENTANYL CITRATE 50 MCG: 50 INJECTION, SOLUTION INTRAMUSCULAR; INTRAVENOUS at 14:53

## 2021-11-22 RX ADMIN — MORPHINE SULFATE 2 MG: 2 INJECTION, SOLUTION INTRAMUSCULAR; INTRAVENOUS at 18:07

## 2021-11-22 RX ADMIN — PROPOFOL 200 MG: 10 INJECTION, EMULSION INTRAVENOUS at 13:00

## 2021-11-22 RX ADMIN — SODIUM CHLORIDE, POTASSIUM CHLORIDE, SODIUM LACTATE AND CALCIUM CHLORIDE: 600; 310; 30; 20 INJECTION, SOLUTION INTRAVENOUS at 17:26

## 2021-11-22 RX ADMIN — LIDOCAINE HYDROCHLORIDE 50 MG: 10 INJECTION, SOLUTION EPIDURAL; INFILTRATION; INTRACAUDAL; PERINEURAL at 13:00

## 2021-11-22 RX ADMIN — Medication 200 MCG: at 13:40

## 2021-11-22 RX ADMIN — FENTANYL CITRATE 50 MCG: 50 INJECTION, SOLUTION INTRAMUSCULAR; INTRAVENOUS at 15:01

## 2021-11-22 RX ADMIN — MIDAZOLAM HYDROCHLORIDE 2 MG: 1 INJECTION, SOLUTION INTRAMUSCULAR; INTRAVENOUS at 12:55

## 2021-11-22 RX ADMIN — HYDROMORPHONE HYDROCHLORIDE 0.5 MG: 1 INJECTION, SOLUTION INTRAMUSCULAR; INTRAVENOUS; SUBCUTANEOUS at 15:20

## 2021-11-22 RX ADMIN — Medication 100 MCG: at 13:02

## 2021-11-22 RX ADMIN — METHOCARBAMOL TABLETS 750 MG: 750 TABLET, COATED ORAL at 20:14

## 2021-11-22 RX ADMIN — CLINDAMYCIN IN 5 PERCENT DEXTROSE 900 MG: 18 INJECTION, SOLUTION INTRAVENOUS at 13:10

## 2021-11-22 RX ADMIN — PHENYLEPHRINE HYDROCHLORIDE 100 MCG/MIN: 10 INJECTION INTRAVENOUS at 13:46

## 2021-11-22 RX ADMIN — DEXAMETHASONE SODIUM PHOSPHATE 10 MG: 10 INJECTION INTRAMUSCULAR; INTRAVENOUS at 13:00

## 2021-11-22 RX ADMIN — SODIUM CHLORIDE, POTASSIUM CHLORIDE, SODIUM LACTATE AND CALCIUM CHLORIDE 1000 ML: 600; 310; 30; 20 INJECTION, SOLUTION INTRAVENOUS at 10:29

## 2021-11-22 RX ADMIN — ROCURONIUM BROMIDE 50 MG: 10 INJECTION INTRAVENOUS at 13:00

## 2021-11-22 RX ADMIN — Medication 200 MCG: at 13:34

## 2021-11-22 RX ADMIN — Medication 100 MCG: at 13:07

## 2021-11-22 RX ADMIN — MORPHINE SULFATE 2 MG: 2 INJECTION, SOLUTION INTRAMUSCULAR; INTRAVENOUS at 21:11

## 2021-11-22 RX ADMIN — GENTAMICIN SULFATE 120 MG: 60 INJECTION, SOLUTION INTRAVENOUS at 13:18

## 2021-11-22 ASSESSMENT — PULMONARY FUNCTION TESTS
PIF_VALUE: 17
PIF_VALUE: 15
PIF_VALUE: 13
PIF_VALUE: 19
PIF_VALUE: 19
PIF_VALUE: 17
PIF_VALUE: 14
PIF_VALUE: 14
PIF_VALUE: 2
PIF_VALUE: 17
PIF_VALUE: 18
PIF_VALUE: 3
PIF_VALUE: 18
PIF_VALUE: 16
PIF_VALUE: 18
PIF_VALUE: 14
PIF_VALUE: 18
PIF_VALUE: 2
PIF_VALUE: 35
PIF_VALUE: 14
PIF_VALUE: 16
PIF_VALUE: 20
PIF_VALUE: 18
PIF_VALUE: 2
PIF_VALUE: 17
PIF_VALUE: 1
PIF_VALUE: 21
PIF_VALUE: 1
PIF_VALUE: 17
PIF_VALUE: 19
PIF_VALUE: 19
PIF_VALUE: 18
PIF_VALUE: 13
PIF_VALUE: 19
PIF_VALUE: 1
PIF_VALUE: 2
PIF_VALUE: 17
PIF_VALUE: 13
PIF_VALUE: 18
PIF_VALUE: 16
PIF_VALUE: 18
PIF_VALUE: 14
PIF_VALUE: 14
PIF_VALUE: 19
PIF_VALUE: 2
PIF_VALUE: 17
PIF_VALUE: 18
PIF_VALUE: 21
PIF_VALUE: 14
PIF_VALUE: 16
PIF_VALUE: 20
PIF_VALUE: 14
PIF_VALUE: 17
PIF_VALUE: 18
PIF_VALUE: 1
PIF_VALUE: 18
PIF_VALUE: 20
PIF_VALUE: 13
PIF_VALUE: 18
PIF_VALUE: 18
PIF_VALUE: 17
PIF_VALUE: 17
PIF_VALUE: 25
PIF_VALUE: 13
PIF_VALUE: 14
PIF_VALUE: 17
PIF_VALUE: 17
PIF_VALUE: 18
PIF_VALUE: 17
PIF_VALUE: 18
PIF_VALUE: 17
PIF_VALUE: 14
PIF_VALUE: 16
PIF_VALUE: 18
PIF_VALUE: 17
PIF_VALUE: 18
PIF_VALUE: 17
PIF_VALUE: 14
PIF_VALUE: 18
PIF_VALUE: 17
PIF_VALUE: 20
PIF_VALUE: 14
PIF_VALUE: 16
PIF_VALUE: 2
PIF_VALUE: 16
PIF_VALUE: 14
PIF_VALUE: 1
PIF_VALUE: 17
PIF_VALUE: 18
PIF_VALUE: 0
PIF_VALUE: 17
PIF_VALUE: 23
PIF_VALUE: 28
PIF_VALUE: 17
PIF_VALUE: 18
PIF_VALUE: 1
PIF_VALUE: 18
PIF_VALUE: 17
PIF_VALUE: 20
PIF_VALUE: 0
PIF_VALUE: 17
PIF_VALUE: 18
PIF_VALUE: 17
PIF_VALUE: 17
PIF_VALUE: 19
PIF_VALUE: 18
PIF_VALUE: 17
PIF_VALUE: 19
PIF_VALUE: 1
PIF_VALUE: 17
PIF_VALUE: 15
PIF_VALUE: 20
PIF_VALUE: 0
PIF_VALUE: 17
PIF_VALUE: 13
PIF_VALUE: 18
PIF_VALUE: 18
PIF_VALUE: 20
PIF_VALUE: 14
PIF_VALUE: 1
PIF_VALUE: 19
PIF_VALUE: 17
PIF_VALUE: 20
PIF_VALUE: 18
PIF_VALUE: 1
PIF_VALUE: 16

## 2021-11-22 ASSESSMENT — PAIN SCALES - GENERAL
PAINLEVEL_OUTOF10: 8
PAINLEVEL_OUTOF10: 3
PAINLEVEL_OUTOF10: 8
PAINLEVEL_OUTOF10: 7
PAINLEVEL_OUTOF10: 8
PAINLEVEL_OUTOF10: 8
PAINLEVEL_OUTOF10: 4
PAINLEVEL_OUTOF10: 3
PAINLEVEL_OUTOF10: 4
PAINLEVEL_OUTOF10: 5
PAINLEVEL_OUTOF10: 5
PAINLEVEL_OUTOF10: 7

## 2021-11-22 ASSESSMENT — PAIN DESCRIPTION - LOCATION
LOCATION: ABDOMEN
LOCATION: ABDOMEN

## 2021-11-22 ASSESSMENT — PAIN DESCRIPTION - PAIN TYPE
TYPE: SURGICAL PAIN
TYPE: SURGICAL PAIN

## 2021-11-22 ASSESSMENT — PAIN DESCRIPTION - ORIENTATION
ORIENTATION: RIGHT
ORIENTATION: LEFT

## 2021-11-22 ASSESSMENT — PAIN DESCRIPTION - DESCRIPTORS
DESCRIPTORS: CONSTANT
DESCRIPTORS: CONSTANT

## 2021-11-22 ASSESSMENT — PAIN - FUNCTIONAL ASSESSMENT: PAIN_FUNCTIONAL_ASSESSMENT: 0-10

## 2021-11-22 NOTE — PLAN OF CARE
Problem:  Bowel/Gastric:  Goal: Complications related to the disease process, condition or treatment will be avoided or minimized  Outcome: Ongoing  Goal: Will be independent with ostomy care  Outcome: Ongoing

## 2021-11-22 NOTE — ANESTHESIA PRE PROCEDURE
Department of Anesthesiology  Preprocedure Note       Name:  Gwendolyn Fung   Age:  59 y.o.  :  1960                                          MRN:  [id number]         Date:  2021      Surgeon: Gabriele Suero):  Kimberly Wilde DO    Procedure: Procedure(s):  COLECTOMY, COLOSTOMY    Medications prior to admission:   Prior to Admission medications    Medication Sig Start Date End Date Taking? Authorizing Provider   sulfamethoxazole-trimethoprim (BACTRIM DS) 800-160 MG per tablet Take 1 tablet by mouth 2 times daily   Yes Historical Provider, MD   metoprolol succinate (TOPROL XL) 25 MG extended release tablet Take 1 tablet by mouth daily 21  Yes Chepe Munoz MD   losartan (COZAAR) 25 MG tablet Take 1 tablet by mouth daily 20  Yes Chepe Munoz MD   sildenafil (VIAGRA) 50 MG tablet Take 1 tablet by mouth as needed     Historical Provider, MD   hydrOXYzine (ATARAX) 50 MG tablet take 1 tablet by mouth at bedtime if needed for sleep 21   Historical Provider, MD   loratadine (CLARITIN) 10 MG tablet take 1 tablet by mouth daily if needed for allergies 21   Historical Provider, MD   fluticasone (FLONASE) 50 MCG/ACT nasal spray 2 sprays by Nasal route daily as needed for Rhinitis  21   Historical Provider, MD   rivaroxaban (XARELTO) 20 MG TABS tablet Take 1 tablet by mouth daily (with breakfast) 21   Chepe Munoz MD       Current medications:    Current Facility-Administered Medications   Medication Dose Route Frequency Provider Last Rate Last Admin    gentamicin (GARAMYCIN) 120.4 mg in dextrose 5 % 100 mL IVPB  1.5 mg/kg (Order-Specific) IntraVENous Once Juwan M Braxton, DO        metronidazole (FLAGYL) 500 mg in NaCl 100 mL IVPB premix  500 mg IntraVENous Once Juwan M Braxton, DO        lactated ringers infusion 1,000 mL  1,000 mL IntraVENous Continuous Chely Murguia MD           Allergies:     Allergies   Allergen Reactions    Cipro [Ciprofloxacin Hcl] Nausea And Vomiting    Pcn [Penicillins] Nausea And Vomiting       Problem List:    Patient Active Problem List   Diagnosis Code    New onset atrial fibrillation (HCC) I48.91    Primary hypertension I10    Colonic diverticular abscess K57.20    Chronic atrial fibrillation (HCC) I48.20    Hyperlipidemia E78.5    Chronic anticoagulation Z79.01    Sigmoid diverticulitis K57.32    Duodenal diverticulum K57.10       Past Medical History:        Diagnosis Date    Abnormal patient-activated cardiac event monitor 02/22/2021    CAM 13 days 8 hrs Baseline sinus ave HR of 63 bpm rang between 47 adn 109 bpm  Rare PAC and PVC with 2 runs of ectopic atrial tachy longest fastest was 7 beats hr 110 bpm No VT runs no AFib    Chronic anticoagulation     xarelto    Colonic diverticular abscess 10/2021    H/O echocardiogram 03/08/2021    EF 45-50% Mild increased LV wallthickness LA is mod dilated 34-39 with LA volume index of 35 ml/m2 Mild mitral and tricuspid regurg Midl diastolic dysfunction seen Aortic root is mod dilated >4.5cm when corrected for body suface area    History of cardioversion     Hyperlipidemia     Hypertension     Non-ischemic cardiomyopathy ([facility] [address]     Dr. Anette Norman cardiology in [place], [place]    Paroxysmal A-fib ([facility] [address]     hx cardioversion    Sleep apnea     Home CPAP    Wellness examination     CARDIOLOGY, DR Marci Hatfield [place]; last visit 9/29/21    Wellness examination     PCP Alize Maurer CNP, [place] ([address]) last visit Aug 2021       Past Surgical History:        Procedure Laterality Date    CARDIOVERSION  01/2021    CT ABSCESS DRAIN SUBCUTANEOUS  10/12/2021    CT ABSCESS DRAIN SUBCUTANEOUS 10/12/2021 [facility] CT SCAN       Social History:    Social History     Tobacco Use    Smoking status: Never Smoker    Smokeless tobacco: Never Used   Substance Use Topics    Alcohol use: Not Currently                                Counseling given: Not Answered      Vital Signs (Current):   Vitals: 11/22/21 1015   BP: 109/67   Pulse: 80   Resp: 18   Temp: 97 °F (36.1 °C)   SpO2: 100%   Weight: 177 lb (80.3 kg)   Height: 5' 11\" (1.803 m)                                              BP Readings from Last 3 Encounters:   11/22/21 109/67   11/17/21 107/72   10/15/21 112/68       NPO Status: Time of last liquid consumption: 2359                        Time of last solid consumption: 1800                        Date of last liquid consumption: 11/21/21                        Date of last solid food consumption: 11/20/21    BMI:   Wt Readings from Last 3 Encounters:   11/22/21 177 lb (80.3 kg)   11/17/21 177 lb (80.3 kg)   10/15/21 192 lb 3.2 oz (87.2 kg)     Body mass index is 24.69 kg/m². CBC:   Lab Results   Component Value Date    WBC 10.6 11/17/2021    RBC 4.58 11/17/2021    HGB 13.1 11/17/2021    HCT 42.4 11/17/2021    MCV 92.6 11/17/2021    RDW 14.3 11/17/2021     11/17/2021       CMP:   Lab Results   Component Value Date     11/17/2021    K 4.9 11/17/2021     11/17/2021    CO2 18 11/17/2021    BUN 20 11/17/2021    CREATININE 1.15 11/17/2021    GFRAA >60 11/17/2021    LABGLOM >60 11/17/2021    GLUCOSE 127 11/17/2021    PROT 6.3 10/12/2021    CALCIUM 8.6 10/14/2021    BILITOT 0.30 10/12/2021    ALKPHOS 74 10/12/2021    AST 8 10/12/2021    ALT 6 10/12/2021       POC Tests: No results for input(s): POCGLU, POCNA, POCK, POCCL, POCBUN, POCHEMO, POCHCT in the last 72 hours.     Coags:   Lab Results   Component Value Date    PROTIME 15.0 10/12/2021    INR 1.2 10/12/2021       HCG (If Applicable): No results found for: PREGTESTUR, PREGSERUM, HCG, HCGQUANT     ABGs: No results found for: PHART, PO2ART, KBI0SQU, KIA3NHR, BEART, V7PRGUZT     Type & Screen (If Applicable):  No results found for: LABABO, LABRH    Drug/Infectious Status (If Applicable):  No results found for: HIV, HEPCAB    COVID-19 Screening (If Applicable):   Lab Results   Component Value Date    COVID19 Not Detected 02/03/2021 Anesthesia Evaluation  Patient summary reviewed and Nursing notes reviewed no history of anesthetic complications:   Airway: Mallampati: III  TM distance: >3 FB   Neck ROM: limited  Mouth opening: > = 3 FB Dental: normal exam         Pulmonary:normal exam    (+) sleep apnea:                             Cardiovascular:    (+) hypertension:, dysrhythmias (cardioversion Januarty 2021): atrial fibrillation, hyperlipidemia                  Neuro/Psych:               GI/Hepatic/Renal: Neg GI/Hepatic/Renal ROS            Endo/Other: Negative Endo/Other ROS                    Abdominal:             Vascular: Other Findings:           Anesthesia Plan      MAC     ASA 3       Induction: intravenous. MIPS: Postoperative opioids intended and Prophylactic antiemetics administered. Anesthetic plan and risks discussed with patient. Use of blood products discussed with patient whom consented to blood products. Plan discussed with CRNA.                 Jesus Vaughn MD   11/22/2021

## 2021-11-23 PROBLEM — E43 SEVERE MALNUTRITION (HCC): Chronic | Status: ACTIVE | Noted: 2021-11-23

## 2021-11-23 LAB
ABSOLUTE EOS #: <0.03 K/UL (ref 0–0.44)
ABSOLUTE IMMATURE GRANULOCYTE: 0.14 K/UL (ref 0–0.3)
ABSOLUTE LYMPH #: 0.82 K/UL (ref 1.1–3.7)
ABSOLUTE MONO #: 1 K/UL (ref 0.1–1.2)
ANION GAP SERPL CALCULATED.3IONS-SCNC: 11 MMOL/L (ref 9–17)
BASOPHILS # BLD: 0 % (ref 0–2)
BASOPHILS ABSOLUTE: <0.03 K/UL (ref 0–0.2)
BUN BLDV-MCNC: 24 MG/DL (ref 8–23)
BUN/CREAT BLD: ABNORMAL (ref 9–20)
CALCIUM SERPL-MCNC: 8.9 MG/DL (ref 8.6–10.4)
CHLORIDE BLD-SCNC: 101 MMOL/L (ref 98–107)
CO2: 22 MMOL/L (ref 20–31)
CREAT SERPL-MCNC: 1.06 MG/DL (ref 0.7–1.2)
CULTURE: NORMAL
DIFFERENTIAL TYPE: ABNORMAL
EOSINOPHILS RELATIVE PERCENT: 0 % (ref 1–4)
GFR AFRICAN AMERICAN: >60 ML/MIN
GFR NON-AFRICAN AMERICAN: >60 ML/MIN
GFR SERPL CREATININE-BSD FRML MDRD: ABNORMAL ML/MIN/{1.73_M2}
GFR SERPL CREATININE-BSD FRML MDRD: ABNORMAL ML/MIN/{1.73_M2}
GLUCOSE BLD-MCNC: 138 MG/DL (ref 75–110)
GLUCOSE BLD-MCNC: 154 MG/DL (ref 70–99)
HCT VFR BLD CALC: 32.6 % (ref 40.7–50.3)
HEMOGLOBIN: 10.2 G/DL (ref 13–17)
IMMATURE GRANULOCYTES: 1 %
LYMPHOCYTES # BLD: 5 % (ref 24–43)
Lab: NORMAL
MCH RBC QN AUTO: 29 PG (ref 25.2–33.5)
MCHC RBC AUTO-ENTMCNC: 31.3 G/DL (ref 28.4–34.8)
MCV RBC AUTO: 92.6 FL (ref 82.6–102.9)
MONOCYTES # BLD: 6 % (ref 3–12)
NRBC AUTOMATED: 0 PER 100 WBC
PDW BLD-RTO: 14.9 % (ref 11.8–14.4)
PLATELET # BLD: 324 K/UL (ref 138–453)
PLATELET ESTIMATE: ABNORMAL
PMV BLD AUTO: 8.7 FL (ref 8.1–13.5)
POTASSIUM SERPL-SCNC: 4.6 MMOL/L (ref 3.7–5.3)
RBC # BLD: 3.52 M/UL (ref 4.21–5.77)
RBC # BLD: ABNORMAL 10*6/UL
SEG NEUTROPHILS: 88 % (ref 36–65)
SEGMENTED NEUTROPHILS ABSOLUTE COUNT: 14.99 K/UL (ref 1.5–8.1)
SODIUM BLD-SCNC: 134 MMOL/L (ref 135–144)
SPECIMEN DESCRIPTION: NORMAL
WBC # BLD: 17 K/UL (ref 3.5–11.3)
WBC # BLD: ABNORMAL 10*3/UL

## 2021-11-23 PROCEDURE — 36415 COLL VENOUS BLD VENIPUNCTURE: CPT

## 2021-11-23 PROCEDURE — 6360000002 HC RX W HCPCS: Performed by: EMERGENCY MEDICINE

## 2021-11-23 PROCEDURE — 6370000000 HC RX 637 (ALT 250 FOR IP): Performed by: EMERGENCY MEDICINE

## 2021-11-23 PROCEDURE — 80048 BASIC METABOLIC PNL TOTAL CA: CPT

## 2021-11-23 PROCEDURE — BT101ZZ FLUOROSCOPY OF BLADDER USING LOW OSMOLAR CONTRAST: ICD-10-PCS | Performed by: SURGERY

## 2021-11-23 PROCEDURE — 85025 COMPLETE CBC W/AUTO DIFF WBC: CPT

## 2021-11-23 PROCEDURE — 1200000000 HC SEMI PRIVATE

## 2021-11-23 PROCEDURE — 2500000003 HC RX 250 WO HCPCS: Performed by: STUDENT IN AN ORGANIZED HEALTH CARE EDUCATION/TRAINING PROGRAM

## 2021-11-23 PROCEDURE — 6370000000 HC RX 637 (ALT 250 FOR IP): Performed by: STUDENT IN AN ORGANIZED HEALTH CARE EDUCATION/TRAINING PROGRAM

## 2021-11-23 PROCEDURE — 2580000003 HC RX 258: Performed by: EMERGENCY MEDICINE

## 2021-11-23 PROCEDURE — 82947 ASSAY GLUCOSE BLOOD QUANT: CPT

## 2021-11-23 RX ORDER — ACETAMINOPHEN 500 MG
1000 TABLET ORAL EVERY 8 HOURS SCHEDULED
Status: DISCONTINUED | OUTPATIENT
Start: 2021-11-23 | End: 2021-11-29 | Stop reason: HOSPADM

## 2021-11-23 RX ORDER — CALCIUM CARBONATE 200(500)MG
500 TABLET,CHEWABLE ORAL 3 TIMES DAILY PRN
Status: DISCONTINUED | OUTPATIENT
Start: 2021-11-23 | End: 2021-11-29 | Stop reason: HOSPADM

## 2021-11-23 RX ORDER — LOSARTAN POTASSIUM 25 MG/1
25 TABLET ORAL DAILY
Status: DISCONTINUED | OUTPATIENT
Start: 2021-11-23 | End: 2021-11-29 | Stop reason: HOSPADM

## 2021-11-23 RX ORDER — DEXTROSE, SODIUM CHLORIDE, AND POTASSIUM CHLORIDE 5; .45; .15 G/100ML; G/100ML; G/100ML
INJECTION INTRAVENOUS CONTINUOUS
Status: DISCONTINUED | OUTPATIENT
Start: 2021-11-23 | End: 2021-11-28

## 2021-11-23 RX ORDER — METOPROLOL SUCCINATE 25 MG/1
25 TABLET, EXTENDED RELEASE ORAL DAILY
Status: DISCONTINUED | OUTPATIENT
Start: 2021-11-23 | End: 2021-11-29 | Stop reason: HOSPADM

## 2021-11-23 RX ORDER — FAMOTIDINE 20 MG/1
20 TABLET, FILM COATED ORAL 2 TIMES DAILY PRN
Status: DISCONTINUED | OUTPATIENT
Start: 2021-11-23 | End: 2021-11-24

## 2021-11-23 RX ADMIN — METHOCARBAMOL TABLETS 750 MG: 750 TABLET, COATED ORAL at 17:01

## 2021-11-23 RX ADMIN — SODIUM CHLORIDE, PRESERVATIVE FREE 10 ML: 5 INJECTION INTRAVENOUS at 09:55

## 2021-11-23 RX ADMIN — CALCIUM CARBONATE 500 MG: 500 TABLET, CHEWABLE ORAL at 17:02

## 2021-11-23 RX ADMIN — ACETAMINOPHEN 1000 MG: 500 TABLET ORAL at 07:49

## 2021-11-23 RX ADMIN — FAMOTIDINE 20 MG: 20 TABLET, FILM COATED ORAL at 17:01

## 2021-11-23 RX ADMIN — LOSARTAN POTASSIUM 25 MG: 25 TABLET, FILM COATED ORAL at 11:52

## 2021-11-23 RX ADMIN — METHOCARBAMOL TABLETS 750 MG: 750 TABLET, COATED ORAL at 09:55

## 2021-11-23 RX ADMIN — METOPROLOL SUCCINATE 25 MG: 25 TABLET, FILM COATED, EXTENDED RELEASE ORAL at 11:52

## 2021-11-23 RX ADMIN — MORPHINE SULFATE 2 MG: 2 INJECTION, SOLUTION INTRAMUSCULAR; INTRAVENOUS at 11:56

## 2021-11-23 RX ADMIN — FAMOTIDINE 20 MG: 20 TABLET, FILM COATED ORAL at 11:52

## 2021-11-23 RX ADMIN — METHOCARBAMOL TABLETS 750 MG: 750 TABLET, COATED ORAL at 13:19

## 2021-11-23 RX ADMIN — ENOXAPARIN SODIUM 40 MG: 100 INJECTION SUBCUTANEOUS at 09:55

## 2021-11-23 RX ADMIN — ACETAMINOPHEN 1000 MG: 500 TABLET ORAL at 13:19

## 2021-11-23 RX ADMIN — ACETAMINOPHEN 1000 MG: 500 TABLET ORAL at 21:06

## 2021-11-23 RX ADMIN — POTASSIUM CHLORIDE, DEXTROSE MONOHYDRATE AND SODIUM CHLORIDE: 150; 5; 450 INJECTION, SOLUTION INTRAVENOUS at 07:51

## 2021-11-23 RX ADMIN — MORPHINE SULFATE 2 MG: 2 INJECTION, SOLUTION INTRAMUSCULAR; INTRAVENOUS at 01:46

## 2021-11-23 RX ADMIN — ONDANSETRON 4 MG: 4 TABLET, ORALLY DISINTEGRATING ORAL at 10:47

## 2021-11-23 RX ADMIN — METHOCARBAMOL TABLETS 750 MG: 750 TABLET, COATED ORAL at 21:06

## 2021-11-23 RX ADMIN — CALCIUM CARBONATE 500 MG: 500 TABLET, CHEWABLE ORAL at 14:17

## 2021-11-23 ASSESSMENT — PAIN DESCRIPTION - ORIENTATION
ORIENTATION: RIGHT;LEFT
ORIENTATION: RIGHT;LEFT

## 2021-11-23 ASSESSMENT — PAIN DESCRIPTION - DESCRIPTORS
DESCRIPTORS: CONSTANT
DESCRIPTORS: CONSTANT

## 2021-11-23 ASSESSMENT — PAIN SCALES - GENERAL
PAINLEVEL_OUTOF10: 2
PAINLEVEL_OUTOF10: 4
PAINLEVEL_OUTOF10: 2
PAINLEVEL_OUTOF10: 4
PAINLEVEL_OUTOF10: 2
PAINLEVEL_OUTOF10: 1
PAINLEVEL_OUTOF10: 2

## 2021-11-23 ASSESSMENT — PAIN DESCRIPTION - FREQUENCY
FREQUENCY: CONTINUOUS
FREQUENCY: CONTINUOUS

## 2021-11-23 ASSESSMENT — PAIN DESCRIPTION - LOCATION
LOCATION: ABDOMEN
LOCATION: ABDOMEN

## 2021-11-23 ASSESSMENT — PAIN DESCRIPTION - PAIN TYPE
TYPE: ACUTE PAIN;SURGICAL PAIN
TYPE: ACUTE PAIN;SURGICAL PAIN
TYPE: SURGICAL PAIN

## 2021-11-23 NOTE — ANESTHESIA POSTPROCEDURE EVALUATION
Department of Anesthesiology  Postprocedure Note    Patient: Theresa Ding  MRN: [id number]  YOB: 1960  Date of evaluation: 11/23/2021  Time:  1:54 PM     Procedure Summary     Date: 11/22/21 Room / Location: [address]    Anesthesia Start: 8654 Anesthesia Stop: 1500    Procedure: COLECTOMY, COLOSTOMY (N/A Abdomen) Diagnosis: (DIVERTICULITIS, COLOVESICAL FISTULA)    Surgeons: Rosemarie Mercer DO Responsible Provider: Kenna Knutson MD    Anesthesia Type: MAC ASA Status: 3          Anesthesia Type: MAC    Elaine Phase I: Elaine Score: 8    Elaine Phase II:      Last vitals: Reviewed and per EMR flowsheets.      POST-OP ANESTHESIA NOTE       BP 97/65   Pulse 69   Temp 97.7 °F (36.5 °C) (Oral)   Resp 20   Ht 5' 11\" (1.803 m)   Wt 177 lb (80.3 kg)   SpO2 95%   BMI 24.69 kg/m²    Pain Assessment: 0-10  Pain Level: 2     POST-OP ANESTHESIA NOTE       BP 97/65   Pulse 69   Temp 97.7 °F (36.5 °C) (Oral)   Resp 20   Ht 5' 11\" (1.803 m)   Wt 177 lb (80.3 kg)   SpO2 95%   BMI 24.69 kg/m²    Pain Assessment: 0-10  Pain Level: 2       Anesthesia Post Evaluation    Patient location during evaluation: PACU  Patient participation: complete - patient participated  Level of consciousness: awake  Pain score: 2  Airway patency: patent  Nausea & Vomiting: no nausea and no vomiting  Complications: no  Cardiovascular status: hemodynamically stable  Respiratory status: acceptable  Hydration status: stable

## 2021-11-23 NOTE — CARE COORDINATION
Case Management Initial Discharge Plan  Layton Brown,             Met with:patient to discuss discharge plans. Information verified: address, contacts, phone number, , insurance Yes  Insurance Provider: [employer]    Emergency Contact/Next of Kin name & number: Tess Aponte and Larias Marie  Who are involved in patient's support system? PCP: JASPREET Viveros NP  Date of last visit:       Discharge Planning    Living Arrangements:  Alone     Home has mobile home stories  3-4stairs to climb to get into front door, stairs to climb to reach second floor  Location of bedroom/bathroom in home     Patient able to perform ADL's:Independent    Current Services (outpatient & in home) none  DME equipment: none  DME provider:     Is patient receiving oral anticoagulation therapy? Yes  Xarelto    If indicated:   Physician managing anticoagulation treatment:   Where does patient obtain lab work for ATC treatment? Potential Assistance Needed:       Patient agreeable to home care: Yes  Freedom of choice provided:  yes provided list for choices    Prior SNF/Rehab Placement and Facility: none  Agreeable to SNF/Rehab: No  [place] of choice provided: n/a     Evaluation: no    Expected Discharge date:       Patient expects to be discharged to: If home: is the family and/or caregiver wiling & able to provide support at home? Friends Tess Aponte and [employer]  Who will be providing this support? Follow Up Appointment: Best Day/ Time:      Transportation provider: Tess Aponte  Transportation arrangements needed for discharge: No    Readmission Risk              Risk of Unplanned Readmission:  14             Does patient have a readmission risk score greater than 14?: Yes  If yes, follow-up appointment must be made within 7 days of discharge.      Goals of Care:       Educated pton transitional options, provided freedom of choice and are agreeable with plan      Discharge Plan: Home w/VNS has new colostomy [facility] and Jennifer[facility] home he wasn't sure of their address but will get it  [facility]          Electronically signed by Azalia Shah RN on 11/23/21 at 6:40 PM EST

## 2021-11-23 NOTE — PROGRESS NOTES
Comprehensive Nutrition Assessment    Type and Reason for Visit:  Initial, Positive Nutrition Screen (Weight Loss; Poor Intakes/Appetite)    Nutrition Recommendations/Plan: Continue current Clear Liquid diet - encourage intakes and monitor for diet advancement. Will provide Ensure Clear Liquid oral supplements x 2 per day. As diet advanced, will provide chocolate Ensure supplements with meals. Monitor labs, weights, and plan of care. Nutrition Assessment:  Pt admitted and s/p rectosigmoid colectomy and colostomy creation - pt with previous lower abdominal pain with pneumaturia along with history of diverticulitis with localized abscess. Pt reports he has had a terrible appetite x past few months. States that last week his appetite had come back and reports he was trying to eat more but sticking to foods that were bland and would be well tolerated. Pt reports he currently does not have an appetite. For breakfast pt ate some of his clear liquid tray but reported nothing tasted good. Pt states he was drinking chocolate Ensure oral supplements at home for nutrition while he did not have much of an appetite. Pt willing to try Clear Liquid Ensure supplements with current diet - will provide chocolate Ensures as diet advanced. Reports UBW of 208 lb and that he has lost 30 lb x past few months. Per chart review, weight loss of 11.5% x 3 months. Encouraged to pt take sips/bites of items on meal trays and slowly increase his intakes. At visit pt reports having some nausea. Labs reviewed: Na 134 mmol/L, Glucose 138-154 mg/dL. Meds reviewed.     Malnutrition Assessment:  Malnutrition Status:  Severe malnutrition    Context:  Chronic Illness     Findings of the 6 clinical characteristics of malnutrition:  Energy Intake:  7 - 75% or less estimated energy requirements for 1 month or longer  Weight Loss:  7 - Greater than 7.5% over 3 months - 11.5% x 3 months per chart review     Body Fat Loss:  1 - Mild body fat loss Orbital   Muscle Mass Loss:  No significant muscle mass loss  Fluid Accumulation:  No significant fluid accumulation   Strength:  Not Performed    Estimated Daily Nutrient Needs:  Energy (kcal):  25-28 kcal/kg = 5202-7774 kcals/day; Weight Used for Energy Requirements:  Current     Protein (g):  1.2-1.5 gm/kg =  gm pro/day; Weight Used for Protein Requirements:  Ideal        Fluid (ml/day):  0128-7260 mL/day or per MD; Method Used for Fluid Requirements:  1 ml/kcal      Nutrition Related Findings:  Labs/Meds reviewed. Hypoactive bowel sounds. +Colostomy with output. Wounds:  Surgical Incision (to abdomen)       Current Nutrition Therapies:    ADULT DIET; Clear Liquid  ADULT ORAL NUTRITION SUPPLEMENT; Breakfast, Dinner; Clear Liquid Oral Supplement  Additional Calorie Sources:   D5%0.45%NaCl with 20 mEq KCl at 100 mL/hr = 408 kcals/day    Anthropometric Measures:  · Height: 5' 11\" (180.3 cm)  · Current Body Weight: 177 lb (80.3 kg)   · Admission Body Weight: 177 lb (80.3 kg)    · Usual Body Weight: 208 lb (94.3 kg) (per pt)     · Ideal Body Weight: 172 lbs; % Ideal Body Weight 102.9 %   · BMI: 24.7  · BMI Categories: Normal Weight (BMI 18.5-24. 9)       Nutrition Diagnosis:   · Inadequate oral intake related to altered GI function (decreased appetite) as evidenced by poor intake prior to admission, weight loss 7.5% in 3 months, intake 0-25% (need for ONS)    Nutrition Interventions:   Food and/or Nutrient Delivery:  Continue Current Diet, Start Oral Nutrition Supplement (Provide Ensure Clear Liquid ONS x 2 per day. Monitor for diet advancement and provide chocolate Ensure supplements as able.)  Nutrition Education/Counseling:  Education completed (Encouraged slowly increasing PO intakes. Discussed oral supplements.)   Coordination of Nutrition Care:  Continue to monitor while inpatient    Goals:  Oral intakes to meet at least 75% of estimated nutrition needs.        Nutrition Monitoring and Evaluation:   Behavioral-Environmental Outcomes:  None Identified   Food/Nutrient Intake Outcomes:  Diet Advancement/Tolerance, Food and Nutrient Intake, Supplement Intake, IVF Intake  Physical Signs/Symptoms Outcomes:  Biochemical Data, GI Status, Nausea or Vomiting, Fluid Status or Edema, Hemodynamic Status, Nutrition Focused Physical Findings, Skin, Weight     Discharge Planning:     Too soon to determine     Electronically signed by Hayder Green RD, LD on 11/23/21 at 12:28 PM EST    Contact: [phone]

## 2021-11-23 NOTE — PLAN OF CARE
Nutrition Problem #1: Inadequate oral intake  Intervention: Food and/or Nutrient Delivery: Continue Current Diet, Start Oral Nutrition Supplement (Provide Ensure Clear Liquid ONS x 2 per day. Monitor for diet advancement and provide chocolate Ensure supplements as able.)  Nutritional Goals: Oral intakes to meet at least 75% of estimated nutrition needs.

## 2021-11-23 NOTE — PROGRESS NOTES
General Surgery:  Daily Progress Note                 PATIENT NAME: Mayo Siddiqui Ebberry DATE: 11/23/2021, 5:45 AM  CC:  Abd pain     SUBJECTIVE:     Pt seen and examined at bedside. No acute events overnight. Tolerating CLD without n/v. Afebrile, 98% on 2L NC, VSS. Labs pending. Montanez in place with adequate UO. CARRINGTON drain with 250cc output since placement. No ostomy output. OBJECTIVE:   VITALS:  /68   Pulse 72   Temp 98 °F (36.7 °C) (Oral)   Resp 16   Ht 5' 11\" (1.803 m)   Wt 177 lb (80.3 kg)   SpO2 98%   BMI 24.69 kg/m²      INTAKE/OUTPUT:      Intake/Output Summary (Last 24 hours) at 11/23/2021 0545  Last data filed at 11/22/2021 1649  Gross per 24 hour   Intake 2000 ml   Output 400 ml   Net 1600 ml       PHYSICAL EXAM:  General Appearance:  awake, alert, oriented, in no acute distress  HEENT:  Normocephalic, atraumatic, mucus membranes moist   Skin:  Skin color, texture, turgor normal. No rashes or lesions. Lungs:  Equal chest rise bilaterally   Heart:  Heart regular rate and rhythm  Abdomen:   soft, ND, NTTP  Wounds/Incisions: Location:   Midline incision CDI, no drainage, induration, or edema noted. Colostomy bag in place. Colostomy pink and viable. CARRINGTON in place with serosanguinous output. Extremities: Extremities warm to touch, pink, with no edema.       IV Access:  PIV  Montanez in place      Data:  CBC with Differential:    Lab Results   Component Value Date    WBC 10.6 11/17/2021    RBC 4.58 11/17/2021    HGB 13.1 11/17/2021    HCT 42.4 11/17/2021     11/17/2021    MCV 92.6 11/17/2021    MCH 28.6 11/17/2021    MCHC 30.9 11/17/2021    RDW 14.3 11/17/2021    LYMPHOPCT 17 10/14/2021    MONOPCT 12 10/14/2021    BASOPCT 1 10/14/2021    MONOSABS 1.25 10/14/2021    LYMPHSABS 1.87 10/14/2021    EOSABS 0.34 10/14/2021    BASOSABS 0.08 10/14/2021    DIFFTYPE NOT REPORTED 10/14/2021     BMP:    Lab Results   Component Value Date     11/17/2021    K 4.9 11/17/2021     11/17/2021    CO2 18 11/17/2021    BUN 20 11/17/2021    LABALBU 3.0 10/12/2021    CREATININE 1.15 11/17/2021    CALCIUM 8.6 10/14/2021    GFRAA >60 11/17/2021    LABGLOM >60 11/17/2021    GLUCOSE 127 11/17/2021       Radiology Review:  No new images     ASSESSMENT:  Active Hospital Problems    Diagnosis Date Noted    Colovesical fistula [N32.1] 11/22/2021       59 y.o. male POD#1 rectosigmoid colectomy and colostomy creation     Plan:  1. Continue CLD  2. Tylenol, robaxin, crista, morphine PRN for pain  3. zofran for nausea  4. Daily BMP and CBC  5. Continue Montanez for 5 days. Will obtain retrograde  Urethrocystogram on Saturday (POD#5)  6.  Finished periop gentamicin and clindamycin     Electronically signed by Jey Mcneill DO  on 11/23/2021 at 5:45 AM

## 2021-11-24 LAB
ANION GAP SERPL CALCULATED.3IONS-SCNC: 12 MMOL/L (ref 9–17)
BUN BLDV-MCNC: 11 MG/DL (ref 8–23)
BUN/CREAT BLD: ABNORMAL (ref 9–20)
CALCIUM SERPL-MCNC: 9.1 MG/DL (ref 8.6–10.4)
CHLORIDE BLD-SCNC: 106 MMOL/L (ref 98–107)
CO2: 21 MMOL/L (ref 20–31)
CREAT SERPL-MCNC: 0.82 MG/DL (ref 0.7–1.2)
GFR AFRICAN AMERICAN: >60 ML/MIN
GFR NON-AFRICAN AMERICAN: >60 ML/MIN
GFR SERPL CREATININE-BSD FRML MDRD: ABNORMAL ML/MIN/{1.73_M2}
GFR SERPL CREATININE-BSD FRML MDRD: ABNORMAL ML/MIN/{1.73_M2}
GLUCOSE BLD-MCNC: 130 MG/DL (ref 70–99)
HCT VFR BLD CALC: 34 % (ref 40.7–50.3)
HEMOGLOBIN: 10.5 G/DL (ref 13–17)
MCH RBC QN AUTO: 28.6 PG (ref 25.2–33.5)
MCHC RBC AUTO-ENTMCNC: 30.9 G/DL (ref 28.4–34.8)
MCV RBC AUTO: 92.6 FL (ref 82.6–102.9)
NRBC AUTOMATED: 0 PER 100 WBC
PDW BLD-RTO: 14.9 % (ref 11.8–14.4)
PLATELET # BLD: 332 K/UL (ref 138–453)
PMV BLD AUTO: 8.7 FL (ref 8.1–13.5)
POTASSIUM SERPL-SCNC: 4.1 MMOL/L (ref 3.7–5.3)
RBC # BLD: 3.67 M/UL (ref 4.21–5.77)
SODIUM BLD-SCNC: 139 MMOL/L (ref 135–144)
SURGICAL PATHOLOGY REPORT: NORMAL
WBC # BLD: 13.7 K/UL (ref 3.5–11.3)

## 2021-11-24 PROCEDURE — 6370000000 HC RX 637 (ALT 250 FOR IP): Performed by: STUDENT IN AN ORGANIZED HEALTH CARE EDUCATION/TRAINING PROGRAM

## 2021-11-24 PROCEDURE — 2500000003 HC RX 250 WO HCPCS: Performed by: SURGERY

## 2021-11-24 PROCEDURE — 6360000002 HC RX W HCPCS: Performed by: EMERGENCY MEDICINE

## 2021-11-24 PROCEDURE — 85027 COMPLETE CBC AUTOMATED: CPT

## 2021-11-24 PROCEDURE — 99212 OFFICE O/P EST SF 10 MIN: CPT

## 2021-11-24 PROCEDURE — 94761 N-INVAS EAR/PLS OXIMETRY MLT: CPT

## 2021-11-24 PROCEDURE — 6370000000 HC RX 637 (ALT 250 FOR IP): Performed by: EMERGENCY MEDICINE

## 2021-11-24 PROCEDURE — 2500000003 HC RX 250 WO HCPCS: Performed by: STUDENT IN AN ORGANIZED HEALTH CARE EDUCATION/TRAINING PROGRAM

## 2021-11-24 PROCEDURE — 1200000000 HC SEMI PRIVATE

## 2021-11-24 PROCEDURE — 36415 COLL VENOUS BLD VENIPUNCTURE: CPT

## 2021-11-24 PROCEDURE — 6360000002 HC RX W HCPCS: Performed by: SURGERY

## 2021-11-24 PROCEDURE — 2580000003 HC RX 258: Performed by: EMERGENCY MEDICINE

## 2021-11-24 PROCEDURE — 80048 BASIC METABOLIC PNL TOTAL CA: CPT

## 2021-11-24 RX ORDER — METOCLOPRAMIDE HYDROCHLORIDE 5 MG/ML
10 INJECTION INTRAMUSCULAR; INTRAVENOUS EVERY 6 HOURS
Status: COMPLETED | OUTPATIENT
Start: 2021-11-24 | End: 2021-11-27

## 2021-11-24 RX ADMIN — METHOCARBAMOL TABLETS 750 MG: 750 TABLET, COATED ORAL at 13:11

## 2021-11-24 RX ADMIN — POTASSIUM CHLORIDE, DEXTROSE MONOHYDRATE AND SODIUM CHLORIDE: 150; 5; 450 INJECTION, SOLUTION INTRAVENOUS at 12:32

## 2021-11-24 RX ADMIN — SODIUM CHLORIDE, PRESERVATIVE FREE 10 ML: 5 INJECTION INTRAVENOUS at 09:48

## 2021-11-24 RX ADMIN — ENOXAPARIN SODIUM 40 MG: 100 INJECTION SUBCUTANEOUS at 09:32

## 2021-11-24 RX ADMIN — METHOCARBAMOL TABLETS 750 MG: 750 TABLET, COATED ORAL at 20:04

## 2021-11-24 RX ADMIN — ACETAMINOPHEN 1000 MG: 500 TABLET ORAL at 22:16

## 2021-11-24 RX ADMIN — SODIUM CHLORIDE, PRESERVATIVE FREE 10 ML: 5 INJECTION INTRAVENOUS at 20:04

## 2021-11-24 RX ADMIN — POTASSIUM CHLORIDE, DEXTROSE MONOHYDRATE AND SODIUM CHLORIDE: 150; 5; 450 INJECTION, SOLUTION INTRAVENOUS at 02:05

## 2021-11-24 RX ADMIN — METHOCARBAMOL TABLETS 750 MG: 750 TABLET, COATED ORAL at 09:32

## 2021-11-24 RX ADMIN — ACETAMINOPHEN 1000 MG: 500 TABLET ORAL at 05:40

## 2021-11-24 RX ADMIN — FAMOTIDINE 20 MG: 10 INJECTION INTRAVENOUS at 20:04

## 2021-11-24 RX ADMIN — METOCLOPRAMIDE 10 MG: 5 INJECTION, SOLUTION INTRAMUSCULAR; INTRAVENOUS at 09:32

## 2021-11-24 RX ADMIN — LOSARTAN POTASSIUM 25 MG: 25 TABLET, FILM COATED ORAL at 09:32

## 2021-11-24 RX ADMIN — METOCLOPRAMIDE 10 MG: 5 INJECTION, SOLUTION INTRAMUSCULAR; INTRAVENOUS at 20:04

## 2021-11-24 RX ADMIN — POTASSIUM CHLORIDE, DEXTROSE MONOHYDRATE AND SODIUM CHLORIDE: 150; 5; 450 INJECTION, SOLUTION INTRAVENOUS at 22:24

## 2021-11-24 RX ADMIN — CALCIUM CARBONATE 500 MG: 500 TABLET, CHEWABLE ORAL at 05:44

## 2021-11-24 RX ADMIN — ACETAMINOPHEN 1000 MG: 500 TABLET ORAL at 13:11

## 2021-11-24 RX ADMIN — OXYCODONE 5 MG: 5 TABLET ORAL at 10:33

## 2021-11-24 RX ADMIN — METOPROLOL SUCCINATE 25 MG: 25 TABLET, FILM COATED, EXTENDED RELEASE ORAL at 09:31

## 2021-11-24 RX ADMIN — MORPHINE SULFATE 2 MG: 2 INJECTION, SOLUTION INTRAMUSCULAR; INTRAVENOUS at 09:31

## 2021-11-24 RX ADMIN — METHOCARBAMOL TABLETS 750 MG: 750 TABLET, COATED ORAL at 16:51

## 2021-11-24 RX ADMIN — OXYCODONE 5 MG: 5 TABLET ORAL at 06:14

## 2021-11-24 RX ADMIN — METOCLOPRAMIDE 10 MG: 5 INJECTION, SOLUTION INTRAMUSCULAR; INTRAVENOUS at 13:11

## 2021-11-24 RX ADMIN — FAMOTIDINE 20 MG: 10 INJECTION INTRAVENOUS at 09:32

## 2021-11-24 ASSESSMENT — PAIN SCALES - GENERAL
PAINLEVEL_OUTOF10: 2
PAINLEVEL_OUTOF10: 1
PAINLEVEL_OUTOF10: 1
PAINLEVEL_OUTOF10: 3
PAINLEVEL_OUTOF10: 4
PAINLEVEL_OUTOF10: 2
PAINLEVEL_OUTOF10: 6
PAINLEVEL_OUTOF10: 8
PAINLEVEL_OUTOF10: 0
PAINLEVEL_OUTOF10: 5

## 2021-11-24 ASSESSMENT — PAIN DESCRIPTION - LOCATION
LOCATION: ANKLE
LOCATION: ABDOMEN

## 2021-11-24 ASSESSMENT — PAIN DESCRIPTION - DESCRIPTORS
DESCRIPTORS: DISCOMFORT
DESCRIPTORS: ACHING

## 2021-11-24 ASSESSMENT — PAIN DESCRIPTION - PAIN TYPE
TYPE: SURGICAL PAIN
TYPE: SURGICAL PAIN

## 2021-11-24 ASSESSMENT — PAIN DESCRIPTION - ORIENTATION: ORIENTATION: LEFT;UPPER

## 2021-11-24 NOTE — PLAN OF CARE
Problem: Falls - Risk of:  Goal: Will remain free from falls  Description: Will remain free from falls  Outcome: Met This Shift  Goal: Absence of physical injury  Description: Absence of physical injury  Outcome: Met This Shift     Problem:  Bowel/Gastric:  Goal: Complications related to the disease process, condition or treatment will be avoided or minimized  Outcome: Ongoing  Goal: Will be independent with ostomy care  Outcome: Ongoing     Problem: Nutrition  Goal: Optimal nutrition therapy  Outcome: Ongoing

## 2021-11-24 NOTE — DISCHARGE INSTR - COC
Continuity of Care Form    Patient Name: Gwendolyn Fung   :  1960  MRN:  [id number]    Admit date:  2021  Discharge date:  2021    Code Status Order: Full Code   Advance Directives:   [facility] Documentation       Date/Time Healthcare Directive Type of Healthcare Directive Copy in [address] Agent's Name Healthcare Agent's Phone Number    21 1009 No, patient does not have an advance directive for healthcare treatment -- -- -- -- --            Admitting Physician:  Kimberly Wilde DO  PCP: JASPREET Humphreys NP    Discharging Nurse: [facility] Unit/Room#: 3725/6066-01  Discharging Unit Phone Number: [phone]    Emergency Contact:   Extended Emergency Contact Information  Primary Emergency Contact: [address] [place] [address] Phone: [phone]  Relation: Other    Past Surgical History:  Past Surgical History:   Procedure Laterality Date    CARDIOVERSION  2021    COLECTOMY N/A 2021    COLECTOMY, COLOSTOMY (N/A Abdomen)    CT ABSCESS DRAIN SUBCUTANEOUS  10/12/2021    CT ABSCESS DRAIN SUBCUTANEOUS 10/12/2021 [facility] CT SCAN    SIGMOID COLECTOMY N/A 2021    COLECTOMY, COLOSTOMY performed by Kimberly Wilde DO at [address] [place] [address] History:   Immunization History   Administered Date(s) Administered    COVID-19, Pfizer, PF, 30mcg/0.3mL 2021, 2021    Influenza, Quadv, IM, PF (6 mo and older Fluzone, Flulaval, Fluarix, and 3 yrs and older Afluria) 2020, 10/15/2021       Active Problems:  Patient Active Problem List   Diagnosis Code    New onset atrial fibrillation (HCC) I48.91    Primary hypertension I10    Colonic diverticular abscess K57.20    Chronic atrial fibrillation (HCC) I48.20    Hyperlipidemia E78.5    Chronic anticoagulation Z79.01    Sigmoid diverticulitis K57.32    Duodenal diverticulum K57.10    Colovesical fistula N32.1    Severe malnutrition ([address] E43 Isolation/Infection:   Isolation            No Isolation          Patient Infection Status       Infection Onset Added Last Indicated Last Indicated By Review Planned Expiration Resolved Resolved By    None active    Resolved    COVID-19 (Rule Out) 02/03/21 02/03/21 02/03/21 COVID-19 (Ordered)   02/04/21 Rule-Out Test Resulted            Nurse Assessment:  Last Vital Signs: /72   Pulse 65   Temp 97.2 °F (36.2 °C)   Resp 18   Ht 5' 11\" (1.803 m)   Wt 177 lb (80.3 kg)   SpO2 94%   BMI 24.69 kg/m²     Last documented pain score (0-10 scale): Pain Level: 5 (dressing change done)  Last Weight:   Wt Readings from Last 1 Encounters:   11/22/21 177 lb (80.3 kg)     Mental Status:  oriented and alert    IV Access:  - None    Nursing Mobility/ADLs:  Walking   Independent  Transfer  Independent  Bathing  Independent  Dressing  Independent  [address]  Independent  Med Delivery   whole    Wound Care Documentation and Therapy:        Elimination:  Continence: Bowel: Yes  Bladder: Yes  Urinary Catheter: Removal Date 11/27/2021    Colostomy/Ileostomy/Ileal Conduit: Yes  Colostomy LUQ Descending/sigmoid-Stomal Appliance: 2 piece  Colostomy LUQ Descending/sigmoid-Stoma  Assessment: Pink, Moist  Colostomy LUQ Descending/sigmoid-Stool Appearance: Watery  Colostomy LUQ Descending/sigmoid-Stool Color: Red  Colostomy LUQ Descending/sigmoid-Stool Amount: Small    Date of Last BM: 11/28/2021      Intake/Output Summary (Last 24 hours) at 11/24/2021 0909  Last data filed at 11/24/2021 4509  Gross per 24 hour   Intake 6328 ml   Output 2540 ml   Net 3788 ml     I/O last 3 completed shifts: In: 2970 [P.O.:200;  I.V.:6128]  Out: 2540 [Urine:2500; Drains:40]    Safety Concerns:     None    Impairments/Disabilities:      Vision    Nutrition Therapy:  Current Nutrition Therapy:   - Oral Diet:  General    Routes of Feeding: Oral  Liquids: No Restrictions  Daily Fluid Restriction: no  Last Modified Barium Swallow with Video (Video Swallowing Test): not done    Treatments at the Time of Hospital Discharge:   Respiratory Treatments: n/a  Oxygen Therapy:  is not on home oxygen therapy. Ventilator:    - No ventilator support    Rehab Therapies: Physical Therapy and Occupational Therapy  Weight Bearing Status/Restrictions: No weight bearing restirctions  Other Medical Equipment (for information only, NOT a DME order): Other Treatments: colostomy and wound care      Patient's personal belongings (please select all that are sent with patient):  Glasses, clothing, wallet    RN SIGNATURE:  Electronically signed by Navneet Lynn RN on 11/28/21 at 12:09 PM EST    CASE MANAGEMENT/SOCIAL WORK SECTION    Inpatient Status Date: ***    Readmission Risk Assessment Score:  Readmission Risk              Risk of Unplanned Readmission:  14           Discharging to Facility/ Agency   Med 1 Care Details  [facility] [address] [place] [address]       Phone: [phone]       Fax: [phone]          Dialysis Facility (if applicable)   Name:  Address:  Dialysis Schedule:  Phone:  Fax:    / signature: Electronically signed by Jarek Rivera RN on 11/29/21 at 11:33 AM EST    PHYSICIAN SECTION    Prognosis: Good    Condition at Discharge: Stable    Rehab Potential (if transferring to Rehab): Good    Recommended Labs or Other Treatments After Discharge: Home wound/ Ostomy care    Physician Certification: I certify the above information and transfer of Nithin Archuleta  is necessary for the continuing treatment of the diagnosis listed and that he requires Home Care for greater 30 days.      Update Admission H&P: No change in H&P    PHYSICIAN SIGNATURE:  Electronically signed by Johnny Bamberger, DO on 11/26/21 at 6:52 AM EST

## 2021-11-24 NOTE — PROGRESS NOTES
Consulted for End Colostomy Care and Teaching          History:   11/22/21:  COLECTOMY, COLOSTOMY with mobilization of splenic flexure due to DIVERTICULITIS, COLOVESICAL FISTULA with Dr. Melania Jimenez      Met with patient for pouch change demonstration. Discussed components of ostomy care. Written material provided and reviewed. OSTOMY ASSESSMENT:     11/24/21 1622   Colostomy LUQ Descending/sigmoid   Placement Date/Time: 11/22/21 1421   Pre-existing: No  Timeout: Patient; Procedure; Site/Side; Appropriate Equipment; Consent Confirmed; Sterile Technique using full body drape  Inserted by: DR. David Chang  Location: LUQ  Colostomy Type: Descending/sigmoid   Stomal Appliance 1 piece   Stoma  Assessment Red; Moist; Protrudes  (35 mm sl oval)   Mucocutaneous Junction Intact   Peristomal Assessment Clean; Intact   Treatment Bag change; Site care; Liquid skin barrier   Stool Appearance Bloody   Stool Amount   (scant serosanguinous effluent)   Output (mL) 10 ml       Equipment used    Coloplast flat barrier, cut to fit stoma of 35mm. SurePREP applied to peristomal skin. Filter cover used in order to assess gas in pouch. Plan of care: Will follow during hospitalization for continued education and support. Agrees to enroll in [facility] customer support.

## 2021-11-24 NOTE — CARE COORDINATION
[facility] Flow/Interdisciplinary Rounds Progress Note    Quality Flow Rounds held on November 24, 2021 at [address] [facility] Attending:  Bedside Nurse,  and Nursing Unit Leadership        Readmission Risk              Risk of Unplanned Readmission:  14           Discussed patient goal for the day, patient clinical progression, and barriers to discharge.   The following Goal(s) of the Day/Commitment(s) have been identified:    Ps Dr Creig Kayser with gen surg for pt/ot and ostomy nurse orders      Maylin Armstrong RN  November 24, 2021

## 2021-11-24 NOTE — PLAN OF CARE
Problem:  Bowel/Gastric:  Goal: Complications related to the disease process, condition or treatment will be avoided or minimized  11/24/2021 1800 by Janet Pena RN  Outcome: Ongoing  11/24/2021 0621 by Paige Arreguin RN  Outcome: Ongoing  Goal: Will be independent with ostomy care  11/24/2021 1800 by Janet Pena RN  Outcome: Ongoing  11/24/2021 0621 by Paige Arreguin RN  Outcome: Ongoing     Problem: Falls - Risk of:  Goal: Will remain free from falls  Description: Will remain free from falls  11/24/2021 1800 by Janet Pena RN  Outcome: Ongoing  11/24/2021 0621 by Paige Arreguin RN  Outcome: Met This Shift  Goal: Absence of physical injury  Description: Absence of physical injury  11/24/2021 1800 by Janet Pena RN  Outcome: Ongoing  11/24/2021 0621 by Paige Arreguin RN  Outcome: Met This Shift     Problem: Nutrition  Goal: Optimal nutrition therapy  11/24/2021 1800 by Janet Pena RN  Outcome: Ongoing  11/24/2021 0621 by Paige Arreguin RN  Outcome: Ongoing

## 2021-11-24 NOTE — PROGRESS NOTES
General Surgery:  Daily Progress Note                 PATIENT NAME: Junior Cantu     TODAY'S DATE: 11/24/2021, 6:29 AM  CC:  Abd pain     SUBJECTIVE:     Pt seen and examined at bedside. No acute events overnight. Tolerating CLD with some indigestion/heartburn symptoms yesterday afternoon. Ordered Pepcid and tums. Also receiving zofran. Afebrile, 94% on RA, VSS. Labs pending. Montanez in place with adequate UO. CARRINGTON drain 40cc serosanguinous output/24hrs. No ostomy output. OBJECTIVE:   VITALS:  /72   Pulse 65   Temp 97.2 °F (36.2 °C)   Resp 18   Ht 5' 11\" (1.803 m)   Wt 177 lb (80.3 kg)   SpO2 94%   BMI 24.69 kg/m²      INTAKE/OUTPUT:      Intake/Output Summary (Last 24 hours) at 11/24/2021 6843  Last data filed at 11/24/2021 0545  Gross per 24 hour   Intake 6328 ml   Output 2525 ml   Net 3803 ml       PHYSICAL EXAM:  General Appearance:  awake, alert, oriented, in no acute distress  HEENT:  Normocephalic, atraumatic, mucus membranes moist   Skin:  Skin color, texture, turgor normal. No rashes or lesions. Lungs:  Equal chest rise bilaterally   Heart:  Heart regular rate and rhythm  Abdomen:   soft, ND, NTTP  Wounds/Incisions: Location:   Midline incision CDI, no drainage, induration, or edema noted. Colostomy bag in place. Colostomy pink and viable. CARRINGTON in place with serosanguinous output. Extremities: Extremities warm to touch, pink, with no edema.       IV Access:  PIV  Montanez in place      Data:  CBC with Differential:    Lab Results   Component Value Date    WBC 17.0 11/23/2021    RBC 3.52 11/23/2021    HGB 10.2 11/23/2021    HCT 32.6 11/23/2021     11/23/2021    MCV 92.6 11/23/2021    MCH 29.0 11/23/2021    MCHC 31.3 11/23/2021    RDW 14.9 11/23/2021    LYMPHOPCT 5 11/23/2021    MONOPCT 6 11/23/2021    BASOPCT 0 11/23/2021    MONOSABS 1.00 11/23/2021    LYMPHSABS 0.82 11/23/2021    EOSABS <0.03 11/23/2021    BASOSABS <0.03 11/23/2021    DIFFTYPE NOT REPORTED 11/23/2021     BMP:    Lab Results   Component Value Date     11/23/2021    K 4.6 11/23/2021     11/23/2021    CO2 22 11/23/2021    BUN 24 11/23/2021    LABALBU 3.0 10/12/2021    CREATININE 1.06 11/23/2021    CALCIUM 8.9 11/23/2021    GFRAA >60 11/23/2021    LABGLOM >60 11/23/2021    GLUCOSE 154 11/23/2021       Radiology Review:  No new images     ASSESSMENT:  Active Hospital Problems    Diagnosis Date Noted    Severe malnutrition ([facility] [E43] 11/23/2021    Colovesical fistula [N32.1] 11/22/2021       59 y.o. male POD#1 rectosigmoid colectomy and colostomy creation     Plan:  1. Continue CLD. No carbonation   2. Tylenol, robaxin, crista, morphine PRN for pain  3. zofran for nausea  4. Daily BMP and CBC  5. Continue Montanez for 5 days. Will obtain retrograde  Urethrocystogram on Saturday (POD#5)      Electronically signed by Ravindra Friedman DO  on 11/24/2021 at 6:29 AM   I attest that I was present with the resident during the patient's evaluation and agree with description of findings and plan as dictated above.   Judy Hi MD

## 2021-11-25 LAB
ANION GAP SERPL CALCULATED.3IONS-SCNC: 12 MMOL/L (ref 9–17)
BUN BLDV-MCNC: 9 MG/DL (ref 8–23)
BUN/CREAT BLD: ABNORMAL (ref 9–20)
CALCIUM SERPL-MCNC: 9.3 MG/DL (ref 8.6–10.4)
CHLORIDE BLD-SCNC: 102 MMOL/L (ref 98–107)
CO2: 19 MMOL/L (ref 20–31)
CREAT SERPL-MCNC: 0.8 MG/DL (ref 0.7–1.2)
GFR AFRICAN AMERICAN: >60 ML/MIN
GFR NON-AFRICAN AMERICAN: >60 ML/MIN
GFR SERPL CREATININE-BSD FRML MDRD: ABNORMAL ML/MIN/{1.73_M2}
GFR SERPL CREATININE-BSD FRML MDRD: ABNORMAL ML/MIN/{1.73_M2}
GLUCOSE BLD-MCNC: 135 MG/DL (ref 70–99)
HCT VFR BLD CALC: 36.7 % (ref 40.7–50.3)
HEMOGLOBIN: 11.3 G/DL (ref 13–17)
MCH RBC QN AUTO: 28.8 PG (ref 25.2–33.5)
MCHC RBC AUTO-ENTMCNC: 30.8 G/DL (ref 28.4–34.8)
MCV RBC AUTO: 93.6 FL (ref 82.6–102.9)
NRBC AUTOMATED: 0 PER 100 WBC
PDW BLD-RTO: 14.9 % (ref 11.8–14.4)
PLATELET # BLD: 372 K/UL (ref 138–453)
PMV BLD AUTO: 8.7 FL (ref 8.1–13.5)
POTASSIUM SERPL-SCNC: 4.1 MMOL/L (ref 3.7–5.3)
RBC # BLD: 3.92 M/UL (ref 4.21–5.77)
SODIUM BLD-SCNC: 133 MMOL/L (ref 135–144)
WBC # BLD: 11.2 K/UL (ref 3.5–11.3)

## 2021-11-25 PROCEDURE — 6370000000 HC RX 637 (ALT 250 FOR IP): Performed by: STUDENT IN AN ORGANIZED HEALTH CARE EDUCATION/TRAINING PROGRAM

## 2021-11-25 PROCEDURE — 6360000002 HC RX W HCPCS: Performed by: EMERGENCY MEDICINE

## 2021-11-25 PROCEDURE — 6360000002 HC RX W HCPCS: Performed by: SURGERY

## 2021-11-25 PROCEDURE — 2500000003 HC RX 250 WO HCPCS: Performed by: SURGERY

## 2021-11-25 PROCEDURE — 85027 COMPLETE CBC AUTOMATED: CPT

## 2021-11-25 PROCEDURE — 1200000000 HC SEMI PRIVATE

## 2021-11-25 PROCEDURE — 6370000000 HC RX 637 (ALT 250 FOR IP): Performed by: EMERGENCY MEDICINE

## 2021-11-25 PROCEDURE — 80048 BASIC METABOLIC PNL TOTAL CA: CPT

## 2021-11-25 PROCEDURE — 2580000003 HC RX 258: Performed by: EMERGENCY MEDICINE

## 2021-11-25 PROCEDURE — 6370000000 HC RX 637 (ALT 250 FOR IP): Performed by: SURGERY

## 2021-11-25 PROCEDURE — 36415 COLL VENOUS BLD VENIPUNCTURE: CPT

## 2021-11-25 RX ORDER — PROMETHAZINE HYDROCHLORIDE 25 MG/ML
6.25 INJECTION, SOLUTION INTRAMUSCULAR; INTRAVENOUS EVERY 6 HOURS PRN
Status: DISCONTINUED | OUTPATIENT
Start: 2021-11-25 | End: 2021-11-29 | Stop reason: HOSPADM

## 2021-11-25 RX ORDER — METHOCARBAMOL 750 MG/1
750 TABLET, FILM COATED ORAL EVERY 6 HOURS SCHEDULED
Status: DISCONTINUED | OUTPATIENT
Start: 2021-11-25 | End: 2021-11-29 | Stop reason: HOSPADM

## 2021-11-25 RX ORDER — BISACODYL 10 MG
10 SUPPOSITORY, RECTAL RECTAL DAILY
Status: DISPENSED | OUTPATIENT
Start: 2021-11-25 | End: 2021-11-28

## 2021-11-25 RX ADMIN — BISACODYL 5 MG: 5 TABLET, COATED ORAL at 13:36

## 2021-11-25 RX ADMIN — OXYCODONE 5 MG: 5 TABLET ORAL at 05:32

## 2021-11-25 RX ADMIN — ENOXAPARIN SODIUM 40 MG: 100 INJECTION SUBCUTANEOUS at 09:55

## 2021-11-25 RX ADMIN — CALCIUM CARBONATE 500 MG: 500 TABLET, CHEWABLE ORAL at 13:36

## 2021-11-25 RX ADMIN — ACETAMINOPHEN 1000 MG: 500 TABLET ORAL at 05:04

## 2021-11-25 RX ADMIN — METOPROLOL SUCCINATE 25 MG: 25 TABLET, FILM COATED, EXTENDED RELEASE ORAL at 09:54

## 2021-11-25 RX ADMIN — OXYCODONE 5 MG: 5 TABLET ORAL at 01:28

## 2021-11-25 RX ADMIN — OXYCODONE 5 MG: 5 TABLET ORAL at 09:52

## 2021-11-25 RX ADMIN — ONDANSETRON 4 MG: 2 INJECTION INTRAMUSCULAR; INTRAVENOUS at 06:26

## 2021-11-25 RX ADMIN — OXYCODONE 5 MG: 5 TABLET ORAL at 13:36

## 2021-11-25 RX ADMIN — LOSARTAN POTASSIUM 25 MG: 25 TABLET, FILM COATED ORAL at 09:54

## 2021-11-25 RX ADMIN — METOCLOPRAMIDE 10 MG: 5 INJECTION, SOLUTION INTRAMUSCULAR; INTRAVENOUS at 01:28

## 2021-11-25 RX ADMIN — FAMOTIDINE 20 MG: 10 INJECTION INTRAVENOUS at 20:53

## 2021-11-25 RX ADMIN — METHOCARBAMOL TABLETS 750 MG: 750 TABLET, COATED ORAL at 19:35

## 2021-11-25 RX ADMIN — PROMETHAZINE HYDROCHLORIDE 6.25 MG: 25 INJECTION INTRAMUSCULAR; INTRAVENOUS at 09:52

## 2021-11-25 RX ADMIN — METOCLOPRAMIDE 10 MG: 5 INJECTION, SOLUTION INTRAMUSCULAR; INTRAVENOUS at 07:07

## 2021-11-25 RX ADMIN — FAMOTIDINE 20 MG: 10 INJECTION INTRAVENOUS at 09:53

## 2021-11-25 RX ADMIN — ONDANSETRON 4 MG: 2 INJECTION INTRAMUSCULAR; INTRAVENOUS at 13:36

## 2021-11-25 RX ADMIN — MORPHINE SULFATE 2 MG: 2 INJECTION, SOLUTION INTRAMUSCULAR; INTRAVENOUS at 02:36

## 2021-11-25 RX ADMIN — METOCLOPRAMIDE 10 MG: 5 INJECTION, SOLUTION INTRAMUSCULAR; INTRAVENOUS at 13:36

## 2021-11-25 RX ADMIN — SODIUM CHLORIDE, PRESERVATIVE FREE 10 ML: 5 INJECTION INTRAVENOUS at 20:53

## 2021-11-25 RX ADMIN — METOCLOPRAMIDE 10 MG: 5 INJECTION, SOLUTION INTRAMUSCULAR; INTRAVENOUS at 20:53

## 2021-11-25 RX ADMIN — MORPHINE SULFATE 2 MG: 2 INJECTION, SOLUTION INTRAMUSCULAR; INTRAVENOUS at 07:07

## 2021-11-25 RX ADMIN — ACETAMINOPHEN 1000 MG: 500 TABLET ORAL at 20:53

## 2021-11-25 RX ADMIN — ACETAMINOPHEN 1000 MG: 500 TABLET ORAL at 13:36

## 2021-11-25 ASSESSMENT — PAIN SCALES - GENERAL
PAINLEVEL_OUTOF10: 4
PAINLEVEL_OUTOF10: 8
PAINLEVEL_OUTOF10: 5
PAINLEVEL_OUTOF10: 6
PAINLEVEL_OUTOF10: 0
PAINLEVEL_OUTOF10: 7
PAINLEVEL_OUTOF10: 5
PAINLEVEL_OUTOF10: 3
PAINLEVEL_OUTOF10: 5
PAINLEVEL_OUTOF10: 7

## 2021-11-25 NOTE — PLAN OF CARE
Problem:  Bowel/Gastric:  Goal: Complications related to the disease process, condition or treatment will be avoided or minimized  11/25/2021 0514 by Sheri Greenberg RN  Outcome: Ongoing  11/24/2021 1800 by Lucy Dolan RN  Outcome: Ongoing  Goal: Will be independent with ostomy care  11/25/2021 0514 by Sheri Greenberg RN  Outcome: Ongoing  11/24/2021 1800 by Lucy Dolan RN  Outcome: Ongoing     Problem: Falls - Risk of:  Goal: Will remain free from falls  Description: Will remain free from falls  11/25/2021 0514 by Sheri Greenberg RN  Outcome: Ongoing  11/24/2021 1800 by Lucy Dolan RN  Outcome: Ongoing  Goal: Absence of physical injury  Description: Absence of physical injury  11/25/2021 0514 by Sheri Greenberg RN  Outcome: Ongoing  11/24/2021 1800 by Lucy Dolan RN  Outcome: Ongoing     Problem: Nutrition  Goal: Optimal nutrition therapy  11/25/2021 0514 by Sheri Greenberg RN  Outcome: Ongoing  11/24/2021 1800 by Lucy Dolan RN  Outcome: Ongoing

## 2021-11-25 NOTE — PROGRESS NOTES
K 4.1 11/24/2021     11/24/2021    CO2 21 11/24/2021    BUN 11 11/24/2021    LABALBU 3.0 10/12/2021    CREATININE 0.82 11/24/2021    CALCIUM 9.1 11/24/2021    GFRAA >60 11/24/2021    LABGLOM >60 11/24/2021    GLUCOSE 130 11/24/2021       Radiology Review:  No new images     ASSESSMENT:  Active Hospital Problems    Diagnosis Date Noted    Severe malnutrition ([facility] [address] [E43] 11/23/2021    Colovesical fistula [N32.1] 11/22/2021       59 y.o. male  s/p rectosigmoid colectomy and colostomy creation     Plan:  1. Continue CLD. No carbonation   2. Tylenol, robaxin, crista, morphine PRN for pain  3. zofran for nausea  4. Daily BMP and CBC  5. Will give suppository through stoma this morning to try and stimulate bowels  6. Continue Montanez for 5 days.  Will obtain retrograde  Urethrocystogram on Saturday (POD#5)      Electronically signed by Alisa Mckenzie DO  on 11/25/2021 at 5:59 AM

## 2021-11-26 LAB
ANION GAP SERPL CALCULATED.3IONS-SCNC: 10 MMOL/L (ref 9–17)
BUN BLDV-MCNC: 8 MG/DL (ref 8–23)
BUN/CREAT BLD: ABNORMAL (ref 9–20)
CALCIUM SERPL-MCNC: 9.1 MG/DL (ref 8.6–10.4)
CHLORIDE BLD-SCNC: 104 MMOL/L (ref 98–107)
CO2: 22 MMOL/L (ref 20–31)
CREAT SERPL-MCNC: 0.83 MG/DL (ref 0.7–1.2)
GFR AFRICAN AMERICAN: >60 ML/MIN
GFR NON-AFRICAN AMERICAN: >60 ML/MIN
GFR SERPL CREATININE-BSD FRML MDRD: ABNORMAL ML/MIN/{1.73_M2}
GFR SERPL CREATININE-BSD FRML MDRD: ABNORMAL ML/MIN/{1.73_M2}
GLUCOSE BLD-MCNC: 123 MG/DL (ref 70–99)
HCT VFR BLD CALC: 34.8 % (ref 40.7–50.3)
HEMOGLOBIN: 10.9 G/DL (ref 13–17)
MCH RBC QN AUTO: 29.1 PG (ref 25.2–33.5)
MCHC RBC AUTO-ENTMCNC: 31.3 G/DL (ref 28.4–34.8)
MCV RBC AUTO: 92.8 FL (ref 82.6–102.9)
NRBC AUTOMATED: 0 PER 100 WBC
PDW BLD-RTO: 15 % (ref 11.8–14.4)
PLATELET # BLD: 362 K/UL (ref 138–453)
PMV BLD AUTO: 8.6 FL (ref 8.1–13.5)
POTASSIUM SERPL-SCNC: 4 MMOL/L (ref 3.7–5.3)
RBC # BLD: 3.75 M/UL (ref 4.21–5.77)
SODIUM BLD-SCNC: 136 MMOL/L (ref 135–144)
WBC # BLD: 9.7 K/UL (ref 3.5–11.3)

## 2021-11-26 PROCEDURE — 85027 COMPLETE CBC AUTOMATED: CPT

## 2021-11-26 PROCEDURE — 6370000000 HC RX 637 (ALT 250 FOR IP): Performed by: EMERGENCY MEDICINE

## 2021-11-26 PROCEDURE — 97162 PT EVAL MOD COMPLEX 30 MIN: CPT

## 2021-11-26 PROCEDURE — 6360000002 HC RX W HCPCS: Performed by: SURGERY

## 2021-11-26 PROCEDURE — 99212 OFFICE O/P EST SF 10 MIN: CPT

## 2021-11-26 PROCEDURE — 2500000003 HC RX 250 WO HCPCS: Performed by: SURGERY

## 2021-11-26 PROCEDURE — 6360000002 HC RX W HCPCS: Performed by: EMERGENCY MEDICINE

## 2021-11-26 PROCEDURE — 2500000003 HC RX 250 WO HCPCS: Performed by: STUDENT IN AN ORGANIZED HEALTH CARE EDUCATION/TRAINING PROGRAM

## 2021-11-26 PROCEDURE — 2580000003 HC RX 258: Performed by: EMERGENCY MEDICINE

## 2021-11-26 PROCEDURE — 6370000000 HC RX 637 (ALT 250 FOR IP): Performed by: STUDENT IN AN ORGANIZED HEALTH CARE EDUCATION/TRAINING PROGRAM

## 2021-11-26 PROCEDURE — 1200000000 HC SEMI PRIVATE

## 2021-11-26 PROCEDURE — 97530 THERAPEUTIC ACTIVITIES: CPT

## 2021-11-26 PROCEDURE — 6370000000 HC RX 637 (ALT 250 FOR IP): Performed by: SURGERY

## 2021-11-26 PROCEDURE — 80048 BASIC METABOLIC PNL TOTAL CA: CPT

## 2021-11-26 PROCEDURE — 36415 COLL VENOUS BLD VENIPUNCTURE: CPT

## 2021-11-26 RX ADMIN — METHOCARBAMOL TABLETS 750 MG: 750 TABLET, COATED ORAL at 06:19

## 2021-11-26 RX ADMIN — ONDANSETRON 4 MG: 2 INJECTION INTRAMUSCULAR; INTRAVENOUS at 04:55

## 2021-11-26 RX ADMIN — ENOXAPARIN SODIUM 40 MG: 100 INJECTION SUBCUTANEOUS at 09:33

## 2021-11-26 RX ADMIN — CALCIUM CARBONATE 500 MG: 500 TABLET, CHEWABLE ORAL at 20:41

## 2021-11-26 RX ADMIN — FAMOTIDINE 20 MG: 10 INJECTION INTRAVENOUS at 20:42

## 2021-11-26 RX ADMIN — BISACODYL 10 MG: 10 SUPPOSITORY RECTAL at 06:26

## 2021-11-26 RX ADMIN — POTASSIUM CHLORIDE, DEXTROSE MONOHYDRATE AND SODIUM CHLORIDE: 150; 5; 450 INJECTION, SOLUTION INTRAVENOUS at 04:37

## 2021-11-26 RX ADMIN — SODIUM CHLORIDE, PRESERVATIVE FREE 10 ML: 5 INJECTION INTRAVENOUS at 09:34

## 2021-11-26 RX ADMIN — ACETAMINOPHEN 1000 MG: 500 TABLET ORAL at 20:42

## 2021-11-26 RX ADMIN — CALCIUM CARBONATE 500 MG: 500 TABLET, CHEWABLE ORAL at 06:25

## 2021-11-26 RX ADMIN — METOCLOPRAMIDE 10 MG: 5 INJECTION, SOLUTION INTRAMUSCULAR; INTRAVENOUS at 06:19

## 2021-11-26 RX ADMIN — METOCLOPRAMIDE 10 MG: 5 INJECTION, SOLUTION INTRAMUSCULAR; INTRAVENOUS at 20:41

## 2021-11-26 RX ADMIN — LOSARTAN POTASSIUM 25 MG: 25 TABLET, FILM COATED ORAL at 09:34

## 2021-11-26 RX ADMIN — METOPROLOL SUCCINATE 25 MG: 25 TABLET, FILM COATED, EXTENDED RELEASE ORAL at 09:33

## 2021-11-26 RX ADMIN — SODIUM CHLORIDE, PRESERVATIVE FREE 10 ML: 5 INJECTION INTRAVENOUS at 20:42

## 2021-11-26 RX ADMIN — METHOCARBAMOL TABLETS 750 MG: 750 TABLET, COATED ORAL at 17:27

## 2021-11-26 RX ADMIN — ACETAMINOPHEN 1000 MG: 500 TABLET ORAL at 13:15

## 2021-11-26 RX ADMIN — METOCLOPRAMIDE 10 MG: 5 INJECTION, SOLUTION INTRAMUSCULAR; INTRAVENOUS at 13:15

## 2021-11-26 RX ADMIN — METHOCARBAMOL TABLETS 750 MG: 750 TABLET, COATED ORAL at 13:15

## 2021-11-26 RX ADMIN — METOCLOPRAMIDE 10 MG: 5 INJECTION, SOLUTION INTRAMUSCULAR; INTRAVENOUS at 00:52

## 2021-11-26 RX ADMIN — FAMOTIDINE 20 MG: 10 INJECTION INTRAVENOUS at 09:34

## 2021-11-26 RX ADMIN — ACETAMINOPHEN 1000 MG: 500 TABLET ORAL at 04:55

## 2021-11-26 RX ADMIN — METHOCARBAMOL TABLETS 750 MG: 750 TABLET, COATED ORAL at 00:47

## 2021-11-26 ASSESSMENT — PAIN SCALES - GENERAL
PAINLEVEL_OUTOF10: 3
PAINLEVEL_OUTOF10: 4
PAINLEVEL_OUTOF10: 6
PAINLEVEL_OUTOF10: 0
PAINLEVEL_OUTOF10: 7

## 2021-11-26 ASSESSMENT — PAIN DESCRIPTION - FREQUENCY: FREQUENCY: CONTINUOUS

## 2021-11-26 ASSESSMENT — PAIN DESCRIPTION - PAIN TYPE: TYPE: ACUTE PAIN

## 2021-11-26 ASSESSMENT — PAIN DESCRIPTION - LOCATION: LOCATION: ABDOMEN;INCISION

## 2021-11-26 ASSESSMENT — PAIN DESCRIPTION - ORIENTATION: ORIENTATION: RIGHT;LEFT

## 2021-11-26 ASSESSMENT — PAIN DESCRIPTION - DESCRIPTORS: DESCRIPTORS: ACHING;DISCOMFORT

## 2021-11-26 ASSESSMENT — PAIN DESCRIPTION - ONSET: ONSET: ON-GOING

## 2021-11-26 NOTE — PROGRESS NOTES
General Surgery:  Daily Progress Note                 PATIENT NAME: Mayo Leonard DATE: 11/26/2021, 7:02 AM  CC:  Abd pain     SUBJECTIVE:     Pt seen and examined at bedside. No acute events overnight. Tolerating CLD. Afebrile, saturating well on RA. VSS. Montanez in place with adequate UO. CARRINGTON drain 30cc serosanguinous output/24hrs. Ostomy with air in bag. OBJECTIVE:   VITALS:  /81   Pulse 79   Temp 97.3 °F (36.3 °C) (Oral)   Resp 16   Ht 5' 11\" (1.803 m)   Wt 177 lb (80.3 kg)   SpO2 97%   BMI 24.69 kg/m²      INTAKE/OUTPUT:      Intake/Output Summary (Last 24 hours) at 11/26/2021 2153  Last data filed at 11/26/2021 8259  Gross per 24 hour   Intake 5324 ml   Output 2660 ml   Net 2664 ml       PHYSICAL EXAM:  General Appearance:  awake, alert, oriented, in no acute distress  HEENT:  Normocephalic, atraumatic, mucus membranes moist   Skin:  Skin color, texture, turgor normal. No rashes or lesions. Lungs:  Equal chest rise bilaterally   Heart:  Heart regular rate and rhythm  Abdomen:   soft, ND, NTTP  Wounds/Incisions: Location:   Midline incision CDI, no drainage, induration, or edema noted. Colostomy bag in place with air. Colostomy pink and viable. CARRINGTON in place with serosanguinous output. Extremities: Extremities warm to touch, pink, with no edema.       IV Access:  PIV  Montanez in place      Data:  CBC with Differential:    Lab Results   Component Value Date    WBC 11.2 11/25/2021    RBC 3.92 11/25/2021    HGB 11.3 11/25/2021    HCT 36.7 11/25/2021     11/25/2021    MCV 93.6 11/25/2021    MCH 28.8 11/25/2021    MCHC 30.8 11/25/2021    RDW 14.9 11/25/2021    LYMPHOPCT 5 11/23/2021    MONOPCT 6 11/23/2021    BASOPCT 0 11/23/2021    MONOSABS 1.00 11/23/2021    LYMPHSABS 0.82 11/23/2021    EOSABS <0.03 11/23/2021    BASOSABS <0.03 11/23/2021    DIFFTYPE NOT REPORTED 11/23/2021     BMP:    Lab Results   Component Value Date     11/25/2021    K 4.1 11/25/2021     11/25/2021 CO2 19 11/25/2021    BUN 9 11/25/2021    LABALBU 3.0 10/12/2021    CREATININE 0.80 11/25/2021    CALCIUM 9.3 11/25/2021    GFRAA >60 11/25/2021    LABGLOM >60 11/25/2021    GLUCOSE 135 11/25/2021       Radiology Review:  No new images     ASSESSMENT:  Active Hospital Problems    Diagnosis Date Noted    Severe malnutrition ([facility] [address] [E43] 11/23/2021    Colovesical fistula [N32.1] 11/22/2021       59 y.o. male  s/p rectosigmoid colectomy and colostomy creation     Plan:  1. Advance to FLD. No carbonation   2. Tylenol, robaxin, crista PRN for pain  3. zofran and phenergan for nausea  4. Daily BMP and CBC  5. Will give suppository through stoma this morning to try and stimulate bowels  6. Continue Montanez for 5 days.  Will obtain retrograde  Urethrocystogram on Saturday (POD#5)      Electronically signed by Ravindra Friedman DO  on 11/26/2021 at 7:02 AM

## 2021-11-26 NOTE — PROGRESS NOTES
Physical Therapy    Facility/Department: [facility]  Initial Assessment    NAME: Theresa Ding  : 1960  MRN: [id number]    Date of Service: 2021    Discharge Recommendations: Further therapy recommended at discharge. No chief complaint on file. The patient is a 59 y.o. male  who presents with history of diverticulitis with localized abscess which was treated with aspiration per interventional radiology. Patient had extensive course of IV and oral antibiotics and subsequent return of symptoms with repeat CAT scan demonstrating reaccumulation of abscess near  bladder. Patient also now complaining of new onset pneumaturia. Patient presents for exploratory laparotomy with likely colectomy colostomy and closure of colovesical fistula. Discussion with interventional radiology revealed inability to access reaccumulation for drain placement. PT Equipment Recommendations  Equipment Needed: Yes  Mobility Devices: Joby Morris: Rolling    Assessment   Body structures, Functions, Activity limitations: Decreased functional mobility ; Decreased safe awareness; Decreased balance; Decreased ADL status; Decreased high-level IADLs; Decreased endurance  Assessment: Pt ambulates 60 ft RW CGA. Pt currently limited by pain and endurance, benefitting from 24 hr support at this time to complete functional mobility. pt would benefit from continued therapy to promote endurance, balance, and strengthening. Prognosis: Good  Decision Making: Medium Complexity  PT Education: Goals; PT Role; Plan of Care  Barriers to Learning: none  REQUIRES PT FOLLOW UP: Yes  Activity Tolerance  Activity Tolerance: Patient limited by endurance; Patient limited by pain       Patient Diagnosis(es): There were no encounter diagnoses.      has a past medical history of Abnormal patient-activated cardiac event monitor, Chronic anticoagulation, Colonic diverticular abscess, H/O echocardiogram, History of cardioversion, Hyperlipidemia, Hypertension, Non-ischemic cardiomyopathy ([facility] [address] Paroxysmal A-fib ([facility] [address] Sleep apnea, Wellness examination, and Wellness examination. has a past surgical history that includes CT ABSCESS DRAINAGE (10/12/2021); Cardioversion (01/2021); colectomy (N/A, 11/22/2021); and Sigmoid Colectomy (N/A, 11/22/2021). Restrictions  Restrictions/Precautions  Restrictions/Precautions: Fall Risk  Required Braces or Orthoses?: No  Position Activity Restriction  Other position/activity restrictions: Cholectomy, cholostomy 11/22  Vision/Hearing  Vision: Impaired  Vision Exceptions: Wears glasses at all times  Hearing: Exceptions to [facility]  Hearing Exceptions:  (pt reports tinnitus, no [place], no hearing aids)     Subjective  General  Patient assessed for rehabilitation services?: Yes  Response To Previous Treatment: Not applicable  Family / Caregiver Present: No  Follows Commands: Within Functional Limits  Subjective  Subjective: RN and pt agreeable to PT. pt agreeable and pleasant. Pt standing beside recliner upon PT entrance. Pain Screening  Patient Currently in Pain: Yes  Pain Assessment  Pain Assessment: 0-10  Pain Level: 7  Pain Type: Acute pain  Pain Location: Abdomen; Incision  Pain Orientation: Right; Left  Pain Descriptors: Aching; Discomfort  Pain Frequency: Continuous  Pain Onset: On-going  Non-Pharmaceutical Pain Intervention(s): Repositioned;  Ambulation/Increased Activity  Response to Pain Intervention: Patient Satisfied  Vital Signs  Patient Currently in Pain: Yes       Orientation  Orientation  Overall Orientation Status: Within Functional Limits  Social/Functional History  Social/Functional History  Lives With: Friend(s) (pt reports he plans to live with his friend for a couple weeks post d/c)  Type of Home: House  Home Layout: Two level, Performs ADL's on one level, Able to Live on Main level with bedroom/bathroom  Home Access: Stairs to enter with rails  Entrance Stairs - Number of Steps: 2  Entrance Stairs - Rails: Both  Bathroom Shower/Tub: Walk-in shower  Bathroom Toilet: Standard  Home Equipment:  (no DME at baseline)  Receives Help From: Friend(s)  ADL Assistance: Independent  Homemaking Assistance: Independent  Homemaking Responsibilities: Yes  Ambulation Assistance: Independent  Transfer Assistance: Independent  Active : Yes  Mode of Transportation: Car, SUV  Type of occupation: not employed at this time  [address]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  Additional Comments: Friend does not work and able to provide 24 hr support  Cognition   Cognition  Overall Cognitive Status: WFL    Objective          Joint Mobility  Spine: WFL  ROM RLE: WFL  ROM LLE: WFL  ROM RUE: WFL  ROM LUE: WFL  Strength RLE  Strength RLE: WFL  Strength LLE  Strength LLE: WFL  Strength RUE  Strength RUE: WFL  Strength LUE  Strength LUE: WFL  Tone RLE  RLE Tone: Normotonic  Tone LLE  LLE Tone: Normotonic  Motor Control  Gross Motor?: WFL  Sensation  Overall Sensation Status: WFL (denies n/t)  Bed mobility  Supine to Sit: Unable to assess  Sit to Supine: Unable to assess  Scooting: Stand by assistance  Comment: Begins standing by recliner, ends sitting in recliner  Transfers  Sit to Stand: Unable to assess  Stand to sit: Contact guard assistance  Comment: Pt begins standing SCOTT sit to stand  Ambulation  Ambulation?: Yes  More Ambulation?: No  Ambulation 1  Surface: level tile  Device: Rolling Walker  Assistance: Contact guard assistance  Gait Deviations: Slow Rubina; Decreased step length; Decreased step height  Distance: 60 ft  Comments: Pt limited by pain.  No LOB noted  Stairs/Curb  Stairs?: No     Balance  Posture: Good  Sitting - Static: Good  Sitting - Dynamic: Good; -  Standing - Static: Fair  Standing - Dynamic: Fair  Comments: Assessed with RW        Plan   Plan  Times per week: 5-6x  Current Treatment Recommendations: Strengthening, Transfer Training, Endurance Training, Neuromuscular Re-education, Patient/Caregiver Education & Training, ADL/Self-care Training, Equipment Evaluation, Education, & procurement, Balance Training, IADL Training, Gait Training, Home Exercise Program, Functional Mobility Training, Stair training, Safety Education & Training  Safety Devices  Type of devices:  All fall risk precautions in place, Gait belt, Call light within reach, Left in chair, Nurse notified                                                      AM-PAC Score  AM-PAC Inpatient Mobility Raw Score : 18 (11/26/21 1622)  AM-PAC Inpatient T-Scale Score : 43.63 (11/26/21 1622)  Mobility Inpatient CMS 0-100% Score: 46.58 (11/26/21 1622)  Mobility Inpatient CMS G-Code Modifier : CK (11/26/21 1622)          Goals  Short term goals  Time Frame for Short term goals: 14 visits  Short term goal 1: Complete transfers independently  Short term goal 2: Complete 300 ft of gait independently  Short term goal 3: Complete 2 steps with RHR independently  Short term goal 4: Participate in 30 minutes of therapy to promote endurance       Therapy Time   Individual Concurrent Group Co-treatment   Time In 1441         Time Out 1457         Minutes 16         Timed Code Treatment Minutes: [facility], PT

## 2021-11-26 NOTE — PLAN OF CARE
Problem: Nutrition  Goal: Optimal nutrition therapy  11/26/2021 1303 by Kraig Winters RD, LD  Outcome: Ongoing  Note: Nutrition Problem #1: Inadequate oral intake  Intervention: Food and/or Nutrient Delivery: Continue Current Diet, Modify Oral Nutrition Supplement  Nutritional Goals: Oral intakes to meet at least 75% of estimated nutrition needs.    11/26/2021 1301 by Chris Cantu RN  Outcome: Ongoing

## 2021-11-26 NOTE — PROGRESS NOTES
Comprehensive Nutrition Assessment    Type and Reason for Visit:  Reassess    Nutrition Recommendations/Plan:   -Continue with current diet, monitor tolerance with advancement. Encouraged adequate intake of meals and supplement  -Will adjust nutrition supplement on meals trays to Ensure Enlive chocolate    Nutrition Assessment:  Discussed with patient, states he has been tolerating the CLD and is happy that diet was advanced this am to a full liquid. Ready for more food choices but knows the importance of taking it slow. Doesn't care for the Ensure clear supplement and would like changed the chocolate Ensure    Malnutrition Assessment:  Malnutrition Status:  Severe malnutrition    Context:  Chronic Illness         Estimated Daily Nutrient Needs:  Energy (kcal):  25-28 kcal/kg = 1075-0095 kcals/day; Weight Used for Energy Requirements:  Current     Protein (g):  1.2-1.5 gm/kg =  gm pro/day; Weight Used for Protein Requirements:  Ideal        Fluid (ml/day):  6326-4977 mL/day or per MD; Method Used for Fluid Requirements:  1 ml/kcal      Nutrition Related Findings:  labs/meds reviewed. Output noted      Wounds:  Surgical Incision (to abdomen)       Current Nutrition Therapies:    ADULT ORAL NUTRITION SUPPLEMENT; Breakfast, Dinner; Clear Liquid Oral Supplement  ADULT DIET; Full Liquid  Additional Calorie Sources:   D5% 0.45%NaCl with 20mEq KCL at 50ml/uz=477 kcal/d    Anthropometric Measures:  · Height: 5' 11\" (180.3 cm)  · Current Body Weight: 177 lb (80.3 kg)   · Admission Body Weight: 177 lb (80.3 kg)    · Usual Body Weight: 208 lb (94.3 kg) (per pt)     · Ideal Body Weight: 172 lbs; % Ideal Body Weight 102.9 %   · BMI: 24.7   · BMI Categories: Normal Weight (BMI 18.5-24. 9)       Nutrition Diagnosis:   · Inadequate oral intake related to altered GI function (decreased appetite) as evidenced by poor intake prior to admission, weight loss 7.5% in 3 months, intake 0-25% (need for ONS)      Nutrition Interventions:   Food and/or Nutrient Delivery:  Continue Current Diet, Modify Oral Nutrition Supplement  Nutrition Education/Counseling:  Education completed   Coordination of Nutrition Care:  Continue to monitor while inpatient    Goals:  Oral intakes to meet at least 75% of estimated nutrition needs. Nutrition Monitoring and Evaluation:   Behavioral-Environmental Outcomes:  None Identified   Food/Nutrient Intake Outcomes:  Diet Advancement/Tolerance, Food and Nutrient Intake, Supplement Intake, IVF Intake  Physical Signs/Symptoms Outcomes:  Biochemical Data, Nutrition Focused Physical Findings, Weight, Skin, GI Status     Discharge Planning:     Too soon to determine     Electronically signed by Xander Lopez RD, LD on 11/26/21 at 1:02 PM EST    Contact: [phone]

## 2021-11-26 NOTE — PLAN OF CARE
Problem:  Bowel/Gastric:  Goal: Complications related to the disease process, condition or treatment will be avoided or minimized  Outcome: Ongoing  Goal: Will be independent with ostomy care  Outcome: Ongoing     Problem: Falls - Risk of:  Goal: Will remain free from falls  Description: Will remain free from falls  Outcome: Ongoing  Goal: Absence of physical injury  Description: Absence of physical injury  Outcome: Ongoing     Problem: Nutrition  Goal: Optimal nutrition therapy  Outcome: Ongoing

## 2021-11-26 NOTE — PLAN OF CARE
Surgeon:  Dr. Xiomy Block [address], [facility]  Phone: [phone]      Ostomy appliance order:  ICD-10-CM:  Z93.3  Type of stoma:  colostomy      Brand:  Coloplast  Product Number: [id number]  Product Description: SenSura Flat MAXI drainable pouch with filter  Hcpcs: [id number]  Amount per month: 20 per month     Brand:  Coloplast  Product Number: [id number]  Product Description: Lynann Coil Skin barrier Wipes  Hcpcs:   Amount per month: 50 per month

## 2021-11-26 NOTE — PROGRESS NOTES
Consulted for End Colostomy Care and Teaching          History: 11/22/21:  COLECTOMY, COLOSTOMY with mobilization of splenic flexure due to DIVERTICULITIS, COLOVESICAL FISTULA with Dr. Devorah Gauthier        Met with patient for pouch change; successfully changed his appliance with standby assistance. Reviewed components of ostomy care. Still no output following suppository. OSTOMY ASSESSMENT:     11/26/21 1200   Colostomy LUQ Descending/sigmoid   Placement Date/Time: 11/22/21 1421   Pre-existing: No  Timeout: Patient; Procedure; Site/Side; Appropriate Equipment; Consent Confirmed; Sterile Technique using full body drape  Inserted by: DR. Bisi Hernandez  Location: LUQ  Colostomy Type: Descending/sigmoid   Stomal Appliance 1 piece; Changed   Stoma  Assessment Red; Moist; Protrudes  (35 mm sl oval)   Mucocutaneous Junction Intact   Treatment Bag change; Site care; Liquid skin barrier   Stool Appearance   (none)     Equipment used    Coloplast flat barrier, cut to fit stoma of 35mm. SurePREP applied to peristomal skin. Filter cover used in order to assess gas in pouch.     Plan of care: Will follow during hospitalization for continued education and support.     Ostomy appliance order:  ICD-10-CM:  Z93.3  Type of stoma:  colostomy      Brand:  Coloplast  Product Number: [id number]  Product Description: SenSura Flat MAXI drainable pouch with filter  Hcpcs: [id number]  Amount per month: 20 per month     Brand:  Coloplast  Product Number: [id number]  Product Description: Megan Michael Skin barrier Wipes  Hcpcs:   Amount per month: 50 per month

## 2021-11-27 ENCOUNTER — APPOINTMENT (OUTPATIENT)
Dept: GENERAL RADIOLOGY | Age: 61
DRG: 231 | End: 2021-11-27
Attending: SURGERY
Payer: MEDICARE

## 2021-11-27 LAB
ANION GAP SERPL CALCULATED.3IONS-SCNC: 12 MMOL/L (ref 9–17)
BUN BLDV-MCNC: 10 MG/DL (ref 8–23)
BUN/CREAT BLD: ABNORMAL (ref 9–20)
CALCIUM SERPL-MCNC: 9.4 MG/DL (ref 8.6–10.4)
CHLORIDE BLD-SCNC: 101 MMOL/L (ref 98–107)
CO2: 22 MMOL/L (ref 20–31)
CREAT SERPL-MCNC: 0.76 MG/DL (ref 0.7–1.2)
GFR AFRICAN AMERICAN: >60 ML/MIN
GFR NON-AFRICAN AMERICAN: >60 ML/MIN
GFR SERPL CREATININE-BSD FRML MDRD: ABNORMAL ML/MIN/{1.73_M2}
GFR SERPL CREATININE-BSD FRML MDRD: ABNORMAL ML/MIN/{1.73_M2}
GLUCOSE BLD-MCNC: 122 MG/DL (ref 70–99)
HCT VFR BLD CALC: 37.1 % (ref 40.7–50.3)
HEMOGLOBIN: 11.7 G/DL (ref 13–17)
MCH RBC QN AUTO: 29.3 PG (ref 25.2–33.5)
MCHC RBC AUTO-ENTMCNC: 31.5 G/DL (ref 28.4–34.8)
MCV RBC AUTO: 92.8 FL (ref 82.6–102.9)
NRBC AUTOMATED: 0 PER 100 WBC
PDW BLD-RTO: 14.8 % (ref 11.8–14.4)
PLATELET # BLD: 369 K/UL (ref 138–453)
PMV BLD AUTO: 9.1 FL (ref 8.1–13.5)
POTASSIUM SERPL-SCNC: 4.3 MMOL/L (ref 3.7–5.3)
RBC # BLD: 4 M/UL (ref 4.21–5.77)
SODIUM BLD-SCNC: 135 MMOL/L (ref 135–144)
WBC # BLD: 12 K/UL (ref 3.5–11.3)

## 2021-11-27 PROCEDURE — 85027 COMPLETE CBC AUTOMATED: CPT

## 2021-11-27 PROCEDURE — 51600 INJECTION FOR BLADDER X-RAY: CPT

## 2021-11-27 PROCEDURE — 6360000004 HC RX CONTRAST MEDICATION: Performed by: EMERGENCY MEDICINE

## 2021-11-27 PROCEDURE — 6370000000 HC RX 637 (ALT 250 FOR IP): Performed by: EMERGENCY MEDICINE

## 2021-11-27 PROCEDURE — 2500000003 HC RX 250 WO HCPCS: Performed by: SURGERY

## 2021-11-27 PROCEDURE — 80048 BASIC METABOLIC PNL TOTAL CA: CPT

## 2021-11-27 PROCEDURE — 6370000000 HC RX 637 (ALT 250 FOR IP): Performed by: SURGERY

## 2021-11-27 PROCEDURE — 6370000000 HC RX 637 (ALT 250 FOR IP): Performed by: STUDENT IN AN ORGANIZED HEALTH CARE EDUCATION/TRAINING PROGRAM

## 2021-11-27 PROCEDURE — 36415 COLL VENOUS BLD VENIPUNCTURE: CPT

## 2021-11-27 PROCEDURE — 1200000000 HC SEMI PRIVATE

## 2021-11-27 PROCEDURE — 6360000002 HC RX W HCPCS: Performed by: SURGERY

## 2021-11-27 RX ADMIN — ACETAMINOPHEN 1000 MG: 500 TABLET ORAL at 13:52

## 2021-11-27 RX ADMIN — FAMOTIDINE 20 MG: 10 INJECTION INTRAVENOUS at 07:56

## 2021-11-27 RX ADMIN — ACETAMINOPHEN 1000 MG: 500 TABLET ORAL at 06:04

## 2021-11-27 RX ADMIN — IOTHALAMATE MEGLUMINE 150 ML: 172 INJECTION URETERAL at 09:15

## 2021-11-27 RX ADMIN — METHOCARBAMOL TABLETS 750 MG: 750 TABLET, COATED ORAL at 06:05

## 2021-11-27 RX ADMIN — FAMOTIDINE 20 MG: 10 INJECTION INTRAVENOUS at 21:01

## 2021-11-27 RX ADMIN — POTASSIUM CHLORIDE, DEXTROSE MONOHYDRATE AND SODIUM CHLORIDE: 150; 5; 450 INJECTION, SOLUTION INTRAVENOUS at 10:56

## 2021-11-27 RX ADMIN — CALCIUM CARBONATE 500 MG: 500 TABLET, CHEWABLE ORAL at 15:54

## 2021-11-27 RX ADMIN — METHOCARBAMOL TABLETS 750 MG: 750 TABLET, COATED ORAL at 12:33

## 2021-11-27 RX ADMIN — ACETAMINOPHEN 1000 MG: 500 TABLET ORAL at 21:01

## 2021-11-27 RX ADMIN — LOSARTAN POTASSIUM 25 MG: 25 TABLET, FILM COATED ORAL at 07:56

## 2021-11-27 RX ADMIN — BISACODYL 10 MG: 10 SUPPOSITORY RECTAL at 06:07

## 2021-11-27 RX ADMIN — METOPROLOL SUCCINATE 25 MG: 25 TABLET, FILM COATED, EXTENDED RELEASE ORAL at 07:56

## 2021-11-27 RX ADMIN — METHOCARBAMOL TABLETS 750 MG: 750 TABLET, COATED ORAL at 17:11

## 2021-11-27 RX ADMIN — METHOCARBAMOL TABLETS 750 MG: 750 TABLET, COATED ORAL at 00:34

## 2021-11-27 RX ADMIN — METOCLOPRAMIDE 10 MG: 5 INJECTION, SOLUTION INTRAMUSCULAR; INTRAVENOUS at 00:35

## 2021-11-27 RX ADMIN — ONDANSETRON 4 MG: 4 TABLET, ORALLY DISINTEGRATING ORAL at 18:41

## 2021-11-27 ASSESSMENT — PAIN SCALES - GENERAL
PAINLEVEL_OUTOF10: 2
PAINLEVEL_OUTOF10: 3
PAINLEVEL_OUTOF10: 2

## 2021-11-27 NOTE — CARE COORDINATION
Transition planning  Spoke with patient re: home care choice, he states his choice is [facility] home care. E-referral sent to [facility].

## 2021-11-27 NOTE — PROGRESS NOTES
General Surgery:  Daily Progress Note                 PATIENT NAME: Mayo Lane DATE: 11/27/2021, 7:05 AM  CC:  Abdominal pain     SUBJECTIVE:     Patient seen and examined at bedside. No acute events overnight. Patient tolerating full liquid diet. Ostomy began having output yesterday, 150 cc of stool in bag. CARRINGTON drain with serosanguineous output 245 cc in 24 hours. OBJECTIVE:   VITALS:  /67   Pulse 71   Temp 97.3 °F (36.3 °C) (Oral)   Resp 16   Ht 5' 11\" (1.803 m)   Wt 177 lb (80.3 kg)   SpO2 96%   BMI 24.69 kg/m²      INTAKE/OUTPUT:      Intake/Output Summary (Last 24 hours) at 11/27/2021 6830  Last data filed at 11/27/2021 5007  Gross per 24 hour   Intake 655 ml   Output 3745 ml   Net -3090 ml       PHYSICAL EXAM:  General Appearance:  awake, alert, oriented, in no acute distress  HEENT:  Normocephalic, atraumatic, EMOI  Skin:  Skin color, texture, turgor normal. No rashes or lesions. Lungs:  Equal chest rise bilaterally   Heart:  Heart regular rate and rhythm  Abdomen: soft, mild tenderness to palpation, nondistended  Wounds/Incisions: Location: Midline incision with iodoform packing with serosanguinous output. Ostomy pink and functioning with stool in bag. CARRINGTON in place with serosanguinous output. Extremities: Extremities warm to touch, pink, with no edema.       IV Access:  PIV  Montanez in place      Data:  CBC with Differential:    Lab Results   Component Value Date    WBC 9.7 11/26/2021    RBC 3.75 11/26/2021    HGB 10.9 11/26/2021    HCT 34.8 11/26/2021     11/26/2021    MCV 92.8 11/26/2021    MCH 29.1 11/26/2021    MCHC 31.3 11/26/2021    RDW 15.0 11/26/2021    LYMPHOPCT 5 11/23/2021    MONOPCT 6 11/23/2021    BASOPCT 0 11/23/2021    MONOSABS 1.00 11/23/2021    LYMPHSABS 0.82 11/23/2021    EOSABS <0.03 11/23/2021    BASOSABS <0.03 11/23/2021    DIFFTYPE NOT REPORTED 11/23/2021     BMP:    Lab Results   Component Value Date     11/26/2021    K 4.0 11/26/2021    CL

## 2021-11-28 LAB
ANION GAP SERPL CALCULATED.3IONS-SCNC: 7 MMOL/L (ref 9–17)
BUN BLDV-MCNC: 12 MG/DL (ref 8–23)
BUN/CREAT BLD: ABNORMAL (ref 9–20)
CALCIUM SERPL-MCNC: 8.7 MG/DL (ref 8.6–10.4)
CHLORIDE BLD-SCNC: 104 MMOL/L (ref 98–107)
CO2: 21 MMOL/L (ref 20–31)
CREAT SERPL-MCNC: 0.72 MG/DL (ref 0.7–1.2)
GFR AFRICAN AMERICAN: >60 ML/MIN
GFR NON-AFRICAN AMERICAN: >60 ML/MIN
GFR SERPL CREATININE-BSD FRML MDRD: ABNORMAL ML/MIN/{1.73_M2}
GFR SERPL CREATININE-BSD FRML MDRD: ABNORMAL ML/MIN/{1.73_M2}
GLUCOSE BLD-MCNC: 112 MG/DL (ref 70–99)
HCT VFR BLD CALC: 32.9 % (ref 40.7–50.3)
HEMOGLOBIN: 10.4 G/DL (ref 13–17)
MCH RBC QN AUTO: 28.6 PG (ref 25.2–33.5)
MCHC RBC AUTO-ENTMCNC: 31.6 G/DL (ref 28.4–34.8)
MCV RBC AUTO: 90.4 FL (ref 82.6–102.9)
NRBC AUTOMATED: 0 PER 100 WBC
PDW BLD-RTO: 14.9 % (ref 11.8–14.4)
PLATELET # BLD: 387 K/UL (ref 138–453)
PMV BLD AUTO: 8.7 FL (ref 8.1–13.5)
POTASSIUM SERPL-SCNC: 4 MMOL/L (ref 3.7–5.3)
RBC # BLD: 3.64 M/UL (ref 4.21–5.77)
SODIUM BLD-SCNC: 132 MMOL/L (ref 135–144)
WBC # BLD: 11.5 K/UL (ref 3.5–11.3)

## 2021-11-28 PROCEDURE — 1200000000 HC SEMI PRIVATE

## 2021-11-28 PROCEDURE — 6370000000 HC RX 637 (ALT 250 FOR IP): Performed by: STUDENT IN AN ORGANIZED HEALTH CARE EDUCATION/TRAINING PROGRAM

## 2021-11-28 PROCEDURE — 36415 COLL VENOUS BLD VENIPUNCTURE: CPT

## 2021-11-28 PROCEDURE — 2500000003 HC RX 250 WO HCPCS: Performed by: SURGERY

## 2021-11-28 PROCEDURE — 80048 BASIC METABOLIC PNL TOTAL CA: CPT

## 2021-11-28 PROCEDURE — 6370000000 HC RX 637 (ALT 250 FOR IP): Performed by: EMERGENCY MEDICINE

## 2021-11-28 PROCEDURE — 85027 COMPLETE CBC AUTOMATED: CPT

## 2021-11-28 RX ORDER — ONDANSETRON 4 MG/1
4 TABLET, ORALLY DISINTEGRATING ORAL EVERY 8 HOURS PRN
Qty: 21 TABLET | Refills: 0 | Status: SHIPPED | OUTPATIENT
Start: 2021-11-28 | End: 2021-12-05

## 2021-11-28 RX ORDER — METHOCARBAMOL 750 MG/1
750 TABLET, FILM COATED ORAL EVERY 8 HOURS PRN
Qty: 30 TABLET | Refills: 0 | Status: SHIPPED | OUTPATIENT
Start: 2021-11-28 | End: 2021-12-08

## 2021-11-28 RX ORDER — OXYCODONE HYDROCHLORIDE AND ACETAMINOPHEN 5; 325 MG/1; MG/1
1 TABLET ORAL EVERY 6 HOURS PRN
Qty: 20 TABLET | Refills: 0 | Status: SHIPPED | OUTPATIENT
Start: 2021-11-28 | End: 2021-12-03

## 2021-11-28 RX ORDER — SULFAMETHOXAZOLE AND TRIMETHOPRIM 800; 160 MG/1; MG/1
1 TABLET ORAL 2 TIMES DAILY
Qty: 14 TABLET | Refills: 0 | Status: SHIPPED | OUTPATIENT
Start: 2021-11-28 | End: 2021-12-05

## 2021-11-28 RX ADMIN — METHOCARBAMOL TABLETS 750 MG: 750 TABLET, COATED ORAL at 13:14

## 2021-11-28 RX ADMIN — OXYCODONE 5 MG: 5 TABLET ORAL at 18:01

## 2021-11-28 RX ADMIN — ACETAMINOPHEN 1000 MG: 500 TABLET ORAL at 20:26

## 2021-11-28 RX ADMIN — METHOCARBAMOL TABLETS 750 MG: 750 TABLET, COATED ORAL at 23:58

## 2021-11-28 RX ADMIN — METHOCARBAMOL TABLETS 750 MG: 750 TABLET, COATED ORAL at 00:46

## 2021-11-28 RX ADMIN — METOPROLOL SUCCINATE 25 MG: 25 TABLET, FILM COATED, EXTENDED RELEASE ORAL at 13:14

## 2021-11-28 RX ADMIN — ACETAMINOPHEN 1000 MG: 500 TABLET ORAL at 06:00

## 2021-11-28 RX ADMIN — ACETAMINOPHEN 1000 MG: 500 TABLET ORAL at 13:16

## 2021-11-28 RX ADMIN — METHOCARBAMOL TABLETS 750 MG: 750 TABLET, COATED ORAL at 06:00

## 2021-11-28 RX ADMIN — LOSARTAN POTASSIUM 25 MG: 25 TABLET, FILM COATED ORAL at 13:14

## 2021-11-28 RX ADMIN — CALCIUM CARBONATE 500 MG: 500 TABLET, CHEWABLE ORAL at 18:04

## 2021-11-28 RX ADMIN — RIVAROXABAN 20 MG: 20 TABLET, FILM COATED ORAL at 09:56

## 2021-11-28 RX ADMIN — FAMOTIDINE 20 MG: 10 INJECTION INTRAVENOUS at 09:56

## 2021-11-28 RX ADMIN — METHOCARBAMOL TABLETS 750 MG: 750 TABLET, COATED ORAL at 17:57

## 2021-11-28 ASSESSMENT — PAIN SCALES - GENERAL
PAINLEVEL_OUTOF10: 4
PAINLEVEL_OUTOF10: 5
PAINLEVEL_OUTOF10: 2
PAINLEVEL_OUTOF10: 5
PAINLEVEL_OUTOF10: 3

## 2021-11-28 NOTE — PLAN OF CARE
Problem:  Bowel/Gastric:  Goal: Complications related to the disease process, condition or treatment will be avoided or minimized  11/28/2021 0523 by Gregory Domínguez RN  Outcome: Ongoing  11/27/2021 1751 by Radha Babcock RN  Outcome: Ongoing  Goal: Will be independent with ostomy care  11/28/2021 0523 by Gregory Domínguez RN  Outcome: Ongoing  11/27/2021 1751 by Radha Babcock RN  Outcome: Ongoing     Problem: Falls - Risk of:  Goal: Will remain free from falls  Description: Will remain free from falls  11/28/2021 0523 by Gregory Domínguez RN  Outcome: Ongoing  11/27/2021 1751 by Radha Babcock RN  Outcome: Ongoing  Goal: Absence of physical injury  Description: Absence of physical injury  11/28/2021 0523 by Gregory Domínguez RN  Outcome: Ongoing  11/27/2021 1751 by Radha Babcock RN  Outcome: Ongoing     Problem: Nutrition  Goal: Optimal nutrition therapy  11/28/2021 0523 by Gregory Domínguez RN  Outcome: Ongoing  11/27/2021 1751 by Radha Babcock RN  Outcome: Ongoing     Problem: Pain:  Goal: Pain level will decrease  Description: Pain level will decrease  11/28/2021 0523 by Gregory Domínguez RN  Outcome: Ongoing  11/27/2021 1751 by Radha Babcock RN  Outcome: Ongoing  Goal: Control of acute pain  Description: Control of acute pain  11/28/2021 0523 by Gregory Domínguez RN  Outcome: Ongoing  11/27/2021 1751 by Radha Babcock RN  Outcome: Ongoing  Goal: Control of chronic pain  Description: Control of chronic pain  11/28/2021 0523 by Gregory Domínguez RN  Outcome: Ongoing  11/27/2021 1751 by Radha Babcock RN  Outcome: Ongoing

## 2021-11-28 NOTE — DISCHARGE SUMMARY
Surgery Discharge Summary     Patient Identification  Theresa Ding is a 59 y.o. male. :  1960  Admit Date:  2021    Discharge date:   21                Disposition: home with home health    Discharge Diagnoses:   Patient Active Problem List   Diagnosis    [facility] onset [facility] fibrillation [facility]    Primary hypertension    Colonic diverticular abscess    Chronic atrial fibrillation (HCC)    Hyperlipidemia    Chronic anticoagulation    Sigmoid diverticulitis    Duodenal diverticulum    Colovesical fistula    Severe malnutrition ([facility]       Condition on discharge: Good    Consults: Home health care    Surgery: Rectosigmoid colectomy and colostomy creation    Patient Instructions: Activity: no heavy lifting, pushing, pulling for 6 weeks, no driving for 2 weeks or while on analgesics  Diet: As tolerated  Follow-up with Dr. Helen Chen in 2 weeks. See pre-printed instructions in chart and given to patient upon discharge. Discharge Medications:        Medication List      START taking these medications    methocarbamol 750 MG tablet  Commonly known as: ROBAXIN  Take 1 tablet by mouth every 8 hours as needed (spasm pain)     ondansetron 4 MG disintegrating tablet  Commonly known as: ZOFRAN-ODT  Take 1 tablet by mouth every 8 hours as needed for Nausea or Vomiting     oxyCODONE-acetaminophen 5-325 MG per tablet  Commonly known as: Percocet  Take 1 tablet by mouth every 6 hours as needed for Pain for up to 5 days. Intended supply: 3 days.  Take lowest dose possible to manage pain        CONTINUE taking these medications    fluticasone 50 MCG/ACT nasal spray  Commonly known as: FLONASE     hydrOXYzine 50 MG tablet  Commonly known as: ATARAX     loratadine 10 MG tablet  Commonly known as: CLARITIN     losartan 25 MG tablet  Commonly known as: COZAAR  Take 1 tablet by mouth daily     metoprolol succinate 25 MG extended release tablet  Commonly known as: TOPROL XL  Take 1 tablet by mouth daily rivaroxaban 20 MG Tabs tablet  Commonly known as: Xarelto  Take 1 tablet by mouth daily (with breakfast)     sulfamethoxazole-trimethoprim 800-160 MG per tablet  Commonly known as: BACTRIM DS;SEPTRA DS  Take 1 tablet by mouth 2 times daily for 7 days     Viagra 50 MG tablet  Generic drug: sildenafil           Where to Get Your Medications      These medications were sent to [facility] [address] [facility], [address]  [address], [address]  [address]    Phone: [phone]   · methocarbamol 750 MG tablet  · ondansetron 4 MG disintegrating tablet  · oxyCODONE-acetaminophen 5-325 MG per tablet  · sulfamethoxazole-trimethoprim 800-160 MG per tablet           HPI and Hospital Course:   59 y.o. male presented on 11/22/2021 for with pneumaturia secondary to colovesical fistula on 11/22/21. He underwent rectosigmoid colectomy with colostomy creation. Cystourethrogram was done on post op day 5. Negative for leak. Montanez was removed. Hospital course was unremarkable. On day of discharge pt was tolerating regular diet, pain controlled with oral medications and ambulating without difficulty.       Electronically signed by Flory Barrow MD on 11/28/2021 at 10:45 AM

## 2021-11-28 NOTE — PROGRESS NOTES
General Surgery:  Daily Progress Note                 PATIENT NAME: Mayo Macias Showers DATE: 11/28/2021, 6:51 AM  CC:  Abdominal pain     SUBJECTIVE:     Patient seen and evaluated at bedside. No acute events overnight. Patient tolerating general diet. Patient continues to have ostomy output. Total CARRINGTON drain output 55cc over last 24 hours. Urethra cystogram negative for fistula yesterday. Montanez was removed patient spontaneously voided following Montanez removal.      OBJECTIVE:   VITALS:  /83   Pulse 81   Temp 98.1 °F (36.7 °C) (Oral)   Resp 16   Ht 5' 11\" (1.803 m)   Wt 177 lb (80.3 kg)   SpO2 96%   BMI 24.69 kg/m²      INTAKE/OUTPUT:      Intake/Output Summary (Last 24 hours) at 11/28/2021 9540  Last data filed at 11/27/2021 2117  Gross per 24 hour   Intake 1000 ml   Output 1230 ml   Net -230 ml       PHYSICAL EXAM:  General Appearance:  awake, alert, oriented, in no acute distress  HEENT:  Normocephalic, atraumatic, mucosal membranes pink  Skin:  Skin color, texture, turgor normal. No rashes or lesions. Lungs:  Equal chest rise bilaterally   Heart:  Heart regular rate and rhythm  Abdomen: soft, mild tenderness to palpation, nondistended  Wounds/Incisions: Location: Midline incision with iodoform packing with serosanguinous output. Ostomy pink and functioning with stool in bag. CARRINGTON in place with serosanguinous output. no surrounding erythema or signs of infection    Extremities: Extremities warm to touch, pink, with no edema.       IV Access:  PIV      Data:  CBC with Differential:    Lab Results   Component Value Date    WBC 12.0 11/27/2021    RBC 4.00 11/27/2021    HGB 11.7 11/27/2021    HCT 37.1 11/27/2021     11/27/2021    MCV 92.8 11/27/2021    MCH 29.3 11/27/2021    MCHC 31.5 11/27/2021    RDW 14.8 11/27/2021    LYMPHOPCT 5 11/23/2021    MONOPCT 6 11/23/2021    BASOPCT 0 11/23/2021    MONOSABS 1.00 11/23/2021    LYMPHSABS 0.82 11/23/2021    EOSABS <0.03 11/23/2021    TANI

## 2021-11-28 NOTE — PLAN OF CARE
Problem:  Bowel/Gastric:  Goal: Complications related to the disease process, condition or treatment will be avoided or minimized  11/28/2021 1154 by Rodolfo Gonzalez RN  Outcome: Completed  11/28/2021 0523 by Agatha Madden RN  Outcome: Ongoing  Goal: Will be independent with ostomy care  11/28/2021 1154 by Rodolfo Gonzalez RN  Outcome: Completed  11/28/2021 0523 by Agatha Madden RN  Outcome: Ongoing     Problem: Falls - Risk of:  Goal: Will remain free from falls  Description: Will remain free from falls  11/28/2021 1154 by Rodolfo Gonzalez RN  Outcome: Completed  11/28/2021 0523 by Agatha Madden RN  Outcome: Ongoing  Goal: Absence of physical injury  Description: Absence of physical injury  11/28/2021 1154 by Rodolfo Gonzalez RN  Outcome: Completed  11/28/2021 0523 by Agatha Madden RN  Outcome: Ongoing     Problem: Nutrition  Goal: Optimal nutrition therapy  11/28/2021 1154 by Rodolfo Gonzalez RN  Outcome: Completed  11/28/2021 0523 by Agatha Madden RN  Outcome: Ongoing     Problem: Pain:  Goal: Pain level will decrease  Description: Pain level will decrease  11/28/2021 1154 by Rodolfo Gonzalez RN  Outcome: Completed  11/28/2021 0523 by Agatha Madden RN  Outcome: Ongoing  Goal: Control of acute pain  Description: Control of acute pain  11/28/2021 1154 by Rodolfo Gonzalez RN  Outcome: Completed  11/28/2021 0523 by Agatha Madden RN  Outcome: Ongoing  Goal: Control of chronic pain  Description: Control of chronic pain  11/28/2021 1154 by Rodolfo Gonzalez RN  Outcome: Completed  11/28/2021 0523 by Agatha Madden RN  Outcome: Ongoing

## 2021-11-28 NOTE — CARE COORDINATION
Transition planning  Called and spoke with Megan Gonzalez at [facility] re: referral, she states she will check if they are able to accept patient. 1050 Call from Megan Gonzalez at [facility], she states they are unable to accept patient due to staffing. 1155 Updated patient that [facility] is unable to accept him, patient chooses [address]. E-referral sent to [address] in [facility]. 1430 Called and left VM for on-call at [facility] care Hailey Speaks) re: referral, informed that patient is to discharge today, requested call back. 1600 Called and left  for on-call at [address] Hailey Speaks) re: referral, requested call back.

## 2021-11-29 VITALS
RESPIRATION RATE: 16 BRPM | DIASTOLIC BLOOD PRESSURE: 67 MMHG | HEART RATE: 471 BPM | TEMPERATURE: 98.1 F | BODY MASS INDEX: 24.78 KG/M2 | SYSTOLIC BLOOD PRESSURE: 110 MMHG | WEIGHT: 177 LBS | HEIGHT: 71 IN | OXYGEN SATURATION: 98 %

## 2021-11-29 LAB
ANION GAP SERPL CALCULATED.3IONS-SCNC: 10 MMOL/L (ref 9–17)
BUN BLDV-MCNC: 18 MG/DL (ref 8–23)
BUN/CREAT BLD: ABNORMAL (ref 9–20)
CALCIUM SERPL-MCNC: 8.8 MG/DL (ref 8.6–10.4)
CHLORIDE BLD-SCNC: 103 MMOL/L (ref 98–107)
CO2: 22 MMOL/L (ref 20–31)
CREAT SERPL-MCNC: 0.78 MG/DL (ref 0.7–1.2)
GFR AFRICAN AMERICAN: >60 ML/MIN
GFR NON-AFRICAN AMERICAN: >60 ML/MIN
GFR SERPL CREATININE-BSD FRML MDRD: ABNORMAL ML/MIN/{1.73_M2}
GFR SERPL CREATININE-BSD FRML MDRD: ABNORMAL ML/MIN/{1.73_M2}
GLUCOSE BLD-MCNC: 100 MG/DL (ref 70–99)
HCT VFR BLD CALC: 30.8 % (ref 40.7–50.3)
HEMOGLOBIN: 9.8 G/DL (ref 13–17)
MCH RBC QN AUTO: 28.9 PG (ref 25.2–33.5)
MCHC RBC AUTO-ENTMCNC: 31.8 G/DL (ref 28.4–34.8)
MCV RBC AUTO: 90.9 FL (ref 82.6–102.9)
NRBC AUTOMATED: 0 PER 100 WBC
PDW BLD-RTO: 15.1 % (ref 11.8–14.4)
PLATELET # BLD: 367 K/UL (ref 138–453)
PMV BLD AUTO: 8.7 FL (ref 8.1–13.5)
POTASSIUM SERPL-SCNC: 4 MMOL/L (ref 3.7–5.3)
RBC # BLD: 3.39 M/UL (ref 4.21–5.77)
SODIUM BLD-SCNC: 135 MMOL/L (ref 135–144)
WBC # BLD: 10.4 K/UL (ref 3.5–11.3)

## 2021-11-29 PROCEDURE — 97116 GAIT TRAINING THERAPY: CPT

## 2021-11-29 PROCEDURE — 97110 THERAPEUTIC EXERCISES: CPT

## 2021-11-29 PROCEDURE — 80048 BASIC METABOLIC PNL TOTAL CA: CPT

## 2021-11-29 PROCEDURE — 99211 OFF/OP EST MAY X REQ PHY/QHP: CPT

## 2021-11-29 PROCEDURE — 85027 COMPLETE CBC AUTOMATED: CPT

## 2021-11-29 PROCEDURE — 6370000000 HC RX 637 (ALT 250 FOR IP): Performed by: STUDENT IN AN ORGANIZED HEALTH CARE EDUCATION/TRAINING PROGRAM

## 2021-11-29 PROCEDURE — 36415 COLL VENOUS BLD VENIPUNCTURE: CPT

## 2021-11-29 PROCEDURE — 2500000003 HC RX 250 WO HCPCS: Performed by: SURGERY

## 2021-11-29 PROCEDURE — 6370000000 HC RX 637 (ALT 250 FOR IP): Performed by: EMERGENCY MEDICINE

## 2021-11-29 RX ORDER — FAMOTIDINE 20 MG/1
20 TABLET, FILM COATED ORAL 2 TIMES DAILY
Status: DISCONTINUED | OUTPATIENT
Start: 2021-11-29 | End: 2021-11-29 | Stop reason: HOSPADM

## 2021-11-29 RX ADMIN — METOPROLOL SUCCINATE 25 MG: 25 TABLET, FILM COATED, EXTENDED RELEASE ORAL at 09:09

## 2021-11-29 RX ADMIN — METHOCARBAMOL TABLETS 750 MG: 750 TABLET, COATED ORAL at 13:20

## 2021-11-29 RX ADMIN — FAMOTIDINE 20 MG: 10 INJECTION INTRAVENOUS at 09:07

## 2021-11-29 RX ADMIN — ACETAMINOPHEN 1000 MG: 500 TABLET ORAL at 13:20

## 2021-11-29 RX ADMIN — ONDANSETRON 4 MG: 4 TABLET, ORALLY DISINTEGRATING ORAL at 05:59

## 2021-11-29 RX ADMIN — LOSARTAN POTASSIUM 25 MG: 25 TABLET, FILM COATED ORAL at 09:07

## 2021-11-29 RX ADMIN — RIVAROXABAN 20 MG: 20 TABLET, FILM COATED ORAL at 09:07

## 2021-11-29 RX ADMIN — OXYCODONE 5 MG: 5 TABLET ORAL at 09:07

## 2021-11-29 ASSESSMENT — PAIN SCALES - GENERAL
PAINLEVEL_OUTOF10: 4

## 2021-11-29 ASSESSMENT — PAIN DESCRIPTION - DESCRIPTORS: DESCRIPTORS: DISCOMFORT

## 2021-11-29 ASSESSMENT — PAIN DESCRIPTION - PAIN TYPE: TYPE: ACUTE PAIN;SURGICAL PAIN

## 2021-11-29 ASSESSMENT — PAIN - FUNCTIONAL ASSESSMENT: PAIN_FUNCTIONAL_ASSESSMENT: PREVENTS OR INTERFERES SOME ACTIVE ACTIVITIES AND ADLS

## 2021-11-29 ASSESSMENT — PAIN DESCRIPTION - LOCATION: LOCATION: ABDOMEN;INCISION

## 2021-11-29 NOTE — PROGRESS NOTES
Balance  Posture: Good  Sitting - Static: Good  Sitting - Dynamic: Good; -  Standing - Static: Fair  Standing - Dynamic: Fair  Comments: Standing balance assessed with RW  Exercises  Hip Flexion: x10 BLE in sitting  Hip Abduction: x10 BLE in sitting  Knee Long Arc Quad: 2x10 BLE in sitting  Ankle Pumps: x15 BLE in sitting  Comments: Pt requests to return to supine upon completing above exercises secondary to increased pain.   Other exercises  Other exercises?: No                                                                             AM-PAC Score  AM-PAC Inpatient Mobility Raw Score : 18 (11/29/21 1251)  AM-PAC Inpatient T-Scale Score : 43.63 (11/29/21 1251)  Mobility Inpatient CMS 0-100% Score: 46.58 (11/29/21 1251)  Mobility Inpatient CMS G-Code Modifier : CK (11/29/21 1251)          Goals  Short term goals  Time Frame for Short term goals: 14 visits  Short term goal 1: Complete transfers independently  Short term goal 2: Complete 300 ft of gait independently  Short term goal 3: Complete 2 steps with RHR independently  Short term goal 4: Participate in 30 minutes of therapy to promote endurance    Plan    Plan  Times per week: 5x/week  Times per day: Daily  Current Treatment Recommendations: Strengthening, Transfer Training, Endurance Training, Patient/Caregiver Education & Training, Equipment Evaluation, Education, & procurement, Balance Training, Gait Training, Home Exercise Program, Functional Mobility Training, Stair training, Safety Education & Training  Safety Devices  Type of devices: Left in bed, Call light within reach, Patient at risk for falls, Gait belt  Restraints  Initially in place: No     Therapy Time   Individual Concurrent Group Co-treatment   Time In 0940         Time Out 1003         Minutes 23         Timed Code Treatment Minutes: ELDA Walker 20, PT

## 2021-11-29 NOTE — PROGRESS NOTES
Patient was discharged via wheelchair with friend as a ride. Ostomy nurse saw patient before discharge for follow-up teaching and teachback ostomy education. Patient was given ostomy supplies for a month, also was given bandages and tape for wound care. To follow-up with woundcare at home.

## 2021-11-29 NOTE — PROGRESS NOTES
General Surgery:  Daily Progress Note                    PATIENT NAME: Mario Whitlock     TODAY'S DATE: 11/29/2021, 7:00 AM  CC:  Abdominal Pain    SUBJECTIVE:     Patient seen and evaluated at bedside. No acute events overnight. Patient tolerating general diet. Patient continues to have ostomy output. Patient denies fever, chills, endorses mild abdominal pain. OBJECTIVE:   VITALS:  BP 98/70   Pulse 81   Temp 98.2 °F (36.8 °C) (Oral)   Resp 17   Ht 5' 11\" (1.803 m)   Wt 177 lb (80.3 kg)   SpO2 96%   BMI 24.69 kg/m²      INTAKE/OUTPUT:    No intake or output data in the 24 hours ending 11/29/21 0700    PHYSICAL EXAM:  General Appearance:  awake, alert, oriented, in no acute distress  HEENT:  Normocephalic, atraumatic, mucosal membranes pink  Skin:  Skin color, texture, turgor normal. No rashes or lesions. Lungs:  Equal chest rise bilaterally   Heart:  Heart regular rate and rhythm  Abdomen: soft, mild tenderness to palpation, nondistended  Wounds/Incisions: Location: Midline incision with iodoform packing with serosanguinous output. Ostomy pink and functioning with stool in bag . no surrounding erythema or signs of infection    Extremities: Extremities warm to touch, pink, with no edema. Data:  CBC:   Lab Results   Component Value Date    WBC 10.4 11/29/2021    RBC 3.39 11/29/2021    HGB 9.8 11/29/2021    HCT 30.8 11/29/2021    MCV 90.9 11/29/2021    MCH 28.9 11/29/2021    MCHC 31.8 11/29/2021    RDW 15.1 11/29/2021     11/29/2021    MPV 8.7 11/29/2021           ASSESSMENT:  Active Hospital Problems    Diagnosis Date Noted    Severe malnutrition ([facility] [E43] 11/23/2021    Colovesical fistula [N32.1] 11/22/2021       59 y.o. male  s/p rectosigmoid colectomy and colostomy creation       Plan:  1. Continue general diet. 2. Tylenol, robaxin, crista PRN for pain  3. zofran and phenergan for nausea  4. Daily BMP and CBC  5. CARRINGTON removed at bedside yesterday. Iodoform packing removed from midline. No additional packing required at this time. Patient is okay to shower and cover wound with dry dressing when at home. 6. Discharge planning: Home care for ostomy care and maintenance as well as dressing changes to midline incision.   Awaiting acceptance with a home care agency    Electronically signed by Mitch Mejia MD  on 11/29/2021 at 7:00 AM

## 2021-11-29 NOTE — CARE COORDINATION
Discharge [facility] Case Management Department  Written by: Ashly Gonzalez RN    Patient Name: Dayton Gonzáles  Attending Provider: No att. providers found  Admit Date: 2021  9:46 AM  MRN: [id number]  Account: [de-identified]                     : 1960  Discharge Date: 2021      Disposition: home with [facility]    Ashly Gonzalez RN

## 2021-11-29 NOTE — CARE COORDINATION
Transitional Planning:    CM spoke with Braden Morel at [facility]. They can accept but need to ensure pt is teachable since they will access pt and teach colostomy care and visit weekly. CM discussed with pt who is agreeable. Facesheet, HC order and AVS faxed to [facility].

## 2021-11-30 NOTE — DISCHARGE SUMMARY
Surgery Discharge Summary     Patient Identification  Mario Whitlock is a 59 y.o. male. :  1960  Admit Date:  2021    Discharge date:   21                Disposition: home with home health    Discharge Diagnoses:   Patient Active Problem List   Diagnosis    New onset atrial fibrillation ([facility]    Primary hypertension    Colonic diverticular abscess    Chronic atrial fibrillation (HCC)    Hyperlipidemia    Chronic anticoagulation    Sigmoid diverticulitis    Duodenal diverticulum    Colovesical fistula    Severe malnutrition ([facility]       Condition on discharge: Good    Consults: Home health care    Surgery: Rectosigmoid colectomy and colostomy creation    Patient Instructions: Activity: no heavy lifting, pushing, pulling for 6 weeks, no driving for 2 weeks or while on analgesics  Diet: As tolerated  Follow-up with Dr. Mattie Gipson in 2 weeks. See pre-printed instructions in chart and given to patient upon discharge. Discharge Medications:        Medication List      START taking these medications    methocarbamol 750 MG tablet  Commonly known as: ROBAXIN  Take 1 tablet by mouth every 8 hours as needed (spasm pain)     ondansetron 4 MG disintegrating tablet  Commonly known as: ZOFRAN-ODT  Take 1 tablet by mouth every 8 hours as needed for Nausea or Vomiting     oxyCODONE-acetaminophen 5-325 MG per tablet  Commonly known as: Percocet  Take 1 tablet by mouth every 6 hours as needed for Pain for up to 5 days. Intended supply: 3 days.  Take lowest dose possible to manage pain        CONTINUE taking these medications    fluticasone 50 MCG/ACT nasal spray  Commonly known as: FLONASE     hydrOXYzine 50 MG tablet  Commonly known as: ATARAX     loratadine 10 MG tablet  Commonly known as: CLARITIN     losartan 25 MG tablet  Commonly known as: COZAAR  Take 1 tablet by mouth daily     metoprolol succinate 25 MG extended release tablet  Commonly known as: TOPROL XL  Take 1 tablet by mouth daily rivaroxaban 20 MG Tabs tablet  Commonly known as: Xarelto  Take 1 tablet by mouth daily (with breakfast)     sulfamethoxazole-trimethoprim 800-160 MG per tablet  Commonly known as: BACTRIM DS;SEPTRA DS  Take 1 tablet by mouth 2 times daily for 7 days     Viagra 50 MG tablet  Generic drug: sildenafil           Where to Get Your Medications      These medications were sent to [facility] [address] [facility], [address]  [address], [address] [facility] [address]    Phone: [phone]   · methocarbamol 750 MG tablet  · ondansetron 4 MG disintegrating tablet  · oxyCODONE-acetaminophen 5-325 MG per tablet  · sulfamethoxazole-trimethoprim 800-160 MG per tablet           HPI and Hospital Course:   59 y.o. male presented on 11/22/2021 for with pneumaturia secondary to colovesical fistula on 11/22/21. He underwent rectosigmoid colectomy with colostomy creation. Cystourethrogram was done on post op day 5. Negative for leak. Montanez was removed. Hospital course was unremarkable. On day of discharge pt was tolerating regular diet, pain controlled with oral medications and ambulating without difficulty.       Electronically signed by Gui Barlow MD on 11/30/2021 at 12:11 PM

## 2021-12-06 ENCOUNTER — HOSPITAL ENCOUNTER (EMERGENCY)
Age: 61
Discharge: HOME | End: 2021-12-06
Attending: EMERGENCY MEDICINE
Payer: MEDICARE

## 2021-12-06 ENCOUNTER — TELEPHONE (OUTPATIENT)
Dept: CARDIOLOGY | Age: 61
End: 2021-12-06

## 2021-12-06 VITALS
DIASTOLIC BLOOD PRESSURE: 69 MMHG | SYSTOLIC BLOOD PRESSURE: 96 MMHG | BODY MASS INDEX: 25.06 KG/M2 | WEIGHT: 179 LBS | HEART RATE: 80 BPM | HEIGHT: 71 IN | RESPIRATION RATE: 16 BRPM | OXYGEN SATURATION: 100 % | TEMPERATURE: 97.9 F

## 2021-12-06 DIAGNOSIS — I95.9 TRANSIENT HYPOTENSION: ICD-10-CM

## 2021-12-06 DIAGNOSIS — U07.1 COVID-19: Primary | ICD-10-CM

## 2021-12-06 LAB
ABSOLUTE EOS #: 0.34 K/UL (ref 0–0.44)
ABSOLUTE IMMATURE GRANULOCYTE: 0.09 K/UL (ref 0–0.3)
ABSOLUTE LYMPH #: 2.47 K/UL (ref 1.1–3.7)
ABSOLUTE MONO #: 0.86 K/UL (ref 0.1–1.2)
ALBUMIN SERPL-MCNC: 3.9 G/DL (ref 3.5–5.2)
ALBUMIN/GLOBULIN RATIO: 1 (ref 1–2.5)
ALP BLD-CCNC: 146 U/L (ref 40–129)
ALT SERPL-CCNC: 26 U/L (ref 5–41)
ANION GAP SERPL CALCULATED.3IONS-SCNC: 12 MMOL/L (ref 9–17)
AST SERPL-CCNC: 19 U/L
BASOPHILS # BLD: 1 % (ref 0–2)
BASOPHILS ABSOLUTE: 0.14 K/UL (ref 0–0.2)
BILIRUB SERPL-MCNC: 0.26 MG/DL (ref 0.3–1.2)
BUN BLDV-MCNC: 16 MG/DL (ref 8–23)
BUN/CREAT BLD: 16 (ref 9–20)
CALCIUM SERPL-MCNC: 9.6 MG/DL (ref 8.6–10.4)
CHLORIDE BLD-SCNC: 100 MMOL/L (ref 98–107)
CO2: 24 MMOL/L (ref 20–31)
CREAT SERPL-MCNC: 1.02 MG/DL (ref 0.7–1.2)
DIFFERENTIAL TYPE: ABNORMAL
EOSINOPHILS RELATIVE PERCENT: 3 % (ref 1–4)
GFR AFRICAN AMERICAN: >60 ML/MIN
GFR NON-AFRICAN AMERICAN: >60 ML/MIN
GFR SERPL CREATININE-BSD FRML MDRD: ABNORMAL ML/MIN/{1.73_M2}
GFR SERPL CREATININE-BSD FRML MDRD: ABNORMAL ML/MIN/{1.73_M2}
GLUCOSE BLD-MCNC: 107 MG/DL (ref 70–99)
HCT VFR BLD CALC: 37.2 % (ref 40.7–50.3)
HEMOGLOBIN: 11.8 G/DL (ref 13–17)
IMMATURE GRANULOCYTES: 1 %
LYMPHOCYTES # BLD: 24 % (ref 24–43)
MCH RBC QN AUTO: 28.9 PG (ref 25.2–33.5)
MCHC RBC AUTO-ENTMCNC: 31.7 G/DL (ref 28.4–34.8)
MCV RBC AUTO: 91.2 FL (ref 82.6–102.9)
MONOCYTES # BLD: 8 % (ref 3–12)
NRBC AUTOMATED: 0 PER 100 WBC
PDW BLD-RTO: 15.1 % (ref 11.8–14.4)
PLATELET # BLD: 452 K/UL (ref 138–453)
PLATELET ESTIMATE: ABNORMAL
PMV BLD AUTO: 9 FL (ref 8.1–13.5)
POTASSIUM SERPL-SCNC: 3.9 MMOL/L (ref 3.7–5.3)
RBC # BLD: 4.08 M/UL (ref 4.21–5.77)
RBC # BLD: ABNORMAL 10*6/UL
SARS-COV-2, RAPID: DETECTED
SEG NEUTROPHILS: 63 % (ref 36–65)
SEGMENTED NEUTROPHILS ABSOLUTE COUNT: 6.42 K/UL (ref 1.5–8.1)
SODIUM BLD-SCNC: 136 MMOL/L (ref 135–144)
SPECIMEN DESCRIPTION: ABNORMAL
TOTAL PROTEIN: 7.8 G/DL (ref 6.4–8.3)
WBC # BLD: 10.3 K/UL (ref 3.5–11.3)
WBC # BLD: ABNORMAL 10*3/UL

## 2021-12-06 PROCEDURE — 87635 SARS-COV-2 COVID-19 AMP PRB: CPT

## 2021-12-06 PROCEDURE — 80053 COMPREHEN METABOLIC PANEL: CPT

## 2021-12-06 PROCEDURE — 2580000003 HC RX 258: Performed by: EMERGENCY MEDICINE

## 2021-12-06 PROCEDURE — C9803 HOPD COVID-19 SPEC COLLECT: HCPCS

## 2021-12-06 PROCEDURE — 85025 COMPLETE CBC W/AUTO DIFF WBC: CPT

## 2021-12-06 PROCEDURE — 99283 EMERGENCY DEPT VISIT LOW MDM: CPT

## 2021-12-06 RX ORDER — 0.9 % SODIUM CHLORIDE 0.9 %
1000 INTRAVENOUS SOLUTION INTRAVENOUS ONCE
Status: COMPLETED | OUTPATIENT
Start: 2021-12-06 | End: 2021-12-06

## 2021-12-06 RX ADMIN — SODIUM CHLORIDE 1000 ML: 9 INJECTION, SOLUTION INTRAVENOUS at 19:48

## 2021-12-07 ENCOUNTER — CARE COORDINATION (OUTPATIENT)
Dept: CARE COORDINATION | Age: 61
End: 2021-12-07

## 2021-12-07 NOTE — CARE COORDINATION
Patient contacted regarding COVID-19 diagnosis. Discussed COVID-19 related testing which was available at this time. Test results were positive. Patient informed of results, if available? Yes. Ambulatory Care Manager contacted the patient by telephone to perform post discharge assessment. Call within 2 business days of discharge: Yes. Verified name and  with patient as identifiers. Provided introduction to self, and explanation of the CTN/ACM role, and reason for call due to risk factors for infection and/or exposure to COVID-19. Symptoms reviewed with patient who verbalized the following symptoms: no new symptoms and no worsening symptoms. Due to no new or worsening symptoms encounter was not routed to provider for escalation. Discussed follow-up appointments. If no appointment was previously scheduled, appointment scheduling offered: Yes. [facility] follow up appointment(s):   Future Appointments   Date Time Provider Keith Coronado   3/29/2022  9:20 AM Nina Croft MD TIFF CARD TPP     Non-[facility] follow up appointment(s): will follow up as needed with PCP     Non-face-to-face services provided:  Obtained and reviewed discharge summary and/or continuity of care documents     Advance Care Planning:   Does patient have an Advance Directive:  patient declined education. Educated patient about risk for severe COVID-19 due to risk factors according to CDC guidelines. ACM reviewed discharge instructions, medical action plan and red flag symptoms with the patient who verbalized understanding. Discussed COVID vaccination status: Yes. Education provided on COVID-19 vaccination as appropriate. Discussed exposure protocols and quarantine with CDC Guidelines. Patient was given an opportunity to verbalize any questions and concerns and agrees to contact ACM or health care provider for questions related to their healthcare.     Reviewed and educated patient on any new and changed medications related to discharge diagnosis     Was patient discharged with a pulse oximeter? No    ACM provided contact information. No further follow-up call identified based on severity of symptoms and risk factors. Spoke with Marita Nails today who denied any new or worsening symptoms. States he is doing good so far today. States he does have the chills now. Denied any lightheadedness or dizziness today. Reviewed COVID at home care with patient. Encouraged rest and increase of oral fluid intake. Discussed symptoms that would warrant return to the ED. Denies any further questions/concerns today.

## 2021-12-07 NOTE — ED PROVIDER NOTES
[facility]  [address] [facility]   Chief Complaint   Patient presents with    Dehydration     per Dr. Stas Barrera pt sent to ED for deyhdration      HPI   Dre Mccann is a 59 y.o. male who presents with low blood pressure. He had a visiting nurse check his blood pressure and it was somewhat low. He recently had a colectomy and has been staying in a house with some people. He is not having any issues. He was told to come in so he came in. It is noted that he does have Covid. He has received his initial vaccine series but not the booster shot. No complaints of shortness of breath or other issues or fever. He feels well at this time. REVIEW OF SYSTEMS   Cardiac: No chest pain or palpitations  Respiratory: No shortness of breath or new cough  General: No fevers   : No dysuria or hematuria  GI: No vomiting or diarrhea  See HPI for further details. All other systems reviewed and are negative. [place] Alicia      PAST MEDICAL OR SURGICAL HISTORY     Past Medical History:   Diagnosis Date    Abnormal patient-activated cardiac event monitor 02/22/2021    CAM 13 days 8 hrs Baseline sinus ave HR of 63 bpm rang between 47 adn 109 bpm  Rare PAC and PVC with 2 runs of ectopic atrial tachy longest fastest was 7 beats hr 110 bpm No VT runs no AFib    Chronic anticoagulation     xarelto    Colonic diverticular abscess 10/2021    H/O echocardiogram 03/08/2021    EF 45-50% Mild increased LV wallthickness LA is mod dilated 34-39 with LA volume index of 35 ml/m2 Mild mitral and tricuspid regurg Midl diastolic dysfunction seen Aortic root is mod dilated >4.5cm when corrected for body suface area    History of cardioversion     Hyperlipidemia     Hypertension     Non-ischemic cardiomyopathy ([address]     [place]. Stas Barrera [place]    Paroxysmal A-fib ([address]     hx cardioversion    Sleep apnea     Home CPAP    Wellness examination     CARDIOLOGY, LUCINDA JACOBS; last visit 9/29/21    Wellness examination     PCP Tal Quintana, CNP, [place] ([address]) last visit Aug 2021       Past Surgical History:   Procedure Laterality Date    CARDIOVERSION  01/2021    COLECTOMY N/A 11/22/2021    COLECTOMY, COLOSTOMY (N/A Abdomen)    CT ABSCESS DRAIN SUBCUTANEOUS  10/12/2021    CT ABSCESS DRAIN SUBCUTANEOUS 10/12/2021 [facility] CT SCAN    SIGMOID COLECTOMY N/A 11/22/2021    COLECTOMY, COLOSTOMY performed by Sherren Naegeli, DO at [address]     Current Outpatient Rx   Medication Sig Dispense Refill    rivaroxaban (XARELTO) 20 MG TABS tablet Take 1 tablet by mouth daily (with breakfast) 90 tablet 3    metoprolol succinate (TOPROL XL) 25 MG extended release tablet Take 1 tablet by mouth daily 90 tablet 3    losartan (COZAAR) 25 MG tablet Take 1 tablet by mouth daily 90 tablet 3    methocarbamol (ROBAXIN) 750 MG tablet Take 1 tablet by mouth every 8 hours as needed (spasm pain) 30 tablet 0    sildenafil (VIAGRA) 50 MG tablet Take 1 tablet by mouth as needed       hydrOXYzine (ATARAX) 50 MG tablet take 1 tablet by mouth at bedtime if needed for sleep      loratadine (CLARITIN) 10 MG tablet take 1 tablet by mouth daily if needed for allergies      fluticasone (FLONASE) 50 MCG/ACT nasal spray 2 sprays by Nasal route daily as needed for Rhinitis          ALLERGIES     Allergies   Allergen Reactions    Cipro [Ciprofloxacin Hcl] Nausea And Vomiting    Pcn [Penicillins] Nausea And Vomiting       FAMILY OR SOCIAL HISTORY     Family History   Problem Relation Age of Onset    Cancer Mother     Coronary Art Dis Mother     Cancer Father     Coronary Art Dis Father        Social History     Socioeconomic History    Marital status: Single     Spouse name: Not on file    Number of children: Not on file    Years of education: Not on file    Highest education level: Not on file   Occupational History    Not on file   Tobacco Use    Smoking status: Never Smoker    Smokeless tobacco: Never Used   Vaping Use    Vaping Use: Never used   Substance and Sexual Activity    Alcohol use: Not Currently    Drug use: Never    Sexual activity: Not on file   Other Topics Concern    Not on file   Social History Narrative    Not on file     Social Determinants of Health     Financial Resource Strain:     Difficulty of Paying Living Expenses: Not on file   Food Insecurity:     Worried About Running Out of Food in the Last Year: Not on file    Butch of Food in the Last Year: Not on file   Transportation Needs:     Lack of Transportation (Medical): Not on file    Lack of Transportation (Non-Medical):  Not on file   Physical Activity:     Days of Exercise per Week: Not on file    Minutes of Exercise per Session: Not on file   Stress:     Feeling of Stress : Not on file   Social Connections:     Frequency of Communication with Friends and Family: Not on file    Frequency of Social Gatherings with Friends and Family: Not on file    Attends Sikh Services: Not on file    Active Member of [address] [place] [address] or Organizations: Not on file    Attends Club or Organization Meetings: Not on file    Marital Status: Not on file   Intimate Partner Violence:     Fear of Current or Ex-Partner: Not on file    Emotionally Abused: Not on file    Physically Abused: Not on file    Sexually Abused: Not on file   Housing Stability:     Unable to Pay for Housing in the Last Year: Not on file    Number of [place] in the Last Year: Not on file    Unstable Housing in the Last Year: Not on file         PHYSICAL EXAM   VITAL SIGNS: BP 96/69   Pulse 80   Temp 97.9 °F (36.6 °C) (Tympanic)   Resp 16   Ht 5' 11\" (1.803 m)   Wt 179 lb (81.2 kg)   SpO2 100%   BMI 24.97 kg/m²   Constitutional: Well developed, well nourished, no acute distress  Eyes: Pupils equally round and reactive to light, sclera nonicteric  HENT: atraumatic, normal appearing mucous membranes  NECK: Normal range of motion, no JVD  Respiratory: No respiratory distress, normal breath sounds, no wheezing   Cardiovascular: reg rate, normal rhythm, no murmurs   GI: Soft, nondistended, nontender, has colostomy bag  Musculoskeletal: No edema, no acute deformities   Integument: Skin is warm and dry, no obvious rash   Vascular: Radial pulses 2+ equal bilaterally  Neurologic: awake, alert, oriented x3, handgrip is 5/5 bilaterally  Psychiatric: pleasant affect, does not appear anxious         RADIOLOGY/PROCEDURES     No orders to display       ED COURSE & MEDICAL DECISION MAKING   Pertinent Labs & Imaging studies reviewed and interpreted. (See chart for details)  See chart for details of medications given during the ED stay. Vitals:    12/06/21 1637   BP: 96/69   Pulse: 80   Resp: 16   Temp: 97.9 °F (36.6 °C)   TempSrc: Tympanic   SpO2: 100%   Weight: 179 lb (81.2 kg)   Height: 5' 11\" (1.803 m)       Differential diagnosis: Dehydration, potentially/metabolic problem, anemia, infection, hypotension, Stroke, Structural CNS mass lesion, other    MDM: Patient with allegedly slightly low blood pressure. No complaints. He is appearing well. Will check blood work and give him a liter of fluid and likely discharge home. Patient noted to have Covid. Most likely his vital signs may be slightly altered due to this. He was vaccinated but did not receive his booster. It also looks like he may have some slight dehydration. FINAL IMPRESSION   1. COVID-19    2.  Transient hypotension        PLAN  Home with follow up          Carlos Andres MD  12/06/21 7213

## 2021-12-08 ENCOUNTER — HOSPITAL ENCOUNTER (EMERGENCY)
Age: 61
Discharge: HOME | End: 2021-12-08
Payer: MEDICARE

## 2021-12-08 VITALS
SYSTOLIC BLOOD PRESSURE: 123 MMHG | WEIGHT: 179 LBS | DIASTOLIC BLOOD PRESSURE: 79 MMHG | RESPIRATION RATE: 18 BRPM | HEART RATE: 83 BPM | HEIGHT: 71 IN | TEMPERATURE: 96.1 F | BODY MASS INDEX: 25.06 KG/M2 | OXYGEN SATURATION: 100 %

## 2021-12-08 DIAGNOSIS — Z51.89 VISIT FOR WOUND CHECK: Primary | ICD-10-CM

## 2021-12-08 PROCEDURE — 99281 EMR DPT VST MAYX REQ PHY/QHP: CPT

## 2021-12-08 PROCEDURE — 99282 EMERGENCY DEPT VISIT SF MDM: CPT

## 2021-12-08 ASSESSMENT — ENCOUNTER SYMPTOMS
BLOOD IN STOOL: 0
SORE THROAT: 0
SHORTNESS OF BREATH: 0
DIARRHEA: 0
VOMITING: 0
ABDOMINAL PAIN: 0
RHINORRHEA: 0
CONSTIPATION: 0
EYE REDNESS: 0
EYE DISCHARGE: 0
CHEST TIGHTNESS: 0
BACK PAIN: 0
NAUSEA: 0
WHEEZING: 0
COUGH: 0

## 2021-12-08 NOTE — ED PROVIDER NOTES
decreased urine volume, difficulty urinating, dysuria, frequency and hematuria. Musculoskeletal: Negative for arthralgias, back pain and myalgias. Skin: Positive for wound. Negative for pallor and rash. Allergic/Immunologic: Negative for food allergies and immunocompromised state. Neurological: Negative for dizziness, syncope, weakness and light-headedness. Hematological: Negative for adenopathy. Does not bruise/bleed easily. Psychiatric/Behavioral: Negative for behavioral problems and suicidal ideas. The patient is not nervous/anxious. Except as noted above the remainder of the review of systems was reviewed and negative.        PAST MEDICAL HISTORY     Past Medical History:   Diagnosis Date    Abnormal patient-activated cardiac event monitor 02/22/2021    CAM 13 days 8 hrs Baseline sinus ave HR of 63 bpm rang between 47 adn 109 bpm  Rare PAC and PVC with 2 runs of ectopic atrial tachy longest fastest was 7 beats hr 110 bpm No VT runs no AFib    Chronic anticoagulation     xarelto    Colonic diverticular abscess 10/2021    H/O echocardiogram 03/08/2021    EF 45-50% Mild increased LV wallthickness LA is mod dilated 34-39 with LA volume index of 35 ml/m2 Mild mitral and tricuspid regurg Midl diastolic dysfunction seen Aortic root is mod dilated >4.5cm when corrected for body suface area    History of cardioversion     Hyperlipidemia     Hypertension     Non-ischemic cardiomyopathy ([address]     Dr. Tae Ribera cardiology in [place]    Paroxysmal A-fib [facility])     hx cardioversion    Sleep apnea     Home CPAP    Wellness examination     CARDIOLOGY, DR Coy Chawla, [place]; last visit 9/29/21    Wellness examination     PCP Ghazala Kitchen, EMMANUEL, [facility]) last visit Aug 2021         SURGICALHISTORY       Past Surgical History:   Procedure Laterality Date    CARDIOVERSION  01/2021    COLECTOMY N/A 11/22/2021    COLECTOMY, COLOSTOMY (N/A Abdomen)    CT ABSCESS DRAIN SUBCUTANEOUS  10/12/2021    CT ABSCESS DRAIN SUBCUTANEOUS 10/12/2021 [facility] CT SCAN    SIGMOID COLECTOMY N/A 11/22/2021    COLECTOMY, COLOSTOMY performed by Achille Severe, DO at [facility]       Previous Medications    FLUTICASONE (FLONASE) 50 MCG/ACT NASAL SPRAY    2 sprays by Nasal route daily as needed for Rhinitis     HYDROXYZINE (ATARAX) 50 MG TABLET    take 1 tablet by mouth at bedtime if needed for sleep    LORATADINE (CLARITIN) 10 MG TABLET    take 1 tablet by mouth daily if needed for allergies    LOSARTAN (COZAAR) 25 MG TABLET    Take 1 tablet by mouth daily    METHOCARBAMOL (ROBAXIN) 750 MG TABLET    Take 1 tablet by mouth every 8 hours as needed (spasm pain)    METOPROLOL SUCCINATE (TOPROL XL) 25 MG EXTENDED RELEASE TABLET    Take 1 tablet by mouth daily    RIVAROXABAN (XARELTO) 20 MG TABS TABLET    Take 1 tablet by mouth daily (with breakfast)    SILDENAFIL (VIAGRA) 50 MG TABLET    Take 1 tablet by mouth as needed          ALLERGIES   Cipro [ciprofloxacin hcl] and Pcn [penicillins]    FAMILY HISTORY       Family History   Problem Relation Age of Onset    Cancer Mother     Coronary Art Dis Mother     Cancer Father     Coronary Art Dis Father           SOCIAL HISTORY       Social History     Socioeconomic History    Marital status: Single     Spouse name: Not on file    Number of children: Not on file    Years of education: Not on file    Highest education level: Not on file   Occupational History    Not on file   Tobacco Use    Smoking status: Never Smoker    Smokeless tobacco: Never Used   Vaping Use    Vaping Use: Never used   Substance and Sexual Activity    Alcohol use: Not Currently    Drug use: Never    Sexual activity: Not on file   Other Topics Concern    Not on file   Social History Narrative    Not on file     Social Determinants of Health     Financial Resource Strain:     Difficulty of Paying Living Expenses: Not on file   Food Insecurity:     Worried About [facility] in the Last Year: Not on file    Butch of Food in the Last Year: Not on file   Transportation Needs:     Lack of Transportation (Medical): Not on file    Lack of Transportation (Non-Medical): Not on file   Physical Activity:     Days of Exercise per Week: Not on file    Minutes of Exercise per Session: Not on file   Stress:     Feeling of Stress : Not on file   Social Connections:     Frequency of Communication with Friends and Family: Not on file    Frequency of Social Gatherings with Friends and Family: Not on file    Attends Mu-ism Services: Not on file    Active Member of [address] [place] [address] or Organizations: Not on file    Attends Club or Organization Meetings: Not on file    Marital Status: Not on file   Intimate Partner Violence:     Fear of Current or Ex-Partner: Not on file    Emotionally Abused: Not on file    Physically Abused: Not on file    Sexually Abused: Not on file   Housing Stability:     Unable to Pay for Housing in the Last Year: Not on file    Number of [place] in the Last Year: Not on file    Unstable Housing in the Last Year: Not on file       SCREENINGS             PHYSICAL EXAM    (up to 7 for level 4, 8 or more for level 5)     ED Triage Vitals [12/08/21 1112]   BP Temp Temp src Pulse Resp SpO2 Height Weight   123/79 96.1 °F (35.6 °C) -- 83 18 100 % 5' 11\" (1.803 m) 179 lb (81.2 kg)       Physical Exam  Vitals and nursing note reviewed. Constitutional:       General: He is not in acute distress. Appearance: Normal appearance. He is well-developed. He is not ill-appearing, toxic-appearing or diaphoretic. HENT:      Head: Normocephalic and atraumatic. Right Ear: External ear normal.      Left Ear: External ear normal.      Mouth/Throat:      Pharynx: No oropharyngeal exudate. Eyes:      General: No scleral icterus. Right eye: No discharge. Left eye: No discharge.       Conjunctiva/sclera: Conjunctivae normal. Neck:      Thyroid: No thyromegaly. Trachea: No tracheal deviation. Cardiovascular:      Rate and Rhythm: Normal rate and regular rhythm. Pulmonary:      Effort: Pulmonary effort is normal. No respiratory distress. Breath sounds: Normal breath sounds. No stridor. Abdominal:      General: There is no distension. Palpations: Abdomen is soft. Tenderness: There is no abdominal tenderness. There is no guarding or rebound. Comments: Postsurgical changes noted abdomen which is soft and nontender. Ostomy in place draining. Slight irritation around the staples without any evidence of a cellulitis. No purulent discharge. Musculoskeletal:         General: No tenderness or deformity. Normal range of motion. Cervical back: Normal range of motion and neck supple. Lymphadenopathy:      Cervical: No cervical adenopathy. Skin:     General: Skin is warm and dry. Capillary Refill: Capillary refill takes less than 2 seconds. Findings: No erythema or rash. Neurological:      General: No focal deficit present. Mental Status: He is alert and oriented to person, place, and time. Cranial Nerves: No cranial nerve deficit. Motor: No abnormal muscle tone. Deep Tendon Reflexes: Reflexes are normal and symmetric.  Reflexes normal.   Psychiatric:         Behavior: Behavior normal.         DIAGNOSTIC RESULTS     EKG: All EKG's are interpreted by the Emergency Department Physician who either signs orCo-signs this chart in the absence of a cardiologist.      RADIOLOGY:   Non-plainfilm images such as CT, Ultrasound and MRI are read by the radiologist. Plain radiographic images are visualized and preliminarily interpreted by the emergency physician with the below findings:      Interpretationper the Radiologist below, if available at the time of this note:    No orders to display         ED BEDSIDE ULTRASOUND:   Performed by ED Physician - none    LABS:  Labs Reviewed - No data to display    All other labs were within normal range or not returned as of this dictation. EMERGENCY DEPARTMENT COURSE and DIFFERENTIAL DIAGNOSIS/MDM:   Vitals:    Vitals:    12/08/21 1112   BP: 123/79   Pulse: 83   Resp: 18   Temp: 96.1 °F (35.6 °C)   SpO2: 100%   Weight: 179 lb (81.2 kg)   Height: 5' 11\" (1.803 m)         MDM  40-year-old male who presents secondary to wanting his wound recheck. He had a exploratory laparotomy with colon resection. He has some small irritation around the staples but there is no active evidence of cellulitis there is no purulent drainage or discharge. He is afebrile. Said to have staples removed next week. I explained to him that if he has any extending erythema from where it is at this time if he develops a fever or has purulent drainage to return immediately to the ER as he would need antibiotics. He verbalized agreement of this plan. Questions have been answered at length. He will otherwise return here with any worse complaints. Procedures    FINAL IMPRESSION      1.  Visit for wound check        DISPOSITION/PLAN   DISPOSITION Decision To Discharge 12/08/2021 03:44:33 PM      PATIENT REFERRED TO:  [facility] ED  [address] [phone]    If symptoms worsen, As needed    JASPREET Moreno NP  [address]  [phone]    Schedule an appointment as soon as possible for a visit   For wound re-check      DISCHARGE MEDICATIONS:  New Prescriptions    No medications on file              Summation      Patient Course:      ED Medications administered this visit:  Medications - No data to display    New Prescriptions from this visit:    New Prescriptions    No medications on file       Follow-up:  [facility]  [address] [facility]  [phone]    If symptoms worsen, As needed    JASPREET Moreno NP  [address] [place] [id number]  [phone]    Schedule an appointment as soon as possible for a visit   For wound re-check        Final Impression:   1.  Visit for wound check               (Please note that portions of this note were completed with a voice recognition program.  Efforts were made to edit the dictations but occasionally words are mis-transcribed.)           Mariposa Taveras PA-C  12/08/21 0971

## 2021-12-14 DIAGNOSIS — I48.19 PERSISTENT ATRIAL FIBRILLATION (HCC): ICD-10-CM

## 2021-12-14 DIAGNOSIS — R53.83 TIRED: ICD-10-CM

## 2021-12-14 DIAGNOSIS — G47.33 OSA (OBSTRUCTIVE SLEEP APNEA): ICD-10-CM

## 2021-12-14 DIAGNOSIS — I42.8 NICM (NONISCHEMIC CARDIOMYOPATHY) (HCC): ICD-10-CM

## 2021-12-14 DIAGNOSIS — I10 ESSENTIAL HYPERTENSION: ICD-10-CM

## 2021-12-14 DIAGNOSIS — K21.00 GASTROESOPHAGEAL REFLUX DISEASE WITH ESOPHAGITIS, UNSPECIFIED WHETHER HEMORRHAGE: ICD-10-CM

## 2021-12-14 RX ORDER — LOSARTAN POTASSIUM 25 MG/1
TABLET ORAL
Qty: 90 TABLET | Refills: 3 | Status: ON HOLD | OUTPATIENT
Start: 2021-12-14 | End: 2022-07-01 | Stop reason: HOSPADM

## 2022-02-14 RX ORDER — METOPROLOL SUCCINATE 25 MG/1
TABLET, EXTENDED RELEASE ORAL
Qty: 90 TABLET | Refills: 3 | Status: ON HOLD | OUTPATIENT
Start: 2022-02-14 | End: 2022-07-01 | Stop reason: HOSPADM

## 2022-03-23 ENCOUNTER — HOSPITAL ENCOUNTER (OUTPATIENT)
Age: 62
Setting detail: SPECIMEN
Discharge: HOME | End: 2022-03-23

## 2022-03-24 LAB
ABSOLUTE EOS #: 0.17 K/UL (ref 0–0.44)
ABSOLUTE IMMATURE GRANULOCYTE: 0.03 K/UL (ref 0–0.3)
ABSOLUTE LYMPH #: 2.16 K/UL (ref 1.1–3.7)
ABSOLUTE MONO #: 0.76 K/UL (ref 0.1–1.2)
ALBUMIN SERPL-MCNC: 4.4 G/DL (ref 3.5–5.2)
ALBUMIN/GLOBULIN RATIO: 1.6 (ref 1–2.5)
ALP BLD-CCNC: 95 U/L (ref 40–129)
ALT SERPL-CCNC: 29 U/L (ref 5–41)
ANION GAP SERPL CALCULATED.3IONS-SCNC: 12 MMOL/L (ref 9–17)
AST SERPL-CCNC: 30 U/L
BASOPHILS # BLD: 1 % (ref 0–2)
BASOPHILS ABSOLUTE: 0.07 K/UL (ref 0–0.2)
BILIRUB SERPL-MCNC: 0.18 MG/DL (ref 0.3–1.2)
BUN BLDV-MCNC: 22 MG/DL (ref 8–23)
CALCIUM SERPL-MCNC: 9.4 MG/DL (ref 8.6–10.4)
CHLORIDE BLD-SCNC: 102 MMOL/L (ref 98–107)
CO2: 26 MMOL/L (ref 20–31)
CREAT SERPL-MCNC: 1.11 MG/DL (ref 0.7–1.2)
EOSINOPHILS RELATIVE PERCENT: 3 % (ref 1–4)
GFR AFRICAN AMERICAN: >60 ML/MIN
GFR NON-AFRICAN AMERICAN: >60 ML/MIN
GFR SERPL CREATININE-BSD FRML MDRD: ABNORMAL ML/MIN/{1.73_M2}
GLUCOSE BLD-MCNC: 94 MG/DL (ref 70–99)
HCT VFR BLD CALC: 46.7 % (ref 40.7–50.3)
HEMOGLOBIN: 14.9 G/DL (ref 13–17)
IMMATURE GRANULOCYTES: 0 %
LYMPHOCYTES # BLD: 31 % (ref 24–43)
MCH RBC QN AUTO: 29.3 PG (ref 25.2–33.5)
MCHC RBC AUTO-ENTMCNC: 31.9 G/DL (ref 28.4–34.8)
MCV RBC AUTO: 91.9 FL (ref 82.6–102.9)
MONOCYTES # BLD: 11 % (ref 3–12)
NRBC AUTOMATED: 0 PER 100 WBC
PDW BLD-RTO: 12.4 % (ref 11.8–14.4)
PLATELET # BLD: 305 K/UL (ref 138–453)
PMV BLD AUTO: 9.8 FL (ref 8.1–13.5)
POTASSIUM SERPL-SCNC: 4.2 MMOL/L (ref 3.7–5.3)
RBC # BLD: 5.08 M/UL (ref 4.21–5.77)
SEG NEUTROPHILS: 54 % (ref 36–65)
SEGMENTED NEUTROPHILS ABSOLUTE COUNT: 3.68 K/UL (ref 1.5–8.1)
SODIUM BLD-SCNC: 140 MMOL/L (ref 135–144)
TOTAL PROTEIN: 7.1 G/DL (ref 6.4–8.3)
WBC # BLD: 6.9 K/UL (ref 3.5–11.3)

## 2022-03-29 ENCOUNTER — OFFICE VISIT (OUTPATIENT)
Dept: CARDIOLOGY | Age: 62
End: 2022-03-29
Payer: MEDICARE

## 2022-03-29 VITALS
HEART RATE: 67 BPM | SYSTOLIC BLOOD PRESSURE: 119 MMHG | RESPIRATION RATE: 18 BRPM | OXYGEN SATURATION: 100 % | BODY MASS INDEX: 28.84 KG/M2 | DIASTOLIC BLOOD PRESSURE: 71 MMHG | HEIGHT: 71 IN | WEIGHT: 206 LBS

## 2022-03-29 DIAGNOSIS — I48.0 PAF (PAROXYSMAL ATRIAL FIBRILLATION) (HCC): Primary | ICD-10-CM

## 2022-03-29 DIAGNOSIS — I10 ESSENTIAL HYPERTENSION: ICD-10-CM

## 2022-03-29 DIAGNOSIS — Z99.89 OSA ON CPAP: ICD-10-CM

## 2022-03-29 DIAGNOSIS — I42.8 NICM (NONISCHEMIC CARDIOMYOPATHY) (HCC): ICD-10-CM

## 2022-03-29 DIAGNOSIS — G47.33 OSA ON CPAP: ICD-10-CM

## 2022-03-29 PROCEDURE — G8419 CALC BMI OUT NRM PARAM NOF/U: HCPCS | Performed by: INTERNAL MEDICINE

## 2022-03-29 PROCEDURE — G8427 DOCREV CUR MEDS BY ELIG CLIN: HCPCS | Performed by: INTERNAL MEDICINE

## 2022-03-29 PROCEDURE — 99214 OFFICE O/P EST MOD 30 MIN: CPT | Performed by: INTERNAL MEDICINE

## 2022-03-29 PROCEDURE — 1036F TOBACCO NON-USER: CPT | Performed by: INTERNAL MEDICINE

## 2022-03-29 PROCEDURE — 3017F COLORECTAL CA SCREEN DOC REV: CPT | Performed by: INTERNAL MEDICINE

## 2022-03-29 PROCEDURE — G8482 FLU IMMUNIZE ORDER/ADMIN: HCPCS | Performed by: INTERNAL MEDICINE

## 2022-03-29 NOTE — PROGRESS NOTES
Bhakti Whiteside am scribing for and in the presence of Kesha Corona MD, F.A.C.C. Patient: Jaclyn Irizarry  : 1960  Date of Admission: (Not on file)  Today's Date: 3/29/2022    REASON FOR CONSULTATION: Follow-up (HX: PAF, NICM, HTN, MICHELLE. Pt states he is doing ok. Denies: CP, Palpitaiotns, SOB. Mild dizziness. )      HPI: I had the pleasure of seeing Jaclyn Irizarry in consultation today. As you know, Mr. Shravan Galeana is a 59 y.o. male who was admitted on 2020 with new onset atrial fibrillation with RVR leading to cardiac consultation and his most recent work-up. Mr. Shravan Galeana has history of intermittent palpitations and history suggestive of obstructive sleep apnea syndrome. He reported having history of borderline hypertension. He denied any history of diabetes or dyslipidemia. He is lifelong non-smoker. With regard to his family history, he reported that his father and mother had a heart attack in their old age. His echo showed ejection fraction of 40%. No regional wall motion abnormalities. There is some shadowing in the apex but I think this is origin of the papillary muscles. He is anticoagulated anyway. Lexiscan stress test showed low ejection fraction on gated SPECT with ejection fraction being 42% but no perfusion abnormalities favoring nonischemic cardiomyopathy. Stress test done 2020: EF 42%  Largely normal myocardial perfusion imaging with soft tissue artifact, but without significant evidence of myocardial ischemia or infarction. Echo done 2020: EF 40% Mildly increased left ventricular wall thickness with a normal left ventricular cavity size. Moderate global hypokinesis with no regional abnormalities. The left atrium is moderately dilated (34-39) with a left atrial volume index of 34 ml/m2. Mild mitral and tricuspid regurgitation. Diastolic function cannot be properly assessed due to atrial fibrillation.      Sleep study done 2020 shows mild sleep apnea. Pending cpap titration. Cardioversion done on 1/14/2021: Successful but did show short runs of atrial tachycardia, patient started on amiodarone orally on discharge. His digoxin was discontinued. EKG done in office on 1/22/2021: Sinus bradycardia otherwise normal    EKG done on (2/22/2021) in office showed him in normal sinus rhythm with a HR of 61 bpm.    Echo done on 3/8/2021: Global left ventricular systolic function appears mildly reduced with an estimated ejection fraction of 45-50%. Mildly increased left ventricular wall thickness with a normal left ventricular cavity size. The left atrium is moderately dilated (34-39) with a left atrial volume index of 35 ml/m2. Mild mitral and tricuspid regurgitation. Evidence of mild diastolic dysfunction is seen. The aortic root is moderately dilated (>4.5cm) when corrected for body surface area. CAM done on 3/17/2021: Baseline rhythm is sinus with average heart rate of 63 bpm, ranging between 47 and 109 bpm. Rare PACs and PVCs with 2 runs of ectopic atrial tachycardia, the longest and fastest was 7 beats at heart rate of 110 bpm. No ventricular runs. Patient-activated events correlated with sinus rhythm and sinus bradycardia. No atrial fibrillation recorded. EKG done (6/25/2021): Sinus shalonda with a HR of 56 bpm.    Echocardiogram on 6/25/2021: Global left ventricular systolic function appears preserved with an estimated ejection fraction of 55%. Mildly increased left ventricular wall thickness with a normal left ventricular cavity size. The left atrium is moderately dilated (34-39) with a left atrial volume index of 39 ml/m2. Normal mitral valve structure with trivial mitral regurgitation. Evidence of mild diastolic dysfunction is seen. The aortic root is mildly dilated. Compared to the previous study of 3/8/21, the patients EF appars to have improved from 45-50% to 55%.     Mr. Do Garber came to the ER and was hospitalized on 10/11/2021 due to constipation. He then was transferred to [facility]. V's due to complaints of left lower quadrant/suprapubic abdominal pain upon evaluation is found to have sigmoid diverticulitis with abscess. The abscess was 4 x 4 cm and above the dome of the bladder. He underwent aspiration of the abscess with interventional radiology with drainage of 15 to 20 mL of fluid. He was treated with IV Cipro and Flagyl and had steady improvement. He then came back to the ER on 10/26/2021 due to constipation again. He was transferred back to [facility]. 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 He underwent rectosigmoid colectomy with colostomy creation. Cystourethrogram was done on post op day five. He then came to the ER on 12/6/2021 due to dehydration and again on 12/15/2021 due to wanting a wound check from his colectomy/colostomy placement. Mr. Mal Franklin reports doing fairly well today. He has been doing well since his above hospitalizations. He did get COVID after wards due to living with a friend. He does have plans to get his colostomy reversed in June. He does get some mild lightheadedness however no falls or near falls. No syncope or presyncope. No chest pain, pressure or tightness. No issues with shortness of breath at this time. He did loose some weight due to his above hospitalizations however he has since put this weight back on. He also can get a whoosh feeling in his ears and some head fullness and cough. He is going to see an ENT for this. No fever or chills. His stool is loose in his bag however he does take metamucil daily. No blood in his stool or urine. He does not sleep well at night however he thinks it is anxiety related. He is using his CPAP machine nightly. Bleeding Risks: Mr. Mal Franklin denies any current or recent bleeding problems including a history of a GI bleed, ulcers, recent or upcoming surgeries, blood in his stool or black tarry stools or blood in his urine.     Past Medical History:   Diagnosis Date    Abnormal patient-activated cardiac event monitor 02/22/2021    CAM 13 days 8 hrs Baseline sinus ave HR of 63 bpm rang between 47 adn 109 bpm  Rare PAC and PVC with 2 runs of ectopic atrial tachy longest fastest was 7 beats hr 110 bpm No VT runs no AFib    Chronic anticoagulation     xarelto    Colonic diverticular abscess 10/2021    H/O echocardiogram 03/08/2021    EF 45-50% Mild increased LV wallthickness LA is mod dilated 34-39 with LA volume index of 35 ml/m2 Mild mitral and tricuspid regurg Midl diastolic dysfunction seen Aortic root is mod dilated >4.5cm when corrected for body suface area    History of cardioversion     Hyperlipidemia     Hypertension     Non-ischemic cardiomyopathy ([address]     Dr. Gan Chamber cardiology in [place], [place]    Paroxysmal A-fib [facility])     hx cardioversion    Sleep apnea     Home CPAP    Wellness examination     CARDIOLOGY, DR Valarie Chanel, [facility]; last visit 9/29/21    Wellness examination     PCP Surendra Millard, [facility] ([address]) last visit Aug 2021   Persistent atrial fibrillation. Dilated aortic root. Compression fracture of T4 and T5.    CURRENT ALLERGIES: Cipro [ciprofloxacin hcl] and Pcn [penicillins] REVIEW OF SYSTEMS: 14 systems were reviewed. Pertinent positives and negatives as above, all else negative.      Past Surgical History:   Procedure Laterality Date    CARDIOVERSION  01/2021    COLECTOMY N/A 11/22/2021    COLECTOMY, COLOSTOMY (N/A Abdomen)    CT ABSCESS DRAIN SUBCUTANEOUS  10/12/2021    CT ABSCESS DRAIN SUBCUTANEOUS 10/12/2021 [facility] CT SCAN    SIGMOID COLECTOMY N/A 11/22/2021    COLECTOMY, COLOSTOMY performed by Therese Lopes DO at [address] History:  Social History     Tobacco Use    Smoking status: Never Smoker    Smokeless tobacco: Never Used   Vaping Use    Vaping Use: Never used   Substance Use Topics    Alcohol use: Not Currently    Drug use: Never        CURRENT MEDICATIONS:  Outpatient Medications Marked as Taking for the 3/29/22 encounter (Office Visit) with Rocky Grewal MD   Medication Sig Dispense Refill    metoprolol succinate (TOPROL XL) 25 MG extended release tablet take 1 tablet by mouth once daily 90 tablet 3    losartan (COZAAR) 25 MG tablet take 1 tablet by mouth once daily 90 tablet 3    sildenafil (VIAGRA) 50 MG tablet Take 1 tablet by mouth as needed       hydrOXYzine (ATARAX) 50 MG tablet take 1 tablet by mouth at bedtime if needed for sleep      loratadine (CLARITIN) 10 MG tablet take 1 tablet by mouth daily if needed for allergies      fluticasone (FLONASE) 50 MCG/ACT nasal spray 2 sprays by Nasal route daily as needed for Rhinitis       rivaroxaban (XARELTO) 20 MG TABS tablet Take 1 tablet by mouth daily (with breakfast) 90 tablet 3       FAMILY HISTORY: family history includes Cancer in his father and mother; Coronary Art Dis in his father and mother. PHYSICAL EXAM:   Physical Examination:     There were no vitals taken for this visit. There is no height or weight on file to calculate BMI. Constitutional: He appeared oriented to person and place. He appears well-developed and well-nourished. In no acute distress. HEENT: Normocephalic and atraumatic. No JVD present. Carotid bruit is not present. No mass and no thyromegaly present. No lymphadenopathy noted. Cardiovascular: Normal rate, regular rhythm, normal heart sounds. Exam reveals no gallop and no friction rubs. No murmur was heard. Pulmonary/Chest: Effort normal and breath sounds normal. No respiratory distress. He has no wheezes, rhonchi or rales. Abdominal: Soft, non-tender. He exhibits no organomegaly, mass or bruit. Extremities: No edema. No cyanosis or clubbing. 2+ radial and carotid pulses. Distal extremity pulses: 2+ bilaterally. Neurological: Alertness and orientation as per Constitutional exam. No evidence of gross cranial nerve deficit.  Coordination appeared normal. Inibitor/ARB: Continue losartan (Cozaar) 25 mg daily. · Essential Hypertension: Controlled   Beta Blocker: Continue Metoprolol succinate (Toprol XL) 25 mg daily.  ACE Inibitor/ARB: Continue losartan (Cozaar) 25 mg daily.  I told him to keep an eye on this and follow up with you as previously scheduled for continued monitoring and treatment of this problem. I also told him to check his blood pressure at home intermittently and call your office if his blood pressure continues to stay up above the [id number] systolic.  Obstructive Sleep Apnea: Encouraged him use CPAP machine every night. Finally, I recommended that he continue his other medications and follow up with you as previously scheduled. FOLLOW UP:   I told Mr. Dudley García to call my office if he had any problems, but otherwise told him to Return in about 1 year (around 3/29/2023). However, I would be happy to see him sooner should the need arise. Sincerely,  Yanely Pacheco MD, MS, F.A.C.C. [facility], [address] [place] [address]  Phone: [phone], Fax: [phone]    I believe that the risk of significant morbidity and mortality related to the patient's current medical conditions are: Intermediate. Approximately 35 minutes were spent during prep work, discussion and exam of the patient, and follow up documentation and all of their questions were answered. The documentation recorded by the scribe, accurately and completely reflects the services I personally performed and the decisions made by me. Yanely Pacheco MD, F.A.C.C.  March 29, 2022

## 2022-03-29 NOTE — PATIENT INSTRUCTIONS
SURVEY:    You may be receiving a survey from [employer] regarding your visit today. Please complete the survey to enable us to provide the highest quality of care to you and your family. If you cannot score us a very good on any question, please call the office to discuss how we could have made your experience a very good one. Thank you.

## 2022-04-21 ENCOUNTER — HOSPITAL ENCOUNTER (OUTPATIENT)
Dept: NON INVASIVE DIAGNOSTICS | Age: 62
Discharge: HOME | End: 2022-04-21
Payer: MEDICARE

## 2022-04-21 DIAGNOSIS — I48.0 PAF (PAROXYSMAL ATRIAL FIBRILLATION) (HCC): ICD-10-CM

## 2022-04-21 DIAGNOSIS — G47.33 OSA ON CPAP: ICD-10-CM

## 2022-04-21 DIAGNOSIS — I42.8 NICM (NONISCHEMIC CARDIOMYOPATHY) (HCC): ICD-10-CM

## 2022-04-21 DIAGNOSIS — I10 ESSENTIAL HYPERTENSION: ICD-10-CM

## 2022-04-21 DIAGNOSIS — Z99.89 OSA ON CPAP: ICD-10-CM

## 2022-04-21 PROCEDURE — 93243 EXT ECG>48HR<7D SCAN A/R: CPT

## 2022-04-21 PROCEDURE — 93242 EXT ECG>48HR<7D RECORDING: CPT

## 2022-05-03 ENCOUNTER — TELEPHONE (OUTPATIENT)
Dept: CARDIOLOGY | Age: 62
End: 2022-05-03

## 2022-05-03 NOTE — TELEPHONE ENCOUNTER
----- Message from Vesna Kaiser MD sent at 5/2/2022  6:37 PM EDT -----  Heart monitor is okay. Occasional abnormal heartbeats but nothing significant.  Thank you

## 2022-06-13 ENCOUNTER — HOSPITAL ENCOUNTER (OUTPATIENT)
Dept: GENERAL RADIOLOGY | Age: 62
Discharge: HOME | End: 2022-06-15
Payer: MEDICARE

## 2022-06-13 ENCOUNTER — OFFICE VISIT (OUTPATIENT)
Dept: CARDIOLOGY | Age: 62
End: 2022-06-13
Payer: MEDICARE

## 2022-06-13 ENCOUNTER — HOSPITAL ENCOUNTER (OUTPATIENT)
Age: 62
Discharge: HOME | End: 2022-06-15
Payer: MEDICARE

## 2022-06-13 ENCOUNTER — HOSPITAL ENCOUNTER (OUTPATIENT)
Age: 62
Discharge: HOME | End: 2022-06-13
Payer: MEDICARE

## 2022-06-13 VITALS
DIASTOLIC BLOOD PRESSURE: 82 MMHG | HEIGHT: 71 IN | RESPIRATION RATE: 18 BRPM | HEART RATE: 66 BPM | SYSTOLIC BLOOD PRESSURE: 137 MMHG | BODY MASS INDEX: 29.82 KG/M2 | WEIGHT: 213 LBS | OXYGEN SATURATION: 98 %

## 2022-06-13 DIAGNOSIS — Z98.890 S/P ABLATION OF ATRIAL FIBRILLATION: ICD-10-CM

## 2022-06-13 DIAGNOSIS — I11.9 HYPERTENSIVE HEART DISEASE WITHOUT HEART FAILURE: ICD-10-CM

## 2022-06-13 DIAGNOSIS — Z86.79 S/P ABLATION OF ATRIAL FIBRILLATION: ICD-10-CM

## 2022-06-13 DIAGNOSIS — G47.33 OSA (OBSTRUCTIVE SLEEP APNEA): ICD-10-CM

## 2022-06-13 DIAGNOSIS — Z01.810 PREOP CARDIOVASCULAR EXAM: Primary | ICD-10-CM

## 2022-06-13 DIAGNOSIS — I10 PRIMARY HYPERTENSION: ICD-10-CM

## 2022-06-13 DIAGNOSIS — I42.8 NICM (NONISCHEMIC CARDIOMYOPATHY) (HCC): ICD-10-CM

## 2022-06-13 DIAGNOSIS — I48.0 PAF (PAROXYSMAL ATRIAL FIBRILLATION) (HCC): ICD-10-CM

## 2022-06-13 DIAGNOSIS — Z01.810 PREOP CARDIOVASCULAR EXAM: ICD-10-CM

## 2022-06-13 LAB
ANION GAP SERPL CALCULATED.3IONS-SCNC: 6 MMOL/L (ref 9–17)
BUN BLDV-MCNC: 16 MG/DL (ref 8–23)
BUN/CREAT BLD: 17 (ref 9–20)
CALCIUM SERPL-MCNC: 9.6 MG/DL (ref 8.6–10.4)
CHLORIDE BLD-SCNC: 103 MMOL/L (ref 98–107)
CO2: 27 MMOL/L (ref 20–31)
CREAT SERPL-MCNC: 0.96 MG/DL (ref 0.7–1.2)
GFR AFRICAN AMERICAN: >60 ML/MIN
GFR NON-AFRICAN AMERICAN: >60 ML/MIN
GFR SERPL CREATININE-BSD FRML MDRD: ABNORMAL ML/MIN/{1.73_M2}
GFR SERPL CREATININE-BSD FRML MDRD: ABNORMAL ML/MIN/{1.73_M2}
GLUCOSE BLD-MCNC: 98 MG/DL (ref 70–99)
HCT VFR BLD CALC: 43.8 % (ref 40.7–50.3)
HEMOGLOBIN: 14.3 G/DL (ref 13–17)
MCH RBC QN AUTO: 30 PG (ref 25.2–33.5)
MCHC RBC AUTO-ENTMCNC: 32.6 G/DL (ref 28.4–34.8)
MCV RBC AUTO: 91.8 FL (ref 82.6–102.9)
NRBC AUTOMATED: 0 PER 100 WBC
PDW BLD-RTO: 12.8 % (ref 11.8–14.4)
PLATELET # BLD: 241 K/UL (ref 138–453)
PMV BLD AUTO: 9.1 FL (ref 8.1–13.5)
POTASSIUM SERPL-SCNC: 4.1 MMOL/L (ref 3.7–5.3)
RBC # BLD: 4.77 M/UL (ref 4.21–5.77)
SODIUM BLD-SCNC: 136 MMOL/L (ref 135–144)
WBC # BLD: 7.3 K/UL (ref 3.5–11.3)

## 2022-06-13 PROCEDURE — 36415 COLL VENOUS BLD VENIPUNCTURE: CPT

## 2022-06-13 PROCEDURE — 99214 OFFICE O/P EST MOD 30 MIN: CPT | Performed by: INTERNAL MEDICINE

## 2022-06-13 PROCEDURE — 3017F COLORECTAL CA SCREEN DOC REV: CPT | Performed by: INTERNAL MEDICINE

## 2022-06-13 PROCEDURE — G8427 DOCREV CUR MEDS BY ELIG CLIN: HCPCS | Performed by: INTERNAL MEDICINE

## 2022-06-13 PROCEDURE — 80048 BASIC METABOLIC PNL TOTAL CA: CPT

## 2022-06-13 PROCEDURE — 71046 X-RAY EXAM CHEST 2 VIEWS: CPT

## 2022-06-13 PROCEDURE — G8419 CALC BMI OUT NRM PARAM NOF/U: HCPCS | Performed by: INTERNAL MEDICINE

## 2022-06-13 PROCEDURE — 85027 COMPLETE CBC AUTOMATED: CPT

## 2022-06-13 PROCEDURE — 93000 ELECTROCARDIOGRAM COMPLETE: CPT | Performed by: INTERNAL MEDICINE

## 2022-06-13 PROCEDURE — 1036F TOBACCO NON-USER: CPT | Performed by: INTERNAL MEDICINE

## 2022-06-13 NOTE — PROGRESS NOTES
Gini Busby am scribing for and in the presence of Olayinka Freitas MD, F.A.C.C. Patient: Kira Shell  : 1960  Date of Admission: (Not on file)  Today's Date: 2022    REASON FOR CONSULTATION: Cardiac Clearance (HX: PAF, NICM, HTN, MICHELLE on CPAP. pt is here today for cardiac clearance for a colonoscopy and reversal of colostomy at [facility]. V's Dr. Allen Liu, not scheduled yet. pt states he is doing well, occasional light headed/dizziness, denies CP, SOB, palpitations.)      HPI: I had the pleasure of seeing Kira Shell in consultation today. As you know, Mr. Cassius Hall is a 59 y.o. male who was admitted on 2020 with new onset atrial fibrillation with RVR leading to cardiac consultation and his most recent work-up. Mr. Cassius Hall has history of intermittent palpitations and history suggestive of obstructive sleep apnea syndrome. He reported having history of borderline hypertension. He denied any history of diabetes or dyslipidemia. He is lifelong non-smoker. With regard to his family history, he reported that his father and mother had a heart attack in their old age. His echo showed ejection fraction of 40%. No regional wall motion abnormalities. There is some shadowing in the apex but I think this is origin of the papillary muscles. He is anticoagulated anyway. Lexiscan stress test showed low ejection fraction on gated SPECT with ejection fraction being 42% but no perfusion abnormalities favoring nonischemic cardiomyopathy. Stress test done 2020: EF 42%  Largely normal myocardial perfusion imaging with soft tissue artifact, but without significant evidence of myocardial ischemia or infarction. Echo done 2020: EF 40% Mildly increased left ventricular wall thickness with a normal left ventricular cavity size. Moderate global hypokinesis with no regional abnormalities. The left atrium is moderately dilated (34-39) with a left atrial volume index of 34 ml/m2. Mild mitral and tricuspid regurgitation. Diastolic function cannot be properly assessed due to atrial fibrillation. Sleep study done 12/21/2020 shows mild sleep apnea. Pending cpap titration. Cardioversion done on 1/14/2021: Successful but did show short runs of atrial tachycardia, patient started on amiodarone orally on discharge. His digoxin was discontinued. EKG done in office on 1/22/2021: Sinus bradycardia otherwise normal    EKG done on (2/22/2021) in office showed him in normal sinus rhythm with a HR of 61 bpm.    Echo done on 3/8/2021: Global left ventricular systolic function appears mildly reduced with an estimated ejection fraction of 45-50%. Mildly increased left ventricular wall thickness with a normal left ventricular cavity size. The left atrium is moderately dilated (34-39) with a left atrial volume index of 35 ml/m2. Mild mitral and tricuspid regurgitation. Evidence of mild diastolic dysfunction is seen. The aortic root is moderately dilated (>4.5cm) when corrected for body surface area. CAM done on 3/17/2021: Baseline rhythm is sinus with average heart rate of 63 bpm, ranging between 47 and 109 bpm. Rare PACs and PVCs with 2 runs of ectopic atrial tachycardia, the longest and fastest was 7 beats at heart rate of 110 bpm. No ventricular runs. Patient-activated events correlated with sinus rhythm and sinus bradycardia. No atrial fibrillation recorded. EKG done (6/25/2021): Sinus shalonda with a HR of 56 bpm.    Echocardiogram on 6/25/2021: Global left ventricular systolic function appears preserved with an estimated ejection fraction of 55%. Mildly increased left ventricular wall thickness with a normal left ventricular cavity size. The left atrium is moderately dilated (34-39) with a left atrial volume index of 39 ml/m2. Normal mitral valve structure with trivial mitral regurgitation. Evidence of mild diastolic dysfunction is seen. The aortic root is mildly dilated.  Compared to the previous study of 3/8/21, the patients EF appars to have improved from 45-50% to 55%. Mr. Adelia Saleh came to the ER and was hospitalized on 10/11/2021 due to constipation. He then was transferred to [facility]. V's due to complaints of left lower quadrant/suprapubic abdominal pain upon evaluation is found to have sigmoid diverticulitis with abscess. The abscess was 4 x 4 cm and above the dome of the bladder. He underwent aspiration of the abscess with interventional radiology with drainage of 15 to 20 mL of fluid. He was treated with IV Cipro and Flagyl and had steady improvement. He then came back to the ER on 10/26/2021 due to constipation again. He was transferred back to [facility]. 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 He underwent rectosigmoid colectomy with colostomy creation. Cystourethrogram was done on post op day five. He then came to the ER on 12/6/2021 due to dehydration and again on 12/15/2021 due to wanting a wound check from his colectomy/colostomy placement. CAM patch done on 4/21/2022: 7 days recorded. Baseline rhythm is sinus with average heart rate of 69 bpm, ranging between 53 and 111 bpm. Atrial tachycardia (AT) 20 episodes; longest 8 beats at average 139 bpm up to 142 bpm; fastest 7 beats at average 157 bpm up to 180 bpm. Premature atrial contractions 0.09%. Premature ventricular contractions 1.43%. No patient-activated events recorded. Overall no dangerous heart rate or rhythm abnormalities detected. EKG done today in office on 6/13/2022: Maintaining normal sinus rhythm     Mr. Adelia Saleh is here today for cardiac clearance for a colonoscopy and reversal of colostomy at [facility]. V's with Dr. Oidlon Trevino - it is not yet scheduled. He reports most of the stools are lose. He reports he has been doing well. He walked 2 miles on Saturday at the [facility] and did okay with this. He does cough phlegm up when using his inhaler.  He does have some shortness of breath with over exertion but this is not new for him. He occasionally has trouble with his CPAP. He does have occasional lightheaded/dizziness but this is not worsening at this time. He denies having any chest pain, pressure or tightness. He denies feeling any palpitations. No fever or chills. No abdominal pain, nausea or vomiting. No bleeding problems, issues with medications or any other concerns at this time. Bleeding Risks: Mr. Colt Escalera denies any current or recent bleeding problems including a history of a GI bleed, ulcers, recent or upcoming surgeries, blood in his stool or black tarry stools or blood in his urine. Past Medical History:   Diagnosis Date    Abnormal patient-activated cardiac event monitor 02/22/2021    CAM 13 days 8 hrs Baseline sinus ave HR of 63 bpm rang between 47 adn 109 bpm  Rare PAC and PVC with 2 runs of ectopic atrial tachy longest fastest was 7 beats hr 110 bpm No VT runs no AFib    Chronic anticoagulation     xarelto    Colonic diverticular abscess 10/2021    H/O echocardiogram 03/08/2021    EF 45-50% Mild increased LV wallthickness LA is mod dilated 34-39 with LA volume index of 35 ml/m2 Mild mitral and tricuspid regurg Midl diastolic dysfunction seen Aortic root is mod dilated >4.5cm when corrected for body suface area    History of cardioversion     Hyperlipidemia     Hypertension     Non-ischemic cardiomyopathy ([facility] [address]     Dr. Chan Gibson cardiology in [place], [place]    Paroxysmal A-fib [facility])     hx cardioversion    Sleep apnea     Home CPAP    Wellness examination     CARDIOLOGY, DR Lars Turner, [place]; last visit 9/29/21    Wellness examination     PCP Jami Hernandez, [place], [place] ([address]) last visit Aug 2021   Persistent atrial fibrillation. Dilated aortic root. Compression fracture of T4 and T5.    CURRENT ALLERGIES: Cipro [ciprofloxacin hcl] and Pcn [penicillins] REVIEW OF SYSTEMS: 14 systems were reviewed.  Pertinent positives and negatives as above, all else negative. Past Surgical History:   Procedure Laterality Date    CARDIOVERSION  01/2021    COLECTOMY N/A 11/22/2021    COLECTOMY, COLOSTOMY (N/A Abdomen)    CT ABSCESS DRAIN SUBCUTANEOUS  10/12/2021    CT ABSCESS DRAIN SUBCUTANEOUS 10/12/2021 [facility] CT SCAN    SIGMOID COLECTOMY N/A 11/22/2021    COLECTOMY, COLOSTOMY performed by Bruce Dubois DO at [facility] History:  Social History     Tobacco Use    Smoking status: Never Smoker    Smokeless tobacco: Never Used   Vaping Use    Vaping Use: Never used   Substance Use Topics    Alcohol use: Not Currently    Drug use: Never        CURRENT MEDICATIONS:  Outpatient Medications Marked as Taking for the 6/13/22 encounter (Office Visit) with Kai Aguilar MD   Medication Sig Dispense Refill    Multiple Vitamin (MULTIVITAMIN ADULT PO) Take by mouth      metoprolol succinate (TOPROL XL) 25 MG extended release tablet take 1 tablet by mouth once daily 90 tablet 3    losartan (COZAAR) 25 MG tablet take 1 tablet by mouth once daily 90 tablet 3    sildenafil (VIAGRA) 50 MG tablet Take 1 tablet by mouth as needed       fluticasone (FLONASE) 50 MCG/ACT nasal spray 2 sprays by Nasal route daily as needed for Rhinitis One time in morning and once in evening      rivaroxaban (XARELTO) 20 MG TABS tablet Take 1 tablet by mouth daily (with breakfast) 90 tablet 3       FAMILY HISTORY: family history includes Cancer in his father and mother; Coronary Art Dis in his father and mother. PHYSICAL EXAM:   Physical Examination:     /82 (Site: Left Upper Arm, Position: Sitting, Cuff Size: Medium Adult)   Pulse 66   Resp 18   Ht 5' 11\" (1.803 m)   Wt 213 lb (96.6 kg)   SpO2 98%   BMI 29.71 kg/m²  Body mass index is 29.71 kg/m². Constitutional: He appeared oriented to person and place. He appears well-developed and well-nourished. In no acute distress. HEENT: Normocephalic and atraumatic. No JVD present. Carotid bruit is not present.  No mass and no thyromegaly present. No lymphadenopathy noted. Cardiovascular: Normal rate, regular rhythm, normal heart sounds. Exam reveals no gallop and no friction rubs. No murmur was heard. Pulmonary/Chest: Effort normal and breath sounds normal. No respiratory distress. He has no wheezes, rhonchi or rales. Abdominal: Soft, non-tender. He exhibits no organomegaly, mass or bruit. Extremities: None. No cyanosis or clubbing. 2+ radial and carotid pulses. Distal extremity pulses: 2+ bilaterally. Neurological: Alertness and orientation as per Constitutional exam. No evidence of gross cranial nerve deficit. Coordination appeared normal.   Skin: Skin is warm and dry. There is no rash or diaphoresis. Psychiatric: He has a normal mood and affect. His speech is normal and behavior is normal.        MOST RECENT LABS ON RECORD:   Lab Results   Component Value Date    WBC 6.9 03/23/2022    HGB 14.9 03/23/2022    HCT 46.7 03/23/2022     03/23/2022    HDL 45 05/06/2021    ALT 29 03/23/2022    AST 30 03/23/2022     03/23/2022    K 4.2 03/23/2022     03/23/2022    CREATININE 1.11 03/23/2022    BUN 22 03/23/2022    CO2 26 03/23/2022    TSH 1.81 05/06/2021    PSA 2.36 05/06/2021    INR 1.2 10/12/2021        Diagnosis Orders   1. Preop cardiovascular exam     2. PAF (paroxysmal atrial fibrillation) ([facility] [address]     3. S/P ablation of atrial fibrillation     4. NICM (nonischemic cardiomyopathy) ([address]     5. Hypertensive heart disease without heart failure     6. Primary hypertension     7. MICHELLE (obstructive sleep apnea)       PLAN:    · Pre-Op Clearance: Colonoscopy and Reversal of Colostomy with Dr. Juan Jose Dinh at [facility]. V's - not yet scheduled. Is cleared for surgery   · Currently stable from cardiovascular standpoint. · Denies any chest pain or worsening shortness of breath.   · No evidence of acute coronary syndrome, heart failure decompensation, significant arrhythmia or significant valvular abnormalities on clinical examination. · He is complaining of shortness of breath on exertion that stayed the same since last visit. He has history of sleep apnea syndrome and is not using the CPAP as much as he should be. I told him we must get an echo to rule out significant pulm hypertension that might be causing his shortness of breath. .  We also need to assess his ejection fraction prior to the upcoming surgery. · Pre-Operative Risk assessment using 2014 ACC/AHA guidelines   · Emergent procedure No  · Active Cardiac Condition No (decompensated HF, Arrhythmia, MI <3 weeks, severe valve disease)  · Risk Level of Procedure Intermediate Risk (intraperitoneal, intrathoracic, HENT, orthopedic, or carotid endarterectomy, etc.)  · Revised Cardiac Risk Index Risk factors: None  · Measurement of Exercise Tolerance before Surgery >4 Yes  · According to the 2014 ACC/AHA pre-operative risk assessment guidelines Vijaya Leon is at low risk for major cardiac complications during a intermediate risk procedure and may continue as planned. Specific medication recommendations are listed below. Medications recommended to continue should be taken with a sip of water even when NPO.  Medical management to reduce perioperative risk:   Anticoagulant Agent: I would suggest holding his Rivaroxaban (Xarelto) 2-3 days prior to the procedure.  Anticoagulation Bridging: Not applicable   Additional Recommendations: I would also suggest that he continue his beta blocker throughout the perioperative period.  Additional Testing List: I took the liberty of ordering a BMP for today to assess their potassium and renal function. I told them that they could get their lab work performed at the location of their choosing, unfortunately, if the lab work was not performed [facility] facility I could not guarantee my ability to follow up with them on their results. and  I took the liberty of ordering a CBC.  I told them that they could get their lab work performed at the location of their choosing, unfortunately, if the lab work was not performed at a [facility]) facility I could not guarantee my ability to follow up with them on their results. and I ordered a echocardiogram to better assess for the etiology of this problem and to help guide future management. I also ordered a chest XR to be completed as well. Paroxysmal Atrial Fibrillation: Rhythm Control S/P Successful Cardioversion on 1/14/2021. Currently maintaining sinus rhythm on physical examination    Beta Blocker: Continue Metoprolol succinate (Toprol XL) 25 mg daily. Stroke Risk: His CHADS2-VASc score is 2/9 (2.2% stroke risk)   [id number] Score for Atrial Fibrillation Stroke Risk   Risk   Factors  Component Value   C  Yes 1   H HTN Yes 1   A2 Age >= 76 No,  (62 y.o.) 0   D DM No 0   S2 Prior Stroke/TIA No 0   V Vascular Disease No 0   A Age 74-69 No,  (62 y.o.) 0   Sc Sex male 0    [id number]  Score  2   Score last updated 7/55/70 5:22 AM EDT  Click here for a link to the UpToDate guideline \"Atrial Fibrillation: Anticoagulation therapy to prevent embolization  Disclaimer: Risk Score calculation is dependent on accuracy of patient problem list and past encounter diagnosis. CHADS2-VASc score: 2/9 (2.2% stroke risk)   Anticoagulation: Continue Riveroxaban (Xarelto): 20 mg once daily with largest meal (typically dinner). · Nonischemic Cardiomyopathy/ Hypertensive Heart Disease Without Heart Failure: Patient fraction improved.  Beta Blocker: Continue Metoprolol succinate (Toprol XL) 25 mg daily.  ACE Inibitor/ARB: Continue losartan (Cozaar) 25 mg daily. · Essential Hypertension: Controlled   Beta Blocker: Continue Metoprolol succinate (Toprol XL) 25 mg daily.  ACE Inibitor/ARB: Continue losartan (Cozaar) 25 mg daily.  I told him to keep an eye on this and follow up with you as previously scheduled for continued monitoring and treatment of this problem.  I also told him to check his blood pressure at home intermittently and call your office if his blood pressure continues to stay up above the [id number] systolic.  Obstructive Sleep Apnea:   o Using CPAP machine nightly       Finally, I recommended that he continue his other medications and follow up with you as previously scheduled. FOLLOW UP:   I told Mr. Adelia Saleh to call my office if he had any problems, but otherwise told him to Return in about 6 months (around 12/13/2022) for Follow up. However, I would be happy to see him sooner should the need arise. Sincerely,  Justina ePttit MD, [facility] Cardiology Specialist    [address] [place] [address] [address], [address] [facility]  Phone: [phone], Fax: [phone]     I believe that the risk of significant morbidity and mortality related to the patient's current medical conditions are: Intermediate. Approximately 35 minutes were spent during prep work, discussion and exam of the patient, and follow up documentation and all of their questions were answered. The documentation recorded by the scribe, accurately and completely reflects the services I personally performed and the decisions made by me. Justina Pettit MD, F.A.C.C.  June 13, 2022

## 2022-06-13 NOTE — PATIENT INSTRUCTIONS
SURVEY:    You may be receiving a survey from [employer] regarding your visit today. Please complete the survey to enable us to provide the highest quality of care to you and your family. If you cannot score us a very good on any question, please call the office to discuss how we could have made your experience a very good one. Thank you.

## 2022-06-14 ENCOUNTER — TELEPHONE (OUTPATIENT)
Dept: CARDIOLOGY | Age: 62
End: 2022-06-14

## 2022-06-14 NOTE — TELEPHONE ENCOUNTER
----- Message from Giselle Garrett MD sent at 6/13/2022  9:35 PM EDT -----  Blood work and chest x-ray are good.   Thank you

## 2022-06-20 RX ORDER — SODIUM CHLORIDE, SODIUM LACTATE, POTASSIUM CHLORIDE, CALCIUM CHLORIDE 600; 310; 30; 20 MG/100ML; MG/100ML; MG/100ML; MG/100ML
1000 INJECTION, SOLUTION INTRAVENOUS CONTINUOUS
Status: CANCELLED | OUTPATIENT
Start: 2022-06-20

## 2022-06-21 ENCOUNTER — HOSPITAL ENCOUNTER (OUTPATIENT)
Dept: PREADMISSION TESTING | Age: 62
Discharge: HOME | End: 2022-06-25

## 2022-06-21 VITALS
TEMPERATURE: 98.1 F | OXYGEN SATURATION: 98 % | HEART RATE: 77 BPM | RESPIRATION RATE: 18 BRPM | BODY MASS INDEX: 29.68 KG/M2 | HEIGHT: 71 IN | SYSTOLIC BLOOD PRESSURE: 127 MMHG | DIASTOLIC BLOOD PRESSURE: 85 MMHG | WEIGHT: 212 LBS

## 2022-06-21 RX ORDER — CLINDAMYCIN PHOSPHATE 600 MG/50ML
600 INJECTION INTRAVENOUS ONCE
Status: CANCELLED | OUTPATIENT
Start: 2022-06-21 | End: 2022-06-21

## 2022-06-27 ENCOUNTER — ANESTHESIA EVENT (OUTPATIENT)
Dept: OPERATING ROOM | Age: 62
DRG: 224 | End: 2022-06-27
Payer: MEDICARE

## 2022-06-28 ENCOUNTER — ANESTHESIA (OUTPATIENT)
Dept: OPERATING ROOM | Age: 62
DRG: 224 | End: 2022-06-28
Payer: MEDICARE

## 2022-06-28 ENCOUNTER — HOSPITAL ENCOUNTER (INPATIENT)
Age: 62
LOS: 5 days | Discharge: HOME | DRG: 224 | End: 2022-07-03
Attending: SURGERY | Admitting: SURGERY
Payer: MEDICARE

## 2022-06-28 DIAGNOSIS — G89.18 POST-OP PAIN: Primary | ICD-10-CM

## 2022-06-28 PROBLEM — Z93.3 COLOSTOMY IN PLACE (HCC): Status: ACTIVE | Noted: 2022-06-28

## 2022-06-28 PROCEDURE — 6360000002 HC RX W HCPCS: Performed by: NURSE ANESTHETIST, CERTIFIED REGISTERED

## 2022-06-28 PROCEDURE — 2709999900 HC NON-CHARGEABLE SUPPLY: Performed by: SURGERY

## 2022-06-28 PROCEDURE — 2580000003 HC RX 258: Performed by: SURGERY

## 2022-06-28 PROCEDURE — 0DNU0ZZ RELEASE OMENTUM, OPEN APPROACH: ICD-10-PCS | Performed by: SURGERY

## 2022-06-28 PROCEDURE — 2580000003 HC RX 258: Performed by: ANESTHESIOLOGY

## 2022-06-28 PROCEDURE — 3700000000 HC ANESTHESIA ATTENDED CARE: Performed by: SURGERY

## 2022-06-28 PROCEDURE — 6370000000 HC RX 637 (ALT 250 FOR IP): Performed by: EMERGENCY MEDICINE

## 2022-06-28 PROCEDURE — 3700000001 HC ADD 15 MINUTES (ANESTHESIA): Performed by: SURGERY

## 2022-06-28 PROCEDURE — 6360000002 HC RX W HCPCS

## 2022-06-28 PROCEDURE — 2720000010 HC SURG SUPPLY STERILE: Performed by: SURGERY

## 2022-06-28 PROCEDURE — 2580000003 HC RX 258: Performed by: STUDENT IN AN ORGANIZED HEALTH CARE EDUCATION/TRAINING PROGRAM

## 2022-06-28 PROCEDURE — 0WJF0ZZ INSPECTION OF ABDOMINAL WALL, OPEN APPROACH: ICD-10-PCS | Performed by: SURGERY

## 2022-06-28 PROCEDURE — 6360000002 HC RX W HCPCS: Performed by: STUDENT IN AN ORGANIZED HEALTH CARE EDUCATION/TRAINING PROGRAM

## 2022-06-28 PROCEDURE — C1729 CATH, DRAINAGE: HCPCS | Performed by: SURGERY

## 2022-06-28 PROCEDURE — 7100000001 HC PACU RECOVERY - ADDTL 15 MIN: Performed by: SURGERY

## 2022-06-28 PROCEDURE — 2500000003 HC RX 250 WO HCPCS: Performed by: SURGERY

## 2022-06-28 PROCEDURE — 6360000002 HC RX W HCPCS: Performed by: ANESTHESIOLOGY

## 2022-06-28 PROCEDURE — 3600000014 HC SURGERY LEVEL 4 ADDTL 15MIN: Performed by: SURGERY

## 2022-06-28 PROCEDURE — 6370000000 HC RX 637 (ALT 250 FOR IP): Performed by: STUDENT IN AN ORGANIZED HEALTH CARE EDUCATION/TRAINING PROGRAM

## 2022-06-28 PROCEDURE — 7100000000 HC PACU RECOVERY - FIRST 15 MIN: Performed by: SURGERY

## 2022-06-28 PROCEDURE — 2500000003 HC RX 250 WO HCPCS: Performed by: STUDENT IN AN ORGANIZED HEALTH CARE EDUCATION/TRAINING PROGRAM

## 2022-06-28 PROCEDURE — 3600000004 HC SURGERY LEVEL 4 BASE: Performed by: SURGERY

## 2022-06-28 PROCEDURE — 2060000000 HC ICU INTERMEDIATE R&B

## 2022-06-28 PROCEDURE — 2500000003 HC RX 250 WO HCPCS: Performed by: NURSE ANESTHETIST, CERTIFIED REGISTERED

## 2022-06-28 RX ORDER — SODIUM CHLORIDE 9 MG/ML
INJECTION, SOLUTION INTRAVENOUS PRN
Status: DISCONTINUED | OUTPATIENT
Start: 2022-06-28 | End: 2022-07-03 | Stop reason: HOSPADM

## 2022-06-28 RX ORDER — METOCLOPRAMIDE HYDROCHLORIDE 5 MG/ML
10 INJECTION INTRAMUSCULAR; INTRAVENOUS
Status: DISCONTINUED | OUTPATIENT
Start: 2022-06-28 | End: 2022-06-28

## 2022-06-28 RX ORDER — CALCIUM CARBONATE 200(500)MG
500 TABLET,CHEWABLE ORAL 3 TIMES DAILY PRN
Status: DISCONTINUED | OUTPATIENT
Start: 2022-06-28 | End: 2022-07-03 | Stop reason: HOSPADM

## 2022-06-28 RX ORDER — ONDANSETRON 2 MG/ML
INJECTION INTRAMUSCULAR; INTRAVENOUS PRN
Status: DISCONTINUED | OUTPATIENT
Start: 2022-06-28 | End: 2022-06-28 | Stop reason: SDUPTHER

## 2022-06-28 RX ORDER — OXYCODONE HYDROCHLORIDE 5 MG/1
5 TABLET ORAL EVERY 4 HOURS PRN
Status: DISCONTINUED | OUTPATIENT
Start: 2022-06-28 | End: 2022-06-29

## 2022-06-28 RX ORDER — ACETAMINOPHEN 500 MG
1000 TABLET ORAL EVERY 8 HOURS SCHEDULED
Status: DISCONTINUED | OUTPATIENT
Start: 2022-06-28 | End: 2022-07-03 | Stop reason: HOSPADM

## 2022-06-28 RX ORDER — DROPERIDOL 2.5 MG/ML
0.62 INJECTION, SOLUTION INTRAMUSCULAR; INTRAVENOUS
Status: DISCONTINUED | OUTPATIENT
Start: 2022-06-28 | End: 2022-06-28

## 2022-06-28 RX ORDER — ROCURONIUM BROMIDE 10 MG/ML
INJECTION, SOLUTION INTRAVENOUS PRN
Status: DISCONTINUED | OUTPATIENT
Start: 2022-06-28 | End: 2022-06-28 | Stop reason: SDUPTHER

## 2022-06-28 RX ORDER — CHOLECALCIFEROL (VITAMIN D3) 125 MCG
5 CAPSULE ORAL NIGHTLY
Status: DISCONTINUED | OUTPATIENT
Start: 2022-06-28 | End: 2022-07-03 | Stop reason: HOSPADM

## 2022-06-28 RX ORDER — SODIUM CHLORIDE 9 MG/ML
25 INJECTION, SOLUTION INTRAVENOUS PRN
Status: DISCONTINUED | OUTPATIENT
Start: 2022-06-28 | End: 2022-06-28

## 2022-06-28 RX ORDER — FENTANYL CITRATE 50 UG/ML
50 INJECTION, SOLUTION INTRAMUSCULAR; INTRAVENOUS
Status: DISCONTINUED | OUTPATIENT
Start: 2022-06-28 | End: 2022-06-29

## 2022-06-28 RX ORDER — FENTANYL CITRATE 50 UG/ML
INJECTION, SOLUTION INTRAMUSCULAR; INTRAVENOUS PRN
Status: DISCONTINUED | OUTPATIENT
Start: 2022-06-28 | End: 2022-06-28 | Stop reason: SDUPTHER

## 2022-06-28 RX ORDER — HYDRALAZINE HYDROCHLORIDE 20 MG/ML
10 INJECTION INTRAMUSCULAR; INTRAVENOUS
Status: DISCONTINUED | OUTPATIENT
Start: 2022-06-28 | End: 2022-06-28

## 2022-06-28 RX ORDER — DIPHENHYDRAMINE HYDROCHLORIDE 50 MG/ML
12.5 INJECTION INTRAMUSCULAR; INTRAVENOUS
Status: DISCONTINUED | OUTPATIENT
Start: 2022-06-28 | End: 2022-06-28

## 2022-06-28 RX ORDER — ONDANSETRON 2 MG/ML
4 INJECTION INTRAMUSCULAR; INTRAVENOUS EVERY 6 HOURS PRN
Status: DISCONTINUED | OUTPATIENT
Start: 2022-06-28 | End: 2022-07-03 | Stop reason: HOSPADM

## 2022-06-28 RX ORDER — SODIUM CHLORIDE 0.9 % (FLUSH) 0.9 %
10 SYRINGE (ML) INJECTION PRN
Status: DISCONTINUED | OUTPATIENT
Start: 2022-06-28 | End: 2022-07-03 | Stop reason: HOSPADM

## 2022-06-28 RX ORDER — MAGNESIUM HYDROXIDE 1200 MG/15ML
LIQUID ORAL CONTINUOUS PRN
Status: DISCONTINUED | OUTPATIENT
Start: 2022-06-28 | End: 2022-06-28 | Stop reason: HOSPADM

## 2022-06-28 RX ORDER — SODIUM CHLORIDE, SODIUM LACTATE, POTASSIUM CHLORIDE, CALCIUM CHLORIDE 600; 310; 30; 20 MG/100ML; MG/100ML; MG/100ML; MG/100ML
1000 INJECTION, SOLUTION INTRAVENOUS CONTINUOUS
Status: DISCONTINUED | OUTPATIENT
Start: 2022-06-28 | End: 2022-06-28 | Stop reason: HOSPADM

## 2022-06-28 RX ORDER — LIDOCAINE HYDROCHLORIDE 10 MG/ML
INJECTION, SOLUTION EPIDURAL; INFILTRATION; INTRACAUDAL; PERINEURAL PRN
Status: DISCONTINUED | OUTPATIENT
Start: 2022-06-28 | End: 2022-06-28 | Stop reason: SDUPTHER

## 2022-06-28 RX ORDER — SODIUM CHLORIDE 0.9 % (FLUSH) 0.9 %
5-40 SYRINGE (ML) INJECTION PRN
Status: DISCONTINUED | OUTPATIENT
Start: 2022-06-28 | End: 2022-06-28

## 2022-06-28 RX ORDER — SODIUM CHLORIDE 0.9 % (FLUSH) 0.9 %
10 SYRINGE (ML) INJECTION EVERY 12 HOURS SCHEDULED
Status: DISCONTINUED | OUTPATIENT
Start: 2022-06-28 | End: 2022-07-03 | Stop reason: HOSPADM

## 2022-06-28 RX ORDER — METHOCARBAMOL 750 MG/1
750 TABLET, FILM COATED ORAL EVERY 6 HOURS
Status: DISCONTINUED | OUTPATIENT
Start: 2022-06-28 | End: 2022-07-03 | Stop reason: HOSPADM

## 2022-06-28 RX ORDER — EPHEDRINE SULFATE/0.9% NACL/PF 50 MG/5 ML
SYRINGE (ML) INTRAVENOUS PRN
Status: DISCONTINUED | OUTPATIENT
Start: 2022-06-28 | End: 2022-06-28 | Stop reason: SDUPTHER

## 2022-06-28 RX ORDER — KETAMINE HYDROCHLORIDE 10 MG/ML
INJECTION, SOLUTION INTRAMUSCULAR; INTRAVENOUS PRN
Status: DISCONTINUED | OUTPATIENT
Start: 2022-06-28 | End: 2022-06-28 | Stop reason: SDUPTHER

## 2022-06-28 RX ORDER — PROPOFOL 10 MG/ML
INJECTION, EMULSION INTRAVENOUS PRN
Status: DISCONTINUED | OUTPATIENT
Start: 2022-06-28 | End: 2022-06-28 | Stop reason: SDUPTHER

## 2022-06-28 RX ORDER — DEXAMETHASONE SODIUM PHOSPHATE 10 MG/ML
INJECTION INTRAMUSCULAR; INTRAVENOUS PRN
Status: DISCONTINUED | OUTPATIENT
Start: 2022-06-28 | End: 2022-06-28 | Stop reason: SDUPTHER

## 2022-06-28 RX ORDER — SODIUM CHLORIDE 0.9 % (FLUSH) 0.9 %
5-40 SYRINGE (ML) INJECTION EVERY 12 HOURS SCHEDULED
Status: DISCONTINUED | OUTPATIENT
Start: 2022-06-28 | End: 2022-06-28

## 2022-06-28 RX ORDER — PHENYLEPHRINE HCL IN 0.9% NACL 0.5 MG/5ML
SYRINGE (ML) INTRAVENOUS PRN
Status: DISCONTINUED | OUTPATIENT
Start: 2022-06-28 | End: 2022-06-28 | Stop reason: SDUPTHER

## 2022-06-28 RX ORDER — CLINDAMYCIN PHOSPHATE 600 MG/50ML
600 INJECTION INTRAVENOUS ONCE
Status: COMPLETED | OUTPATIENT
Start: 2022-06-28 | End: 2022-06-28

## 2022-06-28 RX ORDER — SODIUM CHLORIDE, SODIUM LACTATE, POTASSIUM CHLORIDE, CALCIUM CHLORIDE 600; 310; 30; 20 MG/100ML; MG/100ML; MG/100ML; MG/100ML
INJECTION, SOLUTION INTRAVENOUS CONTINUOUS
Status: DISCONTINUED | OUTPATIENT
Start: 2022-06-28 | End: 2022-07-02

## 2022-06-28 RX ORDER — METOPROLOL SUCCINATE 25 MG/1
25 TABLET, EXTENDED RELEASE ORAL DAILY
Status: DISCONTINUED | OUTPATIENT
Start: 2022-06-29 | End: 2022-07-01

## 2022-06-28 RX ORDER — MIDAZOLAM HYDROCHLORIDE 1 MG/ML
INJECTION INTRAMUSCULAR; INTRAVENOUS PRN
Status: DISCONTINUED | OUTPATIENT
Start: 2022-06-28 | End: 2022-06-28 | Stop reason: SDUPTHER

## 2022-06-28 RX ORDER — MEPERIDINE HYDROCHLORIDE 50 MG/ML
12.5 INJECTION INTRAMUSCULAR; INTRAVENOUS; SUBCUTANEOUS EVERY 5 MIN PRN
Status: DISCONTINUED | OUTPATIENT
Start: 2022-06-28 | End: 2022-06-28

## 2022-06-28 RX ORDER — ENOXAPARIN SODIUM 100 MG/ML
40 INJECTION SUBCUTANEOUS DAILY
Status: DISCONTINUED | OUTPATIENT
Start: 2022-06-29 | End: 2022-06-30

## 2022-06-28 RX ORDER — CLINDAMYCIN PHOSPHATE 600 MG/50ML
600 INJECTION INTRAVENOUS EVERY 8 HOURS
Status: COMPLETED | OUTPATIENT
Start: 2022-06-28 | End: 2022-06-29

## 2022-06-28 RX ADMIN — CLINDAMYCIN PHOSPHATE 600 MG: 600 INJECTION, SOLUTION INTRAVENOUS at 12:39

## 2022-06-28 RX ADMIN — KETAMINE HYDROCHLORIDE 50 MG: 10 INJECTION INTRAMUSCULAR; INTRAVENOUS at 13:05

## 2022-06-28 RX ADMIN — HYDROMORPHONE HYDROCHLORIDE 0.25 MG: 1 INJECTION, SOLUTION INTRAMUSCULAR; INTRAVENOUS; SUBCUTANEOUS at 15:25

## 2022-06-28 RX ADMIN — ACETAMINOPHEN 1000 MG: 500 TABLET ORAL at 21:08

## 2022-06-28 RX ADMIN — METHOCARBAMOL TABLETS 750 MG: 750 TABLET, COATED ORAL at 17:37

## 2022-06-28 RX ADMIN — ACETAMINOPHEN 1000 MG: 500 TABLET ORAL at 17:37

## 2022-06-28 RX ADMIN — Medication 10 MG: at 13:25

## 2022-06-28 RX ADMIN — HYDROMORPHONE HYDROCHLORIDE 0.25 MG: 1 INJECTION, SOLUTION INTRAMUSCULAR; INTRAVENOUS; SUBCUTANEOUS at 15:15

## 2022-06-28 RX ADMIN — Medication 50 MCG: at 13:25

## 2022-06-28 RX ADMIN — ROCURONIUM BROMIDE 50 MG: 10 INJECTION INTRAVENOUS at 12:28

## 2022-06-28 RX ADMIN — SODIUM CHLORIDE, POTASSIUM CHLORIDE, SODIUM LACTATE AND CALCIUM CHLORIDE: 600; 310; 30; 20 INJECTION, SOLUTION INTRAVENOUS at 13:20

## 2022-06-28 RX ADMIN — SODIUM CHLORIDE, POTASSIUM CHLORIDE, SODIUM LACTATE AND CALCIUM CHLORIDE: 600; 310; 30; 20 INJECTION, SOLUTION INTRAVENOUS at 12:24

## 2022-06-28 RX ADMIN — HYDROMORPHONE HYDROCHLORIDE 0.5 MG: 1 INJECTION, SOLUTION INTRAMUSCULAR; INTRAVENOUS; SUBCUTANEOUS at 15:05

## 2022-06-28 RX ADMIN — Medication 5 MG: at 21:08

## 2022-06-28 RX ADMIN — ROCURONIUM BROMIDE 10 MG: 10 INJECTION INTRAVENOUS at 13:57

## 2022-06-28 RX ADMIN — DEXAMETHASONE SODIUM PHOSPHATE 10 MG: 10 INJECTION INTRAMUSCULAR; INTRAVENOUS at 12:48

## 2022-06-28 RX ADMIN — OXYCODONE 5 MG: 5 TABLET ORAL at 19:35

## 2022-06-28 RX ADMIN — METHOCARBAMOL TABLETS 750 MG: 750 TABLET, COATED ORAL at 21:08

## 2022-06-28 RX ADMIN — PROPOFOL 200 MG: 10 INJECTION, EMULSION INTRAVENOUS at 12:28

## 2022-06-28 RX ADMIN — SUGAMMADEX 100 MG: 100 INJECTION, SOLUTION INTRAVENOUS at 14:31

## 2022-06-28 RX ADMIN — SUGAMMADEX 100 MG: 100 INJECTION, SOLUTION INTRAVENOUS at 14:33

## 2022-06-28 RX ADMIN — HYDROMORPHONE HYDROCHLORIDE 0.5 MG: 1 INJECTION, SOLUTION INTRAMUSCULAR; INTRAVENOUS; SUBCUTANEOUS at 14:58

## 2022-06-28 RX ADMIN — HYDROMORPHONE HYDROCHLORIDE 0.25 MG: 1 INJECTION, SOLUTION INTRAMUSCULAR; INTRAVENOUS; SUBCUTANEOUS at 15:35

## 2022-06-28 RX ADMIN — FENTANYL CITRATE 50 MCG: 50 INJECTION, SOLUTION INTRAMUSCULAR; INTRAVENOUS at 13:58

## 2022-06-28 RX ADMIN — LIDOCAINE HYDROCHLORIDE 50 MG: 10 INJECTION, SOLUTION EPIDURAL; INFILTRATION; INTRACAUDAL; PERINEURAL at 12:28

## 2022-06-28 RX ADMIN — ONDANSETRON 4 MG: 2 INJECTION INTRAMUSCULAR; INTRAVENOUS at 21:20

## 2022-06-28 RX ADMIN — CLINDAMYCIN PHOSPHATE 600 MG: 600 INJECTION, SOLUTION INTRAVENOUS at 21:30

## 2022-06-28 RX ADMIN — SUGAMMADEX 300 MG: 100 INJECTION, SOLUTION INTRAVENOUS at 14:27

## 2022-06-28 RX ADMIN — Medication 20 MG: at 13:22

## 2022-06-28 RX ADMIN — MIDAZOLAM HYDROCHLORIDE 2 MG: 2 INJECTION, SOLUTION INTRAMUSCULAR; INTRAVENOUS at 12:25

## 2022-06-28 RX ADMIN — FENTANYL CITRATE 100 MCG: 50 INJECTION, SOLUTION INTRAMUSCULAR; INTRAVENOUS at 12:28

## 2022-06-28 RX ADMIN — SODIUM CHLORIDE, POTASSIUM CHLORIDE, SODIUM LACTATE AND CALCIUM CHLORIDE: 600; 310; 30; 20 INJECTION, SOLUTION INTRAVENOUS at 15:26

## 2022-06-28 RX ADMIN — ROCURONIUM BROMIDE 20 MG: 10 INJECTION INTRAVENOUS at 12:51

## 2022-06-28 RX ADMIN — HYDROMORPHONE HYDROCHLORIDE 0.25 MG: 1 INJECTION, SOLUTION INTRAMUSCULAR; INTRAVENOUS; SUBCUTANEOUS at 15:40

## 2022-06-28 RX ADMIN — ONDANSETRON 4 MG: 2 INJECTION INTRAMUSCULAR; INTRAVENOUS at 14:12

## 2022-06-28 RX ADMIN — ANTACID TABLETS 500 MG: 500 TABLET, CHEWABLE ORAL at 22:00

## 2022-06-28 ASSESSMENT — PAIN SCALES - GENERAL
PAINLEVEL_OUTOF10: 10
PAINLEVEL_OUTOF10: 5
PAINLEVEL_OUTOF10: 2
PAINLEVEL_OUTOF10: 5
PAINLEVEL_OUTOF10: 7
PAINLEVEL_OUTOF10: 7
PAINLEVEL_OUTOF10: 4
PAINLEVEL_OUTOF10: 4

## 2022-06-28 ASSESSMENT — PAIN DESCRIPTION - LOCATION
LOCATION: ABDOMEN

## 2022-06-28 ASSESSMENT — PAIN DESCRIPTION - DESCRIPTORS
DESCRIPTORS: ACHING;DISCOMFORT;STABBING
DESCRIPTORS: DISCOMFORT

## 2022-06-28 ASSESSMENT — PAIN DESCRIPTION - PAIN TYPE: TYPE: SURGICAL PAIN

## 2022-06-28 ASSESSMENT — PAIN DESCRIPTION - ORIENTATION
ORIENTATION: MID

## 2022-06-28 ASSESSMENT — PAIN - FUNCTIONAL ASSESSMENT: PAIN_FUNCTIONAL_ASSESSMENT: 0-10

## 2022-06-28 NOTE — PROGRESS NOTES
Patient transferred to room 430 via stretcher with belongings including c-pap machine. Friend in waiting room.

## 2022-06-28 NOTE — H&P
General Surgery:  H&P        PATIENT NAME: Augustina Madera   YOB: 1960    ADMISSION DATE: 6/28/2022    Admitting Provider: Dr. Elayne Combs DATE: 6/28/2022    HISTORY OF PRESENT ILLNESS:  The patient is a 59 y.o. male who presents for colostomy reversal. Pt is s/p rectosigmoid resection and colostomy creation for diverticulitis and colovesical fistula in November 2021. The pathology from that operation returned as diverticulosis/diverticulitis with serosal fibrosis/fibrous adhesions, chronic active abscess and fistulous track. There were benign mesenteric lymph nodes. Since then patient had Covid in December and an episode of dehydration. He was also seen by Cardiology for new onset atrial fibrillation with RVR in December 2021. He had a successful cardioversion on 1/14/2021 and has maintained rhythm control on beta blocker since then. He does take Xarelto for anticoagulation and has held this for the last 3 days. EKG done in cardiology office on June 13 2022 demonstrated normal sinus rhythm and he was cleared for surgery from cardiology with low risk for major cardiac complications. Pt has held his Xarelto for 3 days. He reports loose stools with the colostomy and would like to proceed to the operating room for reversal today.     Past Medical History:        Diagnosis Date    Abnormal patient-activated cardiac event monitor 02/22/2021    CAM 13 days 8 hrs Baseline sinus ave HR of 63 bpm rang between 47 adn 109 bpm  Rare PAC and PVC with 2 runs of ectopic atrial tachy longest fastest was 7 beats hr 110 bpm No VT runs no AFib    Chronic anticoagulation     xarelto    Colonic diverticular abscess 10/2021    Colostomy fistula (HCC)     GERD (gastroesophageal reflux disease)     H/O echocardiogram 03/08/2021    EF 45-50% Mild increased LV wallthickness LA is mod dilated 34-39 with LA volume index of 35 ml/m2 Mild mitral and tricuspid regurg Midl diastolic dysfunction seen Aortic root is mod dilated >4.5cm when corrected for body suface area    History of cardioversion     History of pneumonia     pt does not recall having this    Hx of bronchitis     Hyperlipidemia     Hypertension     Non-ischemic cardiomyopathy ([facility]     Dr. Laquita Torres cardiology in [place]    MICHELLE on CPAP     Paroxysmal A-fib ([facility]     hx cardioversion    Sleep apnea     Home CPAP    Under care of team 06/21/2022    cardiology-Dr Swanson-Cheshire-last visit june 2022    Under care of team 06/21/2022    [facility]       Past Surgical History:        Procedure Laterality Date    CARDIOVERSION  01/2021    COLECTOMY N/A 11/22/2021    COLECTOMY, COLOSTOMY (N/A Abdomen)    CT ABSCESS DRAIN SUBCUTANEOUS  10/12/2021    CT ABSCESS DRAIN SUBCUTANEOUS 10/12/2021 [facility] CT SCAN    SIGMOID COLECTOMY N/A 11/22/2021    COLECTOMY, COLOSTOMY performed by Ashanti Pickard DO at [place] [address]       Medications Prior to Admission:   No medications prior to admission.     Allergies:  Cipro [ciprofloxacin hcl] and Pcn [penicillins]    Social History:   Social History     Socioeconomic History    Marital status: Single     Spouse name: Not on file    Number of children: Not on file    Years of education: Not on file    Highest education level: Not on file   Occupational History    Not on file   Tobacco Use    Smoking status: Never Smoker    Smokeless tobacco: Never Used   Vaping Use    Vaping Use: Never used   Substance and Sexual Activity    Alcohol use: Not Currently    Drug use: Never    Sexual activity: Not on file   Other Topics Concern    Not on file   Social History Narrative    Not on file     Social Determinants of Health     Financial Resource Strain:     Difficulty of Paying Living Expenses: Not on file   Food Insecurity:     Worried About Running Out of Food in the Last Year: Not on file    Butch of Food in the Last Year: Not on file   Transportation Needs:     Lack of Transportation (Medical): Not on file    Lack of Transportation (Non-Medical): Not on file   Physical Activity:     Days of Exercise per Week: Not on file    Minutes of Exercise per Session: Not on file   Stress:     Feeling of Stress : Not on file   Social Connections:     Frequency of Communication with Friends and Family: Not on file    Frequency of Social Gatherings with Friends and Family: Not on file    Attends Yazdanism Services: Not on file    Active Member of [address] [place] [address] [employer] or Organizations: Not on file    Attends Club or Organization Meetings: Not on file    Marital Status: Not on file   Intimate Partner Violence:     Fear of Current or Ex-Partner: Not on file    Emotionally Abused: Not on file    Physically Abused: Not on file    Sexually Abused: Not on file   Housing Stability:     Unable to Pay for Housing in the Last Year: Not on file    Number of [place] in the Last Year: Not on file    Unstable Housing in the Last Year: Not on file       Family History:       Problem Relation Age of Onset    Cancer Mother     Coronary Art Dis Mother     Cancer Father     Coronary Art Dis Father        REVIEW OF SYSTEMS:    CONSTITUTIONAL: Denies recent weight loss, fatigue, fevers, chills. HEENT: No rhinorrhea, dysphagia, odynphagia. CARDIOVASCULAR: No history of MI, recent chest pain. RESPIRATORY: Denies shortness of breath, COPD, asthma. GASTROINTESTINAL: Ostomy in place, loose stools  GENITOURINARY: Denies increased frequency or dysuria. HEMATOLOGIC/LYMPHATIC: Denies history of anemia or DVTs. ENDOCRINE: Denies history of thyroid problems or diabetes. NEUROLOGIC: Denies history of CVA, TIA. Review of systems negative unless listed above. PHYSICAL EXAM:    VITALS:  There were no vitals taken for this visit.   INTAKE/OUTPUT:   No intake or output data in the 24 hours ending 06/28/22 8794    CONSTITUTIONAL:  awake, alert, not distressed  ENT:  normocephalic/atraumatic, without obvious abnormality  NECK: supple, symmetrical, trachea midline   LUNGS:  CTA bilaterally  CARDIOVASCULAR:  regular rate and rhythm   ABDOMEN: Ostomy in place, non-distended, non-tender  MUSCULOSKELETAL: Muscle strength intact in all extremities bilaterally. NEUROLOGIC: CN II- XII intact. Gross motor intact without focal weakness. SKIN: No cyanosis, rashes, or edema noted. CBC with Differential:    Lab Results   Component Value Date    WBC 7.3 06/13/2022    RBC 4.77 06/13/2022    HGB 14.3 06/13/2022    HCT 43.8 06/13/2022     06/13/2022    MCV 91.8 06/13/2022    MCH 30.0 06/13/2022    MCHC 32.6 06/13/2022    RDW 12.8 06/13/2022    LYMPHOPCT 31 03/23/2022    MONOPCT 11 03/23/2022    BASOPCT 1 03/23/2022    MONOSABS 0.76 03/23/2022    LYMPHSABS 2.16 03/23/2022    EOSABS 0.17 03/23/2022    BASOSABS 0.07 03/23/2022    DIFFTYPE NOT REPORTED 12/06/2021     BMP:    Lab Results   Component Value Date     06/13/2022    K 4.1 06/13/2022     06/13/2022    CO2 27 06/13/2022    BUN 16 06/13/2022    LABALBU 4.4 03/23/2022    CREATININE 0.96 06/13/2022    CALCIUM 9.6 06/13/2022    GFRAA >60 06/13/2022    LABGLOM >60 06/13/2022    GLUCOSE 98 06/13/2022       ASSESSMENT:  S/p rectosigmoid colectomy and colostomy creation in November 2021    Plan:  1. Risks and benefits of exploratory laparotomy with colostomy reversal were discussed with the patient. All questions answered. Pt provided written consent to proceed to the operating room. Electronically signed by Andie Foreman DO  on 6/28/2022 at 6:21 AM   I attest that I was present with the resident during the patient's evaluation and agree with the description of findings and plan as dictated above.   Edward Elizabeth MD

## 2022-06-28 NOTE — BRIEF OP NOTE
Brief Postoperative Note      Patient: Fede Atkinson  YOB: 1960  MRN: [id number]    Date of Procedure: 6/28/2022    Pre-Op Diagnosis: HISTORY OF DIVERTICULITIS    Post-Op Diagnosis: Same       Procedure(s):  EXPLORATORY LAPAROTOMY,  LYSIS OF ADHESIONS    Surgeon(s):  Pola Prasad DO    Assistant:  Resident: Saniya Ardon DO; Andreina Matute DO    Anesthesia: General    Estimated Blood Loss (mL): 78    Complications: None    Specimens:   * No specimens in log *    Implants:  * No implants in log *      Drains:   Closed/Suction Drain RLQ Bulb (Active)       Colostomy LUQ Descending/sigmoid (Active)       Urinary Catheter 2 Way (Active)       Findings: very low rectal stump, lysis of adhesions, inability to perform colorectal anastomosis    Electronically signed by Andreina Matute DO on 6/28/2022 at 2:34 PM

## 2022-06-28 NOTE — OP NOTE
Operative Note      Patient: Gwendolyn Fung  YOB: 1960  MRN: [id number]    Date of Procedure: 6/28/2022    Pre-Op Diagnosis: HISTORY OF DIVERTICULITIS status post colectomy and colostomy with colostomy in place    Post-Op Diagnosis: Same       Procedure(s):  EXPLORATORY LAPAROTOMY,  LYSIS OF EXTENSIVE intraABDOMINAL ADHESIONS     Surgeon(s):  Kimberly Wilde DO     Assistant:  Resident: Angel Benedict DO; Nancy Lizarraga DO     Anesthesia: General     Estimated Blood Loss (mL): 78     Complications: None     Specimens:   * No specimens in log *    Implants:  * No implants in log *      Drains:   Closed/Suction Drain RLQ Bulb (Active)       Colostomy LUQ Descending/sigmoid (Active)       [REMOVED] Urinary Catheter 2 Way (Removed)       Findings: very low rectal stump, lysis of adhesions, inability to perform colorectal anastomosis due to narrow male pelvis with marked thickened scar tissue around rectal stump and inability to exposed rectal stump adequately    History: Patient is a 52-year-old male who presents for colostomy reversal after patient underwent Chan's procedure for diverticulitis and colovesical fistula in November 2021. Patient underwent colonoscopy yesterday and decision was made to proceed with the colostomy reversal. This procedure, including risks, benefits, alternatives and complications have been fully reviewed with the patient and informed consent was obtained. Risks discussed include infection, bleeding, injury to surrounding structures, need for more procedures, chronic pain, scarring, risks of anesthesia, including myocardial infarction, stroke, fatal arrhythmias. Patient understands and all questions were answered appropriately. Detailed Description of Procedure: Patient was brought into the operating room and placed in a supine position on the operating table. Patient underwent general anesthesia via endotracheal intubation.   SCDs were placed as well as a Montanez catheter. Clindamycin was given for perioperative antibiotics. The patient was then placed in lithotomy and the abdomen as well as the perianal region was then prepped with Hibiclens and draped in the usual sterile fashion. A timeout was performed to confirm the patient, procedure, allergies and all other concerns. Using a #10 blade, the midline abdominal scar was completely excised. Further excision was carried down using Bovie electrocautery and entry was gained into the peritoneum bluntly with hemostats. The anterior fascia was completely opened using Bovie electrocautery, taking care to avoid adhesions. There were omental adhesions to the anterior abdominal wall which were carefully taken down with Metzenbaum scissors and Bovie electrocautery. The small bowel small bowel adhesions were additionally taken down with Metzenbaum. We identified the descending colon and carefully lysed adhesions from the omentum. We continued to lyse adhesions for the entirety of the case. We then turned our attention to the pelvis and lysed more small bowel adhesions to the pelvic wall. We palpated the rectal stump but as we were unable to adequately identify the serosa of the rectum or adequate extent of the rectal stump, a 25 mm anal dilator was placed through the anus and towards the rectal stump. We bluntly dissected the rectum from its adhesions to the pelvic wall. The dilator appeared to be through the mucosa but through the serosa. Due to the rectal stump being very low and inadequately will to be exposed for adequate length to perform anastomosis and the narrow pelvis, we did not feel that performing a colorectal anastomosis was safe or appropriate . Decision was made to place a flat CARRINGTON drain in the pelvis and to close the abdomen. Irrisept was used to irrigate the pelvis. The small bowel was run from ligament of Treitz to the terminal ileum.   There was a  serosal tear in the jejunum approximately 3 cm in length which was approximated with 3-0 Vicryl suture in interrupted fashion. There was a 5 cm defect within the omentum that was approximated using 3-0 Vicryl suture in a running fashion and placed over the small bowel. A 19 Hungarian flat CARRINGTON drain was then placed into the pelvis and secured in the right lower quadrant skin level with 3-0 nylon suture. The anterior fascia was then closed using 2 0-looped PDS sutures and the skin was approximated using skin staples. An ostomy appliance was placed over the descending colostomy. The midline abdominal wound was dressed with an island dressing and a drain sponge was secured with Tegaderm. This concluded the procedure. All sponge, needle and instrument counts were correct in the case. Montanez catheter was removed. Patient was extubated without difficulty and transferred to PACU in stable condition. Dr. Cheyenne Horan was present for the entire procedure. Electronically signed by Norm Aceves DO on 6/28/2022 at 3:09 PM   I attest that I was present throughout the operation and agree with the description of findings and procedure as dictated above.   Cee Luna MD

## 2022-06-28 NOTE — ANESTHESIA POSTPROCEDURE EVALUATION
POST- ANESTHESIA EVALUATION       Pt Name: Jayleen Douglass  MRN: [id number]  YOB: 1960  Date of evaluation: 6/28/2022  Time:  4:24 PM      BP (!) 137/99   Pulse 97   Temp 97 °F (36.1 °C) (Axillary)   Resp 12   SpO2 97%      Consciousness Level  Awake  Cardiopulmonary Status  Stable  Pain Adequately Treated YES  Nausea / Vomiting  NO  Adequate Hydration  YES  Anesthesia Related Complications NONE      Electronically signed by Linnea Bartlett MD on 6/28/2022 at 4:24 PM       Department of Anesthesiology  Postprocedure Note    Patient: Jayleen Douglass  MRN: [id number]  YOB: 1960  Date of evaluation: 6/28/2022      Procedure Summary     Date: 06/28/22 Room / Location: [address]    Anesthesia Start: 0368 Anesthesia Stop: 9590    Procedure: EXPLORATORY LAPAROTOMY,  LYSIS OF ADHESIONS (N/A Abdomen/Perineum) Diagnosis:       History of diverticulitis      (HISTORY OF DIVERTICULITIS)    Surgeons: Latanya Chow DO Responsible Provider: Linnea Bartlett MD    Anesthesia Type: general ASA Status: 3          Anesthesia Type: No value filed.     Elaine Phase I: Elaine Score: 9    Elaine Phase II:        Anesthesia Post Evaluation

## 2022-06-28 NOTE — ANESTHESIA PRE PROCEDURE
Department of Anesthesiology  Preprocedure Note       Name:  Jayleen Douglass   Age:  59 y.o.  :  1960                                          MRN:  [id number]         Date:  2022      Surgeon: Rosita Calvin):  Latanya Chow DO    Procedure: Procedure(s):  EXPLORATORY LAPAROTOMY,  REVERSAL OF COLOSTOMY    Medications prior to admission:   Prior to Admission medications    Medication Sig Start Date End Date Taking? Authorizing Provider   Multiple Vitamin (MULTIVITAMIN ADULT PO) Take 1 tablet by mouth daily     Historical Provider, MD   metoprolol succinate (TOPROL XL) 25 MG extended release tablet take 1 tablet by mouth once daily 22   Angelo Diego MD   losartan (COZAAR) 25 MG tablet take 1 tablet by mouth once daily 21   Angelo Diego MD   sildenafil (VIAGRA) 50 MG tablet Take 1 tablet by mouth as needed     Historical Provider, MD   hydrOXYzine (ATARAX) 50 MG tablet take 1 tablet by mouth at bedtime if needed for sleep  Patient not taking: Reported on 2022   Historical Provider, MD   loratadine (CLARITIN) 10 MG tablet take 1 tablet by mouth daily if needed for allergies 21   Historical Provider, MD   fluticasone (FLONASE) 50 MCG/ACT nasal spray 2 sprays by Nasal route daily as needed for Rhinitis One time in morning and once in evening 21   Historical Provider, MD   rivaroxaban (XARELTO) 20 MG TABS tablet Take 1 tablet by mouth daily (with breakfast) 21   Angelo Diego MD       Current medications:    Current Facility-Administered Medications   Medication Dose Route Frequency Provider Last Rate Last Admin    clindamycin (CLEOCIN) 600 mg in dextrose 5 % 50 mL IVPB  600 mg IntraVENous Once Juwan Limon DO        lactated ringers infusion 1,000 mL  1,000 mL IntraVENous Continuous Jones Pandey MD           Allergies:     Allergies   Allergen Reactions    Cipro [Ciprofloxacin Hcl] Nausea And Vomiting    Pcn [Penicillins] Nausea And Vomiting       Problem List:    Patient Active Problem List   Diagnosis Code    New onset atrial fibrillation (HCC) I48.91    Primary hypertension I10    Colonic diverticular abscess K57.20    Chronic atrial fibrillation (HCC) I48.20    Hyperlipidemia E78.5    Chronic anticoagulation Z79.01    Sigmoid diverticulitis K57.32    Duodenal diverticulum K57.10    Colovesical fistula N32.1    Severe malnutrition ([facility] [address] E43    [facility] Z93.3       Past Medical History:        Diagnosis Date    Abnormal patient-activated cardiac event monitor 02/22/2021    CAM 13 days 8 hrs Baseline sinus ave HR of 63 bpm rang between 47 adn 109 bpm  Rare PAC and PVC with 2 runs of ectopic atrial tachy longest fastest was 7 beats hr 110 bpm No VT runs no AFib    Chronic anticoagulation     xarelto    Colonic diverticular abscess 10/2021    Colostomy fistula ([facility] [address]     GERD (gastroesophageal reflux disease)     H/O echocardiogram 03/08/2021    EF 45-50% Mild increased LV wallthickness LA is mod dilated 34-39 with LA volume index of 35 ml/m2 Mild mitral and tricuspid regurg Midl diastolic dysfunction seen Aortic root is mod dilated >4.5cm when corrected for body suface area    History of cardioversion     History of pneumonia     pt does not recall having this    Hx of bronchitis     Hyperlipidemia     Hypertension     [facility]     Dr. Eduardo Obrien cardiology in [place]    MICHELLE on CPAP     Paroxysmal A-fib ([facility] [address]     hx cardioversion    Sleep apnea     Home CPAP    Under care of team 06/21/2022    cardiology-Dr Contreras-last visit june 2022    Under care of team 06/21/2022    pcp-Rema Dietz-last visit nov 2021       Past Surgical History:        Procedure Laterality Date    CARDIOVERSION  01/2021    COLECTOMY N/A 11/22/2021    COLECTOMY, COLOSTOMY (N/A Abdomen)    CT ABSCESS DRAIN SUBCUTANEOUS  10/12/2021    CT ABSCESS DRAIN SUBCUTANEOUS 10/12/2021 [facility] CT SCAN    SIGMOID COLECTOMY N/A 11/22/2021    COLECTOMY, COLOSTOMY performed by Lat[facility] Chow DO [facility] History:    Social History     Tobacco Use    Smoking status: Never Smoker    Smokeless tobacco: Never Used   Substance Use Topics    Alcohol use: Not Currently                                Counseling given: Not Answered      Vital Signs (Current):   Vitals:    06/28/22 1040   BP: (!) 146/91   Pulse: 65   Resp: 20   Temp: 96.8 °F (36 °C)   TempSrc: Temporal   SpO2: 99%                                              BP Readings from Last 3 Encounters:   06/28/22 (!) 146/91   06/21/22 127/85   06/13/22 137/82       NPO Status: Time of last liquid consumption: 2100                        Time of last solid consumption: 2300                        Date of last liquid consumption: 06/27/22                        Date of last solid food consumption: 06/26/22    BMI:   Wt Readings from Last 3 Encounters:   06/21/22 212 lb (96.2 kg)   06/13/22 213 lb (96.6 kg)   03/29/22 206 lb (93.4 kg)     There is no height or weight on file to calculate BMI.    CBC:   Lab Results   Component Value Date    WBC 7.3 06/13/2022    RBC 4.77 06/13/2022    HGB 14.3 06/13/2022    HCT 43.8 06/13/2022    MCV 91.8 06/13/2022    RDW 12.8 06/13/2022     06/13/2022       CMP:   Lab Results   Component Value Date     06/13/2022    K 4.1 06/13/2022     06/13/2022    CO2 27 06/13/2022    BUN 16 06/13/2022    CREATININE 0.96 06/13/2022    GFRAA >60 06/13/2022    LABGLOM >60 06/13/2022    GLUCOSE 98 06/13/2022    PROT 7.1 03/23/2022    CALCIUM 9.6 06/13/2022    BILITOT 0.18 03/23/2022    ALKPHOS 95 03/23/2022    AST 30 03/23/2022    ALT 29 03/23/2022       POC Tests: No results for input(s): POCGLU, POCNA, POCK, POCCL, POCBUN, POCHEMO, POCHCT in the last 72 hours.     Coags:   Lab Results   Component Value Date    PROTIME 15.0 10/12/2021    INR 1.2 10/12/2021       HCG (If Applicable): No results found for: PREGTESTUR, PREGSERUM, HCG, HCGQUANT ABGs: No results found for: PHART, PO2ART, NRR2BEJ, YSF6VMF, BEART, [id number]     Type & Screen (If Applicable):  No results found for: LABABO, LABRH    Drug/Infectious Status (If Applicable):  No results found for: HIV, HEPCAB    COVID-19 Screening (If Applicable):   Lab Results   Component Value Date    COVID19 DETECTED 12/06/2021    COVID19 Not Detected 02/03/2021           Anesthesia Evaluation  Patient summary reviewed and Nursing notes reviewed history of anesthetic complications:   Airway: Mallampati: III  TM distance: >3 FB   Neck ROM: full  Mouth opening: > = 3 FB   Dental: normal exam         Pulmonary:normal exam    (+) sleep apnea: on CPAP,                             Cardiovascular:    (+) hypertension:, dysrhythmias: atrial fibrillation,                ROS comment: Non ischemic cardiomyopathy     Neuro/Psych:               GI/Hepatic/Renal:   (+) GERD:,           Endo/Other: Negative Endo/Other ROS                    Abdominal:             Vascular: Other Findings:           Anesthesia Plan      general     ASA 3       Induction: intravenous. MIPS: Postoperative opioids intended and Prophylactic antiemetics administered. Anesthetic plan and risks discussed with patient. Plan discussed with CRNA.                     Joseph Carson MD   6/28/2022

## 2022-06-29 LAB
ABSOLUTE EOS #: 0 K/UL (ref 0–0.4)
ABSOLUTE IMMATURE GRANULOCYTE: 0 K/UL (ref 0–0.3)
ABSOLUTE LYMPH #: 1.41 K/UL (ref 1–4.8)
ABSOLUTE MONO #: 1.65 K/UL (ref 0.1–0.8)
ANION GAP SERPL CALCULATED.3IONS-SCNC: 16 MMOL/L (ref 9–17)
BASOPHILS # BLD: 0 % (ref 0–2)
BASOPHILS ABSOLUTE: 0 K/UL (ref 0–0.2)
BUN BLDV-MCNC: 24 MG/DL (ref 8–23)
CALCIUM SERPL-MCNC: 9.4 MG/DL (ref 8.6–10.4)
CHLORIDE BLD-SCNC: 103 MMOL/L (ref 98–107)
CO2: 20 MMOL/L (ref 20–31)
CREAT SERPL-MCNC: 0.96 MG/DL (ref 0.7–1.2)
EOSINOPHILS RELATIVE PERCENT: 0 % (ref 1–4)
GFR AFRICAN AMERICAN: >60 ML/MIN
GFR NON-AFRICAN AMERICAN: >60 ML/MIN
GFR SERPL CREATININE-BSD FRML MDRD: ABNORMAL ML/MIN/{1.73_M2}
GLUCOSE BLD-MCNC: 211 MG/DL (ref 70–99)
HCT VFR BLD CALC: 45.1 % (ref 40.7–50.3)
HEMOGLOBIN: 14.8 G/DL (ref 13–17)
IMMATURE GRANULOCYTES: 0 %
LYMPHOCYTES # BLD: 6 % (ref 24–44)
MCH RBC QN AUTO: 30.1 PG (ref 25.2–33.5)
MCHC RBC AUTO-ENTMCNC: 32.8 G/DL (ref 28.4–34.8)
MCV RBC AUTO: 91.7 FL (ref 82.6–102.9)
MONOCYTES # BLD: 7 % (ref 1–7)
MORPHOLOGY: NORMAL
NRBC AUTOMATED: 0 PER 100 WBC
PDW BLD-RTO: 13 % (ref 11.8–14.4)
PLATELET # BLD: 306 K/UL (ref 138–453)
PMV BLD AUTO: 9.4 FL (ref 8.1–13.5)
POTASSIUM SERPL-SCNC: 3.9 MMOL/L (ref 3.7–5.3)
RBC # BLD: 4.92 M/UL (ref 4.21–5.77)
SEG NEUTROPHILS: 87 % (ref 36–66)
SEGMENTED NEUTROPHILS ABSOLUTE COUNT: 20.44 K/UL (ref 1.8–7.7)
SODIUM BLD-SCNC: 139 MMOL/L (ref 135–144)
WBC # BLD: 23.5 K/UL (ref 3.5–11.3)

## 2022-06-29 PROCEDURE — 6370000000 HC RX 637 (ALT 250 FOR IP): Performed by: EMERGENCY MEDICINE

## 2022-06-29 PROCEDURE — 36415 COLL VENOUS BLD VENIPUNCTURE: CPT

## 2022-06-29 PROCEDURE — 80048 BASIC METABOLIC PNL TOTAL CA: CPT

## 2022-06-29 PROCEDURE — 2060000000 HC ICU INTERMEDIATE R&B

## 2022-06-29 PROCEDURE — 6360000002 HC RX W HCPCS: Performed by: SURGERY

## 2022-06-29 PROCEDURE — 6360000002 HC RX W HCPCS: Performed by: STUDENT IN AN ORGANIZED HEALTH CARE EDUCATION/TRAINING PROGRAM

## 2022-06-29 PROCEDURE — 2500000003 HC RX 250 WO HCPCS: Performed by: STUDENT IN AN ORGANIZED HEALTH CARE EDUCATION/TRAINING PROGRAM

## 2022-06-29 PROCEDURE — 97162 PT EVAL MOD COMPLEX 30 MIN: CPT

## 2022-06-29 PROCEDURE — 85025 COMPLETE CBC W/AUTO DIFF WBC: CPT

## 2022-06-29 PROCEDURE — 6370000000 HC RX 637 (ALT 250 FOR IP): Performed by: STUDENT IN AN ORGANIZED HEALTH CARE EDUCATION/TRAINING PROGRAM

## 2022-06-29 PROCEDURE — 2580000003 HC RX 258: Performed by: SURGERY

## 2022-06-29 PROCEDURE — 2580000003 HC RX 258: Performed by: STUDENT IN AN ORGANIZED HEALTH CARE EDUCATION/TRAINING PROGRAM

## 2022-06-29 PROCEDURE — 97530 THERAPEUTIC ACTIVITIES: CPT

## 2022-06-29 RX ORDER — DEXTROSE MONOHYDRATE 50 MG/ML
100 INJECTION, SOLUTION INTRAVENOUS PRN
Status: DISCONTINUED | OUTPATIENT
Start: 2022-06-29 | End: 2022-07-03 | Stop reason: HOSPADM

## 2022-06-29 RX ORDER — CALCIUM CARBONATE 200(500)MG
500 TABLET,CHEWABLE ORAL ONCE
Status: COMPLETED | OUTPATIENT
Start: 2022-06-29 | End: 2022-06-29

## 2022-06-29 RX ORDER — FAMOTIDINE 20 MG/1
20 TABLET, FILM COATED ORAL 2 TIMES DAILY
Status: DISCONTINUED | OUTPATIENT
Start: 2022-06-29 | End: 2022-07-03 | Stop reason: HOSPADM

## 2022-06-29 RX ORDER — INSULIN LISPRO 100 [IU]/ML
0-18 INJECTION, SOLUTION INTRAVENOUS; SUBCUTANEOUS EVERY 4 HOURS
Status: DISCONTINUED | OUTPATIENT
Start: 2022-06-30 | End: 2022-07-02

## 2022-06-29 RX ORDER — MORPHINE SULFATE 2 MG/ML
2 INJECTION, SOLUTION INTRAMUSCULAR; INTRAVENOUS EVERY 4 HOURS PRN
Status: DISCONTINUED | OUTPATIENT
Start: 2022-06-29 | End: 2022-07-02

## 2022-06-29 RX ORDER — METOPROLOL TARTRATE 5 MG/5ML
2.5 INJECTION INTRAVENOUS EVERY 6 HOURS
Status: DISCONTINUED | OUTPATIENT
Start: 2022-06-29 | End: 2022-07-03 | Stop reason: HOSPADM

## 2022-06-29 RX ORDER — FAMOTIDINE 20 MG/1
20 TABLET, FILM COATED ORAL 2 TIMES DAILY
Status: DISCONTINUED | OUTPATIENT
Start: 2022-06-29 | End: 2022-06-29

## 2022-06-29 RX ORDER — OXYCODONE HYDROCHLORIDE 5 MG/1
5 TABLET ORAL EVERY 6 HOURS PRN
Status: DISCONTINUED | OUTPATIENT
Start: 2022-06-29 | End: 2022-07-03 | Stop reason: HOSPADM

## 2022-06-29 RX ORDER — METOCLOPRAMIDE HYDROCHLORIDE 5 MG/ML
10 INJECTION INTRAMUSCULAR; INTRAVENOUS EVERY 6 HOURS
Status: DISCONTINUED | OUTPATIENT
Start: 2022-06-29 | End: 2022-07-03 | Stop reason: HOSPADM

## 2022-06-29 RX ADMIN — METOPROLOL TARTRATE 2.5 MG: 1 INJECTION, SOLUTION INTRAVENOUS at 19:53

## 2022-06-29 RX ADMIN — ANTACID TABLETS 500 MG: 500 TABLET, CHEWABLE ORAL at 01:58

## 2022-06-29 RX ADMIN — SODIUM CHLORIDE, POTASSIUM CHLORIDE, SODIUM LACTATE AND CALCIUM CHLORIDE: 600; 310; 30; 20 INJECTION, SOLUTION INTRAVENOUS at 23:15

## 2022-06-29 RX ADMIN — PROMETHAZINE HYDROCHLORIDE 25 MG: 25 INJECTION INTRAMUSCULAR; INTRAVENOUS at 09:04

## 2022-06-29 RX ADMIN — SODIUM CHLORIDE, POTASSIUM CHLORIDE, SODIUM LACTATE AND CALCIUM CHLORIDE: 600; 310; 30; 20 INJECTION, SOLUTION INTRAVENOUS at 06:27

## 2022-06-29 RX ADMIN — METHOCARBAMOL TABLETS 750 MG: 750 TABLET, COATED ORAL at 05:35

## 2022-06-29 RX ADMIN — METOPROLOL TARTRATE 2.5 MG: 1 INJECTION, SOLUTION INTRAVENOUS at 14:00

## 2022-06-29 RX ADMIN — METOCLOPRAMIDE 10 MG: 5 INJECTION, SOLUTION INTRAMUSCULAR; INTRAVENOUS at 14:00

## 2022-06-29 RX ADMIN — METOCLOPRAMIDE 10 MG: 5 INJECTION, SOLUTION INTRAMUSCULAR; INTRAVENOUS at 07:56

## 2022-06-29 RX ADMIN — METOCLOPRAMIDE 10 MG: 5 INJECTION, SOLUTION INTRAMUSCULAR; INTRAVENOUS at 19:53

## 2022-06-29 RX ADMIN — ACETAMINOPHEN 1000 MG: 500 TABLET ORAL at 05:35

## 2022-06-29 RX ADMIN — CLINDAMYCIN PHOSPHATE 600 MG: 600 INJECTION, SOLUTION INTRAVENOUS at 05:46

## 2022-06-29 RX ADMIN — SODIUM CHLORIDE, POTASSIUM CHLORIDE, SODIUM LACTATE AND CALCIUM CHLORIDE: 600; 310; 30; 20 INJECTION, SOLUTION INTRAVENOUS at 15:28

## 2022-06-29 RX ADMIN — ONDANSETRON 4 MG: 2 INJECTION INTRAMUSCULAR; INTRAVENOUS at 04:33

## 2022-06-29 RX ADMIN — ANTACID TABLETS 500 MG: 500 TABLET, CHEWABLE ORAL at 04:35

## 2022-06-29 RX ADMIN — SODIUM CHLORIDE, POTASSIUM CHLORIDE, SODIUM LACTATE AND CALCIUM CHLORIDE: 600; 310; 30; 20 INJECTION, SOLUTION INTRAVENOUS at 02:14

## 2022-06-29 RX ADMIN — MORPHINE SULFATE 2 MG: 2 INJECTION, SOLUTION INTRAMUSCULAR; INTRAVENOUS at 09:05

## 2022-06-29 RX ADMIN — METOPROLOL TARTRATE 2.5 MG: 1 INJECTION, SOLUTION INTRAVENOUS at 07:56

## 2022-06-29 RX ADMIN — FAMOTIDINE 20 MG: 20 TABLET, FILM COATED ORAL at 05:35

## 2022-06-29 RX ADMIN — ENOXAPARIN SODIUM 40 MG: 100 INJECTION SUBCUTANEOUS at 09:54

## 2022-06-29 ASSESSMENT — PAIN DESCRIPTION - DESCRIPTORS
DESCRIPTORS: DISCOMFORT
DESCRIPTORS: ACHING;STABBING

## 2022-06-29 ASSESSMENT — PAIN SCALES - GENERAL
PAINLEVEL_OUTOF10: 7
PAINLEVEL_OUTOF10: 7

## 2022-06-29 ASSESSMENT — PAIN DESCRIPTION - LOCATION
LOCATION: ABDOMEN
LOCATION: ABDOMEN

## 2022-06-29 NOTE — PROGRESS NOTES
Physical Therapy  Facility/Department: [address] STEPDOWN  Physical Therapy Initial Assessment    Name: Bladimir Madrid  : 1960  MRN: [id number]  Date of Service: 2022  Pt was admitted for colostomy reversal, but instead had EXPLORATORY LAPAROTOMY,  LYSIS OF ADHESIONS   22  Discharge Recommendations:Further therapy recommended at discharge. PT Equipment Recommendations  Equipment Needed: No      Patient Diagnosis(es): There were no encounter diagnoses. Past Medical History:  has a past medical history of Abnormal patient-activated cardiac event monitor, Chronic anticoagulation, Colonic diverticular abscess, Colostomy fistula ([address] GERD (gastroesophageal reflux disease), H/O echocardiogram, History of cardioversion, History of pneumonia, Hx of bronchitis, Hyperlipidemia, Hypertension, Non-ischemic cardiomyopathy ([address] MICHELLE on CPAP, Paroxysmal A-fib ([address] Sleep apnea, Under care of team, and Under care of team.  Past Surgical History:  has a past surgical history that includes CT ABSCESS DRAINAGE (10/12/2021); Cardioversion (2021); colectomy (N/A, 2021); Sigmoid Colectomy (N/A, 2021); Abdominal exploration surgery (2022); and laparotomy (N/A, 2022). Assessment    Pt cooperative, agreeable to attempt mobility in spite of not feeling well; max A for most mobility d/t pain. Try to coordinate PT session with pain meds next session (at rest pt doesn't have pain, so hadn't had anything for pain since this AM, per pt). Pt may be able to stay with friends at [place] if necessary prior to returning to his own home. Body Structures, Functions, Activity Limitations Requiring Skilled Therapeutic Intervention: Decreased functional mobility ; Increased pain;Decreased posture  Therapy Prognosis: Good  Decision Making: Medium Complexity  Requires PT Follow-Up: Yes  Activity Tolerance  Activity Tolerance: Patient limited by pain     Plan   Plan  Plan:  (5-6 visits weekly)  Current Treatment Recommendations: Strengthening,ROM,Balance training,Functional mobility training,Transfer training,Endurance training,Gait training,Stair training,Pain management,Home exercise program,Safety education & training,Patient/Caregiver education & training,Equipment evaluation, education, & procurement,Therapeutic activities  Safety Devices  Type of Devices: Call light within reach,Patient at risk for falls,Left in bed,Nurse notified (pt declined OOB to chair d/t severe abdominal pain, didn't sleep well last night)  Restraints  Restraints Initially in Place: No     Restrictions  Restrictions/Precautions  Restrictions/Precautions: Surgical Protocols,Fall Risk,General Precautions,Up as Tolerated  Required Braces or Orthoses?: No     Subjective   General  Patient assessed for rehabilitation services?: Yes  Response To Previous Treatment: Not applicable  Family / Caregiver Present: No  Follows Commands: Within Functional Limits  Subjective  Subjective: no pain at rest, 10/10 pain with mobility         Social/Functional History  Social/Functional History  Lives With: Alone  Type of Home: Trailer  Home Layout: One level  Home Access: Stairs to enter with rails  Entrance Stairs - Number of Steps: 4  Entrance Stairs - Rails: Both  Has the patient had two or more falls in the past year or any fall with injury in the past year?: No  Receives Help From: Friend(s)  ADL Assistance: Independent  Ambulation Assistance: Independent  Transfer Assistance: Independent  Additional Comments: pt states he may be staying with friends at [place], if needed  Vision/Hearing  Vision  Vision: Impaired  Vision Exceptions: Wears glasses at all times  Hearing  Hearing: Within functional limits    Cognition   Orientation  Overall Orientation Status: Within Normal Limits  Cognition  Overall Cognitive Status: WNL     Objective   Heart Rate: 86  Heart Rate Source: Monitor  BP: 108/79  BP Location: Left upper arm  BP Method: Automatic  Patient Position: Semi fowlers  MAP (Calculated): 88.67  Resp: 20  SpO2: 93 %  O2 Device: None (Room air)     Observation/Palpation  Posture: Poor        AROM RLE (degrees)  RLE AROM: WFL  AROM LLE (degrees)  LLE AROM : WFL  AROM RUE (degrees)  RUE AROM : WFL  AROM LUE (degrees)  LUE AROM : WFL  Strength RLE  Strength RLE: Exception--hip not assessed d/t recent abdominal surgery/pain; knee distal WFL  Strength LLE  Strength LLE: Exception--hip not assessed d/t recent abdominal surgery/pain; knee distal WFL  Strength RUE  Strength RUE: WFL  Strength LUE  Strength LUE: WFL           Bed mobility  Rolling to Left: Moderate assistance  Rolling to Right: Moderate assistance  Supine to Sit: Maximum assistance  Sit to Supine: Moderate assistance  Pt unable to tolerate rolling onto his side and pushing up to sitting with HOB flat; HOB elevated ~60 degrees and still required mod to max A to go from supine to sit d/t pain. Transfers  Sit to Stand: Moderate Assistance  Stand to sit: Minimal Assistance  Lateral Transfers: Moderate Assistance  Comment: poor ability to mobilize d/t abdominal pain s/p surgery  Ambulation  Surface: level tile  Device: No Device  Assistance:  Moderate assistance  Gait Deviations: Slow Rubina;Decreased step length;Decreased step height;Decreased arm swing  Distance: ~4 steps to his L towards the [facility] with A  More Ambulation?: No  Stairs/Curb  Stairs?: No     Balance  Posture: Poor  Sitting - Static: Fair  Sitting - Dynamic: Fair  Standing - Static: Poor  Standing - Dynamic: Poor  Exercise Treatment: ankle pumps  Static Sitting Balance Exercises: pt dangled EOB ~5 minutes with CG+1  Static Standing Balance Exercises: pt stood ~1 minutes, frequent cues to stand erect    AM-PAC Score  AM-PAC Inpatient Mobility Raw Score : 11 (06/29/22 1536)  AM-PAC Inpatient T-Scale Score : 33.86 (06/29/22 1536)  Mobility Inpatient CMS 0-100% Score: 72.57 (06/29/22 1536)  Mobility Inpatient CMS G-Code Modifier : CL (06/29/22

## 2022-06-29 NOTE — PROGRESS NOTES
General Surgery:  Daily Progress Note                    PATIENT NAME: Kerry Babin     TODAY'S DATE: 6/29/2022, 5:09 AM  CC:  GERD symptoms    SUBJECTIVE:     Pt seen and examined at bedside and is complaning of nausea / lack of sleep overnight. He is also complaining of GERD type symptoms. Drain is currently draining 50ml of sanguinous output. Current abdominal pain rated 3/10. Patient is afebrile, normotensive and has good urine output, however appears sweaty and uncomfortable. Currently no output from stoma. OBJECTIVE:   VITALS:  BP (!) 138/96   Pulse 93   Temp 97.7 °F (36.5 °C) (Temporal)   Resp 25   SpO2 93%      INTAKE/OUTPUT:      Intake/Output Summary (Last 24 hours) at 6/29/2022 0509  Last data filed at 6/29/2022 0230  Gross per 24 hour   Intake 1600 ml   Output 1015 ml   Net 585 ml       PHYSICAL EXAM:  General Appearance: awake, alert, sweaty and appears uncomfortable  Heart: Heart sounds are normal.  Regular rate and rhythm without murmur, gallop or rub. Lungs: Normal expansion. Clear to auscultation. No rales, rhonchi, or wheezing. Abdomen: Very distended, tender diffusely. Abdominal pad is dry, clean with minimal strike-through from drain dressing. Drain with sanguinous output. Extremities: No cyanosis, pitting edema, rashes noted. Skin: Skin color, texture, turgor normal. No rashes or lesions.     Data:  CBC with Differential:    Lab Results   Component Value Date    WBC 7.3 06/13/2022    RBC 4.77 06/13/2022    HGB 14.3 06/13/2022    HCT 43.8 06/13/2022     06/13/2022    MCV 91.8 06/13/2022    MCH 30.0 06/13/2022    MCHC 32.6 06/13/2022    RDW 12.8 06/13/2022    LYMPHOPCT 31 03/23/2022    MONOPCT 11 03/23/2022    BASOPCT 1 03/23/2022    MONOSABS 0.76 03/23/2022    LYMPHSABS 2.16 03/23/2022    EOSABS 0.17 03/23/2022    BASOSABS 0.07 03/23/2022    DIFFTYPE NOT REPORTED 12/06/2021     BMP:    Lab Results   Component Value Date     06/13/2022    K 4.1 06/13/2022     06/13/2022    CO2 27 06/13/2022    BUN 16 06/13/2022    LABALBU 4.4 03/23/2022    CREATININE 0.96 06/13/2022    CALCIUM 9.6 06/13/2022    GFRAA >60 06/13/2022    LABGLOM >60 06/13/2022    GLUCOSE 98 06/13/2022         ASSESSMENT:  Active Hospital Problems    Diagnosis Date Noted    Colostomy in place [facility]) [Z93.3] 06/28/2022     Priority: Medium       59 y.o. male POD# 1 s/p exploratory laparotomy and lysis of adhesions    Plan:  1. Will keep on clears this morning due to nausea and vomiting  2. Analgesia:  Tylenol q6h, oxycodone PRN   3. Nausea / GERD  - zofran, TUMS, pepcid  4. Monitor I/O's  5. Chronic Afib - Continue Lovenox   6. Primary HTN - Continue Metoprolol  7. Labs: BMP / CBC follow-up  8. PT/OT  9. Will change midline island dressing tomorrow morning    Electronically signed by Magda Perez  on 6/29/2022 at 5:09 AM       General Surgery Resident Statement/Addendum  I have discussed the case, including pertinent history and exam findings with the above medical student. I have personally seen the patient. Pt seen and examined at bedside. I performed both history and physical exam.  Note was edited and changes made by me. Assessment and plan reviewed and changes made by me. I agree with the assessment and plan as stated above.     Sierra Aguilar DO PGY-1  6/29/22 5:46 AM

## 2022-06-29 NOTE — PLAN OF CARE
Problem: Discharge Planning  Goal: Discharge to home or other facility with appropriate resources  6/28/2022 2343 by Marybeth Amezquita RN  Outcome: Progressing  6/28/2022 1751 by Jessenia Hendrickson RN  Outcome: Progressing     Problem: Pain  Goal: Verbalizes/displays adequate comfort level or baseline comfort level  6/28/2022 2343 by Marybeth Amezquita RN  Outcome: Progressing  6/28/2022 1751 by Jessenia Hendrickson RN  Outcome: Progressing     Problem: Skin/Tissue Integrity  Goal: Absence of new skin breakdown  Description: 1. Monitor for areas of redness and/or skin breakdown  2. Assess vascular access sites hourly  3. Every 4-6 hours minimum:  Change oxygen saturation probe site  4. Every 4-6 hours:  If on nasal continuous positive airway pressure, respiratory therapy assess nares and determine need for appliance change or resting period.   6/28/2022 2343 by Marybeth Amezquita RN  Outcome: Progressing  6/28/2022 1751 by Jessenia Hendrickson RN  Outcome: Progressing     Problem: Safety - Adult  Goal: Free from fall injury  6/28/2022 2343 by Marybeth Amezquita RN  Outcome: Progressing  6/28/2022 1751 by Jessenia Hendrickson RN  Outcome: Progressing     Problem: ABCDS Injury Assessment  Goal: Absence of physical injury  6/28/2022 1751 by Jessenia Hendrickson RN  Outcome: Progressing

## 2022-06-30 LAB
ABSOLUTE EOS #: <0.03 K/UL (ref 0–0.44)
ABSOLUTE IMMATURE GRANULOCYTE: 0.18 K/UL (ref 0–0.3)
ABSOLUTE LYMPH #: 1.28 K/UL (ref 1.1–3.7)
ABSOLUTE MONO #: 1.44 K/UL (ref 0.1–1.2)
ANION GAP SERPL CALCULATED.3IONS-SCNC: 14 MMOL/L (ref 9–17)
BASOPHILS # BLD: 0 % (ref 0–2)
BASOPHILS ABSOLUTE: 0.03 K/UL (ref 0–0.2)
BUN BLDV-MCNC: 25 MG/DL (ref 8–23)
CALCIUM SERPL-MCNC: 8.8 MG/DL (ref 8.6–10.4)
CHLORIDE BLD-SCNC: 105 MMOL/L (ref 98–107)
CO2: 21 MMOL/L (ref 20–31)
CREAT SERPL-MCNC: 1.04 MG/DL (ref 0.7–1.2)
EOSINOPHILS RELATIVE PERCENT: 0 % (ref 1–4)
ESTIMATED AVERAGE GLUCOSE: 117 MG/DL
GFR AFRICAN AMERICAN: >60 ML/MIN
GFR NON-AFRICAN AMERICAN: >60 ML/MIN
GFR SERPL CREATININE-BSD FRML MDRD: ABNORMAL ML/MIN/{1.73_M2}
GLUCOSE BLD-MCNC: 111 MG/DL (ref 75–110)
GLUCOSE BLD-MCNC: 121 MG/DL (ref 70–99)
GLUCOSE BLD-MCNC: 122 MG/DL (ref 75–110)
GLUCOSE BLD-MCNC: 122 MG/DL (ref 75–110)
GLUCOSE BLD-MCNC: 124 MG/DL (ref 75–110)
GLUCOSE BLD-MCNC: 133 MG/DL (ref 75–110)
GLUCOSE BLD-MCNC: 139 MG/DL (ref 75–110)
HBA1C MFR BLD: 5.7 % (ref 4–6)
HCT VFR BLD CALC: 38.9 % (ref 40.7–50.3)
HCT VFR BLD CALC: 40.9 % (ref 40.7–50.3)
HEMOGLOBIN: 12.8 G/DL (ref 13–17)
HEMOGLOBIN: 12.9 G/DL (ref 13–17)
IMMATURE GRANULOCYTES: 1 %
LYMPHOCYTES # BLD: 6 % (ref 24–43)
MCH RBC QN AUTO: 30.9 PG (ref 25.2–33.5)
MCH RBC QN AUTO: 31 PG (ref 25.2–33.5)
MCHC RBC AUTO-ENTMCNC: 31.3 G/DL (ref 28.4–34.8)
MCHC RBC AUTO-ENTMCNC: 33.2 G/DL (ref 28.4–34.8)
MCV RBC AUTO: 93.3 FL (ref 82.6–102.9)
MCV RBC AUTO: 99 FL (ref 82.6–102.9)
MONOCYTES # BLD: 7 % (ref 3–12)
NRBC AUTOMATED: 0 PER 100 WBC
NRBC AUTOMATED: 0 PER 100 WBC
PARTIAL THROMBOPLASTIN TIME: 23.4 SEC (ref 20.5–30.5)
PARTIAL THROMBOPLASTIN TIME: 24 SEC (ref 20.5–30.5)
PDW BLD-RTO: 13.7 % (ref 11.8–14.4)
PDW BLD-RTO: 13.8 % (ref 11.8–14.4)
PLATELET # BLD: 222 K/UL (ref 138–453)
PLATELET # BLD: 276 K/UL (ref 138–453)
PMV BLD AUTO: 9.8 FL (ref 8.1–13.5)
PMV BLD AUTO: 9.8 FL (ref 8.1–13.5)
POTASSIUM SERPL-SCNC: 4.5 MMOL/L (ref 3.7–5.3)
RBC # BLD: 4.13 M/UL (ref 4.21–5.77)
RBC # BLD: 4.17 M/UL (ref 4.21–5.77)
REASON FOR REJECTION: NORMAL
SEG NEUTROPHILS: 86 % (ref 36–65)
SEGMENTED NEUTROPHILS ABSOLUTE COUNT: 17.34 K/UL (ref 1.5–8.1)
SODIUM BLD-SCNC: 140 MMOL/L (ref 135–144)
WBC # BLD: 18.6 K/UL (ref 3.5–11.3)
WBC # BLD: 20.3 K/UL (ref 3.5–11.3)
ZZ NTE CLEAN UP: ORDERED TEST: NORMAL
ZZ NTE WITH NAME CLEAN UP: SPECIMEN SOURCE: NORMAL

## 2022-06-30 PROCEDURE — 2580000003 HC RX 258: Performed by: STUDENT IN AN ORGANIZED HEALTH CARE EDUCATION/TRAINING PROGRAM

## 2022-06-30 PROCEDURE — 6370000000 HC RX 637 (ALT 250 FOR IP): Performed by: SURGERY

## 2022-06-30 PROCEDURE — 80048 BASIC METABOLIC PNL TOTAL CA: CPT

## 2022-06-30 PROCEDURE — 85025 COMPLETE CBC W/AUTO DIFF WBC: CPT

## 2022-06-30 PROCEDURE — 82947 ASSAY GLUCOSE BLOOD QUANT: CPT

## 2022-06-30 PROCEDURE — 6360000002 HC RX W HCPCS: Performed by: STUDENT IN AN ORGANIZED HEALTH CARE EDUCATION/TRAINING PROGRAM

## 2022-06-30 PROCEDURE — 36415 COLL VENOUS BLD VENIPUNCTURE: CPT

## 2022-06-30 PROCEDURE — 76937 US GUIDE VASCULAR ACCESS: CPT

## 2022-06-30 PROCEDURE — 2500000003 HC RX 250 WO HCPCS: Performed by: STUDENT IN AN ORGANIZED HEALTH CARE EDUCATION/TRAINING PROGRAM

## 2022-06-30 PROCEDURE — 85730 THROMBOPLASTIN TIME PARTIAL: CPT

## 2022-06-30 PROCEDURE — 2060000000 HC ICU INTERMEDIATE R&B

## 2022-06-30 PROCEDURE — 93005 ELECTROCARDIOGRAM TRACING: CPT | Performed by: INTERNAL MEDICINE

## 2022-06-30 PROCEDURE — 6360000002 HC RX W HCPCS: Performed by: EMERGENCY MEDICINE

## 2022-06-30 PROCEDURE — 2500000003 HC RX 250 WO HCPCS: Performed by: SURGERY

## 2022-06-30 PROCEDURE — 94150 VITAL CAPACITY TEST: CPT

## 2022-06-30 PROCEDURE — 83036 HEMOGLOBIN GLYCOSYLATED A1C: CPT

## 2022-06-30 PROCEDURE — 2580000003 HC RX 258: Performed by: SURGERY

## 2022-06-30 PROCEDURE — 93005 ELECTROCARDIOGRAM TRACING: CPT | Performed by: STUDENT IN AN ORGANIZED HEALTH CARE EDUCATION/TRAINING PROGRAM

## 2022-06-30 PROCEDURE — 6370000000 HC RX 637 (ALT 250 FOR IP): Performed by: STUDENT IN AN ORGANIZED HEALTH CARE EDUCATION/TRAINING PROGRAM

## 2022-06-30 PROCEDURE — 94664 DEMO&/EVAL PT USE INHALER: CPT

## 2022-06-30 PROCEDURE — 6360000002 HC RX W HCPCS: Performed by: SURGERY

## 2022-06-30 PROCEDURE — 2500000003 HC RX 250 WO HCPCS

## 2022-06-30 PROCEDURE — 85027 COMPLETE CBC AUTOMATED: CPT

## 2022-06-30 PROCEDURE — 6370000000 HC RX 637 (ALT 250 FOR IP): Performed by: EMERGENCY MEDICINE

## 2022-06-30 PROCEDURE — 94761 N-INVAS EAR/PLS OXIMETRY MLT: CPT

## 2022-06-30 RX ORDER — METOPROLOL TARTRATE 5 MG/5ML
5 INJECTION INTRAVENOUS ONCE
Status: COMPLETED | OUTPATIENT
Start: 2022-06-30 | End: 2022-06-30

## 2022-06-30 RX ORDER — HEPARIN SODIUM AND DEXTROSE 10000; 5 [USP'U]/100ML; G/100ML
5-30 INJECTION INTRAVENOUS CONTINUOUS
Status: DISCONTINUED | OUTPATIENT
Start: 2022-06-30 | End: 2022-07-01

## 2022-06-30 RX ORDER — DIPHENHYDRAMINE HYDROCHLORIDE 50 MG/ML
25 INJECTION INTRAMUSCULAR; INTRAVENOUS ONCE
Status: COMPLETED | OUTPATIENT
Start: 2022-06-30 | End: 2022-06-30

## 2022-06-30 RX ORDER — METOPROLOL TARTRATE 5 MG/5ML
INJECTION INTRAVENOUS
Status: COMPLETED
Start: 2022-06-30 | End: 2022-06-30

## 2022-06-30 RX ORDER — LOSARTAN POTASSIUM 25 MG/1
25 TABLET ORAL DAILY
Status: DISCONTINUED | OUTPATIENT
Start: 2022-06-30 | End: 2022-07-01

## 2022-06-30 RX ORDER — DILTIAZEM HYDROCHLORIDE 5 MG/ML
10 INJECTION INTRAVENOUS ONCE
Status: DISCONTINUED | OUTPATIENT
Start: 2022-06-30 | End: 2022-06-30 | Stop reason: SDUPTHER

## 2022-06-30 RX ORDER — DILTIAZEM HYDROCHLORIDE 5 MG/ML
10 INJECTION INTRAVENOUS ONCE
Status: DISCONTINUED | OUTPATIENT
Start: 2022-06-30 | End: 2022-06-30

## 2022-06-30 RX ADMIN — AMIODARONE HYDROCHLORIDE 150 MG: 50 INJECTION, SOLUTION INTRAVENOUS at 11:36

## 2022-06-30 RX ADMIN — AMIODARONE HYDROCHLORIDE 1 MG/MIN: 50 INJECTION, SOLUTION INTRAVENOUS at 11:53

## 2022-06-30 RX ADMIN — PROMETHAZINE HYDROCHLORIDE 25 MG: 25 INJECTION INTRAMUSCULAR; INTRAVENOUS at 09:43

## 2022-06-30 RX ADMIN — METOPROLOL TARTRATE 5 MG: 5 INJECTION, SOLUTION INTRAVENOUS at 10:21

## 2022-06-30 RX ADMIN — METOPROLOL TARTRATE 2.5 MG: 1 INJECTION, SOLUTION INTRAVENOUS at 06:43

## 2022-06-30 RX ADMIN — DIPHENHYDRAMINE HYDROCHLORIDE 25 MG: 50 INJECTION, SOLUTION INTRAMUSCULAR; INTRAVENOUS at 03:24

## 2022-06-30 RX ADMIN — MORPHINE SULFATE 2 MG: 2 INJECTION, SOLUTION INTRAMUSCULAR; INTRAVENOUS at 03:23

## 2022-06-30 RX ADMIN — ENOXAPARIN SODIUM 40 MG: 100 INJECTION SUBCUTANEOUS at 10:00

## 2022-06-30 RX ADMIN — METOPROLOL TARTRATE 2.5 MG: 1 INJECTION, SOLUTION INTRAVENOUS at 20:07

## 2022-06-30 RX ADMIN — SODIUM CHLORIDE, PRESERVATIVE FREE 10 ML: 5 INJECTION INTRAVENOUS at 20:12

## 2022-06-30 RX ADMIN — ACETAMINOPHEN 1000 MG: 500 TABLET ORAL at 22:23

## 2022-06-30 RX ADMIN — METOCLOPRAMIDE 10 MG: 5 INJECTION, SOLUTION INTRAMUSCULAR; INTRAVENOUS at 06:43

## 2022-06-30 RX ADMIN — ANTACID TABLETS 500 MG: 500 TABLET, CHEWABLE ORAL at 20:07

## 2022-06-30 RX ADMIN — MORPHINE SULFATE 2 MG: 2 INJECTION, SOLUTION INTRAMUSCULAR; INTRAVENOUS at 08:22

## 2022-06-30 RX ADMIN — METHOCARBAMOL TABLETS 750 MG: 750 TABLET, COATED ORAL at 23:00

## 2022-06-30 RX ADMIN — SODIUM CHLORIDE, POTASSIUM CHLORIDE, SODIUM LACTATE AND CALCIUM CHLORIDE: 600; 310; 30; 20 INJECTION, SOLUTION INTRAVENOUS at 06:58

## 2022-06-30 RX ADMIN — LOSARTAN POTASSIUM 25 MG: 25 TABLET, FILM COATED ORAL at 08:23

## 2022-06-30 RX ADMIN — METOCLOPRAMIDE 10 MG: 5 INJECTION, SOLUTION INTRAMUSCULAR; INTRAVENOUS at 20:07

## 2022-06-30 RX ADMIN — FAMOTIDINE 20 MG: 20 TABLET, FILM COATED ORAL at 22:21

## 2022-06-30 RX ADMIN — METOPROLOL TARTRATE 2.5 MG: 1 INJECTION, SOLUTION INTRAVENOUS at 01:14

## 2022-06-30 RX ADMIN — HEPARIN SODIUM 10.4 UNITS/KG/HR: 5000 INJECTION, SOLUTION INTRAVENOUS; SUBCUTANEOUS at 20:27

## 2022-06-30 RX ADMIN — METOCLOPRAMIDE 10 MG: 5 INJECTION, SOLUTION INTRAMUSCULAR; INTRAVENOUS at 14:00

## 2022-06-30 RX ADMIN — METOPROLOL TARTRATE 5 MG: 1 INJECTION, SOLUTION INTRAVENOUS at 10:20

## 2022-06-30 RX ADMIN — METOCLOPRAMIDE 10 MG: 5 INJECTION, SOLUTION INTRAMUSCULAR; INTRAVENOUS at 01:14

## 2022-06-30 RX ADMIN — METOPROLOL TARTRATE 5 MG: 5 INJECTION, SOLUTION INTRAVENOUS at 10:20

## 2022-06-30 ASSESSMENT — PAIN DESCRIPTION - DESCRIPTORS: DESCRIPTORS: ACHING;NAGGING

## 2022-06-30 ASSESSMENT — PAIN DESCRIPTION - LOCATION
LOCATION: ABDOMEN
LOCATION: ABDOMEN

## 2022-06-30 ASSESSMENT — PAIN SCALES - GENERAL
PAINLEVEL_OUTOF10: 5
PAINLEVEL_OUTOF10: 5
PAINLEVEL_OUTOF10: 4
PAINLEVEL_OUTOF10: 0

## 2022-06-30 ASSESSMENT — PAIN - FUNCTIONAL ASSESSMENT: PAIN_FUNCTIONAL_ASSESSMENT: PREVENTS OR INTERFERES SOME ACTIVE ACTIVITIES AND ADLS

## 2022-06-30 NOTE — PROGRESS NOTES
Pt heart rate was up to 180's, new onset afib, ekg completed. Pt alert and  oriented, denies any chest pain, dizziness. Placed on 2 Liters oxygen for comfort. Pt is sweaty. Dr. Tri Pickett is at bedside . He has received 2 doses IVP lopressor , his pressure is now 97/82, , irregular , O2 sat 93 .

## 2022-06-30 NOTE — PROGRESS NOTES
General Surgery:  Daily Progress Note                    PATIENT NAME: Mukund Mcmullen     TODAY'S DATE: 6/30/2022, 5:18 AM  CC:  GERD symptoms    SUBJECTIVE:     Pt seen and examined at bedside. Patient's pain, nausea and vomiting well controlled compared to yesterday. CARRINGTON drain 385ml of sanguinous output in last 24hours, 285 overnight. Current abdominal pain rated 3/10. Patient is afebrile, hypotensive overnight and has good urine output, however appears sweaty. Metoprolol held. Currently no output from stoma. Patient was able to get out of bed and stand up for a brief moment yesterday. OBJECTIVE:   VITALS:  BP (!) 147/92   Pulse 89   Temp 97.1 °F (36.2 °C) (Oral)   Resp 17   Wt 212 lb 1.3 oz (96.2 kg)   SpO2 90%   BMI 29.58 kg/m²      INTAKE/OUTPUT:      Intake/Output Summary (Last 24 hours) at 6/30/2022 0518  Last data filed at 6/30/2022 9136  Gross per 24 hour   Intake --   Output 1085 ml   Net -1085 ml       PHYSICAL EXAM:  General Appearance: awake, alert, sweaty and appears uncomfortable  Heart: Heart sounds are normal.  Regular rate and rhythm without murmur, gallop or rub. Lungs: Normal expansion. Clear to auscultation. No rales, rhonchi, or wheezing. Abdomen: Very distended, tender diffusely. Drain with sanguinous output. Extremities: No cyanosis, pitting edema, rashes noted. Skin: Skin color, texture, turgor normal. No rashes or lesions.     Data:  CBC with Differential:    Lab Results   Component Value Date/Time    WBC 23.5 06/29/2022 06:01 AM    RBC 4.92 06/29/2022 06:01 AM    HGB 14.8 06/29/2022 06:01 AM    HCT 45.1 06/29/2022 06:01 AM     06/29/2022 06:01 AM    MCV 91.7 06/29/2022 06:01 AM    MCH 30.1 06/29/2022 06:01 AM    MCHC 32.8 06/29/2022 06:01 AM    RDW 13.0 06/29/2022 06:01 AM    LYMPHOPCT 6 06/29/2022 06:01 AM    MONOPCT 7 06/29/2022 06:01 AM    BASOPCT 0 06/29/2022 06:01 AM    MONOSABS 1.65 06/29/2022 06:01 AM    LYMPHSABS 1.41 06/29/2022 06:01 AM    EOSABS 0.00 06/29/2022 06:01 AM    BASOSABS 0.00 06/29/2022 06:01 AM    DIFFTYPE NOT REPORTED 12/06/2021 06:50 PM     BMP:    Lab Results   Component Value Date/Time     06/29/2022 06:01 AM    K 3.9 06/29/2022 06:01 AM     06/29/2022 06:01 AM    CO2 20 06/29/2022 06:01 AM    BUN 24 06/29/2022 06:01 AM    LABALBU 4.4 03/23/2022 05:57 AM    CREATININE 0.96 06/29/2022 06:01 AM    CALCIUM 9.4 06/29/2022 06:01 AM    GFRAA >60 06/29/2022 06:01 AM    LABGLOM >60 06/29/2022 06:01 AM    GLUCOSE 211 06/29/2022 06:01 AM         ASSESSMENT:  Active Hospital Problems    Diagnosis Date Noted    Colostomy in place [facility]) [Z93.3] 06/28/2022     Priority: Medium       59 y.o. male s/p exploratory laparotomy and lysis of adhesions    Plan:  1. Currently on clear liquid diet, advance to clears  2. Nausea/vomiting under control  3. Asymptomatic Leukocytosis - WBC 23.5 - patient is afebrile, stomach pain 3/10. Continue to watch for signs of infection. CBC tomorrow. Most likely reactive leukocytosis  4. Multimodal pain control  5. Nausea / GERD  - phenergan   6. Sleeping: Benadryl PRN  7. Monitor I/O's  8. Chronic Afib - Continue Lovenox   9. Primary HTN -  Metoprolol (held due to hypotensive episodes overnight)   10. Follow PT / OT recommendations  11. Midline Island dressing changed       Electronically signed by David Jackson  on 6/30/2022 at 5:18 AM       General Surgery Resident Statement/Addendum  I have discussed the case, including pertinent history and exam findings with the above medical student. I have personally seen the patient. Pt seen and examined at bedside. I performed both history and physical exam.      Note was edited and changes made by me. Assessment and plan reviewed and changes made by me. I agree with the assessment and plan as stated above.     Huyen Stephens, DO PGY-1  6/30/22 5:58 AM

## 2022-06-30 NOTE — PROGRESS NOTES
Pt IV infiltrated , in l eft arm, with amio/lr running. Hand is swollen , and painful. Rx called to see how damaginng amio is to the skin, they said use dry warm compress. Both meds on hold, until new IV access is established .   MD aware

## 2022-06-30 NOTE — DISCHARGE INSTR - COC
Continuity of Care Form    Patient Name: Ana Lee   :  1960  MRN:  [id number]    Admit date:  2022  Discharge date:  ***    Code Status Order: Full Code   Advance Directives:      Admitting Physician:  Arleth Jacobson DO  PCP: JASPREET Mullen NP    Discharging Nurse: [facility] Unit/Room#: 4810/8130-45  Discharging Unit Phone Number: ***    Emergency Contact:   Extended Emergency Contact Information  Primary Emergency Contact: [address] [place] [address] St Phone: [phone]  Work Phone: [phone]  Mobile Phone: [phone]  Relation: Other    Past Surgical History:  Past Surgical History:   Procedure Laterality Date    ABDOMINAL EXPLORATION SURGERY  2022     EXPLORATORY LAPAROTOMY,  LYSIS OF ADHESIONS     CARDIOVERSION  2021    COLECTOMY N/A 2021    COLECTOMY, COLOSTOMY (N/A Abdomen)    CT ABSCESS DRAIN SUBCUTANEOUS  10/12/2021    CT ABSCESS DRAIN SUBCUTANEOUS 10/12/2021 [facility] CT SCAN    LAPAROTOMY N/A 2022    EXPLORATORY LAPAROTOMY,  LYSIS OF ADHESIONS performed by Arleth Jacobson DO at [address] [facility] N/A 2021    COLECTOMY, COLOSTOMY performed by Arleth Jacobson DO at [facility]       Immunization History:   Immunization History   Administered Date(s) Administered    COVID-19, PFIZER PURPLE top, DILUTE for use, (age 15 y+), 30mcg/0.3mL 2021    Influenza, Quadv, IM, PF (6 mo and older Fluzone, Flulaval, Fluarix, and 3 yrs and older Afluria) 2020, 10/15/2021       Active Problems:  Patient Active Problem List   Diagnosis Code    New onset atrial fibrillation (HCC) I48.91    Primary hypertension I10    Colonic diverticular abscess K57.20    Chronic atrial fibrillation (HCC) I48.20    Hyperlipidemia E78.5    Chronic anticoagulation Z79.01    Sigmoid diverticulitis K57.32    Duodenal diverticulum K57.10    Colovesical fistula N32.1    Severe malnutrition ([address] E43    Colostomy in place ([address] Z93.3       Isolation/Infection: Isolation            No Isolation          Patient Infection Status       Infection Onset Added Last Indicated Last Indicated By Review Planned Expiration Resolved Resolved By    None active    Resolved    COVID-19 21 COVID-19, Rapid   21     COVID-19 (Rule Out) 21 COVID-19, Rapid (Ordered)   21 Rule-Out Test Resulted    COVID-19 (Rule Out) 21 COVID-19 (Ordered)   21 Rule-Out Test Resulted            Nurse Assessment:  Last Vital Signs: BP (!) 147/92   Pulse 89   Temp 97.1 °F (36.2 °C) (Oral)   Resp 17   Wt 212 lb 1.3 oz (96.2 kg)   SpO2 90%   BMI 29.58 kg/m²     Last documented pain score (0-10 scale): Pain Level: 5  Last Weight:   Wt Readings from Last 1 Encounters:   22 212 lb 1.3 oz (96.2 kg)     Mental Status:  {IP PT MENTAL STATUS:}    IV Access:  { IRENE IV ACCESS:346463017}    Nursing Mobility/ADLs:  Walking   {CHP DME NOFN:391522688}  Transfer  {CHP DME ZBZH:419491456}  Bathing  {CHP DME SWZT:156077225}  Dressing  {CHP DME TSOQ:022017263}  Toileting  {CHP DME ZDUT:520637466}  Feeding  {CHP DME NDCA:110266227}  Med Admin  {CHP DME ZLWC:239856526}  Med Delivery   { IRENE MED Delivery:952549117}    Wound Care Documentation and Therapy:  Incision 22 Abdomen Medial (Active)   Dressing Status Old drainage noted 22 0400   Dressing/Treatment Dry dressing 22 0400   Closure Other (Comment) 22 1621   Drainage Amount Moderate 22 1600   Drainage Description Sanguinous 22 0400   Odor None 22 1600   Number of days: 1        Elimination:  Continence:    Bowel: {YES / CZ:48641}  Bladder: {YES / RP:64181}  Urinary Catheter: {Urinary Catheter:694069296}   Colostomy/Ileostomy/Ileal Conduit: {YES / IR:65450}  Colostomy LUQ Descending/sigmoid-Stomal Appliance: Clean, dry & intact  Colostomy LUQ Descending/sigmoid-Flange Size (inches):  (SCOTT)  Colostomy LUQ Descending/sigmoid-Peristomal Assessment:  (carol)  Colostomy LUQ Descending/sigmoid-Treatment:  (colostomy bag in place)  Colostomy LUQ Descending/sigmoid-Stool Appearance:  (none)  Colostomy LUQ Descending/sigmoid-Stool Color:  (none)    Date of Last BM: ***    Intake/Output Summary (Last 24 hours) at 2022 0627  Last data filed at 2022 0543  Gross per 24 hour   Intake 1320 ml   Output 1085 ml   Net 235 ml     I/O last 3 completed shifts:   In: 1600 [I.V.:1600]  Out: 36 [Urine:1000; Emesis/NG output:150; Drains:240; Blood:75]    Safety Concerns:     [address] Safety Concerns:053209172}    Impairments/Disabilities:      [address] Impairments/Disabilities:747193881}    Nutrition Therapy:  Current Nutrition Therapy:   [address] Diet List:615010204}    Routes of Feeding: {P DME Other Feedings:457161583}  Liquids: {Slp liquid thickness:55556}  Daily Fluid Restriction: {CHP DME Yes amt example:088913308}  Last Modified Barium Swallow with Video (Video Swallowing Test): {Done Not Done IUJM:311971468}    Treatments at the Time of Hospital Discharge:   Respiratory Treatments: ***  Oxygen Therapy:  {Therapy; copd oxygen:68911}  Ventilator:    { CC Vent RXDO:964116802}    Rehab Therapies: {THERAPEUTIC INTERVENTION:7325050519}  Weight Bearing Status/Restrictions: [address]  Weight Bearin}  Other Medical Equipment (for information only, NOT a DME order):  {EQUIPMENT:371644098}  Other Treatments: ***    Patient's personal belongings (please select all that are sent with patient):  {P DME Belongings:327786125}    RN SIGNATURE:  {Esignature:177856892}    CASE MANAGEMENT/SOCIAL WORK SECTION    Inpatient Status Date: ***    Readmission Risk Assessment Score:  Readmission Risk              Risk of Unplanned Readmission:  12           Discharging to Facility/ Agency   Name:   Address:  Phone:  Fax:    Dialysis Facility (if applicable)   Name:  Address:  Dialysis Schedule:  Phone:  Fax:    / signature: {Esignature:303146691}    PHYSICIAN SECTION    Prognosis: {Prognosis:1105588714}    Condition at Discharge: [address] Patient Condition:525844864}    Rehab Potential (if transferring to Rehab): {Prognosis:9476812502}    Recommended Labs or Other Treatments After Discharge: ***    Physician Certification: I certify the above information and transfer of Daytno Gonzáles  is necessary for the continuing treatment of the diagnosis listed and that he requires {Admit to Appropriate Level of Care:09982} for {GREATER/LESS:970634985} 30 days.      Update Admission H&P: {CHP DME Changes in HXNNJ:757753148}    PHYSICIAN SIGNATURE:  Electronically signed by Sherren Naegeli, DO on 6/30/22 at 6:28 AM EDT

## 2022-06-30 NOTE — CONSULTS
[facility]  In PatientCardiology Consult             Date:   6/30/2022  Patient name: Bladimir Madrid  Date of admission:  6/28/2022  9:38 AM  MRN:   [id number]  YOB: 1960    Reason for Admission: Colostomy creation     CHIEF COMPLAINT:  Colostomy creation      History Obtained From:  Patient and medical records. HISTORY OF PRESENT ILLNESS:      The patient is a 59 y.o. male who presents for colostomy reversal. Pt is s/p rectosigmoid resection and colostomy creation for diverticulitis and colovesical fistula in November 2021. The pathology from that operation returned as diverticulosis/diverticulitis with serosal fibrosis/fibrous adhesions, chronic active abscess and fistulous track. There were benign mesenteric lymph nodes. Since then patient had Covid in December and an episode of dehydration. He was also seen by Cardiology for new onset atrial fibrillation with RVR in December 2021. He had a successful cardioversion on 1/14/2021 and has maintained rhythm control on beta blocker since then. He does take Xarelto for anticoagulation and has held this for the last 3 days. EKG done in cardiology office on June 13 2022 demonstrated normal sinus rhythm and he was cleared for surgery from cardiology with low risk for major cardiac complications.      Pt has held his Xarelto for 3 days. He reports loose stools with the colostomy and would like to proceed to the operating room for reversal today. Cardiology was consulted for post-op AFib with RVR.      Past Medical History:   has a past medical history of Abnormal patient-activated cardiac event monitor, Chronic anticoagulation, Colonic diverticular abscess, Colostomy fistula ([address] GERD (gastroesophageal reflux disease), H/O echocardiogram, History of cardioversion, History of pneumonia, Hx of bronchitis, Hyperlipidemia, Hypertension, Non-ischemic cardiomyopathy ([address] MICHELLE on CPAP, Paroxysmal A-fib ([address] Sleep apnea, Under care of team, and Under care of team.    Past Surgical History:   has a past surgical history that includes CT ABSCESS DRAINAGE (10/12/2021); Cardioversion (01/2021); Sigmoid Colectomy (N/A, 11/22/2021); and laparotomy (N/A, 06/28/2022). Home Medications:    Prior to Admission medications    Medication Sig Start Date End Date Taking? Authorizing Provider   Multiple Vitamin (MULTIVITAMIN ADULT PO) Take 1 tablet by mouth daily     Historical Provider, MD   metoprolol succinate (TOPROL XL) 25 MG extended release tablet take 1 tablet by mouth once daily 2/14/22   Kaity Mackay MD   losartan (COZAAR) 25 MG tablet take 1 tablet by mouth once daily 12/14/21   Kaity Mackay MD   sildenafil (VIAGRA) 50 MG tablet Take 1 tablet by mouth as needed     Historical Provider, MD   hydrOXYzine (ATARAX) 50 MG tablet take 1 tablet by mouth at bedtime if needed for sleep  Patient not taking: Reported on 6/13/2022 9/27/21   Historical Provider, MD   loratadine (CLARITIN) 10 MG tablet take 1 tablet by mouth daily if needed for allergies 9/17/21   Historical Provider, MD   fluticasone (FLONASE) 50 MCG/ACT nasal spray 2 sprays by Nasal route daily as needed for Rhinitis One time in morning and once in evening 8/26/21   Historical Provider, MD   rivaroxaban (XARELTO) 20 MG TABS tablet Take 1 tablet by mouth daily (with breakfast) 9/29/21   Kaity Mackay MD       Allergies:  Cipro [ciprofloxacin hcl] and Pcn [penicillins]    Social History:   reports that he has never smoked. He has never used smokeless tobacco. He reports previous alcohol use. He reports that he does not use drugs. Family History: family history includes Cancer in his father and mother; Coronary Art Dis in his father and mother. No h/o sudden cardiac death. REVIEW OF SYSTEMS:    · Constitutional: there has been no unanticipated weight loss. There's been No change in energy level, No change in activity level. · Eyes: No visual changes or diplopia.  No scleral icterus. · ENT: No Headaches, hearing loss or vertigo. No mouth sores or sore throat. · Cardiovascular: As above. · Respiratory: As above. · Gastrointestinal: No abdominal pain, appetite loss, blood in stools. No change in bowel or bladder habits. · Genitourinary: No dysuria, trouble voiding, or hematuria. · Musculoskeletal:  No gait disturbance, No weakness or joint complaints. · Integumentary: No rash or pruritis. · Neurological: No headache, diplopia, change in muscle strength, numbness or tingling. No change in gait, balance, coordination, mood, affect, memory, mentation, behavior. · Psychiatric: No anxiety, or depression. · Endocrine: No temperature intolerance. No excessive thirst, fluid intake, or urination. No tremor. · Hematologic/Lymphatic: No abnormal bruising or bleeding, blood clots or swollen lymph nodes. · Allergic/Immunologic: No nasal congestion or hives. PHYSICAL EXAM:    Physical Examination:    BP (!) 140/89   Pulse 86   Temp 99.3 °F (37.4 °C) (Temporal)   Resp 18   Wt 212 lb 1.3 oz (96.2 kg)   SpO2 91%   BMI 29.58 kg/m²    Constitutional and General Appearance: alert, cooperative, no distress and appears stated age  HEENT: PERRL, no cervical lymphadenopathy. No masses palpable. Normal oral mucosa  Respiratory:  · Normal excursion and expansion without use of accessory muscles  · Resp Auscultation: Good respiratory effort. No for increased work of breathing. On auscultation: Clear to auscultation. Cardiovascular:  · The apical impulse is not displaced  · Heart tones are crisp and normal.Irregularly irregular S1 and S2. Murmurs: None.   · Jugular venous pulsation Normal  · The carotid upstroke is normal in amplitude and contour without delay or bruit  · Peripheral pulses are symmetrical and full   Abdomen:  · No masses or tenderness  · Bowel sounds present  Extremities:  ·  No Cyanosis or Clubbing  ·  Lower extremity edema: No.  ·  Skin: Warm and dry  Neurological:  · Alert and oriented. · Moves all extremities well  · No abnormalities of mood, affect, memory, mentation, or behavior are noted    DATA:    Diagnostics:      EKG: A.fib with RVR    ECHO 6/25/2021:   Global left ventricular systolic function appears preserved with an estimated ejection fraction of 55%. Mildly increased left ventricular wall thickness with a normal left ventricular cavity size. The left atrium is moderately dilated (34-39) with a left atrial volume index of 39 ml/m2. Normal mitral valve structure with trivial mitral regurgitation. Evidence of mild diastolic dysfunction is seen. The aortic root is mildly dilated. EVENT MONITOR 4/21/2022:   1. 7 days recorded. 2. Baseline rhythm is sinus with average heart rate of 69 bpm, ranging between 53 and 111 bpm.  3. Atrial tachycardia (AT) 20 episodes; longest 8 beats at average 139  bpm up to 142 bpm; fastest 7 beats at average 157 bpm up to 180 bpm.  4. Premature atrial contractions 0.09%. 5. Premature ventricular contractions 1.43%. 6. No patient-activated events recorded. 7. Overall no dangerous heart rate or rhythm abnormalities detected. CARDIOVERSION 1/14/2021: Successful but did show short runs of atrial tachycardia, patient started on amiodarone orally on discharge. His digoxin was discontinued.     Labs:     CBC:   Recent Labs     06/29/22  0601 06/30/22  0636   WBC 23.5* 20.3*   HGB 14.8 12.8*   HCT 45.1 40.9    222     BMP:   Recent Labs     06/29/22  0601 06/30/22  0636    140   K 3.9 4.5   CO2 20 21   BUN 24* 25*   CREATININE 0.96 1.04   LABGLOM >60 >60   GLUCOSE 211* 121*     FASTING LIPID PANEL:  Lab Results   Component Value Date/Time    HDL 45 05/06/2021 09:43 AM       Patient Active Problem List   Diagnosis    New onset atrial fibrillation (HCC)    Primary hypertension    Colonic diverticular abscess    Chronic atrial fibrillation (HCC)    Hyperlipidemia    Chronic anticoagulation    Sigmoid diverticulitis    Duodenal diverticulum    Colovesical fistula    Severe malnutrition (HCC)    Colostomy in place [facility])       IMPRESSION:    1. History of PAF, in AFib with RVR   2. POD #2 ex-lap and lysis of adhesions  3. Nonischemic cardiomyopathy (?? -- mentioned in Dr. Rona Ford note)  4. Hypertension       RECOMMENDATIONS:  1. Amiodarone bolus and gtt   2. Cardizem bolus 10 mg IV   3. NPO after midnight for possible HO/CV in AM if he remains in AFib with RVR   4. Start Heparin gtt if ok with General Surgery   5. Pt of Dr. Meagan Wilkes in [place]       Discussed with patient and nursing.     Electronically signed by Mikaela Gar MD on 6/30/2022 at [address] [facility]  [phone]

## 2022-07-01 LAB
ANION GAP SERPL CALCULATED.3IONS-SCNC: 10 MMOL/L (ref 9–17)
BUN BLDV-MCNC: 24 MG/DL (ref 8–23)
CALCIUM SERPL-MCNC: 8.4 MG/DL (ref 8.6–10.4)
CHLORIDE BLD-SCNC: 106 MMOL/L (ref 98–107)
CO2: 24 MMOL/L (ref 20–31)
CREAT SERPL-MCNC: 0.97 MG/DL (ref 0.7–1.2)
EKG ATRIAL RATE: 357 BPM
EKG ATRIAL RATE: 83 BPM
EKG P AXIS: 25 DEGREES
EKG P-R INTERVAL: 152 MS
EKG Q-T INTERVAL: 312 MS
EKG Q-T INTERVAL: 402 MS
EKG QRS DURATION: 84 MS
EKG QRS DURATION: 84 MS
EKG QTC CALCULATION (BAZETT): 472 MS
EKG QTC CALCULATION (BAZETT): 498 MS
EKG R AXIS: 11 DEGREES
EKG R AXIS: 31 DEGREES
EKG T AXIS: -121 DEGREES
EKG T AXIS: 38 DEGREES
EKG VENTRICULAR RATE: 153 BPM
EKG VENTRICULAR RATE: 83 BPM
GFR AFRICAN AMERICAN: >60 ML/MIN
GFR NON-AFRICAN AMERICAN: >60 ML/MIN
GFR SERPL CREATININE-BSD FRML MDRD: ABNORMAL ML/MIN/{1.73_M2}
GLUCOSE BLD-MCNC: 104 MG/DL (ref 75–110)
GLUCOSE BLD-MCNC: 109 MG/DL (ref 75–110)
GLUCOSE BLD-MCNC: 109 MG/DL (ref 75–110)
GLUCOSE BLD-MCNC: 116 MG/DL (ref 70–99)
GLUCOSE BLD-MCNC: 119 MG/DL (ref 75–110)
GLUCOSE BLD-MCNC: 126 MG/DL (ref 75–110)
HCT VFR BLD CALC: 38 % (ref 40.7–50.3)
HEMOGLOBIN: 12 G/DL (ref 13–17)
MAGNESIUM: 1.9 MG/DL (ref 1.6–2.6)
MCH RBC QN AUTO: 30.4 PG (ref 25.2–33.5)
MCHC RBC AUTO-ENTMCNC: 31.6 G/DL (ref 28.4–34.8)
MCV RBC AUTO: 96.2 FL (ref 82.6–102.9)
NRBC AUTOMATED: 0 PER 100 WBC
PARTIAL THROMBOPLASTIN TIME: 26.4 SEC (ref 20.5–30.5)
PARTIAL THROMBOPLASTIN TIME: 26.5 SEC (ref 20.5–30.5)
PDW BLD-RTO: 13.7 % (ref 11.8–14.4)
PLATELET # BLD: 252 K/UL (ref 138–453)
PMV BLD AUTO: 9.5 FL (ref 8.1–13.5)
POTASSIUM SERPL-SCNC: 3.8 MMOL/L (ref 3.7–5.3)
RBC # BLD: 3.95 M/UL (ref 4.21–5.77)
SODIUM BLD-SCNC: 140 MMOL/L (ref 135–144)
WBC # BLD: 12.2 K/UL (ref 3.5–11.3)

## 2022-07-01 PROCEDURE — 6370000000 HC RX 637 (ALT 250 FOR IP): Performed by: EMERGENCY MEDICINE

## 2022-07-01 PROCEDURE — 6370000000 HC RX 637 (ALT 250 FOR IP): Performed by: SURGERY

## 2022-07-01 PROCEDURE — 6360000002 HC RX W HCPCS: Performed by: SURGERY

## 2022-07-01 PROCEDURE — 97530 THERAPEUTIC ACTIVITIES: CPT

## 2022-07-01 PROCEDURE — 83735 ASSAY OF MAGNESIUM: CPT

## 2022-07-01 PROCEDURE — 2060000000 HC ICU INTERMEDIATE R&B

## 2022-07-01 PROCEDURE — 85027 COMPLETE CBC AUTOMATED: CPT

## 2022-07-01 PROCEDURE — 6370000000 HC RX 637 (ALT 250 FOR IP): Performed by: NURSE PRACTITIONER

## 2022-07-01 PROCEDURE — 6370000000 HC RX 637 (ALT 250 FOR IP): Performed by: STUDENT IN AN ORGANIZED HEALTH CARE EDUCATION/TRAINING PROGRAM

## 2022-07-01 PROCEDURE — 85730 THROMBOPLASTIN TIME PARTIAL: CPT

## 2022-07-01 PROCEDURE — 2580000003 HC RX 258

## 2022-07-01 PROCEDURE — 93010 ELECTROCARDIOGRAM REPORT: CPT | Performed by: INTERNAL MEDICINE

## 2022-07-01 PROCEDURE — 36415 COLL VENOUS BLD VENIPUNCTURE: CPT

## 2022-07-01 PROCEDURE — 2580000003 HC RX 258: Performed by: STUDENT IN AN ORGANIZED HEALTH CARE EDUCATION/TRAINING PROGRAM

## 2022-07-01 PROCEDURE — 6360000002 HC RX W HCPCS: Performed by: STUDENT IN AN ORGANIZED HEALTH CARE EDUCATION/TRAINING PROGRAM

## 2022-07-01 PROCEDURE — 82947 ASSAY GLUCOSE BLOOD QUANT: CPT

## 2022-07-01 PROCEDURE — 80048 BASIC METABOLIC PNL TOTAL CA: CPT

## 2022-07-01 PROCEDURE — 97116 GAIT TRAINING THERAPY: CPT

## 2022-07-01 PROCEDURE — 2500000003 HC RX 250 WO HCPCS: Performed by: SURGERY

## 2022-07-01 RX ORDER — METOPROLOL SUCCINATE 50 MG/1
50 TABLET, EXTENDED RELEASE ORAL DAILY
Qty: 30 TABLET | Refills: 3 | Status: SHIPPED | OUTPATIENT
Start: 2022-07-01 | End: 2022-07-19

## 2022-07-01 RX ORDER — METOPROLOL SUCCINATE 50 MG/1
50 TABLET, EXTENDED RELEASE ORAL DAILY
Status: DISCONTINUED | OUTPATIENT
Start: 2022-07-02 | End: 2022-07-01

## 2022-07-01 RX ORDER — AMIODARONE HYDROCHLORIDE 200 MG/1
200 TABLET ORAL 2 TIMES DAILY
Qty: 10 TABLET | Refills: 0 | Status: SHIPPED | OUTPATIENT
Start: 2022-07-01 | End: 2022-07-19 | Stop reason: SDUPTHER

## 2022-07-01 RX ORDER — AMIODARONE HYDROCHLORIDE 200 MG/1
200 TABLET ORAL 2 TIMES DAILY
Status: DISCONTINUED | OUTPATIENT
Start: 2022-07-01 | End: 2022-07-03 | Stop reason: HOSPADM

## 2022-07-01 RX ORDER — AMIODARONE HYDROCHLORIDE 200 MG/1
200 TABLET ORAL DAILY
Qty: 30 TABLET | Refills: 2 | Status: SHIPPED | OUTPATIENT
Start: 2022-07-06 | End: 2022-07-19

## 2022-07-01 RX ORDER — AMIODARONE HYDROCHLORIDE 200 MG/1
200 TABLET ORAL DAILY
Status: DISCONTINUED | OUTPATIENT
Start: 2022-07-06 | End: 2022-07-03 | Stop reason: HOSPADM

## 2022-07-01 RX ORDER — METOPROLOL SUCCINATE 50 MG/1
50 TABLET, EXTENDED RELEASE ORAL DAILY
Status: DISCONTINUED | OUTPATIENT
Start: 2022-07-01 | End: 2022-07-03 | Stop reason: HOSPADM

## 2022-07-01 RX ADMIN — METHOCARBAMOL TABLETS 750 MG: 750 TABLET, COATED ORAL at 22:45

## 2022-07-01 RX ADMIN — ACETAMINOPHEN 1000 MG: 500 TABLET ORAL at 22:45

## 2022-07-01 RX ADMIN — METOPROLOL TARTRATE 2.5 MG: 1 INJECTION, SOLUTION INTRAVENOUS at 06:33

## 2022-07-01 RX ADMIN — METOCLOPRAMIDE 10 MG: 5 INJECTION, SOLUTION INTRAMUSCULAR; INTRAVENOUS at 01:24

## 2022-07-01 RX ADMIN — METOPROLOL TARTRATE 2.5 MG: 1 INJECTION, SOLUTION INTRAVENOUS at 01:24

## 2022-07-01 RX ADMIN — SODIUM CHLORIDE, PRESERVATIVE FREE 10 ML: 5 INJECTION INTRAVENOUS at 11:57

## 2022-07-01 RX ADMIN — METOCLOPRAMIDE 10 MG: 5 INJECTION, SOLUTION INTRAMUSCULAR; INTRAVENOUS at 20:16

## 2022-07-01 RX ADMIN — RIVAROXABAN 20 MG: 20 TABLET, FILM COATED ORAL at 12:04

## 2022-07-01 RX ADMIN — AMIODARONE HYDROCHLORIDE 0.5 MG/MIN: 50 INJECTION, SOLUTION INTRAVENOUS at 01:00

## 2022-07-01 RX ADMIN — FAMOTIDINE 20 MG: 20 TABLET, FILM COATED ORAL at 10:56

## 2022-07-01 RX ADMIN — METOCLOPRAMIDE 10 MG: 5 INJECTION, SOLUTION INTRAMUSCULAR; INTRAVENOUS at 06:33

## 2022-07-01 RX ADMIN — AMIODARONE HYDROCHLORIDE 200 MG: 200 TABLET ORAL at 11:56

## 2022-07-01 RX ADMIN — METHOCARBAMOL TABLETS 750 MG: 750 TABLET, COATED ORAL at 10:57

## 2022-07-01 RX ADMIN — SODIUM CHLORIDE, POTASSIUM CHLORIDE, SODIUM LACTATE AND CALCIUM CHLORIDE: 600; 310; 30; 20 INJECTION, SOLUTION INTRAVENOUS at 12:03

## 2022-07-01 RX ADMIN — METOPROLOL SUCCINATE 50 MG: 50 TABLET, FILM COATED, EXTENDED RELEASE ORAL at 11:56

## 2022-07-01 RX ADMIN — AMIODARONE HYDROCHLORIDE 200 MG: 200 TABLET ORAL at 20:16

## 2022-07-01 RX ADMIN — METOCLOPRAMIDE 10 MG: 5 INJECTION, SOLUTION INTRAMUSCULAR; INTRAVENOUS at 15:55

## 2022-07-01 RX ADMIN — FAMOTIDINE 20 MG: 20 TABLET, FILM COATED ORAL at 20:16

## 2022-07-01 RX ADMIN — ACETAMINOPHEN 1000 MG: 500 TABLET ORAL at 15:55

## 2022-07-01 ASSESSMENT — PAIN SCALES - GENERAL
PAINLEVEL_OUTOF10: 2
PAINLEVEL_OUTOF10: 0
PAINLEVEL_OUTOF10: 0

## 2022-07-01 NOTE — PROGRESS NOTES
General Surgery:  Daily Progress Note                    PATIENT NAME: Brett Gould     TODAY'S DATE: 7/1/2022, 5:15 AM  CC:  GERD symptoms    SUBJECTIVE:     Pt seen and examined at bedside. Nurse reported patient is struggling to sleep. Patient's pain, nausea and vomiting well controlled. CARRINGTON drain 160ml of sanguinous output overnight. Current abdominal pain rated 2/10. Patient is afebrile, no new afib episodes overnight,  good urine output, and flatulence only from stomal output. Patient was not able to meet with physical therapy yesterday due to Afib problems. Amiodarone bolus for afib RVR, and drip continued. Heparin gtt started yesterday. OBJECTIVE:   VITALS:  /81   Pulse 76   Temp 98.2 °F (36.8 °C) (Oral)   Resp 22   Wt 212 lb 1.3 oz (96.2 kg)   SpO2 94%   BMI 29.58 kg/m²      INTAKE/OUTPUT:      Intake/Output Summary (Last 24 hours) at 7/1/2022 0515  Last data filed at 6/30/2022 1659  Gross per 24 hour   Intake 1440 ml   Output 685 ml   Net 755 ml       PHYSICAL EXAM:  General Appearance: awake, alert, sweaty and appears uncomfortable  Heart: Heart sounds are normal.  Regular rate and rhythm without murmur, gallop or rub. Lungs: Normal expansion. Clear to auscultation. No rales, rhonchi, or wheezing. Abdomen: Very distended, tender diffusely. Drain with sanguinous output. Abdominal pad is clean, dry with no strikethrough. Extremities: No cyanosis, pitting edema, rashes noted. bilateral swollen hands    Skin: Skin color, texture, turgor normal. No rashes or lesions.     Data:  CBC with Differential:    Lab Results   Component Value Date/Time    WBC 18.6 06/30/2022 12:39 PM    RBC 4.17 06/30/2022 12:39 PM    HGB 12.9 06/30/2022 12:39 PM    HCT 38.9 06/30/2022 12:39 PM     06/30/2022 12:39 PM    MCV 93.3 06/30/2022 12:39 PM    MCH 30.9 06/30/2022 12:39 PM    MCHC 33.2 06/30/2022 12:39 PM    RDW 13.7 06/30/2022 12:39 PM    LYMPHOPCT 6 06/30/2022 06:36 AM    MONOPCT 7 06/30/2022 06:36 AM    BASOPCT 0 06/30/2022 06:36 AM    MONOSABS 1.44 06/30/2022 06:36 AM    LYMPHSABS 1.28 06/30/2022 06:36 AM    EOSABS <0.03 06/30/2022 06:36 AM    BASOSABS 0.03 06/30/2022 06:36 AM    DIFFTYPE NOT REPORTED 12/06/2021 06:50 PM     BMP:    Lab Results   Component Value Date/Time     06/30/2022 06:36 AM    K 4.5 06/30/2022 06:36 AM     06/30/2022 06:36 AM    CO2 21 06/30/2022 06:36 AM    BUN 25 06/30/2022 06:36 AM    LABALBU 4.4 03/23/2022 05:57 AM    CREATININE 1.04 06/30/2022 06:36 AM    CALCIUM 8.8 06/30/2022 06:36 AM    GFRAA >60 06/30/2022 06:36 AM    LABGLOM >60 06/30/2022 06:36 AM    GLUCOSE 121 06/30/2022 06:36 AM         ASSESSMENT:  Active Hospital Problems    Diagnosis Date Noted    Colostomy in place [facility]) [Z93.3] 06/28/2022     Priority: Medium       59 y.o. male s/p exploratory laparotomy and lysis of adhesions    Plan:  1. Currently on clear diet, will discuss with resident on advancing diet  2. Nausea/vomiting under control  3. Reactive Leukocytosis - WBC 23.5 yesterday, downtrending 18. 6. patient is afebrile, stomach pain 3/10. CBC tomorrow? Most likely reactive leukocytosis vs infection  4. Multimodal pain control  5. Nausea / GERD  - Phenergan , Reglan  6. Monitor I/O's  7. Chronic Afib - Patient experienced AFIB W/ RVR - currently on rate/rhythm control. Will follow cardiology recommendations. Patient is good to go on heparin from surgery standpoint. 8. Primary HTN -  Metoprolol  9. Follow PT / OT recommendations  10. [place] dressing meneses tomorrow       Electronically signed by Sergei Mathis  on 7/1/2022 at 5:15 AM     I have reviewed the medical student's findings.     Pipe Flores PGY-I  .7/1/22  6:16AM

## 2022-07-01 NOTE — PLAN OF CARE
Cardiology has seen the patient, and has cleared the patient from NPO. Advancing diet to full liquid diet. If patient tolerates FLD, will consider switching Heparin gtt to po Xarelto.     [facility] PGY-1

## 2022-07-01 NOTE — PROGRESS NOTES
Physical Therapy  Facility/Department: [address] STEPDOWN  Daily Treatment note    Name: Janice Gomez  : 1960  MRN: [id number]  Date of Service: 2022    Discharge Recommendations:  Patient would benefit from continued therapy after discharge   PT Equipment Recommendations  Equipment Needed: No      Patient Diagnosis(es): There were no encounter diagnoses. Past Medical History:  has a past medical history of Abnormal patient-activated cardiac event monitor, Chronic anticoagulation, Colonic diverticular abscess, Colostomy fistula ([facility] GERD (gastroesophageal reflux disease), H/O echocardiogram, History of cardioversion, History of pneumonia, Hx of bronchitis, Hyperlipidemia, Hypertension, Non-ischemic cardiomyopathy ([facility] [facility] MICHELLE on CPAP, Paroxysmal A-fib ([facility] [address] [facility] Sleep apnea, Under care of team, and Under care of team.  Past Surgical History:  has a past surgical history that includes CT ABSCESS DRAINAGE (10/12/2021); Cardioversion (2021); Sigmoid Colectomy (N/A, 2021); and laparotomy (N/A, 2022). Assessment   Body Structures, Functions, Activity Limitations Requiring Skilled Therapeutic Intervention: Decreased functional mobility ; Increased pain;Decreased posture;Decreased endurance  Therapy Prognosis: Good  Requires PT Follow-Up: Yes  Activity Tolerance  Activity Tolerance: Patient limited by pain; Patient limited by fatigue     Plan   Plan  Plan:  (5-6x/wk)  Current Treatment Recommendations: Strengthening,ROM,Balance training,Functional mobility training,Transfer training,Endurance training,Gait training,Stair training,Pain management,Home exercise program,Safety education & training,Patient/Caregiver education & training,Equipment evaluation, education, & procurement,Therapeutic activities  Safety Devices  Type of Devices: Call light within reach,Patient at risk for falls,Nurse notified,Left in chair,Gait belt,Chair alarm in place  Restraints  Restraints Initially in Place: No Restrictions  Restrictions/Precautions  Restrictions/Precautions: Surgical Protocols,Fall Risk,General Precautions,Up as Tolerated  Required Braces or Orthoses?: No     Subjective   General  Response To Previous Treatment: Patient with no complaints from previous session. Family / Caregiver Present: No  Follows Commands: Within Functional Limits  Subjective  Subjective: RN and pt agreeable to PT. Pt supine in bed upon arrival , pleasant and            Objective      Bed mobility  Supine to Sit: Moderate assistance  Sit to Supine: Unable to assess  Scooting: Moderate assistance  Bed Mobility Comments: c/o pain with Mobility. Transfers  Sit to Stand: Minimal Assistance  Stand to sit: Minimal Assistance  Bed to Chair: Minimal assistance  Comment: Transfer with RW  Ambulation  Surface: level tile  Device: Rolling Walker  Assistance: Minimal assistance  Quality of Gait: unsteady ;Cueing for safety  Gait Deviations: Slow Rubina;Decreased step length;Decreased step height;Decreased arm swing  Distance: 30ft  Comments: Unsteady with no LOB noted.  Limited by decrease endurance  Stairs/Curb  Stairs?: No     Balance  Posture: Fair  Sitting - Static: Fair (Tolerated ~6mins at EOB)  Sitting - Dynamic: Fair  Standing - Static: Fair;-  Standing - Dynamic: Poor;+   AM-PAC Score    AM-PAC Inpatient Mobility Raw Score : 11 (06/29/22 1536)  AM-PAC Inpatient T-Scale Score : 33.86 (06/29/22 1536)  Mobility Inpatient CMS 0-100% Score: 72.57 (06/29/22 1536)  Mobility Inpatient CMS G-Code Modifier : CL (06/29/22 1536)             Goals  Short Term Goals  Time Frame for Short term goals: 12 visits  Short term goal 1: independent bed mobility, HOB flat, no bed rails  Short term goal 2: independent transfers  Short term goal 3: independent gait with appropriate device vs none x 80'  Short term goal 4: stair ambulation x 4 steps with B HRs independently  Patient Goals   Patient goals : decrease pain, be able to sleep in bed and able to get into/out of bed independently at home       Education  Patient Education  Education Given To: Patient  Education Provided: Role of Therapy;Plan of Care;Transfer Training; Fall Prevention Strategies  Education Method: Verbal  Barriers to Learning: None  Education Outcome: Continued education needed      Therapy Time   Individual Concurrent Group Co-treatment   Time In 1019         Time Out 9332         Minutes 34         Timed Code Treatment Minutes: [address] [facility]

## 2022-07-01 NOTE — PROGRESS NOTES
[facility]   Progress Note                   Date:   7/1/2022  Patient name: Karma Meredith  Date of admission:  6/28/2022  9:38 AM  MRN:   [id number]  YOB: 1960  PCP: JASPREET Bray NP    Reason for Admission: History of diverticulitis [Z87.19]  Colostomy in place [facility]) [Z93.3]    Subjective:       Clinical Changes / Abnormalities: Pt seen and examined in the room. Pt denies any CP or SOB. NSR currently. On hep and amio gtt        Medications:   Scheduled Meds:   losartan  25 mg Oral Daily    famotidine  20 mg Oral BID    metoclopramide  10 mg IntraVENous Q6H    metoprolol  2.5 mg IntraVENous Q6H    insulin lispro  0-18 Units SubCUTAneous Q4H    metoprolol succinate  25 mg Oral Daily    sodium chloride flush  10 mL IntraVENous 2 times per day    acetaminophen  1,000 mg Oral 3 times per day    methocarbamol  750 mg Oral Q6H    [Held by provider] melatonin  5 mg Oral Nightly     Continuous Infusions:   amiodarone 0.5 mg/min (07/01/22 0100)    heparin (PORCINE) Infusion 14.4 Units/kg/hr (07/01/22 0402)    dextrose      sodium chloride      lactated ringers 75 mL/hr at 07/01/22 1047     CBC:   Recent Labs     06/30/22  0636 06/30/22  1239 07/01/22  0649   WBC 20.3* 18.6* 12.2*   HGB 12.8* 12.9* 12.0*    276 252     BMP:    Recent Labs     06/29/22  0601 06/30/22  0636 07/01/22  0649    140 140   K 3.9 4.5 3.8    105 106   CO2 20 21 24   BUN 24* 25* 24*   CREATININE 0.96 1.04 0.97   GLUCOSE 211* 121* 116*     Hepatic: No results for input(s): AST, ALT, ALB, BILITOT, ALKPHOS in the last 72 hours. Troponin: No results for input(s): TROPHS in the last 72 hours. BNP: No results for input(s): BNP in the last 72 hours. Lipids: No results for input(s): CHOL, HDL in the last 72 hours. Invalid input(s): LDLCALCU  INR: No results for input(s): INR in the last 72 hours.     ECHO 6/2021  CONCLUSIONS     Summary  Global left ventricular systolic function appears preserved with an  estimated ejection fraction of 55%. Mildly increased left ventricular wall thickness with a normal left  ventricular cavity size. The left atrium is moderately dilated (34-39) with a left atrial volume  index of 39 ml/m2. Normal mitral valve structure with trivial mitral regurgitation. Evidence of mild diastolic dysfunction is seen. The aortic root is mildly dilated. Objective:   Vitals: /87   Pulse 70   Temp 98.4 °F (36.9 °C) (Temporal)   Resp 20   Wt 212 lb 1.3 oz (96.2 kg)   SpO2 97%   BMI 29.58 kg/m²   General appearance: alert and cooperative with exam  HEENT: Head: Normocephalic, no lesions, without obvious abnormality. Neck: no JVD, trachea midline, no adenopathy  Lungs: Clear to auscultation  Heart: Regular rate and rhythm, s1/s2 auscultated, no murmurs  Abdomen: soft, non-tender, bowel sounds active  Extremities: no edema  Neurologic: not done        Assessment / Acute Cardiac Problems:   1. History of PAF, in AFib with RVR   2. POD #2 ex-lap and lysis of adhesions  3. Nonischemic cardiomyopathy (?? -- mentioned in Dr. Britney Moody note)  4. Hypertension       Patient Active Problem List:     New onset atrial fibrillation (HCC)     Primary hypertension     Colonic diverticular abscess     Chronic atrial fibrillation (HCC)     Hyperlipidemia     Chronic anticoagulation     Sigmoid diverticulitis     Duodenal diverticulum     Colovesical fistula     Severe malnutrition ([address]     Colostomy in place [facility])      Plan of Treatment:   1. Afib. NSR currenlty. Will stop amio gtt and start on Po. Continue BB, will increase to 50mg. Pt cleared from Surgery for Xarelto. Will start Xarelto today. 2. Will d/c losartan. Preserved EF. BB increased for rate control. 3. Will sign off and can follow up with Dr. Tyrone Danielle in [place] on discharge.   Please call with further questions or concerns     Electronically signed by JASPREET Mckeon CNP on 7/1/2022 at 10:52   [id number] [address]  [phone]

## 2022-07-02 LAB
ANION GAP SERPL CALCULATED.3IONS-SCNC: 10 MMOL/L (ref 9–17)
BUN BLDV-MCNC: 20 MG/DL (ref 8–23)
CALCIUM SERPL-MCNC: 8.1 MG/DL (ref 8.6–10.4)
CHLORIDE BLD-SCNC: 105 MMOL/L (ref 98–107)
CO2: 23 MMOL/L (ref 20–31)
CREAT SERPL-MCNC: 0.86 MG/DL (ref 0.7–1.2)
GFR AFRICAN AMERICAN: >60 ML/MIN
GFR NON-AFRICAN AMERICAN: >60 ML/MIN
GFR SERPL CREATININE-BSD FRML MDRD: ABNORMAL ML/MIN/{1.73_M2}
GLUCOSE BLD-MCNC: 135 MG/DL (ref 75–110)
GLUCOSE BLD-MCNC: 87 MG/DL (ref 75–110)
GLUCOSE BLD-MCNC: 94 MG/DL (ref 70–99)
HCT VFR BLD CALC: 34.5 % (ref 40.7–50.3)
HEMOGLOBIN: 11.2 G/DL (ref 13–17)
MAGNESIUM: 1.9 MG/DL (ref 1.6–2.6)
MCH RBC QN AUTO: 30.6 PG (ref 25.2–33.5)
MCHC RBC AUTO-ENTMCNC: 32.5 G/DL (ref 28.4–34.8)
MCV RBC AUTO: 94.3 FL (ref 82.6–102.9)
NRBC AUTOMATED: 0 PER 100 WBC
PDW BLD-RTO: 13.3 % (ref 11.8–14.4)
PLATELET # BLD: 245 K/UL (ref 138–453)
PMV BLD AUTO: 9.6 FL (ref 8.1–13.5)
POTASSIUM SERPL-SCNC: 3.7 MMOL/L (ref 3.7–5.3)
RBC # BLD: 3.66 M/UL (ref 4.21–5.77)
SODIUM BLD-SCNC: 138 MMOL/L (ref 135–144)
WBC # BLD: 9.6 K/UL (ref 3.5–11.3)

## 2022-07-02 PROCEDURE — 6360000002 HC RX W HCPCS: Performed by: SURGERY

## 2022-07-02 PROCEDURE — 94761 N-INVAS EAR/PLS OXIMETRY MLT: CPT

## 2022-07-02 PROCEDURE — 6370000000 HC RX 637 (ALT 250 FOR IP): Performed by: STUDENT IN AN ORGANIZED HEALTH CARE EDUCATION/TRAINING PROGRAM

## 2022-07-02 PROCEDURE — 82947 ASSAY GLUCOSE BLOOD QUANT: CPT

## 2022-07-02 PROCEDURE — 6370000000 HC RX 637 (ALT 250 FOR IP): Performed by: EMERGENCY MEDICINE

## 2022-07-02 PROCEDURE — 2700000000 HC OXYGEN THERAPY PER DAY

## 2022-07-02 PROCEDURE — 6370000000 HC RX 637 (ALT 250 FOR IP): Performed by: NURSE PRACTITIONER

## 2022-07-02 PROCEDURE — 85027 COMPLETE CBC AUTOMATED: CPT

## 2022-07-02 PROCEDURE — 97116 GAIT TRAINING THERAPY: CPT

## 2022-07-02 PROCEDURE — 2580000003 HC RX 258: Performed by: STUDENT IN AN ORGANIZED HEALTH CARE EDUCATION/TRAINING PROGRAM

## 2022-07-02 PROCEDURE — 83735 ASSAY OF MAGNESIUM: CPT

## 2022-07-02 PROCEDURE — 2500000003 HC RX 250 WO HCPCS: Performed by: SURGERY

## 2022-07-02 PROCEDURE — 2580000003 HC RX 258

## 2022-07-02 PROCEDURE — 2060000000 HC ICU INTERMEDIATE R&B

## 2022-07-02 PROCEDURE — 80048 BASIC METABOLIC PNL TOTAL CA: CPT

## 2022-07-02 PROCEDURE — 36415 COLL VENOUS BLD VENIPUNCTURE: CPT

## 2022-07-02 RX ADMIN — ACETAMINOPHEN 1000 MG: 500 TABLET ORAL at 13:25

## 2022-07-02 RX ADMIN — METOPROLOL TARTRATE 2.5 MG: 1 INJECTION, SOLUTION INTRAVENOUS at 01:43

## 2022-07-02 RX ADMIN — METOCLOPRAMIDE 10 MG: 5 INJECTION, SOLUTION INTRAMUSCULAR; INTRAVENOUS at 08:19

## 2022-07-02 RX ADMIN — ACETAMINOPHEN 1000 MG: 500 TABLET ORAL at 23:27

## 2022-07-02 RX ADMIN — SODIUM CHLORIDE, POTASSIUM CHLORIDE, SODIUM LACTATE AND CALCIUM CHLORIDE: 600; 310; 30; 20 INJECTION, SOLUTION INTRAVENOUS at 01:44

## 2022-07-02 RX ADMIN — METHOCARBAMOL TABLETS 750 MG: 750 TABLET, COATED ORAL at 23:26

## 2022-07-02 RX ADMIN — FAMOTIDINE 20 MG: 20 TABLET, FILM COATED ORAL at 23:26

## 2022-07-02 RX ADMIN — AMIODARONE HYDROCHLORIDE 200 MG: 200 TABLET ORAL at 08:19

## 2022-07-02 RX ADMIN — SODIUM CHLORIDE, PRESERVATIVE FREE 10 ML: 5 INJECTION INTRAVENOUS at 08:19

## 2022-07-02 RX ADMIN — METOCLOPRAMIDE 10 MG: 5 INJECTION, SOLUTION INTRAMUSCULAR; INTRAVENOUS at 13:26

## 2022-07-02 RX ADMIN — METOCLOPRAMIDE 10 MG: 5 INJECTION, SOLUTION INTRAMUSCULAR; INTRAVENOUS at 01:43

## 2022-07-02 RX ADMIN — AMIODARONE HYDROCHLORIDE 200 MG: 200 TABLET ORAL at 23:26

## 2022-07-02 RX ADMIN — METOPROLOL SUCCINATE 50 MG: 50 TABLET, FILM COATED, EXTENDED RELEASE ORAL at 08:19

## 2022-07-02 RX ADMIN — METHOCARBAMOL TABLETS 750 MG: 750 TABLET, COATED ORAL at 17:55

## 2022-07-02 RX ADMIN — METOCLOPRAMIDE 10 MG: 5 INJECTION, SOLUTION INTRAMUSCULAR; INTRAVENOUS at 19:54

## 2022-07-02 RX ADMIN — Medication 5 MG: at 00:12

## 2022-07-02 RX ADMIN — SODIUM CHLORIDE, PRESERVATIVE FREE 10 ML: 5 INJECTION INTRAVENOUS at 00:13

## 2022-07-02 RX ADMIN — FAMOTIDINE 20 MG: 20 TABLET, FILM COATED ORAL at 08:19

## 2022-07-02 RX ADMIN — RIVAROXABAN 20 MG: 20 TABLET, FILM COATED ORAL at 13:25

## 2022-07-02 RX ADMIN — SODIUM CHLORIDE, PRESERVATIVE FREE 10 ML: 5 INJECTION INTRAVENOUS at 23:33

## 2022-07-02 RX ADMIN — OXYCODONE 5 MG: 5 TABLET ORAL at 08:24

## 2022-07-02 ASSESSMENT — PAIN SCALES - GENERAL
PAINLEVEL_OUTOF10: 7
PAINLEVEL_OUTOF10: 6
PAINLEVEL_OUTOF10: 1
PAINLEVEL_OUTOF10: 0

## 2022-07-02 ASSESSMENT — PAIN DESCRIPTION - LOCATION: LOCATION: ABDOMEN

## 2022-07-02 NOTE — PROGRESS NOTES
Physical Therapy  Facility/Department: [address] STEPDOWN  Daily Treatment Note    Name: Layton Brown  : 1960  MRN: [id number]  Date of Service: 2022    Discharge Recommendations:  Patient would benefit from continued therapy after discharge   PT Equipment Recommendations  Equipment Needed: No      Patient Diagnosis(es): There were no encounter diagnoses. Past Medical History:  has a past medical history of Abnormal patient-activated cardiac event monitor, Chronic anticoagulation, Colonic diverticular abscess, Colostomy fistula ([facility] [address] GERD (gastroesophageal reflux disease), H/O echocardiogram, History of cardioversion, History of pneumonia, Hx of bronchitis, Hyperlipidemia, Hypertension, Non-ischemic cardiomyopathy ([facility] [address] MICHELLE on CPAP, Paroxysmal A-fib ([facility] [address] Sleep apnea, Under care of team, and Under care of team.  Past Surgical History:  has a past surgical history that includes CT ABSCESS DRAINAGE (10/12/2021); Cardioversion (2021); Sigmoid Colectomy (N/A, 2021); and laparotomy (N/A, 2022). Assessment   Body Structures, Functions, Activity Limitations Requiring Skilled Therapeutic Intervention: Decreased functional mobility ; Increased pain;Decreased posture;Decreased endurance  Assessment: Pt ambulated 250 ft with RW with CGA. Pt is able to perform transfers and bed mobility with SBA. Pt would benefit from continued PT. Therapy Prognosis: Good  Activity Tolerance  Activity Tolerance: Patient limited by pain; Patient limited by fatigue     Plan   Plan  Plan:  (5-6x/wk)  Current Treatment Recommendations: Strengthening,ROM,Balance training,Functional mobility training,Transfer training,Endurance training,Gait training,Stair training,Pain management,Home exercise program,Safety education & training,Patient/Caregiver education & training,Equipment evaluation, education, & procurement,Therapeutic activities  Safety Devices  Type of Devices: Call light within reach,Patient at risk for falls,Nurse notified,Gait belt,Left in bed  Restraints  Restraints Initially in Place: No     Restrictions  Restrictions/Precautions  Restrictions/Precautions: Surgical Protocols,Fall Risk,General Precautions,Up as Tolerated  Required Braces or Orthoses?: No     Subjective   General  Chart Reviewed: Yes  Response To Previous Treatment: Patient with no complaints from previous session. Family / Caregiver Present: No  General Comment  Comments: Pt retired to supine with HOB elevated with call light  Subjective  Subjective: RN and pt agreeable to PT. Pt supine in bed upon arrival , pleasant and cooperative Pt having 5/10 abdominal pain.          Cognition   Orientation  Overall Orientation Status: Within Normal Limits  Cognition  Overall Cognitive Status: WFL     Objective   Bed mobility  Supine to Sit: Stand by assistance  Sit to Supine: Stand by assistance  Scooting: Stand by assistance  Bed Mobility Comments: HOB slightly elevated with increased time and effort  Transfers  Sit to Stand: Stand by assistance  Stand to sit: Stand by assistance  Comment: Assessed to RW  Ambulation  Surface: level tile  Device: Rolling Walker  Assistance: Contact guard assistance  Quality of Gait: fwd flexed posture d/t abdominal pain  Gait Deviations: Slow Rubina;Decreased step length;Decreased step height  Distance: 250 ft  Comments: pt refused to attempt stairs today d/t fatigue     Balance  Posture: Fair  Sitting - Static: Fair  Sitting - Dynamic: Fair  Standing - Static: Fair  Standing - Dynamic: Fair  Comments: Assessed sitting EOB and with RW           OutComes Score   AM-PAC Score  AM-PAC Inpatient Mobility Raw Score : 17 (07/02/22 1554)  AM-PAC Inpatient T-Scale Score : 42.13 (07/02/22 1554)  Mobility Inpatient CMS 0-100% Score: 50.57 (07/02/22 1554)  Mobility Inpatient CMS G-Code Modifier : CK (07/02/22 1554)          Goals  Short Term Goals  Time Frame for Short term goals: 12 visits  Short term goal 1: independent bed mobility, HOB flat, no bed rails  Short term goal 2: independent transfers  Short term goal 3: independent gait with appropriate device vs none x 80'  Short term goal 4: stair ambulation x 4 steps with B HRs independently  Patient Goals   Patient goals : decrease pain, be able to sleep in bed and able to get into/out of bed independently at home       Education  Patient Education  Education Given To: Patient  Education Provided: Role of Therapy;Plan of Care;Transfer Training; Fall Prevention Strategies  Education Provided Comments: ambulation and transfer training  Education Method: Verbal  Barriers to Learning: None  Education Outcome: Continued education needed;Verbalized understanding;Demonstrated understanding      Therapy Time   Individual Concurrent Group Co-treatment   Time In 1536         Time Out 1552         Minutes 16         Timed Code Treatment Minutes: [facility]

## 2022-07-02 NOTE — PROGRESS NOTES
9.6 07/02/2022 04:41 AM       Lab Results   Component Value Date/Time     07/02/2022 04:41 AM    K 3.7 07/02/2022 04:41 AM     07/02/2022 04:41 AM    CO2 23 07/02/2022 04:41 AM    BUN 20 07/02/2022 04:41 AM    CREATININE 0.86 07/02/2022 04:41 AM    GLUCOSE 94 07/02/2022 04:41 AM    CALCIUM 8.1 07/02/2022 04:41 AM       Assessment/Plan:   1. Status post exploratory laparotomy, lysis of adhesion with dense adhesions and inability to perform low pelvic anastomosis due to same  2. Colostomy function now returned and no complaints of nausea or GERD type symptoms  3. Increase to regular diet and stop IV fluids  4. Likely discharge later today or in a.m.       Electronically signed by Anaya Elmore DO  on 7/2/2022 at 6:46 AM

## 2022-07-03 VITALS
HEART RATE: 74 BPM | WEIGHT: 212.08 LBS | DIASTOLIC BLOOD PRESSURE: 98 MMHG | RESPIRATION RATE: 27 BRPM | OXYGEN SATURATION: 93 % | TEMPERATURE: 98.7 F | SYSTOLIC BLOOD PRESSURE: 144 MMHG | BODY MASS INDEX: 29.58 KG/M2

## 2022-07-03 LAB
ANION GAP SERPL CALCULATED.3IONS-SCNC: 12 MMOL/L (ref 9–17)
BUN BLDV-MCNC: 14 MG/DL (ref 8–23)
CALCIUM SERPL-MCNC: 8.4 MG/DL (ref 8.6–10.4)
CHLORIDE BLD-SCNC: 104 MMOL/L (ref 98–107)
CO2: 22 MMOL/L (ref 20–31)
CREAT SERPL-MCNC: 0.88 MG/DL (ref 0.7–1.2)
GFR AFRICAN AMERICAN: >60 ML/MIN
GFR NON-AFRICAN AMERICAN: >60 ML/MIN
GFR SERPL CREATININE-BSD FRML MDRD: ABNORMAL ML/MIN/{1.73_M2}
GLUCOSE BLD-MCNC: 101 MG/DL (ref 70–99)
HCT VFR BLD CALC: 38.5 % (ref 40.7–50.3)
HEMOGLOBIN: 12.2 G/DL (ref 13–17)
MAGNESIUM: 1.9 MG/DL (ref 1.6–2.6)
MCH RBC QN AUTO: 29.8 PG (ref 25.2–33.5)
MCHC RBC AUTO-ENTMCNC: 31.7 G/DL (ref 28.4–34.8)
MCV RBC AUTO: 93.9 FL (ref 82.6–102.9)
NRBC AUTOMATED: 0 PER 100 WBC
PDW BLD-RTO: 13.2 % (ref 11.8–14.4)
PLATELET # BLD: 303 K/UL (ref 138–453)
PMV BLD AUTO: 9.6 FL (ref 8.1–13.5)
POTASSIUM SERPL-SCNC: 3.4 MMOL/L (ref 3.7–5.3)
RBC # BLD: 4.1 M/UL (ref 4.21–5.77)
SODIUM BLD-SCNC: 138 MMOL/L (ref 135–144)
WBC # BLD: 9.7 K/UL (ref 3.5–11.3)

## 2022-07-03 PROCEDURE — 85027 COMPLETE CBC AUTOMATED: CPT

## 2022-07-03 PROCEDURE — 2580000003 HC RX 258: Performed by: STUDENT IN AN ORGANIZED HEALTH CARE EDUCATION/TRAINING PROGRAM

## 2022-07-03 PROCEDURE — 6370000000 HC RX 637 (ALT 250 FOR IP): Performed by: NURSE PRACTITIONER

## 2022-07-03 PROCEDURE — 2500000003 HC RX 250 WO HCPCS: Performed by: SURGERY

## 2022-07-03 PROCEDURE — 83735 ASSAY OF MAGNESIUM: CPT

## 2022-07-03 PROCEDURE — 36415 COLL VENOUS BLD VENIPUNCTURE: CPT

## 2022-07-03 PROCEDURE — 6370000000 HC RX 637 (ALT 250 FOR IP): Performed by: STUDENT IN AN ORGANIZED HEALTH CARE EDUCATION/TRAINING PROGRAM

## 2022-07-03 PROCEDURE — 6370000000 HC RX 637 (ALT 250 FOR IP): Performed by: EMERGENCY MEDICINE

## 2022-07-03 PROCEDURE — 80048 BASIC METABOLIC PNL TOTAL CA: CPT

## 2022-07-03 PROCEDURE — 6360000002 HC RX W HCPCS: Performed by: SURGERY

## 2022-07-03 RX ORDER — OXYCODONE HYDROCHLORIDE 5 MG/1
5 TABLET ORAL EVERY 8 HOURS PRN
Qty: 9 TABLET | Refills: 0 | Status: SHIPPED | OUTPATIENT
Start: 2022-07-03 | End: 2022-07-06

## 2022-07-03 RX ORDER — METHOCARBAMOL 750 MG/1
750 TABLET, FILM COATED ORAL 4 TIMES DAILY
Qty: 40 TABLET | Refills: 0 | Status: SHIPPED | OUTPATIENT
Start: 2022-07-03 | End: 2022-07-13

## 2022-07-03 RX ADMIN — OXYCODONE 5 MG: 5 TABLET ORAL at 08:29

## 2022-07-03 RX ADMIN — METOPROLOL SUCCINATE 50 MG: 50 TABLET, FILM COATED, EXTENDED RELEASE ORAL at 08:29

## 2022-07-03 RX ADMIN — ACETAMINOPHEN 1000 MG: 500 TABLET ORAL at 06:39

## 2022-07-03 RX ADMIN — METOCLOPRAMIDE 10 MG: 5 INJECTION, SOLUTION INTRAMUSCULAR; INTRAVENOUS at 02:04

## 2022-07-03 RX ADMIN — SODIUM CHLORIDE, PRESERVATIVE FREE 10 ML: 5 INJECTION INTRAVENOUS at 08:30

## 2022-07-03 RX ADMIN — METHOCARBAMOL TABLETS 750 MG: 750 TABLET, COATED ORAL at 06:39

## 2022-07-03 RX ADMIN — FAMOTIDINE 20 MG: 20 TABLET, FILM COATED ORAL at 08:31

## 2022-07-03 RX ADMIN — METOPROLOL TARTRATE 2.5 MG: 1 INJECTION, SOLUTION INTRAVENOUS at 02:03

## 2022-07-03 RX ADMIN — AMIODARONE HYDROCHLORIDE 200 MG: 200 TABLET ORAL at 08:30

## 2022-07-03 RX ADMIN — METOCLOPRAMIDE 10 MG: 5 INJECTION, SOLUTION INTRAMUSCULAR; INTRAVENOUS at 08:33

## 2022-07-03 ASSESSMENT — PAIN SCALES - GENERAL
PAINLEVEL_OUTOF10: 7
PAINLEVEL_OUTOF10: 3

## 2022-07-03 NOTE — FLOWSHEET NOTE
IVs removed, d/c instructions reviewed with patient. Patient states understanding of aftercare. Await patient transportation to home in [place].

## 2022-07-03 NOTE — PROGRESS NOTES
General Surgery Progress Note    Subjective:     Patient without complaints. No nausea or vomiting. Voiding well. Has good ostomy output. No new input from cardiology, tolerating diet    Scheduled Meds:   metoprolol succinate  50 mg Oral Daily    amiodarone  200 mg Oral BID    Followed by   Sherine Turner ON 7/6/2022] amiodarone  200 mg Oral Daily    rivaroxaban  20 mg Oral Daily    famotidine  20 mg Oral BID    metoclopramide  10 mg IntraVENous Q6H    metoprolol  2.5 mg IntraVENous Q6H    sodium chloride flush  10 mL IntraVENous 2 times per day    acetaminophen  1,000 mg Oral 3 times per day    methocarbamol  750 mg Oral Q6H    melatonin  5 mg Oral Nightly     Continuous Infusions:   dextrose      sodium chloride       PRN Meds:oxyCODONE, promethazine (PHENERGAN) in sodium chloride 0.9% IVPB, glucose, dextrose bolus **OR** dextrose bolus, glucagon (rDNA), dextrose, sodium chloride flush, sodium chloride, ondansetron, calcium carbonate    Objective:   /83   Pulse 74   Temp 98.6 °F (37 °C) (Oral)   Resp 22   Wt 212 lb 1.3 oz (96.2 kg)   SpO2 93%   BMI 29.58 kg/m²     I/O last 3 completed shifts: In: 2547.9 [P.O.:1010; I.V.:1537.9]  Out: 2870 [Urine:2580; Drains:290]    Chest: Breath sounds were clear and equal with no rales, wheezes, or rhonchi. Respiratory effort was normal with no retractions or use of accessory muscles. Cardiovascular: Heart sounds were normal with a regular rate and rhythm. There were no murmurs or gallops. Abdomen: Bowel sounds were normal.  Wound is clean. CARRINGTON still with moderate serosanguineous output.   Good colostomy output  LABS:  Lab Results   Component Value Date/Time    WBC 9.6 07/02/2022 04:41 AM    RBC 3.66 07/02/2022 04:41 AM    HGB 11.2 07/02/2022 04:41 AM    HCT 34.5 07/02/2022 04:41 AM    MCV 94.3 07/02/2022 04:41 AM    MCH 30.6 07/02/2022 04:41 AM    MCHC 32.5 07/02/2022 04:41 AM    RDW 13.3 07/02/2022 04:41 AM     07/02/2022 04:41 AM    MPV 9.6 07/02/2022 04:41 AM       Lab Results   Component Value Date/Time     07/02/2022 04:41 AM    K 3.7 07/02/2022 04:41 AM     07/02/2022 04:41 AM    CO2 23 07/02/2022 04:41 AM    BUN 20 07/02/2022 04:41 AM    CREATININE 0.86 07/02/2022 04:41 AM    GLUCOSE 94 07/02/2022 04:41 AM    CALCIUM 8.1 07/02/2022 04:41 AM       Assessment/Plan:   1. Stable status post exploratory laparotomy with lysis of extensive intra-abdominal adhesions. Wound is clean and tolerating diet. Cardiac status now stabilized. Patient to be discharged and follow-up my office in 10 days.       Electronically signed by Edward Elizabeth DO  on 7/3/2022 at 6:41 AM

## 2022-07-03 NOTE — ADT AUTH CERT
Re: Brandyn Warner fax start and end dates. Member inpatient admit started 6/28, and d/c 7/3.  Thanks

## 2022-07-03 NOTE — DISCHARGE SUMMARY
mucosa, and tongue normal; teeth and gums normal   Neck:   Supple, symmetrical, trachea midline, no adenopathy;        thyroid:  No enlargement/tenderness/nodules; no carotid    bruit or JVD   Back:     Symmetric, no curvature, ROM normal, no CVA tenderness   Lungs:     Clear to auscultation bilaterally, respirations unlabored   Chest wall:    No tenderness or deformity   Heart:    Regular rate and rhythm, S1 and S2 normal, no murmur, rub   or gallop   Abdomen:     Soft, non-tender, bowel sounds active all four quadrants,     no masses, no organomegaly, good colostomy function with clean midline incision   Genitalia:    Normal male without lesion, discharge or tenderness   Rectal:    Normal tone, normal prostate, no masses or tenderness;    guaiac negative stool   Extremities:   Extremities normal, atraumatic, no cyanosis or edema   Pulses:   2+ and symmetric all extremities   Skin:   Skin color, texture, turgor normal, no rashes or lesions   Lymph nodes:   Cervical, supraclavicular, and axillary nodes normal   Neurologic:   CNII-XII intact. Normal strength, sensation and reflexes       throughout         Scheduled Outpatient Follow-Up    1. Follow up with Dr Lorna Alford in 10 days     2. Call [phone] to make appointment    3. Address [address] [facility], [address] [place] [address]

## 2022-07-03 NOTE — PLAN OF CARE
Problem: Discharge Planning  Goal: Discharge to home or other facility with appropriate resources  Outcome: Completed     Problem: Pain  Goal: Verbalizes/displays adequate comfort level or baseline comfort level  Outcome: Completed  Flowsheets (Taken 7/3/2022 0800)  Verbalizes/displays adequate comfort level or baseline comfort level: Encourage patient to monitor pain and request assistance     Problem: Skin/Tissue Integrity  Goal: Absence of new skin breakdown  Description: 1. Monitor for areas of redness and/or skin breakdown  2. Assess vascular access sites hourly  3. Every 4-6 hours minimum:  Change oxygen saturation probe site  4. Every 4-6 hours:  If on nasal continuous positive airway pressure, respiratory therapy assess nares and determine need for appliance change or resting period.   Outcome: Completed     Problem: Safety - Adult  Goal: Free from fall injury  Outcome: Completed     Problem: ABCDS Injury Assessment  Goal: Absence of physical injury  Outcome: Completed

## 2022-07-19 ENCOUNTER — OFFICE VISIT (OUTPATIENT)
Dept: CARDIOLOGY | Age: 62
End: 2022-07-19
Payer: MEDICARE

## 2022-07-19 VITALS
OXYGEN SATURATION: 99 % | BODY MASS INDEX: 28.28 KG/M2 | SYSTOLIC BLOOD PRESSURE: 115 MMHG | HEIGHT: 71 IN | WEIGHT: 202 LBS | RESPIRATION RATE: 18 BRPM | HEART RATE: 64 BPM | DIASTOLIC BLOOD PRESSURE: 76 MMHG

## 2022-07-19 DIAGNOSIS — Z79.899 ON AMIODARONE THERAPY: ICD-10-CM

## 2022-07-19 DIAGNOSIS — I11.9 HYPERTENSIVE HEART DISEASE WITHOUT HEART FAILURE: ICD-10-CM

## 2022-07-19 DIAGNOSIS — I42.8 NICM (NONISCHEMIC CARDIOMYOPATHY) (HCC): ICD-10-CM

## 2022-07-19 DIAGNOSIS — Z79.01 CHRONIC ANTICOAGULATION: ICD-10-CM

## 2022-07-19 DIAGNOSIS — I48.0 PAF (PAROXYSMAL ATRIAL FIBRILLATION) (HCC): Primary | ICD-10-CM

## 2022-07-19 PROCEDURE — G8427 DOCREV CUR MEDS BY ELIG CLIN: HCPCS | Performed by: PHYSICIAN ASSISTANT

## 2022-07-19 PROCEDURE — 1036F TOBACCO NON-USER: CPT | Performed by: PHYSICIAN ASSISTANT

## 2022-07-19 PROCEDURE — 1111F DSCHRG MED/CURRENT MED MERGE: CPT | Performed by: PHYSICIAN ASSISTANT

## 2022-07-19 PROCEDURE — 3017F COLORECTAL CA SCREEN DOC REV: CPT | Performed by: PHYSICIAN ASSISTANT

## 2022-07-19 PROCEDURE — G8419 CALC BMI OUT NRM PARAM NOF/U: HCPCS | Performed by: PHYSICIAN ASSISTANT

## 2022-07-19 PROCEDURE — 93000 ELECTROCARDIOGRAM COMPLETE: CPT | Performed by: PHYSICIAN ASSISTANT

## 2022-07-19 PROCEDURE — 99214 OFFICE O/P EST MOD 30 MIN: CPT | Performed by: PHYSICIAN ASSISTANT

## 2022-07-19 RX ORDER — METOPROLOL SUCCINATE 25 MG/1
25 TABLET, EXTENDED RELEASE ORAL DAILY
Qty: 90 TABLET | Refills: 3 | Status: SHIPPED | OUTPATIENT
Start: 2022-07-19

## 2022-07-19 RX ORDER — AMIODARONE HYDROCHLORIDE 200 MG/1
100 TABLET ORAL DAILY
Qty: 90 TABLET | Refills: 1 | Status: SHIPPED | OUTPATIENT
Start: 2022-07-19 | End: 2022-09-01

## 2022-07-19 NOTE — PROGRESS NOTES
Lalitha Layne am scribing for and in the presence of Jose Alberto Graves [place]    Patient: Dayton Gonzáles  : 1960  Date of Admission: (Not on file)  Today's Date: 2022    REASON FOR CONSULTATION: Follow-up (HX: PAF, NICM, HTN, MICHELLE on CPAP. Pt states he is doing ok. Denies: CP, Palpitations, SOB. C/o: Lightheaded no falls. )      HPI: I had the pleasure of seeing Dayton Gonzáles in consultation today. As you know, Mr. Sha Hidalgo is a 59 y.o. male who was admitted on 2020 with new onset atrial fibrillation with RVR leading to cardiac consultation and his most recent work-up. Mr. Sha Hidalgo has history of intermittent palpitations and history suggestive of obstructive sleep apnea syndrome. He reported having history of borderline hypertension. He denied any history of diabetes or dyslipidemia. He is lifelong non-smoker. With regard to his family history, he reported that his father and mother had a heart attack in their old age. His echo showed ejection fraction of 40%. No regional wall motion abnormalities. There is some shadowing in the apex but I think this is origin of the papillary muscles. He is anticoagulated anyway. Lexiscan stress test showed low ejection fraction on gated SPECT with ejection fraction being 42% but no perfusion abnormalities favoring nonischemic cardiomyopathy. Stress test done 2020: EF 42%  Largely normal myocardial perfusion imaging with soft tissue artifact, but without significant evidence of myocardial ischemia or infarction. Echo done 2020: EF 40% Mildly increased left ventricular wall thickness with a normal left ventricular cavity size. Moderate global hypokinesis with no regional abnormalities. The left atrium is moderately dilated (34-39) with a left atrial volume index of 34 ml/m2. Mild mitral and tricuspid regurgitation. Diastolic function cannot be properly assessed due to atrial fibrillation.      Sleep study done 12/21/2020 shows mild sleep apnea. Pending cpap titration. Cardioversion done on 1/14/2021: Successful but did show short runs of atrial tachycardia, patient started on amiodarone orally on discharge. His digoxin was discontinued. EKG done in office on 1/22/2021: Sinus bradycardia otherwise normal    EKG done on (2/22/2021) in office showed him in normal sinus rhythm with a HR of 61 bpm.    Echo done on 3/8/2021: Global left ventricular systolic function appears mildly reduced with an estimated ejection fraction of 45-50%. Mildly increased left ventricular wall thickness with a normal left ventricular cavity size. The left atrium is moderately dilated (34-39) with a left atrial volume index of 35 ml/m2. Mild mitral and tricuspid regurgitation. Evidence of mild diastolic dysfunction is seen. The aortic root is moderately dilated (>4.5cm) when corrected for body surface area. CAM done on 3/17/2021: Baseline rhythm is sinus with average heart rate of 63 bpm, ranging between 47 and 109 bpm. Rare PACs and PVCs with 2 runs of ectopic atrial tachycardia, the longest and fastest was 7 beats at heart rate of 110 bpm. No ventricular runs. Patient-activated events correlated with sinus rhythm and sinus bradycardia. No atrial fibrillation recorded. EKG done (6/25/2021): Sinus shalonda with a HR of 56 bpm.    Echocardiogram on 6/25/2021: Global left ventricular systolic function appears preserved with an estimated ejection fraction of 55%. Mildly increased left ventricular wall thickness with a normal left ventricular cavity size. The left atrium is moderately dilated (34-39) with a left atrial volume index of 39 ml/m2. Normal mitral valve structure with trivial mitral regurgitation. Evidence of mild diastolic dysfunction is seen. The aortic root is mildly dilated. Compared to the previous study of 3/8/21, the patients EF appars to have improved from 45-50% to 55%.     Mr. Kaylan Valdez came to the ER and was hospitalized on 10/11/2021 due to constipation. He then was transferred to [facility]. V's due to complaints of left lower quadrant/suprapubic abdominal pain upon evaluation is found to have sigmoid diverticulitis with abscess. The abscess was 4 x 4 cm and above the dome of the bladder. He underwent aspiration of the abscess with interventional radiology with drainage of 15 to 20 mL of fluid. He was treated with IV Cipro and Flagyl and had steady improvement. He then came back to the ER on 10/26/2021 due to constipation again. He was transferred back to [facility]. 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 He underwent rectosigmoid colectomy with colostomy creation. Cystourethrogram was done on post op day five. He then came to the ER on 12/6/2021 due to dehydration and again on 12/15/2021 due to wanting a wound check from his colectomy/colostomy placement. CAM patch done on 4/21/2022: 7 days recorded. Baseline rhythm is sinus with average heart rate of 69 bpm, ranging between 53 and 111 bpm. Atrial tachycardia (AT) 20 episodes; longest 8 beats at average 139 bpm up to 142 bpm; fastest 7 beats at average 157 bpm up to 180 bpm. Premature atrial contractions 0.09%. Premature ventricular contractions 1.43%. No patient-activated events recorded. Overall no dangerous heart rate or rhythm abnormalities detected. EKG done in office on 6/13/2022: Maintaining normal sinus rhythm     7/3/2022: S/p colectomy and colostomy admitted for elective reversal of colostomy. Patient unable to have colostomy reversed due to significant pelvic adhesions with inability to dissect out rectal stump safely. Patient had postoperative atrial fibrillation with rapid ventricular response treated by cardiology    Mr. Raine Adorno is here today for follow up after his above procedure which was unable to be done due to him having post op atrial fibrillation. Today he reports doing well.  He did mention having some lightheaded and dizziness. He also had some medication changes while admitted and he wanted to be sure this was ok with us. He denies having any chest pain, pressure or tightness. He denies feeling any palpitations. No fever or chills. No abdominal pain, nausea or vomiting. No bleeding problems, issues with medications or any other concerns at this time. He is able to do all his house chores at this time since getting his staples removed. EKG done in office today (7/19/2022): Showed normal sinus rhythm with a HR of 65 bpm.    Bleeding Risks: Mr. Carlin Barrera denies any current or recent bleeding problems including a history of a GI bleed, ulcers, recent or upcoming surgeries, blood in his stool or black tarry stools or blood in his urine.     Past Medical History:   Diagnosis Date    Abnormal patient-activated cardiac event monitor 02/22/2021    CAM 13 days 8 hrs Baseline sinus ave HR of 63 bpm rang between 47 adn 109 bpm  Rare PAC and PVC with 2 runs of ectopic atrial tachy longest fastest was 7 beats hr 110 bpm No VT runs no AFib    Chronic anticoagulation     xarelto    Colonic diverticular abscess 10/2021    Colostomy fistula (HCC)     GERD (gastroesophageal reflux disease)     H/O echocardiogram 03/08/2021    EF 45-50% Mild increased LV wallthickness LA is mod dilated 34-39 with LA volume index of 35 ml/m2 Mild mitral and tricuspid regurg Midl diastolic dysfunction seen Aortic root is mod dilated >4.5cm when corrected for body suface area    History of cardioversion     History of pneumonia     pt does not recall having this    Hx of bronchitis     Hyperlipidemia     Hypertension     Non-ischemic cardiomyopathy ([facility] [address]     Dr. Merry Lutz cardiology in [place], [place]    MICHELLE on CPAP     Paroxysmal A-fib ([facility] [address]     hx cardioversion    Sleep apnea     Home CPAP    Under care of team 06/21/2022    cardiology-Dr Contreras-last visit june 2022    Under care of team 06/21/2022    pcp-Rema Dietz-last visit nov 2021   Persistent atrial fibrillation. Dilated aortic root. Compression fracture of T4 and T5.    CURRENT ALLERGIES: Cipro [ciprofloxacin hcl] and Pcn [penicillins] REVIEW OF SYSTEMS: 14 systems were reviewed. Pertinent positives and negatives as above, all else negative. Past Surgical History:   Procedure Laterality Date    CARDIOVERSION  01/2021    CT ABSCESS DRAIN SUBCUTANEOUS  10/12/2021    CT ABSCESS DRAIN SUBCUTANEOUS 10/12/2021 [facility] CT SCAN    LAPAROTOMY N/A 06/28/2022    EXPLORATORY LAPAROTOMY,  LYSIS OF ADHESIONS performed by Marck Hernandez DO at [address] [facility] N/A 11/22/2021    COLECTOMY, COLOSTOMY performed by Marck Hernandez DO at [address] History:  Social History     Tobacco Use    Smoking status: Never    Smokeless tobacco: Never   Vaping Use    Vaping Use: Never used   Substance Use Topics    Alcohol use: Not Currently    Drug use: Never        CURRENT MEDICATIONS:  Outpatient Medications Marked as Taking for the 7/19/22 encounter (Office Visit) with Xavi Arroyo MD   Medication Sig Dispense Refill    amiodarone (CORDARONE) 200 MG tablet Take 1 tablet by mouth daily 30 tablet 2    metoprolol succinate (TOPROL XL) 50 MG extended release tablet Take 1 tablet by mouth daily 30 tablet 3    Multiple Vitamin (MULTIVITAMIN ADULT PO) Take 1 tablet by mouth daily       sildenafil (VIAGRA) 50 MG tablet Take 1 tablet by mouth as needed       loratadine (CLARITIN) 10 MG tablet take 1 tablet by mouth daily if needed for allergies      fluticasone (FLONASE) 50 MCG/ACT nasal spray 2 sprays by Nasal route daily as needed for Rhinitis One time in morning and once in evening      rivaroxaban (XARELTO) 20 MG TABS tablet Take 1 tablet by mouth daily (with breakfast) 90 tablet 3       FAMILY HISTORY: family history includes Cancer in his father and mother; Coronary Art Dis in his father and mother.      PHYSICAL EXAM:   Physical Examination:     /76 (Site: Left Upper Arm, Position: Sitting, Cuff Size: 1/14/2021. Beta Blocker: DECREASE to Metoprolol succinate (Toprol XL) 25 mg daily. Due to his lightheaded and dizziness that he mentioned today. Anti-Arrhythmic: Decrease amiodarone (Pacerone) 200 mg, 1/2 tab daily. Monitoring: Since they will being maintained on Amiodarone, I told them that we will need to closely monitor them for potential side effects. These include monitoring LFTs and TSH at least every 6 months as well as chest x-rays, pulmonary function tests, and eye exams at least yearly. We did discuss that at this time we will leave him on Amiodarone for six weeks and then we will most likely stop this medications. He has been on Amiodarone in the past however he is to young to be maintained on Amiodarone. He has noticed some ringing in his ears since starting the medication. [id number] Score for Atrial Fibrillation Stroke Risk   Risk   Factors  Component Value   C  Yes 1   H HTN Yes 1   A2 Age >= 75 No,  (62 y.o.) 0   D DM No 0   S2 Prior Stroke/TIA No 0   V Vascular Disease No 0   A Age 74-69 No,  (62 y.o.) 0   Sc Sex male 0    [id number]  Score  2   Score last updated 1/75/43 4:08 AM EDT  Click here for a link to the UpToDate guideline \"Atrial Fibrillation: Anticoagulation therapy to prevent embolization  Disclaimer: Risk Score calculation is dependent on accuracy of patient problem list and past encounter diagnosis. CHADS2-VASc score: 2/9 (2.2% stroke risk)   Anticoagulation: Continue Riveroxaban (Xarelto): 20 mg once daily with largest meal (typically dinner). Nonischemic Cardiomyopathy/ Hypertensive Heart Disease Without Heart Failure: Patient fraction improved. Beta Blocker: DECREASE to Metoprolol succinate (Toprol XL) 25 mg daily. ACE Inibitor/ARB: Continue losartan (Cozaar) 25 mg daily. Essential Hypertension: Controlled  Beta Blocker: DECREASE to Metoprolol succinate (Toprol XL) 25 mg daily. ACE Inibitor/ARB: Continue losartan (Cozaar) 25 mg daily.        Finally, I recommended that he continue his other medications and follow up with you as previously scheduled. I discussed patient's symptoms and treatment plan with Dr Stas Barrera, he was in agreement with the plan and follow up. FOLLOW UP:   I told Mr. Malvin Carter to call my office if he had any problems, but otherwise told him to Return in about 6 weeks (around 8/30/2022). However, I would be happy to see him sooner should the need arise. Sincerely,  Orelia Sicard  [facility] Cardiology Specialist    [address] [facility], [address] [place] [address]  Phone: [phone], Fax: [phone]     I believe that the risk of significant morbidity and mortality related to the patient's current medical conditions are: Intermediate. Approximately 35 minutes were spent during prep work, discussion and exam of the patient, and follow up documentation and all of their questions were answered. The documentation recorded by the scribe, accurately and completely reflects the services I personally performed and the decisions made by me.  Yimi Diehl PA-C July 19, 2022

## 2022-07-19 NOTE — PATIENT INSTRUCTIONS
SURVEY:    You may be receiving a survey from [employer] regarding your visit today. Please complete the survey to enable us to provide the highest quality of care to you and your family. If you cannot score us a very good on any question, please call the office to discuss how we could have made your experience a very good one. Thank you.

## 2022-07-29 NOTE — OP NOTE
CT scan completed.   Operative Note      Patient: Mukund Mcmullen  YOB: 1960  MRN: [id number]    Date of Procedure: 11/22/2021    Pre-Op Diagnosis: DIVERTICULITIS, COLOVESICAL FISTULA    Post-Op Diagnosis: Same       Procedure(s):  COLECTOMY, COLOSTOMY with mobilization of splenic flexure    Surgeon(s):  Anaya Elmore DO    Assistant:   Resident: Virginia Muniz DO; You Felix DO    Anesthesia: General    Estimated Blood Loss (mL): 780     Complications: None    Specimens:   ID Type Source Tests Collected by Time Destination   1 : urine culture  Urine Urine, indwelling catheter CULTURE, URINE Anaya Elmore DO 11/22/2021 1321    A : RECTO SIGMOID COLON Tissue Colon SURGICAL PATHOLOGY Anaya Elmore DO 11/22/2021 1347        Implants:  * No implants in log *      Drains:   Closed/Suction Drain Midline RUQ Bulb 19 Uzbek (Active)       Colostomy LUQ Descending/sigmoid (Active)       Urethral Catheter Non-latex 16 fr (Active)       Findings: extensive inflammation of sigmoid and proximal rectum with adhesions to nearby bladder and abdominal wall,  abscess cavity with purulent material present and localized peritonitis    Detailed Description of Procedure:   Procedure, benefits, risks explained to the patient understood and consent was obtained. Patient was taken to the operating room placed in supine position. Preprocedure timeout was performed. Antibiotics were given according to SCIP protocol. Anesthesia was induced and an ET tube and Montanez catheter were placed. The abdomen was prepped and draped in usual sterile fashion. Next a midline laparotomy incision was made using a scalpel. Dissection was carried down to the level of the fascia using Bovie electrocautery. The fascia was incised with cautery, the peritoneum was lifted and opened using Bovie electrocautery. The length of the incision was then opened using cautery. Upon entrance into the abdomen attention was turned to the sigmoid colon.  It was palpated and noted to be densely adhered to surrounding structures such as the abdominal wall and the bladder. There was noted to be a chronic abscess cavity that had purulent caseating material within it. The sigmoid colon was then mobilized along the white line of Toldt. Mobilization extended up to and around the splenic flexure using cautery until the splenic flexure was completely mobilized. The area where the dense adhesions were extensive was bluntly taken down. Next, using a blue load ANTONIO stapler an area of the descending colon that was proximal to the diseased colon was found and dissected. A window was made in the mesentery using cautery and the stapler was passed through the colon was divided in the mid descending portion. Then, an area near the proximal rectum that appeared to be healthy was found in a similar fashion, the mesenteric window was made using cautery. A blue load contour stapler was used to dissect across the colon. The mesentery was then dissected free and divided into the pelvis using the LigaSure good hemostasis. The specimen was handed off and sent to pathology. The abdomen was thoroughly irrigated with irrisept. Hemostasis was obtained in the pelvis. A Prolene suture was used to tag the rectal stump. Next, the area of the colostomy was chosen on the left upper quadrant. A circular incision was made using cautery dissection was carried down to the fascia. A cruciate incision was made within the fascia and dissection was carried down through the peritoneum into the abdomen. 2 finger breaths fit through the ostomy site. Next, the descending colon was passed through the ostomy site and held in place with a [place] clamp. Next, the abdomen was inspected once more and hemostasis was good. A 19 Croatian round drain was placed in the right lower quadrant and guided into the pelvis. This was sutured into place using a 3 o nylon stitch. Abdomen and pelvis were thoroughly irrigated with irrisept.   The fascia of the midline incision was then closed using two 0 looped PDS sutures. 1 superior and 1 inferiorly running to me in the center of the incision. The skin was then approximated loosely using staples. The subcutaneous tissue was then irrigated with irrisept and packed using half-inch iodoform gauze. Attention was then turned to the colostomy creation. The staple line was dissected off using the Bovie. The bowel was opened to show the mucosa. In a Jeannine fashion the colostomy was created using circumferential 3-0 Vicryl sutures. The colostomy was patent. The abdomen was then cleaned and dried and a colostomy appliance as well as dressing was applied. The patient was then awoken in the operating room and taken to PACU in stable condition. All sponge needle and instrument counts were reported correct at the end of the case. He tolerated the procedure well. Electronically signed by Jeanine Chong DO on 11/22/2021 at 3:04 PM   I attest that I was present throughout the operation and agree with the description of findings and procedure as dictated above.   General Colby SOTO

## 2022-08-15 ENCOUNTER — HOSPITAL ENCOUNTER (OUTPATIENT)
Dept: NON INVASIVE DIAGNOSTICS | Age: 62
Discharge: HOME | End: 2022-08-15
Payer: MEDICARE

## 2022-08-15 DIAGNOSIS — I48.0 PAF (PAROXYSMAL ATRIAL FIBRILLATION) (HCC): ICD-10-CM

## 2022-08-15 DIAGNOSIS — Z98.890 S/P ABLATION OF ATRIAL FIBRILLATION: ICD-10-CM

## 2022-08-15 DIAGNOSIS — I10 PRIMARY HYPERTENSION: ICD-10-CM

## 2022-08-15 DIAGNOSIS — G47.33 OSA (OBSTRUCTIVE SLEEP APNEA): ICD-10-CM

## 2022-08-15 DIAGNOSIS — Z01.810 PREOP CARDIOVASCULAR EXAM: ICD-10-CM

## 2022-08-15 DIAGNOSIS — I42.8 NICM (NONISCHEMIC CARDIOMYOPATHY) (HCC): ICD-10-CM

## 2022-08-15 DIAGNOSIS — Z86.79 S/P ABLATION OF ATRIAL FIBRILLATION: ICD-10-CM

## 2022-08-15 LAB
LV EF: 65 %
LVEF MODALITY: NORMAL

## 2022-08-15 PROCEDURE — 93306 TTE W/DOPPLER COMPLETE: CPT

## 2022-08-16 ENCOUNTER — TELEPHONE (OUTPATIENT)
Dept: CARDIOLOGY | Age: 62
End: 2022-08-16

## 2022-08-16 NOTE — TELEPHONE ENCOUNTER
----- Message from Xavi Arroyo MD sent at 8/15/2022  9:43 PM EDT -----  Echo is good. No change from prior. Continue current therapy and follow-up. Please call with questions and/or concerns.   Thank you

## 2022-09-01 ENCOUNTER — OFFICE VISIT (OUTPATIENT)
Dept: CARDIOLOGY | Age: 62
End: 2022-09-01
Payer: MEDICARE

## 2022-09-01 VITALS
BODY MASS INDEX: 29.31 KG/M2 | WEIGHT: 209.4 LBS | RESPIRATION RATE: 18 BRPM | HEART RATE: 61 BPM | OXYGEN SATURATION: 97 % | HEIGHT: 71 IN | DIASTOLIC BLOOD PRESSURE: 86 MMHG | SYSTOLIC BLOOD PRESSURE: 138 MMHG

## 2022-09-01 DIAGNOSIS — I10 ESSENTIAL HYPERTENSION: ICD-10-CM

## 2022-09-01 DIAGNOSIS — Z79.899 ON AMIODARONE THERAPY: ICD-10-CM

## 2022-09-01 DIAGNOSIS — I42.8 NICM (NONISCHEMIC CARDIOMYOPATHY) (HCC): ICD-10-CM

## 2022-09-01 DIAGNOSIS — I48.0 PAF (PAROXYSMAL ATRIAL FIBRILLATION) (HCC): Primary | ICD-10-CM

## 2022-09-01 DIAGNOSIS — I11.9 HYPERTENSIVE HEART DISEASE WITHOUT HEART FAILURE: ICD-10-CM

## 2022-09-01 DIAGNOSIS — Z79.01 CHRONIC ANTICOAGULATION: ICD-10-CM

## 2022-09-01 PROCEDURE — G8419 CALC BMI OUT NRM PARAM NOF/U: HCPCS | Performed by: PHYSICIAN ASSISTANT

## 2022-09-01 PROCEDURE — 3017F COLORECTAL CA SCREEN DOC REV: CPT | Performed by: PHYSICIAN ASSISTANT

## 2022-09-01 PROCEDURE — 99214 OFFICE O/P EST MOD 30 MIN: CPT | Performed by: PHYSICIAN ASSISTANT

## 2022-09-01 PROCEDURE — G8427 DOCREV CUR MEDS BY ELIG CLIN: HCPCS | Performed by: PHYSICIAN ASSISTANT

## 2022-09-01 PROCEDURE — 1036F TOBACCO NON-USER: CPT | Performed by: PHYSICIAN ASSISTANT

## 2022-09-01 RX ORDER — LOSARTAN POTASSIUM 25 MG/1
12.5 TABLET ORAL DAILY
Qty: 45 TABLET | Refills: 3 | Status: SHIPPED | OUTPATIENT
Start: 2022-09-01

## 2022-09-01 NOTE — PATIENT INSTRUCTIONS
SURVEY:    You may be receiving a survey from [employer] regarding your visit today. Please complete the survey to enable us to provide the highest quality of care to you and your family. If you cannot score us a very good on any question, please call the office to discuss how we could have made your experience a very good one. Thank you.

## 2022-09-01 NOTE — PROGRESS NOTES
Andrea Agosto am scribing for and in the presence of [facility] CoombsSanta [place], [place]    Patient: Livingston Baumgarten  : 1960  Date of Admission: (Not on file)  Today's Date: 2022    REASON FOR CONSULTATION: Follow-up (Hx: PAF, NICM, Htn, MICHELLE. Pt is here for 6 week f/u. Doing okay. Dizziness off and on no falls or near falls Denies: CP, SOB, palps. Things amio is causing hearing issues)      HPI: I had the pleasure of seeing Livingston Baumgarten in consultation today. As you know, Mr. Wanda Zayas is a 59 y.o. male who was admitted on 2020 with new onset atrial fibrillation with RVR leading to cardiac consultation and his most recent work-up. Mr. Wanda Zayas has history of intermittent palpitations and history suggestive of obstructive sleep apnea syndrome. He reported having history of borderline hypertension. He denied any history of diabetes or dyslipidemia. He is lifelong non-smoker. With regard to his family history, he reported that his father and mother had a heart attack in their old age. His echo showed ejection fraction of 40%. No regional wall motion abnormalities. There is some shadowing in the apex but I think this is origin of the papillary muscles. He is anticoagulated anyway. Lexiscan stress test showed low ejection fraction on gated SPECT with ejection fraction being 42% but no perfusion abnormalities favoring nonischemic cardiomyopathy. Stress test done 2020: EF 42%  Largely normal myocardial perfusion imaging with soft tissue artifact, but without significant evidence of myocardial ischemia or infarction. Echo done 2020: EF 40% Mildly increased left ventricular wall thickness with a normal left ventricular cavity size. Moderate global hypokinesis with no regional abnormalities. The left atrium is moderately dilated (34-39) with a left atrial volume index of 34 ml/m2. Mild mitral and tricuspid regurgitation.  Diastolic function cannot be properly assessed due to atrial fibrillation. Sleep study done 12/21/2020 shows mild sleep apnea. Pending cpap titration. Cardioversion done on 1/14/2021: Successful but did show short runs of atrial tachycardia, patient started on amiodarone orally on discharge. His digoxin was discontinued. EKG done in office on 1/22/2021: Sinus bradycardia otherwise normal    EKG done on (2/22/2021) in office showed him in normal sinus rhythm with a HR of 61 bpm.    Echo done on 3/8/2021: Global left ventricular systolic function appears mildly reduced with an estimated ejection fraction of 45-50%. Mildly increased left ventricular wall thickness with a normal left ventricular cavity size. The left atrium is moderately dilated (34-39) with a left atrial volume index of 35 ml/m2. Mild mitral and tricuspid regurgitation. Evidence of mild diastolic dysfunction is seen. The aortic root is moderately dilated (>4.5cm) when corrected for body surface area. CAM done on 3/17/2021: Baseline rhythm is sinus with average heart rate of 63 bpm, ranging between 47 and 109 bpm. Rare PACs and PVCs with 2 runs of ectopic atrial tachycardia, the longest and fastest was 7 beats at heart rate of 110 bpm. No ventricular runs. Patient-activated events correlated with sinus rhythm and sinus bradycardia. No atrial fibrillation recorded. EKG done (6/25/2021): Sinus shalonda with a HR of 56 bpm.    Echocardiogram on 6/25/2021: Global left ventricular systolic function appears preserved with an estimated ejection fraction of 55%. Mildly increased left ventricular wall thickness with a normal left ventricular cavity size. The left atrium is moderately dilated (34-39) with a left atrial volume index of 39 ml/m2. Normal mitral valve structure with trivial mitral regurgitation. Evidence of mild diastolic dysfunction is seen. The aortic root is mildly dilated.  Compared to the previous study of 3/8/21, the patients EF appars to have improved from 45-50% to 55%.    Mr. Malvin Carter came to the ER and was hospitalized on 10/11/2021 due to constipation. He then was transferred to [facility]. V's due to complaints of left lower quadrant/suprapubic abdominal pain upon evaluation is found to have sigmoid diverticulitis with abscess. The abscess was 4 x 4 cm and above the dome of the bladder. He underwent aspiration of the abscess with interventional radiology with drainage of 15 to 20 mL of fluid. He was treated with IV Cipro and Flagyl and had steady improvement. He then came back to the ER on 10/26/2021 due to constipation again. He was transferred back to [facility]. 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 He underwent rectosigmoid colectomy with colostomy creation. Cystourethrogram was done on post op day five. He then came to the ER on 12/6/2021 due to dehydration and again on 12/15/2021 due to wanting a wound check from his colectomy/colostomy placement. CAM patch done on 4/21/2022: 7 days recorded. Baseline rhythm is sinus with average heart rate of 69 bpm, ranging between 53 and 111 bpm. Atrial tachycardia (AT) 20 episodes; longest 8 beats at average 139 bpm up to 142 bpm; fastest 7 beats at average 157 bpm up to 180 bpm. Premature atrial contractions 0.09%. Premature ventricular contractions 1.43%. No patient-activated events recorded. Overall no dangerous heart rate or rhythm abnormalities detected. EKG done in office on 6/13/2022: Maintaining normal sinus rhythm     7/3/2022: S/p colectomy and colostomy admitted for elective reversal of colostomy. Patient unable to have colostomy reversed due to significant pelvic adhesions with inability to dissect out rectal stump safely. Patient had postoperative atrial fibrillation with rapid ventricular response treated by cardiology    EKG done in office (7/19/2022): Showed normal sinus rhythm with a HR of 65 bpm.    Mr. Malvin Carter is here today for a follow up. He has been doing well.  He still [ciprofloxacin hcl] and Pcn [penicillins] REVIEW OF SYSTEMS: 14 systems were reviewed. Pertinent positives and negatives as above, all else negative. Past Surgical History:   Procedure Laterality Date    CARDIOVERSION  01/2021    CT ABSCESS DRAIN SUBCUTANEOUS  10/12/2021    CT ABSCESS DRAIN SUBCUTANEOUS 10/12/2021 [facility] CT SCAN    LAPAROTOMY N/A 06/28/2022    EXPLORATORY LAPAROTOMY,  LYSIS OF ADHESIONS performed by Risa Lima DO at [address] [facility] N/A 11/22/2021    COLECTOMY, COLOSTOMY performed by Risa Lima DO at [address] [facility] History:  Social History     Tobacco Use    Smoking status: Never    Smokeless tobacco: Never   Vaping Use    Vaping Use: Never used   Substance Use Topics    Alcohol use: Not Currently    Drug use: Never        CURRENT MEDICATIONS:  Outpatient Medications Marked as Taking for the 9/1/22 encounter (Office Visit) with Leigh Goldman PA-C   Medication Sig Dispense Refill    losartan (COZAAR) 25 MG tablet Take 0.5 tablets by mouth daily 45 tablet 3    metoprolol succinate (TOPROL XL) 25 MG extended release tablet Take 1 tablet by mouth in the morning. 90 tablet 3    Multiple Vitamin (MULTIVITAMIN ADULT PO) Take 1 tablet by mouth daily       sildenafil (VIAGRA) 50 MG tablet Take 1 tablet by mouth as needed       loratadine (CLARITIN) 10 MG tablet take 1 tablet by mouth daily if needed for allergies      rivaroxaban (XARELTO) 20 MG TABS tablet Take 1 tablet by mouth daily (with breakfast) 90 tablet 3       FAMILY HISTORY: family history includes Cancer in his father and mother; Coronary Art Dis in his father and mother. PHYSICAL EXAM:   Physical Examination:     /86 (Site: Right Upper Arm, Position: Sitting, Cuff Size: Medium Adult)   Pulse 61   Resp 18   Ht 5' 10.98\" (1.803 m)   Wt 209 lb 6.4 oz (95 kg)   SpO2 97%   BMI 29.22 kg/m²  Body mass index is 29.22 kg/m². Constitutional: He appeared oriented to person and place.  He appears well-developed and well-nourished. In no acute distress. HEENT: Normocephalic and atraumatic. No JVD present. Carotid bruit is not present. No mass and no thyromegaly present. No lymphadenopathy noted. Cardiovascular: Normal rate, regular rhythm, normal heart sounds. Exam reveals no gallop and no friction rubs. No murmur was heard. Pulmonary/Chest: Effort normal and breath sounds normal. No respiratory distress. He has no wheezes, rhonchi or rales. Abdominal: Soft, non-tender. He exhibits no organomegaly, mass or bruit. Extremities: None. No cyanosis or clubbing. 2+ radial and carotid pulses. Distal extremity pulses: 2+ bilaterally. Neurological: Alertness and orientation as per Constitutional exam. No evidence of gross cranial nerve deficit. Coordination appeared normal.   Skin: Skin is warm and dry. There is no rash or diaphoresis. Psychiatric: He has a normal mood and affect. His speech is normal and behavior is normal.        MOST RECENT LABS ON RECORD:   Lab Results   Component Value Date    WBC 9.7 07/03/2022    HGB 12.2 (L) 07/03/2022    HCT 38.5 (L) 07/03/2022     07/03/2022    HDL 45 05/06/2021    ALT 29 03/23/2022    AST 30 03/23/2022     07/03/2022    K 3.4 (L) 07/03/2022     07/03/2022    CREATININE 0.88 07/03/2022    BUN 14 07/03/2022    CO2 22 07/03/2022    TSH 1.81 05/06/2021    PSA 2.36 05/06/2021    INR 1.2 10/12/2021    LABA1C 5.7 06/30/2022        Diagnosis Orders   1. PAF (paroxysmal atrial fibrillation) (HCC)  losartan (COZAAR) 25 MG tablet    Continuous cardiac monitoring, >2 up to 14 days      2. Chronic anticoagulation  losartan (COZAAR) 25 MG tablet    Continuous cardiac monitoring, >2 up to 14 days      3. On amiodarone therapy  losartan (COZAAR) 25 MG tablet    Continuous cardiac monitoring, >2 up to 14 days      4. NICM (nonischemic cardiomyopathy) (HCC)  losartan (COZAAR) 25 MG tablet    Continuous cardiac monitoring, >2 up to 14 days      5.  Hypertensive heart disease without heart failure  losartan (COZAAR) 25 MG tablet    Continuous cardiac monitoring, >2 up to 14 days      6. Essential hypertension  losartan (COZAAR) 25 MG tablet    Continuous cardiac monitoring, >2 up to 14 days        PLAN:  Paroxysmal Atrial Fibrillation: Rhythm Control S/P Successful Cardioversion on 1/14/2021. Beta Blocker: Continue Metoprolol succinate (Toprol XL) 25 mg daily. Anti-Arrhythmic: STOP amiodarone (Pacerone) 200 mg, 1/2 tab daily. Monitoring: Since they will being maintained on Amiodarone, I told them that we will need to closely monitor them for potential side effects. These include monitoring LFTs and TSH at least every 6 months as well as chest x-rays, pulmonary function tests, and eye exams at least yearly. [id number] Score for Atrial Fibrillation Stroke Risk   Risk   Factors  Component Value   C  Yes 1   H HTN Yes 1   A2 Age >= 75 No,  (62 y.o.) 0   D DM No 0   S2 Prior Stroke/TIA No 0   V Vascular Disease No 0   A Age 74-69 No,  (62 y.o.) 0   Sc Sex male 0    [id number]  Score  2   Score last updated 3/95/04 5:21 AM EDT  Click here for a link to the UpToDate guideline \"Atrial Fibrillation: Anticoagulation therapy to prevent embolization  Disclaimer: Risk Score calculation is dependent on accuracy of patient problem list and past encounter diagnosis. CHADS2-VASc score: 2/9 (2.2% stroke risk)   Anticoagulation: Continue Riveroxaban (Xarelto): 20 mg once daily with largest meal (typically dinner). I ordered a CAM monitor to be worn in 2-3 weeks for 1 week to assess for any atrial fibrillation since stopping amiodarone. Educated him that if the ringing in his ears continued for 4 weeks to call ENT for further evaluation. Nonischemic Cardiomyopathy/ Hypertensive Heart Disease Without Heart Failure: Patient ejection fraction improved. Beta Blocker: Continue Metoprolol succinate (Toprol XL) 25 mg daily.    ACE Inibitor/ARB: START losartan (Cozaar) 25 mg, 1/2 tab daily.     Essential Hypertension: Controlled  Beta Blocker: Continue Metoprolol succinate (Toprol XL) 25 mg daily. ACE Inibitor/ARB: START losartan (Cozaar) 25 mg, 1/2 tab daily. I do want him to monitor for one week and to call our office with his home readings, we can adjust his mediation if needed. Finally, I recommended that he continue his other medications and follow up with you as previously scheduled. FOLLOW UP:   I told Mr. Jennifer Hanna to call my office if he had any problems, but otherwise told him to Return in about 3 months (around 12/1/2022). However, I would be happy to see him sooner should the need arise. Sincerely,  [facility] Cardiology Specialist    [address] [facility], [address] [place] [address]  Phone: [phone], Fax: [phone]     I believe that the risk of significant morbidity and mortality related to the patient's current medical conditions are: intermediate-high. Approximately 35 minutes were spent during prep work, discussion and exam of the patient, and follow up documentation and all of their questions were answered. The documentation recorded by the scribe, accurately and completely reflects the services I personally performed and the decisions made by me.  Rosangela Gutierrez PA-C   September 1, 2022

## 2022-09-20 ENCOUNTER — HOSPITAL ENCOUNTER (EMERGENCY)
Age: 62
Discharge: HOME | End: 2022-09-20
Attending: EMERGENCY MEDICINE
Payer: MEDICARE

## 2022-09-20 VITALS
SYSTOLIC BLOOD PRESSURE: 126 MMHG | HEART RATE: 86 BPM | OXYGEN SATURATION: 96 % | RESPIRATION RATE: 20 BRPM | DIASTOLIC BLOOD PRESSURE: 74 MMHG | TEMPERATURE: 98.4 F

## 2022-09-20 DIAGNOSIS — U07.1 COVID-19: Primary | ICD-10-CM

## 2022-09-20 LAB
FLU A ANTIGEN: NEGATIVE
FLU B ANTIGEN: NEGATIVE
SARS-COV-2, RAPID: DETECTED
SPECIMEN DESCRIPTION: ABNORMAL

## 2022-09-20 PROCEDURE — 87804 INFLUENZA ASSAY W/OPTIC: CPT

## 2022-09-20 PROCEDURE — 87635 SARS-COV-2 COVID-19 AMP PRB: CPT

## 2022-09-20 PROCEDURE — 99283 EMERGENCY DEPT VISIT LOW MDM: CPT

## 2022-09-20 ASSESSMENT — ENCOUNTER SYMPTOMS
SHORTNESS OF BREATH: 0
BACK PAIN: 0
SINUS PRESSURE: 1
ABDOMINAL DISTENTION: 0
SORE THROAT: 0
COUGH: 1

## 2022-09-20 NOTE — ED PROVIDER NOTES
Except as noted above the remainder of the review of systems was reviewed and negative.        PAST MEDICAL HISTORY     Past Medical History:   Diagnosis Date    Abnormal patient-activated cardiac event monitor 02/22/2021    CAM 13 days 8 hrs Baseline sinus ave HR of 63 bpm rang between 47 adn 109 bpm  Rare PAC and PVC with 2 runs of ectopic atrial tachy longest fastest was 7 beats hr 110 bpm No VT runs no AFib    Chronic anticoagulation     xarelto    Colonic diverticular abscess 10/2021    Colostomy fistula (HCC)     GERD (gastroesophageal reflux disease)     H/O echocardiogram 03/08/2021    EF 45-50% Mild increased LV wallthickness LA is mod dilated 34-39 with LA volume index of 35 ml/m2 Mild mitral and tricuspid regurg Midl diastolic dysfunction seen Aortic root is mod dilated >4.5cm when corrected for body suface area    History of cardioversion     History of pneumonia     pt does not recall having this    Hx of bronchitis     Hyperlipidemia     Hypertension     Non-ischemic cardiomyopathy ([facility] [address]     Dr. Aida Aranda cardiology in [place], [place]    MICHELLE on CPAP     Paroxysmal A-fib ([facility] [address]     hx cardioversion    Sleep apnea     Home CPAP    Under care of team 06/21/2022    cardiology-Dr Swanson-[place]-last visit june 2022    Under care of team 06/21/2022    [place]         SURGICAL HISTORY       Past Surgical History:   Procedure Laterality Date    CARDIOVERSION  01/2021    CT ABSCESS DRAIN SUBCUTANEOUS  10/12/2021    CT ABSCESS DRAIN SUBCUTANEOUS 10/12/2021 [facility] CT SCAN    LAPAROTOMY N/A 06/28/2022    EXPLORATORY LAPAROTOMY,  LYSIS OF ADHESIONS performed by Debby Mccurdy DO at [address] [facility] 11/22/2021    COLECTOMY, COLOSTOMY performed by Debby Mccurdy DO at [address] [place] [address]       Previous Medications    FLUTICASONE (FLONASE) 50 MCG/ACT NASAL SPRAY    2 sprays by Nasal route daily as needed for Rhinitis One time in morning and once in evening    HYDROXYZINE (ATARAX) 50 MG TABLET    take 1 tablet by mouth at bedtime if needed for sleep    LORATADINE (CLARITIN) 10 MG TABLET    take 1 tablet by mouth daily if needed for allergies    LOSARTAN (COZAAR) 25 MG TABLET    Take 0.5 tablets by mouth daily    METOPROLOL SUCCINATE (TOPROL XL) 25 MG EXTENDED RELEASE TABLET    Take 1 tablet by mouth in the morning. MULTIPLE VITAMIN (MULTIVITAMIN ADULT PO)    Take 1 tablet by mouth daily     RIVAROXABAN (XARELTO) 20 MG TABS TABLET    Take 1 tablet by mouth daily (with breakfast)    SILDENAFIL (VIAGRA) 50 MG TABLET    Take 1 tablet by mouth as needed        ALLERGIES     Cipro [ciprofloxacin hcl] and Pcn [penicillins]    FAMILY HISTORY       Family History   Problem Relation Age of Onset    Cancer Mother     Coronary Art Dis Mother     Cancer Father     Coronary Art Dis Father           SOCIAL HISTORY       Social History     Socioeconomic History    Marital status: Single   Tobacco Use    Smoking status: Never    Smokeless tobacco: Never   Vaping Use    Vaping Use: Never used   Substance and Sexual Activity    Alcohol use: Not Currently    Drug use: Never       SCREENINGS        [place] Coma Scale  Eye Opening: Spontaneous  Best Verbal Response: Oriented  Best Motor Response: Obeys commands  [place] Coma Scale Score: 15               PHYSICAL EXAM    (up to 7 for level 4, 8 or more for level 5)     ED Triage Vitals   BP Temp Temp Source Heart Rate Resp SpO2 Height Weight   09/20/22 0831 09/20/22 0834 09/20/22 0834 09/20/22 0831 09/20/22 0831 09/20/22 0831 -- --   (!) 160/81 98.4 °F (36.9 °C) Oral 86 20 98 %         Physical Exam  Constitutional:       General: He is not in acute distress. Appearance: Normal appearance. He is not toxic-appearing. HENT:      Head: Normocephalic and atraumatic. Mouth/Throat:      Mouth: Mucous membranes are moist.   Eyes:      Extraocular Movements: Extraocular movements intact.       Pupils: Pupils are equal, round, and reactive to light. Cardiovascular:      Rate and Rhythm: Normal rate and regular rhythm. Pulses: Normal pulses. Heart sounds: Normal heart sounds. Pulmonary:      Effort: Pulmonary effort is normal.      Breath sounds: Normal breath sounds. Abdominal:      General: Abdomen is flat. Bowel sounds are normal.      Palpations: Abdomen is soft. Musculoskeletal:         General: Normal range of motion. Skin:     General: Skin is warm and dry. Capillary Refill: Capillary refill takes less than 2 seconds. Neurological:      General: No focal deficit present. Mental Status: He is alert and oriented to person, place, and time. Psychiatric:         Mood and Affect: Mood normal.       DIAGNOSTIC RESULTS     RADIOLOGY:   Non-plain film images such as CT, Ultrasound and MRI are read by the radiologist. Plain radiographic images are visualized and preliminarily interpreted by the emergency physician with the below findings:      Interpretation per the Radiologist below, if available at the time of this note:    No orders to display         LABS:  Labs Reviewed   COVID-19, RAPID - Abnormal; Notable for the following components:       Result Value    SARS-CoV-2, Rapid DETECTED (*)     All other components within normal limits   RAPID INFLUENZA A/B ANTIGENS       All other labs were within normal range or not returned as of this dictation. EMERGENCY DEPARTMENT COURSE and DIFFERENTIAL DIAGNOSIS/MDM:   Vitals:    Vitals:    09/20/22 0831 09/20/22 0834   BP: (!) 160/81    Pulse: 86    Resp: 20    Temp:  98.4 °F (36.9 °C)   TempSrc:  Oral   SpO2: 98%        REASSESSMENT      I spoke to patient regarding side effects of the medication. Patient has history of atrial fibrillation and is on anticoagulation and with his age and mild symptoms at this time patient may benefit from being started on the Paxlovid.   Patient understands the risks of taking the medication and will follow up with his doctor in the next few days. Advised him to return if symptoms get worse. He understands and has no other questions or concerns. Patient denies any known liver or kidney conditions. FINAL IMPRESSION      1. COVID-19          DISPOSITION/PLAN   DISPOSITION Decision To Discharge 09/20/2022 09:12:40 AM      PATIENT REFERRED TO:  JASPREET Waldron NP  [address] [place] [address]  [phone]    In 1 week      DISCHARGE MEDICATIONS:  New Prescriptions    NIRMATRELVIR/RITONAVIR (PAXLOVID) 20 X 150 MG & 10 X 100MG TBPK    Take 3 tablets (two 150 mg nirmatrelvir and one 100 mg ritonavir tablets) by mouth every 12 hours for 5 days.          (Please note that portions of this note were completed with a voice recognition program.  Efforts were made to edit the dictations but occasionally words are mis-transcribed.)    Megan Finch DO (electronically signed)  Attending Emergency Physician            Megan Finch DO  09/20/22 0298

## 2022-09-20 NOTE — DISCHARGE INSTRUCTIONS
Get plenty of rest.  Start taking the Paxil bed today. Follow-up with your doctor in the next few days. Return to the emergency department if symptoms get worse.

## 2022-09-26 ENCOUNTER — HOSPITAL ENCOUNTER (OUTPATIENT)
Dept: NON INVASIVE DIAGNOSTICS | Age: 62
Discharge: HOME | End: 2022-09-26
Payer: MEDICARE

## 2022-09-26 DIAGNOSIS — I11.9 HYPERTENSIVE HEART DISEASE WITHOUT HEART FAILURE: ICD-10-CM

## 2022-09-26 DIAGNOSIS — Z79.899 ON AMIODARONE THERAPY: ICD-10-CM

## 2022-09-26 DIAGNOSIS — I10 ESSENTIAL HYPERTENSION: ICD-10-CM

## 2022-09-26 DIAGNOSIS — I48.0 PAF (PAROXYSMAL ATRIAL FIBRILLATION) (HCC): ICD-10-CM

## 2022-09-26 DIAGNOSIS — Z79.01 CHRONIC ANTICOAGULATION: ICD-10-CM

## 2022-09-26 DIAGNOSIS — I42.8 NICM (NONISCHEMIC CARDIOMYOPATHY) (HCC): ICD-10-CM

## 2022-09-26 PROCEDURE — 93242 EXT ECG>48HR<7D RECORDING: CPT

## 2022-09-26 PROCEDURE — 93243 EXT ECG>48HR<7D SCAN A/R: CPT

## 2022-10-11 NOTE — PROCEDURES
[address], [place] [address]                                 EVENT MONITOR    PATIENT NAME: Martin Johns                  :        1960  MED REC NO:   [id number]                              ROOM:  ACCOUNT NO:   [de-identified]                           ADMIT DATE: 2022  PROVIDER:     Allison Palma MD    CARDIOVASCULAR DIAGNOSTIC DEPARTMENT    DATE OF STUDY:  2022    ORDERING PROVIDER:  Ralph Perez PA-C    PRIMARY CARE PROVIDER:  PEARL Cheatham    INTERPRETING PHYSICIAN:  Allison Palma MD    DIAGNOSIS:  Paroxysmal atrial fibrillation. PHYSICIAN INTERPRETATION:  1. Predominant rhythm:  Normal sinus rhythm. 2.  Atrial tachycardia:  4 episodes. Longest/fastest, 3 beats at  average 156 bpm up to 173 bpm.    3.  PAC 0.1%. 4.  PVC 0.7%    5 days and 10 hours recorded. Sinus rhythm with few short runs of  ectopic atrial tachycardia, the longest was 3 beats at a heart rate of  156 bpm.  Rare isolated APCs and PVCs. No ventricular runs. No  symptoms reported. Overall fairly unremarkable study. JOSE LUIS Price MD    D: 10/11/2022 9:29:16       T: 10/11/2022 9:30:18     CRISTY/[email or web address]  Job#: [id number]     Doc#: Unknown    CC:  JOHNNIE Sharif APRN-NP

## 2022-10-12 ENCOUNTER — TELEPHONE (OUTPATIENT)
Dept: CARDIOLOGY | Age: 62
End: 2022-10-12

## 2022-10-12 NOTE — TELEPHONE ENCOUNTER
----- Message from Pranav Guan PA-C sent at 10/12/2022  8:05 AM EDT -----  Please let them know that their CAM monitor was overall unremarkable. We will discuss at their follow up appointment.

## 2022-10-22 RX ORDER — RIVAROXABAN 20 MG/1
TABLET, FILM COATED ORAL
Qty: 90 TABLET | Refills: 3 | Status: SHIPPED | OUTPATIENT
Start: 2022-10-22

## 2023-01-24 ENCOUNTER — OFFICE VISIT (OUTPATIENT)
Dept: CARDIOLOGY | Age: 63
End: 2023-01-24
Payer: MEDICARE

## 2023-01-24 ENCOUNTER — HOSPITAL ENCOUNTER (OUTPATIENT)
Age: 63
Discharge: HOME | End: 2023-01-24
Payer: MEDICARE

## 2023-01-24 VITALS
RESPIRATION RATE: 18 BRPM | DIASTOLIC BLOOD PRESSURE: 75 MMHG | HEART RATE: 58 BPM | HEIGHT: 71 IN | SYSTOLIC BLOOD PRESSURE: 124 MMHG | OXYGEN SATURATION: 99 % | WEIGHT: 211.2 LBS | BODY MASS INDEX: 29.57 KG/M2

## 2023-01-24 DIAGNOSIS — I48.0 PAF (PAROXYSMAL ATRIAL FIBRILLATION) (HCC): Primary | ICD-10-CM

## 2023-01-24 DIAGNOSIS — Z79.01 CHRONIC ANTICOAGULATION: ICD-10-CM

## 2023-01-24 DIAGNOSIS — I48.0 PAF (PAROXYSMAL ATRIAL FIBRILLATION) (HCC): ICD-10-CM

## 2023-01-24 DIAGNOSIS — I42.8 NICM (NONISCHEMIC CARDIOMYOPATHY) (HCC): ICD-10-CM

## 2023-01-24 DIAGNOSIS — I10 ESSENTIAL HYPERTENSION: ICD-10-CM

## 2023-01-24 LAB
ANION GAP SERPL CALCULATED.3IONS-SCNC: 10 MMOL/L (ref 9–17)
BUN BLDV-MCNC: 20 MG/DL (ref 8–23)
BUN/CREAT BLD: 20 (ref 9–20)
CALCIUM SERPL-MCNC: 9.8 MG/DL (ref 8.6–10.4)
CHLORIDE BLD-SCNC: 103 MMOL/L (ref 98–107)
CHOLESTEROL/HDL RATIO: 5.3
CHOLESTEROL: 222 MG/DL
CO2: 26 MMOL/L (ref 20–31)
CREAT SERPL-MCNC: 1.01 MG/DL (ref 0.7–1.2)
GFR SERPL CREATININE-BSD FRML MDRD: >60 ML/MIN/1.73M2
GLUCOSE BLD-MCNC: 99 MG/DL (ref 70–99)
HCT VFR BLD CALC: 45.3 % (ref 40.7–50.3)
HDLC SERPL-MCNC: 42 MG/DL
HEMOGLOBIN: 14.7 G/DL (ref 13–17)
LDL CHOLESTEROL: 140 MG/DL (ref 0–130)
MCH RBC QN AUTO: 30.3 PG (ref 25.2–33.5)
MCHC RBC AUTO-ENTMCNC: 32.5 G/DL (ref 28.4–34.8)
MCV RBC AUTO: 93.4 FL (ref 82.6–102.9)
NRBC AUTOMATED: 0 PER 100 WBC
PDW BLD-RTO: 12.9 % (ref 11.8–14.4)
PLATELET # BLD: 263 K/UL (ref 138–453)
PMV BLD AUTO: 9 FL (ref 8.1–13.5)
POTASSIUM SERPL-SCNC: 4.5 MMOL/L (ref 3.7–5.3)
RBC # BLD: 4.85 M/UL (ref 4.21–5.77)
SODIUM BLD-SCNC: 139 MMOL/L (ref 135–144)
TRIGL SERPL-MCNC: 202 MG/DL
TSH SERPL DL<=0.05 MIU/L-ACNC: 1.26 UIU/ML (ref 0.3–5)
WBC # BLD: 6.2 K/UL (ref 3.5–11.3)

## 2023-01-24 PROCEDURE — 1036F TOBACCO NON-USER: CPT | Performed by: INTERNAL MEDICINE

## 2023-01-24 PROCEDURE — 99214 OFFICE O/P EST MOD 30 MIN: CPT | Performed by: INTERNAL MEDICINE

## 2023-01-24 PROCEDURE — 85027 COMPLETE CBC AUTOMATED: CPT

## 2023-01-24 PROCEDURE — G8427 DOCREV CUR MEDS BY ELIG CLIN: HCPCS | Performed by: INTERNAL MEDICINE

## 2023-01-24 PROCEDURE — G8419 CALC BMI OUT NRM PARAM NOF/U: HCPCS | Performed by: INTERNAL MEDICINE

## 2023-01-24 PROCEDURE — G8484 FLU IMMUNIZE NO ADMIN: HCPCS | Performed by: INTERNAL MEDICINE

## 2023-01-24 PROCEDURE — 36415 COLL VENOUS BLD VENIPUNCTURE: CPT

## 2023-01-24 PROCEDURE — 84443 ASSAY THYROID STIM HORMONE: CPT

## 2023-01-24 PROCEDURE — 80061 LIPID PANEL: CPT

## 2023-01-24 PROCEDURE — 3017F COLORECTAL CA SCREEN DOC REV: CPT | Performed by: INTERNAL MEDICINE

## 2023-01-24 PROCEDURE — 80048 BASIC METABOLIC PNL TOTAL CA: CPT

## 2023-01-24 PROCEDURE — 3074F SYST BP LT 130 MM HG: CPT | Performed by: INTERNAL MEDICINE

## 2023-01-24 PROCEDURE — 3078F DIAST BP <80 MM HG: CPT | Performed by: INTERNAL MEDICINE

## 2023-01-24 NOTE — PATIENT INSTRUCTIONS
SURVEY:    You may be receiving a survey from [employer] regarding your visit today. Please complete the survey to enable us to provide the highest quality of care to you and your family. If you cannot score us a very good on any question, please call the office to discuss how we could have made your experience a very good one. Thank you.

## 2023-01-24 NOTE — PROGRESS NOTES
Tonia Michele am scribing for and in the presence of Ember Waller MD, F.A.C.C. Patient: Wil Rueda  : 1960  Date of Admission: (Not on file)  Today's Date: 2023    REASON FOR CONSULTATION: Follow-up (HX:PAF,NICM, Hypertensive heart disease PT is here for 3 month follow up he states he is doing ok he does have some elevated BP issues at times. He does have pulsing in eyes when getting up at times. Denies:CP,sob, lightheaded/dizziness,palp )    Dear Carl Ni, JASPREET - NP    HPI: I had the pleasure of seeing Wil Rueda in consultation today. As you know, Mr. Erin Fay is a 57 y.o. male who was admitted on 2020 with new onset atrial fibrillation with RVR leading to cardiac consultation and his most recent work-up. Mr. Erin Fay has history of intermittent palpitations and history suggestive of obstructive sleep apnea syndrome. He reported having history of borderline hypertension. He denied any history of diabetes or dyslipidemia. He is lifelong non-smoker. With regard to his family history, he reported that his father and mother had a heart attack in their old age. His echo showed ejection fraction of 40%. No regional wall motion abnormalities. There is some shadowing in the apex but I think this is origin of the papillary muscles. He is anticoagulated anyway. Lexiscan stress test showed low ejection fraction on gated SPECT with ejection fraction being 42% but no perfusion abnormalities favoring nonischemic cardiomyopathy. Stress test done 2020: EF 42%  Largely normal myocardial perfusion imaging with soft tissue artifact, but without significant evidence of myocardial ischemia or infarction. Echo done 2020: EF 40% Mildly increased left ventricular wall thickness with a normal left ventricular cavity size. Moderate global hypokinesis with no regional abnormalities. The left atrium is moderately dilated (34-39) with a left atrial volume index of 34 ml/m2. Mild mitral and tricuspid regurgitation. Diastolic function cannot be properly assessed due to atrial fibrillation. Sleep study done 12/21/2020 shows mild sleep apnea. Pending cpap titration. Cardioversion done on 1/14/2021: Successful but did show short runs of atrial tachycardia, patient started on amiodarone orally on discharge. His digoxin was discontinued. EKG done in office on 1/22/2021: Sinus bradycardia otherwise normal    EKG done on (2/22/2021) in office showed him in normal sinus rhythm with a HR of 61 bpm.    Echo done on 3/8/2021: Global left ventricular systolic function appears mildly reduced with an estimated ejection fraction of 45-50%. Mildly increased left ventricular wall thickness with a normal left ventricular cavity size. The left atrium is moderately dilated (34-39) with a left atrial volume index of 35 ml/m2. Mild mitral and tricuspid regurgitation. Evidence of mild diastolic dysfunction is seen. The aortic root is moderately dilated (>4.5cm) when corrected for body surface area. CAM done on 3/17/2021: Baseline rhythm is sinus with average heart rate of 63 bpm, ranging between 47 and 109 bpm. Rare PACs and PVCs with 2 runs of ectopic atrial tachycardia, the longest and fastest was 7 beats at heart rate of 110 bpm. No ventricular runs. Patient-activated events correlated with sinus rhythm and sinus bradycardia. No atrial fibrillation recorded. EKG done (6/25/2021): Sinus shalonda with a HR of 56 bpm.    Echocardiogram on 6/25/2021: Global left ventricular systolic function appears preserved with an estimated ejection fraction of 55%. Mildly increased left ventricular wall thickness with a normal left ventricular cavity size. The left atrium is moderately dilated (34-39) with a left atrial volume index of 39 ml/m2. Normal mitral valve structure with trivial mitral regurgitation. Evidence of mild diastolic dysfunction is seen. The aortic root is mildly dilated. Compared to the previous study of 3/8/21, the patients EF appars to have improved from 45-50% to 55%. Mr. Yohan Montalvo came to the ER and was hospitalized on 10/11/2021 due to constipation. He then was transferred to [facility]. V's due to complaints of left lower quadrant/suprapubic abdominal pain upon evaluation is found to have sigmoid diverticulitis with abscess. The abscess was 4 x 4 cm and above the dome of the bladder. He underwent aspiration of the abscess with interventional radiology with drainage of 15 to 20 mL of fluid. He was treated with IV Cipro and Flagyl and had steady improvement. He then came back to the ER on 10/26/2021 due to constipation again. He was transferred back to [facility]. 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 He underwent rectosigmoid colectomy with colostomy creation. Cystourethrogram was done on post op day five. He then came to the ER on 12/6/2021 due to dehydration and again on 12/15/2021 due to wanting a wound check from his colectomy/colostomy placement. CAM patch done on 4/21/2022: 7 days recorded. Baseline rhythm is sinus with average heart rate of 69 bpm, ranging between 53 and 111 bpm. Atrial tachycardia (AT) 20 episodes; longest 8 beats at average 139 bpm up to 142 bpm; fastest 7 beats at average 157 bpm up to 180 bpm. Premature atrial contractions 0.09%. Premature ventricular contractions 1.43%. No patient-activated events recorded. Overall no dangerous heart rate or rhythm abnormalities detected. EKG done in office on 6/13/2022: Maintaining normal sinus rhythm     7/3/2022: S/p colectomy and colostomy admitted for elective reversal of colostomy. Patient unable to have colostomy reversed due to significant pelvic adhesions with inability to dissect out rectal stump safely. Patient had postoperative atrial fibrillation with rapid ventricular response treated by cardiology    EKG done in office (7/19/2022):  Showed normal sinus rhythm with a HR of 65 bpm.  EKG done in office 9/1/2022- sinus shalonda, other normal ECG    Echo done on 8/15/2022- EF 65% The left ventricular cavity size is within normal limits and the left ventricular wall thickness is mildly increased. No definite specific wall motion abnormalities were identified. The left atrium is mildly dilated (29-33) with a left atrial volume index of 30 ml/m2. Mild tricuspid regurgitation. The aortic root is mildly dilated when corrected for body surface area. Mild diastolic dysfunction. CAM monitor done on 9/26/2022-  1. Predominant rhythm:  Normal sinus rhythm. 2.  Atrial tachycardia:  4 episodes. Longest/fastest, 3 beats at  average 156 bpm up to 173 bpm.     3.  PAC 0.1%. 4.  PVC 0.7%     5 days and 10 hours recorded. Sinus rhythm with few short runs of ectopic atrial tachycardia, the longest was 3 beats at a heart rate of  156 bpm.  Rare isolated APCs and PVCs. No ventricular runs. No symptoms reported. Overall fairly unremarkable study. Mr. Fabiola Archuleta is here today for a three month follow up. He has been doing well. He states he does notice is blood pressure is running high he feels pulsing in eyes. He has had recent eye exam and does show signs of cataract. He does work part time with [employer]. He denies having any chest pain, pressure or tightness. He denies feeling any palpitations. No fever or chills. No abdominal pain, nausea or vomiting. No bleeding problems, issues with medications or any other concerns at this time. Bleeding Risks: Mr. Fabiola Archuleta denies any current or recent bleeding problems including a history of a GI bleed, ulcers, recent or upcoming surgeries, blood in his stool or black tarry stools or blood in his urine.         Past Medical History:   Diagnosis Date    Abnormal patient-activated cardiac event monitor 02/22/2021    CAM 13 days 8 hrs Baseline sinus ave HR of 63 bpm rang between 47 adn 109 bpm  Rare PAC and PVC with 2 runs of ectopic atrial tachy longest fastest was 7 beats hr 110 bpm No VT runs no AFib    Chronic anticoagulation     xarelto    Colonic diverticular abscess 10/2021    Colostomy fistula (HCC)     GERD (gastroesophageal reflux disease)     H/O echocardiogram 03/08/2021    EF 45-50% Mild increased LV wallthickness LA is mod dilated 34-39 with LA volume index of 35 ml/m2 Mild mitral and tricuspid regurg Midl diastolic dysfunction seen Aortic root is mod dilated >4.5cm when corrected for body suface area    History of cardioversion     History of pneumonia     pt does not recall having this    Hx of bronchitis     Hyperlipidemia     Hypertension     Non-ischemic cardiomyopathy ([facility]     Dr. Yaa Barrera cardiology in [place], [place]    MICHELLE on CPAP     Paroxysmal A-fib ([facility] [address]     hx cardioversion    Sleep apnea     Home CPAP    Under care of team 06/21/2022    cardiology-Dr Swanson[place]-last visit june 2022    Under care of team 06/21/2022    pcp-[facility]-last visit nov 2021   Persistent atrial fibrillation. Dilated aortic root. Compression fracture of T4 and T5.    CURRENT ALLERGIES: Cipro [ciprofloxacin hcl] and Pcn [penicillins] REVIEW OF SYSTEMS: 14 systems were reviewed. Pertinent positives and negatives as above, all else negative.      Past Surgical History:   Procedure Laterality Date    CARDIOVERSION  01/2021    CT ABSCESS DRAIN SUBCUTANEOUS  10/12/2021    CT ABSCESS DRAIN SUBCUTANEOUS 10/12/2021 [facility] CT SCAN    LAPAROTOMY N/A 06/28/2022    EXPLORATORY LAPAROTOMY,  LYSIS OF ADHESIONS performed by Alessandro Wood DO at [address] [facility] N/A 11/22/2021    COLECTOMY, COLOSTOMY performed by Alessandro Wood DO at [address] [facility] History:  Social History     Tobacco Use    Smoking status: Never    Smokeless tobacco: Never   Vaping Use    Vaping Use: Never used   Substance Use Topics    Alcohol use: Not Currently    Drug use: Never        CURRENT MEDICATIONS:  Outpatient Medications Marked as Taking for the 1/24/23 encounter (Office Visit) with Ezequiel Encarnacion MD   Medication Sig Dispense Refill    XARELTO 20 MG TABS tablet take 1 tablet by mouth EVERY MORNING WITH BREAKFAST 90 tablet 3    losartan (COZAAR) 25 MG tablet Take 0.5 tablets by mouth daily 45 tablet 3    metoprolol succinate (TOPROL XL) 25 MG extended release tablet Take 1 tablet by mouth in the morning. 90 tablet 3    Multiple Vitamin (MULTIVITAMIN ADULT PO) Take 1 tablet by mouth daily       sildenafil (VIAGRA) 50 MG tablet Take 1 tablet by mouth as needed       fluticasone (FLONASE) 50 MCG/ACT nasal spray 2 sprays by Nasal route daily as needed for Rhinitis One time in morning and once in evening         FAMILY HISTORY: family history includes Cancer in his father and mother; Coronary Art Dis in his father and mother. PHYSICAL EXAM:   Physical Examination:     /75 (Site: Left Upper Arm, Position: Sitting, Cuff Size: Large Adult)   Pulse 58   Resp 18   Ht 5' 11\" (1.803 m)   Wt 211 lb 3.2 oz (95.8 kg)   SpO2 99%   BMI 29.46 kg/m²  Body mass index is 29.46 kg/m². Constitutional: He appeared oriented to person and place. He appears well-developed and well-nourished. In no acute distress. HEENT: Normocephalic and atraumatic. No JVD present. Carotid bruit is not present. No mass and no thyromegaly present. No lymphadenopathy noted. Cardiovascular: Normal rate, regular rhythm, normal heart sounds. Exam reveals no gallop and no friction rubs. No murmur was heard. Pulmonary/Chest: Effort normal and breath sounds normal. No respiratory distress. He has no wheezes, rhonchi or rales. Abdominal: Soft, non-tender. He exhibits no organomegaly, mass or bruit. Extremities: None. No cyanosis or clubbing. 2+ radial and carotid pulses. Distal extremity pulses: 2+ bilaterally. Neurological: Alertness and orientation as per Constitutional exam. No evidence of gross cranial nerve deficit. Coordination appeared normal.   Skin: Skin is warm and dry. There is no rash or diaphoresis. Psychiatric: He has a normal mood and affect. His speech is normal and behavior is normal.        MOST RECENT LABS ON RECORD:   Lab Results   Component Value Date    WBC 9.7 07/03/2022    HGB 12.2 (L) 07/03/2022    HCT 38.5 (L) 07/03/2022     07/03/2022    HDL 45 05/06/2021    ALT 29 03/23/2022    AST 30 03/23/2022     07/03/2022    K 3.4 (L) 07/03/2022     07/03/2022    CREATININE 0.88 07/03/2022    BUN 14 07/03/2022    CO2 22 07/03/2022    TSH 1.81 05/06/2021    PSA 2.36 05/06/2021    INR 1.2 10/12/2021    LABA1C 5.7 06/30/2022        Diagnosis Orders   1. PAF (paroxysmal atrial fibrillation) ([facility]        2. NICM (nonischemic cardiomyopathy) [facility]        3. Essential hypertension        4. Chronic anticoagulation            PLAN:  Paroxysmal Atrial Fibrillation: Rhythm Control S/P Successful Cardioversion on 1/14/2021. Beta Blocker: Continue Metoprolol succinate (Toprol XL) 25 mg daily. Anti-Arrhythmic:  Not indicated. [id number] Score for Atrial Fibrillation Stroke Risk   Risk   Factors  Component Value   C  Yes 1   H HTN Yes 1   A2 Age >= 75 No,  (60 y.o.) 0   D DM No 0   S2 Prior Stroke/TIA No 0   V Vascular Disease No 0   A Age 74-69 No,  (60 y.o.) 0   Sc Sex male 0    [id number]  Score  2   Score last updated 4/00/04 9:85 AM EDT  Click here for a link to the UpToDate guideline \"Atrial Fibrillation: Anticoagulation therapy to prevent embolization  Disclaimer: Risk Score calculation is dependent on accuracy of patient problem list and past encounter diagnosis. CHADS2-VASc score: 2/9 (2.2% stroke risk)   Anticoagulation: Continue Riveroxaban (Xarelto): 20 mg once daily with largest meal (typically dinner). Nonischemic Cardiomyopathy/ Hypertensive Heart Disease Without Heart Failure: Patient ejection fraction improved. Beta Blocker: Continue Metoprolol succinate (Toprol XL) 25 mg daily.    ACE Inibitor/ARB: Continue losartan (Cozaar) 25 mg, 1/2 tab daily. Essential Hypertension: Controlled  Beta Blocker: Continue Metoprolol succinate (Toprol XL) 25 mg daily. ACE Inibitor/ARB: Continue losartan (Cozaar) 25 mg, 1/2 tab daily. I took the liberty of ordering a BMP for today to assess their potassium and renal function. I told them that they could get their lab work performed at the location of their choosing, unfortunately, if the lab work was not performed at a [facility]) facility I could not guarantee my ability to follow up with them on their results. ,  I took the liberty of ordering a CBC. I told them that they could get their lab work performed at the location of their choosing, unfortunately, if the lab work was not performed at a [facility]) facility I could not guarantee my ability to follow up with them on their results. , and I ordered a pro BNP level to better assess for the patients level of heart failure. I told them that they could get their lab work performed at the location of their choosing, unfortunately, if the lab work was not performed at a [facility]) facility I could not guarantee my ability to follow up with them on their results. I also ordered Lipid panel. Finally, I recommended that he continue his other medications and follow up with you as previously scheduled. FOLLOW UP:   I told Mr. Selam Aguero to call my office if he had any problems, but otherwise told him to Return in about 6 months (around 7/24/2023). However, I would be happy to see him sooner should the need arise. Sincerely,  Taras Goldmann MD, [facility] Cardiology Specialist    [address], [place], [address] [place] [address]  Phone: [phone], Fax: [phone]     I believe that the risk of significant morbidity and mortality related to the patient's current medical conditions are: Intermediate.  Approximately 40 minutes were spent during prep work, discussion and exam of the patient, and follow up documentation and all of their questions were answered.    The documentation recorded by the scribe, accurately and completely reflects the services I personally performed and the decisions made by me. Irma Swanson MD, F.A.C.C. January 24, 2023

## 2023-02-11 RX ORDER — ATORVASTATIN CALCIUM 20 MG/1
20 TABLET, FILM COATED ORAL DAILY
Qty: 90 TABLET | Refills: 1 | Status: SHIPPED | OUTPATIENT
Start: 2023-02-11

## 2023-02-13 ENCOUNTER — TELEPHONE (OUTPATIENT)
Dept: CARDIOLOGY | Age: 63
End: 2023-02-13

## 2023-02-13 NOTE — TELEPHONE ENCOUNTER
----- Message from Katerina Vaughan MD sent at 2/11/2023  6:40 PM EST -----  Blood work is good except for the abnormal cholesterol, I would like to start him on a low dose of Lipitor 20 mg daily. Rx sent to the pharmacy. Please call with questions and/or concerns.  Thank you

## 2023-03-14 ENCOUNTER — HOSPITAL ENCOUNTER (EMERGENCY)
Age: 63
Discharge: HOME | End: 2023-03-14
Attending: STUDENT IN AN ORGANIZED HEALTH CARE EDUCATION/TRAINING PROGRAM
Payer: MEDICAID

## 2023-03-14 ENCOUNTER — APPOINTMENT (OUTPATIENT)
Dept: NON INVASIVE DIAGNOSTICS | Age: 63
End: 2023-03-14
Payer: MEDICAID

## 2023-03-14 ENCOUNTER — APPOINTMENT (OUTPATIENT)
Dept: GENERAL RADIOLOGY | Age: 63
End: 2023-03-14
Payer: MEDICAID

## 2023-03-14 VITALS
OXYGEN SATURATION: 97 % | DIASTOLIC BLOOD PRESSURE: 87 MMHG | SYSTOLIC BLOOD PRESSURE: 125 MMHG | HEART RATE: 68 BPM | RESPIRATION RATE: 14 BRPM | BODY MASS INDEX: 29.43 KG/M2 | WEIGHT: 211 LBS | TEMPERATURE: 97.9 F

## 2023-03-14 DIAGNOSIS — R00.2 PALPITATIONS: Primary | ICD-10-CM

## 2023-03-14 LAB
ABSOLUTE EOS #: 0.25 K/UL (ref 0–0.44)
ABSOLUTE IMMATURE GRANULOCYTE: <0.03 K/UL (ref 0–0.3)
ABSOLUTE LYMPH #: 2.42 K/UL (ref 1.1–3.7)
ABSOLUTE MONO #: 0.9 K/UL (ref 0.1–1.2)
ALBUMIN SERPL-MCNC: 4.4 G/DL (ref 3.5–5.2)
ALBUMIN/GLOBULIN RATIO: 1.2 (ref 1–2.5)
ALP SERPL-CCNC: 120 U/L (ref 40–129)
ALT SERPL-CCNC: 22 U/L (ref 5–41)
ANION GAP SERPL CALCULATED.3IONS-SCNC: 12 MMOL/L (ref 9–17)
AST SERPL-CCNC: 27 U/L
BASOPHILS # BLD: 1 % (ref 0–2)
BASOPHILS ABSOLUTE: 0.07 K/UL (ref 0–0.2)
BILIRUB SERPL-MCNC: 0.4 MG/DL (ref 0.3–1.2)
BUN SERPL-MCNC: 16 MG/DL (ref 8–23)
BUN/CREAT BLD: 15 (ref 9–20)
CALCIUM SERPL-MCNC: 9.8 MG/DL (ref 8.6–10.4)
CHLORIDE SERPL-SCNC: 100 MMOL/L (ref 98–107)
CO2 SERPL-SCNC: 27 MMOL/L (ref 20–31)
CREAT SERPL-MCNC: 1.04 MG/DL (ref 0.7–1.2)
EKG ATRIAL RATE: 149 BPM
EKG ATRIAL RATE: 74 BPM
EKG P AXIS: 25 DEGREES
EKG P AXIS: 40 DEGREES
EKG P-R INTERVAL: 166 MS
EKG P-R INTERVAL: 172 MS
EKG Q-T INTERVAL: 302 MS
EKG Q-T INTERVAL: 386 MS
EKG QRS DURATION: 86 MS
EKG QRS DURATION: 90 MS
EKG QTC CALCULATION (BAZETT): 428 MS
EKG QTC CALCULATION (BAZETT): 475 MS
EKG R AXIS: 20 DEGREES
EKG R AXIS: 31 DEGREES
EKG T AXIS: -89 DEGREES
EKG T AXIS: 23 DEGREES
EKG VENTRICULAR RATE: 149 BPM
EKG VENTRICULAR RATE: 74 BPM
EOSINOPHILS RELATIVE PERCENT: 3 % (ref 1–4)
GFR SERPL CREATININE-BSD FRML MDRD: >60 ML/MIN/1.73M2
GLUCOSE SERPL-MCNC: 133 MG/DL (ref 70–99)
HCT VFR BLD AUTO: 49.3 % (ref 40.7–50.3)
HGB BLD-MCNC: 16.3 G/DL (ref 13–17)
IMMATURE GRANULOCYTES: 0 %
LYMPHOCYTES # BLD: 30 % (ref 24–43)
MCH RBC QN AUTO: 30.1 PG (ref 25.2–33.5)
MCHC RBC AUTO-ENTMCNC: 33.1 G/DL (ref 28.4–34.8)
MCV RBC AUTO: 91 FL (ref 82.6–102.9)
MONOCYTES # BLD: 11 % (ref 3–12)
NRBC AUTOMATED: 0 PER 100 WBC
PDW BLD-RTO: 12.5 % (ref 11.8–14.4)
PLATELET # BLD AUTO: 261 K/UL (ref 138–453)
PMV BLD AUTO: 9.2 FL (ref 8.1–13.5)
POTASSIUM SERPL-SCNC: 4.1 MMOL/L (ref 3.7–5.3)
PROT SERPL-MCNC: 8.1 G/DL (ref 6.4–8.3)
RBC # BLD: 5.42 M/UL (ref 4.21–5.77)
SEG NEUTROPHILS: 55 % (ref 36–65)
SEGMENTED NEUTROPHILS ABSOLUTE COUNT: 4.31 K/UL (ref 1.5–8.1)
SODIUM SERPL-SCNC: 139 MMOL/L (ref 135–144)
TROPONIN I SERPL DL<=0.01 NG/ML-MCNC: 7 NG/L (ref 0–22)
TSH SERPL-ACNC: 1.81 UIU/ML (ref 0.3–5)
WBC # BLD AUTO: 8 K/UL (ref 3.5–11.3)

## 2023-03-14 PROCEDURE — 93010 ELECTROCARDIOGRAM REPORT: CPT | Performed by: FAMILY MEDICINE

## 2023-03-14 PROCEDURE — 84443 ASSAY THYROID STIM HORMONE: CPT

## 2023-03-14 PROCEDURE — 80053 COMPREHEN METABOLIC PANEL: CPT

## 2023-03-14 PROCEDURE — 36415 COLL VENOUS BLD VENIPUNCTURE: CPT

## 2023-03-14 PROCEDURE — 85025 COMPLETE CBC W/AUTO DIFF WBC: CPT

## 2023-03-14 PROCEDURE — 84484 ASSAY OF TROPONIN QUANT: CPT

## 2023-03-14 PROCEDURE — 96360 HYDRATION IV INFUSION INIT: CPT

## 2023-03-14 PROCEDURE — 99285 EMERGENCY DEPT VISIT HI MDM: CPT

## 2023-03-14 PROCEDURE — 71045 X-RAY EXAM CHEST 1 VIEW: CPT

## 2023-03-14 PROCEDURE — 2580000003 HC RX 258: Performed by: STUDENT IN AN ORGANIZED HEALTH CARE EDUCATION/TRAINING PROGRAM

## 2023-03-14 PROCEDURE — 93005 ELECTROCARDIOGRAM TRACING: CPT | Performed by: STUDENT IN AN ORGANIZED HEALTH CARE EDUCATION/TRAINING PROGRAM

## 2023-03-14 PROCEDURE — 99254 IP/OBS CNSLTJ NEW/EST MOD 60: CPT | Performed by: INTERNAL MEDICINE

## 2023-03-14 PROCEDURE — 93247 EXT ECG>7D<15D SCAN A/R: CPT

## 2023-03-14 PROCEDURE — 93246 EXT ECG>7D<15D RECORDING: CPT

## 2023-03-14 RX ORDER — DILTIAZEM HYDROCHLORIDE 5 MG/ML
20 INJECTION INTRAVENOUS ONCE
Status: DISCONTINUED | OUTPATIENT
Start: 2023-03-14 | End: 2023-03-14

## 2023-03-14 RX ORDER — 0.9 % SODIUM CHLORIDE 0.9 %
1000 INTRAVENOUS SOLUTION INTRAVENOUS ONCE
Status: COMPLETED | OUTPATIENT
Start: 2023-03-14 | End: 2023-03-14

## 2023-03-14 RX ADMIN — SODIUM CHLORIDE 1000 ML: 9 INJECTION, SOLUTION INTRAVENOUS at 10:45

## 2023-03-14 ASSESSMENT — PAIN SCALES - GENERAL: PAINLEVEL_OUTOF10: 0

## 2023-03-14 ASSESSMENT — LIFESTYLE VARIABLES
HOW MANY STANDARD DRINKS CONTAINING ALCOHOL DO YOU HAVE ON A TYPICAL DAY: PATIENT DOES NOT DRINK
HOW OFTEN DO YOU HAVE A DRINK CONTAINING ALCOHOL: NEVER

## 2023-03-14 ASSESSMENT — PAIN - FUNCTIONAL ASSESSMENT: PAIN_FUNCTIONAL_ASSESSMENT: NONE - DENIES PAIN

## 2023-03-14 NOTE — ED NOTES
Contacted Dr. Sola Monterroso per Dr. Rosie Bautista request.     Brayan CALVILLO Reser  03/14/23 [id number]

## 2023-03-14 NOTE — ED NOTES
Contacted Dr. Sola Monterroso per Dr. Rosie Bautista request.     Brayan CALVILLO Reser  03/14/23 [id number]

## 2023-03-14 NOTE — ED PROVIDER NOTES
[facility] ED      EMERGENCY MEDICINE     Pt Name: Mk Calix  MRN: [id number]  [place] 1960  Date of evaluation: 3/14/2023  Provider: Leonor Coy MD    CHIEF COMPLAINT       Chief Complaint   Patient presents with    Dizziness     Onset this AM, resolved now. History of Afib      Palpitations     HISTORY OF PRESENT ILLNESS   Mk Calix is a 57 y.o. male with past medical history of hypertension hyperlipidemia, cardiomyopathy, paroxysmal atrial fibrillation who presents to the emergency department for potation's that began approximately 20 minutes prior to arrival.  He is anticoagulated on Xarelto has been compliant with his medications he denies any chest pain shortness of breath fever chills or cough. Denies any recent illnesses.     PASTMEDICAL HISTORY     Past Medical History:   Diagnosis Date    Abnormal patient-activated cardiac event monitor 02/22/2021    CAM 13 days 8 hrs Baseline sinus ave HR of 63 bpm rang between 47 adn 109 bpm  Rare PAC and PVC with 2 runs of ectopic atrial tachy longest fastest was 7 beats hr 110 bpm No VT runs no AFib    Chronic anticoagulation     xarelto    Colonic diverticular abscess 10/2021    Colostomy fistula (HCC)     GERD (gastroesophageal reflux disease)     H/O echocardiogram 03/08/2021    EF 45-50% Mild increased LV wallthickness LA is mod dilated 34-39 with LA volume index of 35 ml/m2 Mild mitral and tricuspid regurg Midl diastolic dysfunction seen Aortic root is mod dilated >4.5cm when corrected for body suface area    History of cardioversion     History of pneumonia     pt does not recall having this    Hx of bronchitis     Hyperlipidemia     Hypertension     Non-ischemic cardiomyopathy ([facility] [address]     Dr. Jana Dennis cardiology in [place]    MICHELLE on CPAP     Paroxysmal A-fib ([address]     hx cardioversion    Sleep apnea     Home CPAP    Under care of team 06/21/2022    cardiology-Dr Swanson-Alpha-last visit june 2022    Under care of team 2022    pcp-Rema Dietz-last visit 2021       Patient Active Problem List   Diagnosis Code    New onset atrial fibrillation (HCC) I48.91    Primary hypertension I10    Colonic diverticular abscess K57.20    Chronic atrial fibrillation (HCC) I48.20    Hyperlipidemia E78.5    Chronic anticoagulation Z79.01    Sigmoid diverticulitis K57.32    Duodenal diverticulum K57.10    Colovesical fistula N32.1    Severe malnutrition ([address] E43    Colostomy in place [facility]) Z93.3     SURGICAL HISTORY       Past Surgical History:   Procedure Laterality Date    CARDIOVERSION  2021    CT ABSCESS DRAIN SUBCUTANEOUS  10/12/2021    CT ABSCESS DRAIN SUBCUTANEOUS 10/12/2021 [facility] CT SCAN    LAPAROTOMY N/A 2022    EXPLORATORY LAPAROTOMY,  LYSIS OF ADHESIONS performed by Omelia Fat, DO at [address] 2021    COLECTOMY, COLOSTOMY performed by Omelia Fat, DO at [facility]       Previous Medications    ATORVASTATIN (LIPITOR) 20 MG TABLET    Take 1 tablet by mouth daily    FLUTICASONE (FLONASE) 50 MCG/ACT NASAL SPRAY    2 sprays by Nasal route daily as needed for Rhinitis One time in morning and once in evening    HYDROXYZINE (ATARAX) 50 MG TABLET    take 1 tablet by mouth at bedtime if needed for sleep    LORATADINE (CLARITIN) 10 MG TABLET    take 1 tablet by mouth daily if needed for allergies    LOSARTAN (COZAAR) 25 MG TABLET    Take 0.5 tablets by mouth daily    METOPROLOL SUCCINATE (TOPROL XL) 25 MG EXTENDED RELEASE TABLET    Take 1 tablet by mouth in the morning. MULTIPLE VITAMIN (MULTIVITAMIN ADULT PO)    Take 1 tablet by mouth daily     SILDENAFIL (VIAGRA) 50 MG TABLET    Take 1 tablet by mouth as needed     XARELTO 20 MG TABS TABLET    take 1 tablet by mouth EVERY MORNING WITH BREAKFAST       ALLERGIES     is allergic to cipro [ciprofloxacin hcl] and pcn [penicillins]. FAMILY HISTORY     He indicated that his mother is .  He indicated that his father is . SOCIAL HISTORY       Social History     Tobacco Use    Smoking status: Never    Smokeless tobacco: Never   Vaping Use    Vaping Use: Never used   Substance Use Topics    Alcohol use: Not Currently    Drug use: Never       PHYSICAL EXAM       ED Triage Vitals [23 1021]   BP Temp Temp Source Heart Rate Resp SpO2 Height Weight   (!) 148/124 97.9 °F (36.6 °C) Oral (!) 146 26 98 % -- 211 lb (95.7 kg)       Physical Exam  Vitals and nursing note reviewed. Constitutional:       General: He is not in acute distress. Appearance: He is not ill-appearing, toxic-appearing or diaphoretic. HENT:      Head: Normocephalic and atraumatic. Right Ear: External ear normal.      Left Ear: External ear normal.      Nose: Nose normal.      Mouth/Throat:      Mouth: Mucous membranes are moist.      Pharynx: Oropharynx is clear. Eyes:      General: No scleral icterus. Conjunctiva/sclera: Conjunctivae normal.   Cardiovascular:      Rate and Rhythm: Regular rhythm. Tachycardia present. Pulses: Normal pulses. Heart sounds: Normal heart sounds. Pulmonary:      Effort: Pulmonary effort is normal. No respiratory distress. Breath sounds: Normal breath sounds. Abdominal:      General: Abdomen is flat. There is no distension. Palpations: Abdomen is soft. Tenderness: There is no abdominal tenderness. There is no guarding or rebound. Musculoskeletal:         General: Normal range of motion. Cervical back: Normal range of motion and neck supple. No rigidity. No muscular tenderness. Right lower leg: No edema. Left lower leg: No edema. Lymphadenopathy:      Cervical: No cervical adenopathy. Skin:     General: Skin is warm and dry. Capillary Refill: Capillary refill takes less than 2 seconds. Coloration: Skin is not jaundiced. Neurological:      General: No focal deficit present.       Mental Status: He is alert and oriented to person, place, and time. Psychiatric:         Mood and Affect: Mood normal.         Behavior: Behavior normal.         FORMAL DIAGNOSTIC RESULTS     RADIOLOGY: Interpretation per the Radiologist below, if available at the time of this note (none if blank):    XR CHEST PORTABLE   Final Result   No acute cardiopulmonary process. LABS: (none if blank)  Labs Reviewed   COMPREHENSIVE METABOLIC PANEL - Abnormal; Notable for the following components:       Result Value    Glucose 133 (*)     All other components within normal limits   CBC WITH AUTO DIFFERENTIAL   TROPONIN   TSH WITH REFLEX       (Any cultures that may have been sent were not resulted at the time of this patient visit)    [facility] / ED COURSE:     External Documentation Reviewed:         Previous patient encounter documents & history available on EMR was reviewed: Patient was seen on 9/20/2022 for COVID-19. 1)           Differential Diagnosis includes (but not limited to):  Arrhythmia, thyroid derangement, metabolic derangement, electrolyte derangement, ACS        Diagnoses Considered but I have low suspicion of:          Decision Rules/Clinical Scores utilized:               See Formal Diagnostic Results above for the lab and radiology tests and orders. 3)  Treatment and Disposition         ED Reassessment:  See ED course         Case discussed with consulting clinician: Dr. Mark Fuchs cardiology         Shared Decision-Making was performed and disposition discussed with the        Patient/Family and questions answered          Social determinants of health impacting treatment or disposition: None      Summary of Patient Presentation:      ED Course as of 03/14/23 1328   Tue Mar 14, 2023   1048 Patient's cardiologist was sent a [employer] message regarding his case as well as his EKG obtained here.  [AL]   1054 EKG 12 Lead  His cardiologist will come down and take a look at the patient he reviewed the EKG and thinks it more consistent with atrial flutter. As we speak on the phone the patient heart rate does not come down to the 70s. And he has self converted. [AL]   [phone] Cardiology was called again and will be coming shortly. [AL]   1241 TSH: 1.81 [AL]   1242 XR CHEST PORTABLE  \"IMPRESSION:  No acute cardiopulmonary process. \" [AL]   [id number] Cardiologist came and evaluated the patient the emergency department. He will order a long-term monitor and is okay with the patient being discharged home once his repeat troponin back and if it is stable or downtrending. [AL]   1327 Troponin, High Sensitivity: 7 [AL]      ED Course User Index  [AL] Pablo Humphrey MD         Medical Decision Making  Amount and/or Complexity of Data Reviewed  Labs: ordered. Decision-making details documented in ED Course. Radiology: ordered. Decision-making details documented in ED Course. ECG/medicine tests: ordered. Decision-making details documented in ED Course. Risk  Prescription drug management. This well-appearing 57-year-old male with a history of atrial fibrillation paroxysmally on anticoagulation taking Xarelto and been compliant with his medications and came in for palpitations that began approximate 20 minutes prior to arrival.  Upon arrival here he was tachycardic and regular monitor EKG shows signs consistent with atrial flutter. He self converted and has been normal sinus for last few hours. He was evaluated by his cardiologist here in the [facility]. Who if the repeat troponin is negative or stable would like him to be discharged on long-term monitoring that has been ordered follow-up outpatient. Repeat troponin is negative.   Patient will be discharged and follow-up as planned accordingly with his cardiologist.    Mayda Singer Reviewed:    Vitals:    03/14/23 1058 03/14/23 1128 03/14/23 1213 03/14/23 1228   BP: (!) 138/97  132/89 130/80   Pulse: 77 72 68 62   Resp: 17 18 18 18   Temp:       TempSrc:       SpO2: 97% 96% 97% 99%   Weight:           The patient was seen and examined. Appropriate diagnostic testing was performed and results reviewed with the patient. The results of pertinent diagnostic studies and exam findings were discussed. The patients provisional diagnosis and plan of care were discussed with the patient and present family who expressed understanding. Any medications were reviewed and indications and risks of medications were discussed with the patient /family present. Strict verbal and written return precautions, instructions and appropriate follow-up provided to  the patient . ED Medications administered this visit:  (None if blank)  Medications   0.9 % sodium chloride bolus (0 mLs IntraVENous Stopped 3/14/23 1148)         PROCEDURES: (None if blank)  Procedures:     CRITICAL CARE: (None if blank)      DISCHARGE PRESCRIPTIONS: (None if blank)  New Prescriptions    No medications on file       FINAL IMPRESSION      1.  Palpitations            DISPOSITION/PLAN   DISPOSITION Decision To Discharge 03/14/2023 01:27:37 PM      OUTPATIENT FOLLOW UP THE PATIENT:  Kalyani Cadet MD  [facility] [address] [place] [id number] [phone]    Schedule an appointment as soon as possible for a visit in 2 days      MD Ford Maza MD  Resident  03/14/23 8107

## 2023-03-14 NOTE — DISCHARGE INSTRUCTIONS
Take your medications as prescribed. Wear your monitor as directed by your cardiologist.  Follow-up with a cardiologist in the next 2 days or as mentioned. Return the emergency department if you develop any new or worsening symptoms.

## 2023-03-14 NOTE — CONSULTS
Magdy Martel am scribing for and in the presence of Jose F Rocha MD, F.A.C.C. Patient: Fartun Olvera  : 1960  Date of Admission: 3/14/2023  Today's Date: 3/14/2023    REASON FOR CONSULTATION: Dizziness (Onset this AM, resolved now. History of Afib/) and Palpitations    Dear JASPREET Lee - SUNITA    HPI: I had the pleasure of seeing Fartun Olvera in consultation today. As you know, Mr. Jong Miller is a 57 y.o. male who was admitted on 2020 with new onset atrial fibrillation with RVR leading to cardiac consultation and his most recent work-up. Mr. Jong Miller has history of intermittent palpitations and history suggestive of obstructive sleep apnea syndrome. He reported having history of borderline hypertension. He denied any history of diabetes or dyslipidemia. He is lifelong non-smoker. With regard to his family history, he reported that his father and mother had a heart attack in their old age. His echo showed ejection fraction of 40%. No regional wall motion abnormalities. There is some shadowing in the apex but I think this is origin of the papillary muscles. He is anticoagulated anyway. Lexiscan stress test showed low ejection fraction on gated SPECT with ejection fraction being 42% but no perfusion abnormalities favoring nonischemic cardiomyopathy. Stress test done 2020: EF 42%  Largely normal myocardial perfusion imaging with soft tissue artifact, but without significant evidence of myocardial ischemia or infarction. Echo done 2020: EF 40% Mildly increased left ventricular wall thickness with a normal left ventricular cavity size. Moderate global hypokinesis with no regional abnormalities. The left atrium is moderately dilated (34-39) with a left atrial volume index of 34 ml/m2. Mild mitral and tricuspid regurgitation. Diastolic function cannot be properly assessed due to atrial fibrillation.      Sleep study done 2020 shows mild sleep apnea. Pending cpap titration. Cardioversion done on 1/14/2021: Successful but did show short runs of atrial tachycardia, patient started on amiodarone orally on discharge. His digoxin was discontinued. EKG done in office on 1/22/2021: Sinus bradycardia otherwise normal    EKG done on (2/22/2021) in office showed him in normal sinus rhythm with a HR of 61 bpm.    Echo done on 3/8/2021: Global left ventricular systolic function appears mildly reduced with an estimated ejection fraction of 45-50%. Mildly increased left ventricular wall thickness with a normal left ventricular cavity size. The left atrium is moderately dilated (34-39) with a left atrial volume index of 35 ml/m2. Mild mitral and tricuspid regurgitation. Evidence of mild diastolic dysfunction is seen. The aortic root is moderately dilated (>4.5cm) when corrected for body surface area. CAM done on 3/17/2021: Baseline rhythm is sinus with average heart rate of 63 bpm, ranging between 47 and 109 bpm. Rare PACs and PVCs with 2 runs of ectopic atrial tachycardia, the longest and fastest was 7 beats at heart rate of 110 bpm. No ventricular runs. Patient-activated events correlated with sinus rhythm and sinus bradycardia. No atrial fibrillation recorded. EKG done (6/25/2021): Sinus shalonda with a HR of 56 bpm.    Echocardiogram on 6/25/2021: Global left ventricular systolic function appears preserved with an estimated ejection fraction of 55%. Mildly increased left ventricular wall thickness with a normal left ventricular cavity size. The left atrium is moderately dilated (34-39) with a left atrial volume index of 39 ml/m2. Normal mitral valve structure with trivial mitral regurgitation. Evidence of mild diastolic dysfunction is seen. The aortic root is mildly dilated. Compared to the previous study of 3/8/21, the patients EF appars to have improved from 45-50% to 55%.     Mr. Chandrika Meraz came to the ER and was hospitalized on 10/11/2021 due to constipation. He then was transferred to [facility]. V's due to complaints of left lower quadrant/suprapubic abdominal pain upon evaluation is found to have sigmoid diverticulitis with abscess. The abscess was 4 x 4 cm and above the dome of the bladder. He underwent aspiration of the abscess with interventional radiology with drainage of 15 to 20 mL of fluid. He was treated with IV Cipro and Flagyl and had steady improvement. He then came back to the ER on 10/26/2021 due to constipation again. He was transferred back to [facility]. 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 He underwent rectosigmoid colectomy with colostomy creation. Cystourethrogram was done on post op day five. He then came to the ER on 12/6/2021 due to dehydration and again on 12/15/2021 due to wanting a wound check from his colectomy/colostomy placement. CAM patch done on 4/21/2022: 7 days recorded. Baseline rhythm is sinus with average heart rate of 69 bpm, ranging between 53 and 111 bpm. Atrial tachycardia (AT) 20 episodes; longest 8 beats at average 139 bpm up to 142 bpm; fastest 7 beats at average 157 bpm up to 180 bpm. Premature atrial contractions 0.09%. Premature ventricular contractions 1.43%. No patient-activated events recorded. Overall no dangerous heart rate or rhythm abnormalities detected. EKG done in office on 6/13/2022: Maintaining normal sinus rhythm     7/3/2022: S/p colectomy and colostomy admitted for elective reversal of colostomy. Patient unable to have colostomy reversed due to significant pelvic adhesions with inability to dissect out rectal stump safely. Patient had postoperative atrial fibrillation with rapid ventricular response treated by cardiology    EKG done in office (7/19/2022):  Showed normal sinus rhythm with a HR of 65 bpm.  EKG done in office 9/1/2022- sinus shalonda, other normal ECG    Echo done on 8/15/2022- EF 65% The left ventricular cavity size is within normal limits and the left ventricular wall thickness is mildly increased. No definite specific wall motion abnormalities were identified. The left atrium is mildly dilated (29-33) with a left atrial volume index of 30 ml/m2. Mild tricuspid regurgitation. The aortic root is mildly dilated when corrected for body surface area. Mild diastolic dysfunction. CAM monitor done on 9/26/2022-  1. Predominant rhythm:  Normal sinus rhythm. 2.  Atrial tachycardia:  4 episodes. Longest/fastest, 3 beats at average 156 bpm up to 173 bpm.     3.  PAC 0.1%. 4.  PVC 0.7%     5 days and 10 hours recorded. Sinus rhythm with few short runs of ectopic atrial tachycardia, the longest was 3 beats at a heart rate of  156 bpm.  Rare isolated APCs and PVCs. No ventricular runs. No symptoms reported. Overall fairly unremarkable study. Mr. Kenroy Adams is admitted to the hospital because of palpitations that started early in the morning. He said he did not sleep well last night and he felt dizzy. He took his blood pressure and his heart rate was 130 bpm.  Admission ECG showed atrial flutter with 2:1 conduction. In the emergency room he was given IV fluids and later converted to normal sinus rhythm. He stayed in sinus rhythm until afternoon. He denies any chest pain, pressure or tightness. He reported having some shortness of breath during the palpitation episode but nothing drastic. Work-up in the emergency room was fairly unremarkable. I discussed with the patient further management of his atrial flutter/paroxysmal atrial fibrillation. We decided to hold off on antiarrhythmic therapy and ablation at this point. Patient is discharged with vital connect monitor and I will reevaluate him in 2 to 3 weeks.         Past Medical History:   Diagnosis Date    Abnormal patient-activated cardiac event monitor 02/22/2021    CAM 13 days 8 hrs Baseline sinus ave HR of 63 bpm rang between 47 adn 109 bpm  Rare PAC and PVC with 2 runs of ectopic atrial tachy longest fastest was 7 beats hr 110 bpm No VT runs no AFib    Chronic anticoagulation     xarelto    Colonic diverticular abscess 10/2021    Colostomy fistula (HCC)     GERD (gastroesophageal reflux disease)     H/O echocardiogram 03/08/2021    EF 45-50% Mild increased LV wallthickness LA is mod dilated 34-39 with LA volume index of 35 ml/m2 Mild mitral and tricuspid regurg Midl diastolic dysfunction seen Aortic root is mod dilated >4.5cm when corrected for body suface area    History of cardioversion     History of pneumonia     pt does not recall having this    Hx of bronchitis     Hyperlipidemia     Hypertension     Non-ischemic cardiomyopathy ([facility] [address]     Dr. Carroll Bergman cardiology in [place], [place]    MICHELLE on CPAP     Paroxysmal A-fib ([facility] [address]     hx cardioversion    Sleep apnea     Home CPAP    Under care of team 06/21/2022    cardiology-Dr Swanson[place]-last visit june 2022    Under care of team 06/21/2022    pcp-Rema Dorado-[place]-last visit nov 2021   Persistent atrial fibrillation. Dilated aortic root. Compression fracture of T4 and T5.    CURRENT ALLERGIES: Cipro [ciprofloxacin hcl] and Pcn [penicillins] REVIEW OF SYSTEMS: 14 systems were reviewed. Pertinent positives and negatives as above, all else negative.      Past Surgical History:   Procedure Laterality Date    CARDIOVERSION  01/2021    CT ABSCESS DRAIN SUBCUTANEOUS  10/12/2021    CT ABSCESS DRAIN SUBCUTANEOUS 10/12/2021 [facility] CT SCAN    LAPAROTOMY N/A 06/28/2022    EXPLORATORY LAPAROTOMY,  LYSIS OF ADHESIONS performed by Tapan Howard DO at [address] [facility] N/A 11/22/2021    COLECTOMY, COLOSTOMY performed by Tapan Howard DO at [address] [facility] History:  Social History     Tobacco Use    Smoking status: Never    Smokeless tobacco: Never   Vaping Use    Vaping Use: Never used   Substance Use Topics    Alcohol use: Not Currently    Drug use: Never        CURRENT MEDICATIONS:  Prior to Admission medications    Medication Sig Start Date End Date Taking? Authorizing Provider   atorvastatin (LIPITOR) 20 MG tablet Take 1 tablet by mouth daily 2/11/23   Chapraro Garcia MD   XARELTO 20 MG TABS tablet take 1 tablet by mouth EVERY MORNING WITH BREAKFAST  Patient taking differently: Take 20 mg by mouth daily 10/22/22   Chaparro Garcia MD   losartan (COZAAR) 25 MG tablet Take 0.5 tablets by mouth daily 9/1/22   Marilyn Nguyễn PA-C   metoprolol succinate (TOPROL XL) 25 MG extended release tablet Take 1 tablet by mouth in the morning. 7/19/22   Gabe Cee PA-C   Multiple Vitamin (MULTIVITAMIN ADULT PO) Take 1 tablet by mouth daily     Historical Provider, MD   sildenafil (VIAGRA) 50 MG tablet Take 1 tablet by mouth as needed     Historical Provider, MD   hydrOXYzine (ATARAX) 50 MG tablet take 1 tablet by mouth at bedtime if needed for sleep  Patient not taking: No sig reported 9/27/21   Historical Provider, MD   loratadine (CLARITIN) 10 MG tablet take 1 tablet by mouth daily if needed for allergies  Patient not taking: No sig reported 9/17/21   Historical Provider, MD   fluticasone (FLONASE) 50 MCG/ACT nasal spray 2 sprays by Nasal route daily as needed for Rhinitis One time in morning and once in evening 8/26/21   Historical Provider, MD           FAMILY HISTORY: family history includes Cancer in his father and mother; Coronary Art Dis in his father and mother. PHYSICAL EXAM:   Physical Examination:     /80   Pulse 62   Temp 97.9 °F (36.6 °C) (Oral)   Resp 18   Wt 211 lb (95.7 kg)   SpO2 99%   BMI 29.43 kg/m²  Body mass index is 29.43 kg/m². Constitutional: He appeared oriented to person and place. He appears well-developed and well-nourished. In no acute distress. HEENT: Normocephalic and atraumatic. No JVD present. Carotid bruit is not present. No mass and no thyromegaly present. No lymphadenopathy noted. Cardiovascular: Normal rate, regular rhythm, normal heart sounds. Exam reveals no gallop and no friction rubs.  No murmur was heard.  Pulmonary/Chest: Effort normal and breath sounds normal. No respiratory distress. He has no wheezes, rhonchi or rales. Abdominal: Soft, non-tender. He exhibits no organomegaly, mass or bruit.   Extremities: None. No cyanosis or clubbing. 2+ radial and carotid pulses. Distal extremity pulses: 2+ bilaterally.  Neurological: Alertness and orientation as per Constitutional exam. No evidence of gross cranial nerve deficit. Coordination appeared normal.   Skin: Skin is warm and dry. There is no rash or diaphoresis.   Psychiatric: He has a normal mood and affect. His speech is normal and behavior is normal.        MOST RECENT LABS ON RECORD:   Lab Results   Component Value Date    WBC 8.0 03/14/2023    HGB 16.3 03/14/2023    HCT 49.3 03/14/2023     03/14/2023    CHOL 222 (H) 01/24/2023    TRIG 202 (H) 01/24/2023    HDL 42 01/24/2023    ALT 22 03/14/2023    AST 27 03/14/2023     03/14/2023    K 4.1 03/14/2023     03/14/2023    CREATININE 1.04 03/14/2023    BUN 16 03/14/2023    CO2 27 03/14/2023    TSH 1.81 03/14/2023    PSA 2.36 05/06/2021    INR 1.2 10/12/2021    LABA1C 5.7 06/30/2022       Atypical atrial flutter.  Paroxysmal atrial fibrillation  History of nonischemic cardiomyopathy.  Essential hypertension.    PLAN:  Paroxysmal Atrial Fibrillation: Rhythm Control S/P Successful Cardioversion on 1/14/2021.    Currently presented with atypical atrial flutter and converted to normal sinus rhythm spontaneously.  Beta Blocker: Continue Metoprolol succinate (Toprol XL) 25 mg daily.   We discussed antiarrhythmic therapy versus ablation.  After long discussion patient decided to continue current therapy and we will consider ablation procedure if he has recurrent ventricular arrhythmia.  Patient will be discharged from the emergency room with continuous cardiac monitoring for 2 weeks.    Planning to see him back after 3 weeks for reevaluation.  [id number] Score for Atrial Fibrillation Stroke  Risk   Risk   Factors  Component Value   C  Yes 1   H HTN Yes 1   A2 Age >= 76 No,  (60 y.o.) 0   D DM No 0   S2 Prior Stroke/TIA No 0   V Vascular Disease No 0   A Age 74-69 No,  (60 y.o.) 0   Sc Sex male 0    [id number]  Score  2   Score last updated 4/84/61 5:17 AM EDT  Click here for a link to the UpToDate guideline \"Atrial Fibrillation: Anticoagulation therapy to prevent embolization  Disclaimer: Risk Score calculation is dependent on accuracy of patient problem list and past encounter diagnosis. CHADS2-VASc score: 2/9 (2.2% stroke risk)   Anticoagulation: Continue Riveroxaban (Xarelto): 20 mg once daily with largest meal (typically dinner). Nonischemic Cardiomyopathy/ Hypertensive Heart Disease Without Heart Failure: Patient ejection fraction improved. Beta Blocker: Continue Metoprolol succinate (Toprol XL) 25 mg daily. ACE Inibitor/ARB: Continue losartan (Cozaar) 25 mg, 1/2 tab daily. Essential Hypertension: Controlled  Beta Blocker: Continue Metoprolol succinate (Toprol XL) 25 mg daily. ACE Inibitor/ARB: Continue losartan (Cozaar) 25 mg, 1/2 tab daily. Finally, I recommended that he continue his other medications and follow up with you as previously scheduled. FOLLOW UP:   I told Mr. Farida Zamora to call my office if he had any problems, but otherwise told him to start 3 weeks however, I would be happy to see him sooner should the need arise. Sincerely,  René Mahoney MD, [facility] Cardiology Specialist     [address] [facility]  Phone: [phone], Fax: [phone]     I believe that the risk of significant morbidity and mortality related to the patient's current medical conditions are: Intermediate. Approximately 60 minutes were spent during prep work, discussion and exam of the patient, and follow up documentation and all of their questions were answered.     The documentation recorded by the scribe, accurately and completely reflects the services I personally performed and the decisions made by me. Yuko Pisano MD, F.A.C.C.  March 14, 2023

## 2023-03-15 ENCOUNTER — HOSPITAL ENCOUNTER (EMERGENCY)
Age: 63
Discharge: HOME | End: 2023-03-15
Payer: MEDICAID

## 2023-03-15 VITALS
RESPIRATION RATE: 18 BRPM | TEMPERATURE: 97.9 F | SYSTOLIC BLOOD PRESSURE: 154 MMHG | HEART RATE: 72 BPM | OXYGEN SATURATION: 100 % | DIASTOLIC BLOOD PRESSURE: 90 MMHG

## 2023-03-15 DIAGNOSIS — U07.1 COVID-19: Primary | ICD-10-CM

## 2023-03-15 PROCEDURE — 99283 EMERGENCY DEPT VISIT LOW MDM: CPT

## 2023-03-15 ASSESSMENT — PAIN - FUNCTIONAL ASSESSMENT: PAIN_FUNCTIONAL_ASSESSMENT: NONE - DENIES PAIN

## 2023-03-15 NOTE — ED PROVIDER NOTES
[address]      Pt Name: Javier Pastrana  MRN: [id number]  [place] 1960  Date of evaluation: 3/15/2023  Provider: Bria Mejia PA-C    CHIEF COMPLAINT       Chief Complaint   Patient presents with    Positive For Covid-19     Pt tested positive today, was seen in ED yesterday for cardiac issues. Wants Paxlovid         HISTORY OF PRESENT ILLNESS      Javier Pastrana is a 57 y.o. male who presents to the emergency department with Paxlovid. Patient had a home test of COVID and was positive. Denies any symptoms but states he had a runny nose today.   Denies any fevers chills cough shortness of breath abdominal pain nausea vomiting        REVIEW OF SYSTEMS       Review of Systems   AS STATED IN HPI      PAST MEDICAL HISTORY     Past Medical History:   Diagnosis Date    Abnormal patient-activated cardiac event monitor 02/22/2021    CAM 13 days 8 hrs Baseline sinus ave HR of 63 bpm rang between 47 adn 109 bpm  Rare PAC and PVC with 2 runs of ectopic atrial tachy longest fastest was 7 beats hr 110 bpm No VT runs no AFib    Chronic anticoagulation     xarelto    Colonic diverticular abscess 10/2021    Colostomy fistula (HCC)     GERD (gastroesophageal reflux disease)     H/O echocardiogram 03/08/2021    EF 45-50% Mild increased LV wallthickness LA is mod dilated 34-39 with LA volume index of 35 ml/m2 Mild mitral and tricuspid regurg Midl diastolic dysfunction seen Aortic root is mod dilated >4.5cm when corrected for body suface area    History of cardioversion     History of pneumonia     pt does not recall having this    Hx of bronchitis     Hyperlipidemia     Hypertension     Non-ischemic cardiomyopathy ([address]     Dr. Bessy Rojas cardiology in [place], [place]    MICHELLE on CPAP     Paroxysmal A-fib ([address]     hx cardioversion    Sleep apnea     Home CPAP    Under care of team 06/21/2022    cardiology-Dr Swanson-[place]-last visit june 2022    Under care of team 06/21/2022    pcp-Rema Mirela-last visit nov 2021         SURGICAL HISTORY       Past Surgical History:   Procedure Laterality Date    CARDIOVERSION  01/2021    CT ABSCESS DRAIN SUBCUTANEOUS  10/12/2021    CT ABSCESS DRAIN SUBCUTANEOUS 10/12/2021 [facility] CT SCAN    LAPAROTOMY N/A 06/28/2022    EXPLORATORY LAPAROTOMY,  LYSIS OF ADHESIONS performed by Jerrell Mckenzie DO at [address] 11/22/2021    COLECTOMY, COLOSTOMY performed by Jerrell Mckenzie DO at [address] [facility]       Previous Medications    ATORVASTATIN (LIPITOR) 20 MG TABLET    Take 1 tablet by mouth daily    FLUTICASONE (FLONASE) 50 MCG/ACT NASAL SPRAY    2 sprays by Nasal route daily as needed for Rhinitis One time in morning and once in evening    HYDROXYZINE (ATARAX) 50 MG TABLET    take 1 tablet by mouth at bedtime if needed for sleep    LORATADINE (CLARITIN) 10 MG TABLET    take 1 tablet by mouth daily if needed for allergies    LOSARTAN (COZAAR) 25 MG TABLET    Take 0.5 tablets by mouth daily    METOPROLOL SUCCINATE (TOPROL XL) 25 MG EXTENDED RELEASE TABLET    Take 1 tablet by mouth in the morning.     MULTIPLE VITAMIN (MULTIVITAMIN ADULT PO)    Take 1 tablet by mouth daily     SILDENAFIL (VIAGRA) 50 MG TABLET    Take 1 tablet by mouth as needed     XARELTO 20 MG TABS TABLET    take 1 tablet by mouth EVERY MORNING WITH BREAKFAST       ALLERGIES       Cipro [ciprofloxacin hcl] and Pcn [penicillins]    FAMILY HISTORY       Family History   Problem Relation Age of Onset    Cancer Mother     Coronary Art Dis Mother     Cancer Father     Coronary Art Dis Father           SOCIAL HISTORY       Social History     Tobacco Use    Smoking status: Never    Smokeless tobacco: Never   Vaping Use    Vaping Use: Never used   Substance Use Topics    Alcohol use: Not Currently    Drug use: Never         PHYSICAL EXAM       ED Triage Vitals [03/15/23 1857]   BP Temp Temp src Heart Rate Resp SpO2 Height Weight   (!) 154/90 97.9 °F (36.6 °C) -- 72 18 100 % -- --       Physical Exam   Nursing note and vitals reviewed. Constitutional: Oriented to person, place, and time and well-developed, well-nourished. Head: Normocephalic and atraumatic. Ear: External ears normal.   Nose: Nose normal and midline. Eyes: Conjunctivae and EOM are normal. Pupils are equal, round, and reactive to light. Neck: Normal range of motion. Neck supple. Musculoskeletal: Normal range of motion. Neurological: Alert and oriented to person, place, and time. GCS score is 15. Skin: Skin is warm and dry. No rash noted. No erythema. No pallor. Psychiatric: Mood, memory, affect and judgment normal.       All other labs were within normal range or not returned as of this dictation. EMERGENCY DEPARTMENT COURSE and DIFFERENTIAL DIAGNOSIS/MDM:     PROCEDURES:  Unless otherwise noted below, none     Procedures    1)  Number and Complexity of Problems  Problem List This Visit:   Chief Complaint   Patient presents with    Positive For Covid-19     Pt tested positive today, was seen in ED yesterday for cardiac issues. Wants Paxlovid        Differential Diagnosis:       2)  Data Reviewed      Ekg:     LABS:  Labs Reviewed - No data to display    RADIOLOGY:   All plain film, CT, MRI, and formal ultrasound images (except ED bedside ultrasound) are read by the radiologist  No orders to display       XR CHEST PORTABLE    Result Date: 3/14/2023  EXAMINATION: ONE XRAY VIEW OF THE CHEST 3/14/2023 12:30 pm COMPARISON: Chest radiograph performed 06/13/2022. HISTORY: ORDERING SYSTEM PROVIDED HISTORY: palpitations TECHNOLOGIST PROVIDED HISTORY: palpitations FINDINGS: There is no acute consolidation or effusion. There is no pneumothorax. The mediastinal structures are unremarkable. The upper abdomen is unremarkable. The extrathoracic soft tissues are unremarkable. There is no acute osseous abnormality. No acute cardiopulmonary process.          3)  Treatment and Disposition    Patient repeat assessment:  stable    Disposition discussion with patient/family:  Patient instructed to return to the emergency room if symptoms worsen, return, or any other concern right away which is agreed by the patient. Discussed close follow up with pcp    Shared Decision Making:      PATIENT REFERRED TO:  JASPREET Hull - NP  [address]  [place] [address]  [phone]    Schedule an appointment as soon as possible for a visit in 3 days  Follow up within 3 days, Return to ED sooner if symptoms worsen    [facility] ED  [address] [place] [address]  [phone]    For worsening symptoms, or any other concern      DISCHARGE MEDICATIONS:  New Prescriptions    NIRMATRELVIR/RITONAVIR 300/100 (PAXLOVID) 20 X 150 MG & 10 X 100MG TBPK    Take 3 tablets (two 150 mg nirmatrelvir and one 100 mg ritonavir tablets) by mouth every 12 hours for 5 days.            FINAL IMPRESSION      1. COVID-19          DISPOSITION/PLAN     DISPOSITION Decision To Discharge 03/15/2023 07:40:49 PM        (Please note that portions of this note were completed with a voice recognition program.  Efforts were made to edit the dictations but occasionally words are mis-transcribed.)    Luigi Shetty PA-C (electronically signed)       Luigi Shetty PA-C  03/15/23 1942       Luigi Shetty PA-C  03/15/23 1943

## 2023-03-15 NOTE — ED NOTES
Pt denies any covid symptoms but states he tested positive at home today and is wanting medication.  Provider aware     Bob Fuchs RN  03/15/23 1944

## 2023-03-16 ENCOUNTER — TELEPHONE (OUTPATIENT)
Dept: CARDIOLOGY | Age: 63
End: 2023-03-16

## 2023-03-16 NOTE — TELEPHONE ENCOUNTER
He can take Motrin intermittently if needed. Of course regular, high-dose ibuprofen is not recommended because it can harm the kidneys stomach and the heart.   Thank you

## 2023-03-24 ENCOUNTER — TELEPHONE (OUTPATIENT)
Dept: CARDIOLOGY | Age: 63
End: 2023-03-24

## 2023-03-24 NOTE — TELEPHONE ENCOUNTER
Vital connect called with results of monitor to advise patient had paroxysmal atrial fibrillation for 30 minutes and 35 seconds ranging from 117-161 bpm.

## 2023-03-24 NOTE — TELEPHONE ENCOUNTER
Report given to [facility] regarding pt return VO per dr Negrito Delgado, he is aware and wants patient to continue current therapy and we will continue to monitor.

## 2023-04-06 NOTE — PROCEDURES
[address], [place] [address]                                 EVENT MONITOR    PATIENT NAME: Merly Julien                  :        1960  MED REC NO:   [id number]                              ROOM:  ACCOUNT NO:   [de-identified]                           ADMIT DATE: 2022  PROVIDER:     Jesusita Polanco MD    CARDIOVASCULAR DIAGNOSTIC DEPARTMENT    DATE OF STUDY:  2022    ORDERING PROVIDER:  Susana Diaz MD    PRIMARY CARE PROVIDER:  Rema Dorado CNP    INTERPRETING PHYSICIAN: Susana Diaz MD    DIAGNOSIS:  Paroxysmal atrial fibrillation. PHYSICIAN INTERPRETATION:  Findings Summary  1. 7 days recorded. 2. Baseline rhythm is sinus with average heart rate of 69 bpm, ranging  between 53 and 111 bpm.  3. Atrial tachycardia (AT) 20 episodes; longest 8 beats at average 139  bpm up to 142 bpm; fastest 7 beats at average 157 bpm up to 180 bpm.  4. Premature atrial contractions 0.09%. 5. Premature ventricular contractions 1.43%. 6. No patient-activated events recorded. 7. Overall no dangerous heart rate or rhythm abnormalities detected. Mynor Sanchez MD    D: 2022 11:18:11       T: 2022 11:19:35     CRISTY/ALAN_ALFREDO  Job#: [id number]     Doc#: Unknown    CC:  PEARL Cheatham
Patient

## 2023-04-28 ENCOUNTER — HOSPITAL ENCOUNTER (OUTPATIENT)
Dept: NON INVASIVE DIAGNOSTICS | Age: 63
Discharge: HOME | End: 2023-04-28
Payer: MEDICAID

## 2023-04-28 ENCOUNTER — OFFICE VISIT (OUTPATIENT)
Dept: CARDIOLOGY | Age: 63
End: 2023-04-28
Payer: MEDICAID

## 2023-04-28 VITALS
DIASTOLIC BLOOD PRESSURE: 81 MMHG | BODY MASS INDEX: 30.52 KG/M2 | HEART RATE: 64 BPM | WEIGHT: 218 LBS | HEIGHT: 71 IN | RESPIRATION RATE: 18 BRPM | SYSTOLIC BLOOD PRESSURE: 138 MMHG | OXYGEN SATURATION: 98 %

## 2023-04-28 DIAGNOSIS — I48.0 PAF (PAROXYSMAL ATRIAL FIBRILLATION) (HCC): ICD-10-CM

## 2023-04-28 DIAGNOSIS — E66.9 CLASS 1 OBESITY WITH BODY MASS INDEX (BMI) OF 30.0 TO 30.9 IN ADULT, UNSPECIFIED OBESITY TYPE, UNSPECIFIED WHETHER SERIOUS COMORBIDITY PRESENT: ICD-10-CM

## 2023-04-28 DIAGNOSIS — I42.8 NICM (NONISCHEMIC CARDIOMYOPATHY) (HCC): ICD-10-CM

## 2023-04-28 DIAGNOSIS — E78.2 MIXED HYPERLIPIDEMIA: ICD-10-CM

## 2023-04-28 DIAGNOSIS — Z79.01 CHRONIC ANTICOAGULATION: ICD-10-CM

## 2023-04-28 DIAGNOSIS — I10 ESSENTIAL HYPERTENSION: ICD-10-CM

## 2023-04-28 DIAGNOSIS — I48.0 PAF (PAROXYSMAL ATRIAL FIBRILLATION) (HCC): Primary | ICD-10-CM

## 2023-04-28 LAB
LV EF: 65 %
LVEF MODALITY: NORMAL

## 2023-04-28 PROCEDURE — 3075F SYST BP GE 130 - 139MM HG: CPT | Performed by: INTERNAL MEDICINE

## 2023-04-28 PROCEDURE — 3079F DIAST BP 80-89 MM HG: CPT | Performed by: INTERNAL MEDICINE

## 2023-04-28 PROCEDURE — 99214 OFFICE O/P EST MOD 30 MIN: CPT | Performed by: INTERNAL MEDICINE

## 2023-04-28 PROCEDURE — 93306 TTE W/DOPPLER COMPLETE: CPT

## 2023-04-28 RX ORDER — FLECAINIDE ACETATE 50 MG/1
50 TABLET ORAL 2 TIMES DAILY
Qty: 180 TABLET | Refills: 3 | Status: SHIPPED | OUTPATIENT
Start: 2023-04-28

## 2023-04-28 NOTE — PATIENT INSTRUCTIONS
SURVEY:    You may be receiving a survey from [employer] regarding your visit today. Please complete the survey to enable us to provide the highest quality of care to you and your family. If you cannot score us a very good on any question, please call the office to discuss how we could have made your experience a very good one. Thank you.

## 2023-04-28 NOTE — PROGRESS NOTES
Cardiology Specialist    [address] [place] [address] [facility], [address] [place] [address]  Phone: [phone], Fax: [phone]     I believe that the risk of significant morbidity and mortality related to the patient's current medical conditions are: Intermediate. > 35 minutes were spent during prep work, discussion and exam of the patient, and follow up documentation and all of their questions were answered. The documentation recorded by the scribe, accurately and completely reflects the services I personally performed and the decisions made by me. Deborah Viramontes MD, F.A.C.C.  April 28, 2023

## 2023-05-01 ENCOUNTER — TELEPHONE (OUTPATIENT)
Dept: CARDIOLOGY | Age: 63
End: 2023-05-01

## 2023-05-01 NOTE — TELEPHONE ENCOUNTER
----- Message from Don Styles MD sent at 4/30/2023  3:45 PM EDT -----  Heart function is normal.  No change from prior study back in 8/2022.  Thank you

## 2023-08-08 DIAGNOSIS — I48.0 PAF (PAROXYSMAL ATRIAL FIBRILLATION) (HCC): ICD-10-CM

## 2023-08-08 DIAGNOSIS — I11.9 HYPERTENSIVE HEART DISEASE WITHOUT HEART FAILURE: ICD-10-CM

## 2023-08-08 DIAGNOSIS — I42.8 NICM (NONISCHEMIC CARDIOMYOPATHY) (HCC): ICD-10-CM

## 2023-08-08 RX ORDER — METOPROLOL SUCCINATE 25 MG/1
TABLET, EXTENDED RELEASE ORAL
Qty: 90 TABLET | Refills: 3 | Status: SHIPPED | OUTPATIENT
Start: 2023-08-08

## 2023-08-08 RX ORDER — ATORVASTATIN CALCIUM 20 MG/1
TABLET, FILM COATED ORAL
Qty: 90 TABLET | Refills: 3 | Status: SHIPPED | OUTPATIENT
Start: 2023-08-08

## 2023-10-19 ENCOUNTER — APPOINTMENT (OUTPATIENT)
Dept: CT IMAGING | Age: 63
End: 2023-10-19
Payer: MEDICAID

## 2023-10-19 ENCOUNTER — HOSPITAL ENCOUNTER (EMERGENCY)
Age: 63
Discharge: HOME | End: 2023-10-20
Attending: STUDENT IN AN ORGANIZED HEALTH CARE EDUCATION/TRAINING PROGRAM
Payer: MEDICAID

## 2023-10-19 DIAGNOSIS — K56.7 ILEUS (HCC): Primary | ICD-10-CM

## 2023-10-19 LAB
ALBUMIN SERPL-MCNC: 4.4 G/DL (ref 3.5–5.2)
ALBUMIN/GLOB SERPL: 1.3 {RATIO} (ref 1–2.5)
ALP SERPL-CCNC: 127 U/L (ref 40–129)
ALT SERPL-CCNC: 20 U/L (ref 5–41)
AMORPH SED URNS QL MICRO: ABNORMAL
ANION GAP SERPL CALCULATED.3IONS-SCNC: 12 MMOL/L (ref 9–17)
AST SERPL-CCNC: 23 U/L
BASOPHILS # BLD: 0.05 K/UL (ref 0–0.2)
BASOPHILS NFR BLD: 0 % (ref 0–2)
BILIRUB DIRECT SERPL-MCNC: <0.1 MG/DL
BILIRUB INDIRECT SERPL-MCNC: NORMAL MG/DL (ref 0–1)
BILIRUB SERPL-MCNC: 0.7 MG/DL (ref 0.3–1.2)
BILIRUB UR QL STRIP: NEGATIVE
BUN SERPL-MCNC: 23 MG/DL (ref 8–23)
BUN/CREAT SERPL: 23 (ref 9–20)
CALCIUM SERPL-MCNC: 9.6 MG/DL (ref 8.6–10.4)
CHLORIDE SERPL-SCNC: 99 MMOL/L (ref 98–107)
CLARITY UR: CLEAR
CO2 SERPL-SCNC: 28 MMOL/L (ref 20–31)
COLOR UR: YELLOW
CREAT SERPL-MCNC: 1 MG/DL (ref 0.7–1.2)
EOSINOPHIL # BLD: <0.03 K/UL (ref 0–0.44)
EOSINOPHILS RELATIVE PERCENT: 0 % (ref 1–4)
EPI CELLS #/AREA URNS HPF: ABNORMAL /HPF (ref 0–5)
ERYTHROCYTE [DISTWIDTH] IN BLOOD BY AUTOMATED COUNT: 12.8 % (ref 11.8–14.4)
FLUAV AG SPEC QL: NEGATIVE
FLUBV AG SPEC QL: NEGATIVE
GFR SERPL CREATININE-BSD FRML MDRD: >60 ML/MIN/1.73M2
GLUCOSE SERPL-MCNC: 125 MG/DL (ref 70–99)
GLUCOSE UR STRIP-MCNC: NEGATIVE MG/DL
HCT VFR BLD AUTO: 46.6 % (ref 40.7–50.3)
HGB BLD-MCNC: 15.5 G/DL (ref 13–17)
HGB UR QL STRIP.AUTO: NEGATIVE
IMM GRANULOCYTES # BLD AUTO: 0.05 K/UL (ref 0–0.3)
IMM GRANULOCYTES NFR BLD: 0 %
KETONES UR STRIP-MCNC: ABNORMAL MG/DL
LACTATE BLDV-SCNC: 3.1 MMOL/L (ref 0.5–2.2)
LEUKOCYTE ESTERASE UR QL STRIP: NEGATIVE
LIPASE SERPL-CCNC: 31 U/L (ref 13–60)
LYMPHOCYTES NFR BLD: 1.05 K/UL (ref 1.1–3.7)
LYMPHOCYTES RELATIVE PERCENT: 8 % (ref 24–43)
MCH RBC QN AUTO: 29.7 PG (ref 25.2–33.5)
MCHC RBC AUTO-ENTMCNC: 33.3 G/DL (ref 28.4–34.8)
MCV RBC AUTO: 89.3 FL (ref 82.6–102.9)
MONOCYTES NFR BLD: 0.78 K/UL (ref 0.1–1.2)
MONOCYTES NFR BLD: 6 % (ref 3–12)
NEUTROPHILS NFR BLD: 86 % (ref 36–65)
NEUTS SEG NFR BLD: 12.03 K/UL (ref 1.5–8.1)
NITRITE UR QL STRIP: NEGATIVE
NRBC BLD-RTO: 0 PER 100 WBC
PH UR STRIP: 8.5 [PH] (ref 5–9)
PLATELET # BLD AUTO: 243 K/UL (ref 138–453)
PMV BLD AUTO: 9.4 FL (ref 8.1–13.5)
POTASSIUM SERPL-SCNC: 4.1 MMOL/L (ref 3.7–5.3)
PROT SERPL-MCNC: 7.7 G/DL (ref 6.4–8.3)
PROT UR STRIP-MCNC: NEGATIVE MG/DL
RBC # BLD AUTO: 5.22 M/UL (ref 4.21–5.77)
RBC #/AREA URNS HPF: ABNORMAL /HPF (ref 0–2)
SARS-COV-2 RDRP RESP QL NAA+PROBE: NOT DETECTED
SODIUM SERPL-SCNC: 139 MMOL/L (ref 135–144)
SP GR UR STRIP: 1.01 (ref 1.01–1.02)
SPECIMEN DESCRIPTION: NORMAL
UROBILINOGEN UR STRIP-ACNC: NORMAL EU/DL (ref 0–1)
WBC #/AREA URNS HPF: ABNORMAL /HPF (ref 0–5)
WBC OTHER # BLD: 14 K/UL (ref 3.5–11.3)

## 2023-10-19 PROCEDURE — 83605 ASSAY OF LACTIC ACID: CPT

## 2023-10-19 PROCEDURE — 6360000002 HC RX W HCPCS: Performed by: STUDENT IN AN ORGANIZED HEALTH CARE EDUCATION/TRAINING PROGRAM

## 2023-10-19 PROCEDURE — 87804 INFLUENZA ASSAY W/OPTIC: CPT

## 2023-10-19 PROCEDURE — 36415 COLL VENOUS BLD VENIPUNCTURE: CPT

## 2023-10-19 PROCEDURE — 74177 CT ABD & PELVIS W/CONTRAST: CPT

## 2023-10-19 PROCEDURE — 87635 SARS-COV-2 COVID-19 AMP PRB: CPT

## 2023-10-19 PROCEDURE — 2580000003 HC RX 258: Performed by: STUDENT IN AN ORGANIZED HEALTH CARE EDUCATION/TRAINING PROGRAM

## 2023-10-19 PROCEDURE — 6360000004 HC RX CONTRAST MEDICATION: Performed by: STUDENT IN AN ORGANIZED HEALTH CARE EDUCATION/TRAINING PROGRAM

## 2023-10-19 PROCEDURE — 81001 URINALYSIS AUTO W/SCOPE: CPT

## 2023-10-19 PROCEDURE — 80076 HEPATIC FUNCTION PANEL: CPT

## 2023-10-19 PROCEDURE — 85025 COMPLETE CBC W/AUTO DIFF WBC: CPT

## 2023-10-19 PROCEDURE — 96374 THER/PROPH/DIAG INJ IV PUSH: CPT

## 2023-10-19 PROCEDURE — C9803 HOPD COVID-19 SPEC COLLECT: HCPCS

## 2023-10-19 PROCEDURE — 99285 EMERGENCY DEPT VISIT HI MDM: CPT

## 2023-10-19 PROCEDURE — 80048 BASIC METABOLIC PNL TOTAL CA: CPT

## 2023-10-19 PROCEDURE — 83690 ASSAY OF LIPASE: CPT

## 2023-10-19 RX ORDER — ONDANSETRON 2 MG/ML
4 INJECTION INTRAMUSCULAR; INTRAVENOUS ONCE
Status: COMPLETED | OUTPATIENT
Start: 2023-10-19 | End: 2023-10-19

## 2023-10-19 RX ORDER — FLUOXETINE HYDROCHLORIDE 20 MG/1
20 CAPSULE ORAL DAILY
COMMUNITY

## 2023-10-19 RX ORDER — 0.9 % SODIUM CHLORIDE 0.9 %
1000 INTRAVENOUS SOLUTION INTRAVENOUS ONCE
Status: COMPLETED | OUTPATIENT
Start: 2023-10-19 | End: 2023-10-19

## 2023-10-19 RX ADMIN — SODIUM CHLORIDE 1000 ML: 9 INJECTION, SOLUTION INTRAVENOUS at 20:18

## 2023-10-19 RX ADMIN — IOPAMIDOL 75 ML: 755 INJECTION, SOLUTION INTRAVENOUS at 21:30

## 2023-10-19 RX ADMIN — ONDANSETRON 4 MG: 2 INJECTION INTRAMUSCULAR; INTRAVENOUS at 20:16

## 2023-10-19 ASSESSMENT — ENCOUNTER SYMPTOMS
VOMITING: 1
NAUSEA: 1
FACIAL SWELLING: 0
SHORTNESS OF BREATH: 0
CHEST TIGHTNESS: 0
SINUS PAIN: 0
COUGH: 0
SINUS PRESSURE: 0
EYE DISCHARGE: 0
BACK PAIN: 0
TROUBLE SWALLOWING: 0
COLOR CHANGE: 0
ABDOMINAL PAIN: 1
EYE PAIN: 0

## 2023-10-19 ASSESSMENT — PAIN - FUNCTIONAL ASSESSMENT: PAIN_FUNCTIONAL_ASSESSMENT: NONE - DENIES PAIN

## 2023-10-20 VITALS
TEMPERATURE: 97.4 F | RESPIRATION RATE: 18 BRPM | OXYGEN SATURATION: 98 % | SYSTOLIC BLOOD PRESSURE: 132 MMHG | DIASTOLIC BLOOD PRESSURE: 75 MMHG | HEART RATE: 62 BPM

## 2023-10-20 ASSESSMENT — PAIN - FUNCTIONAL ASSESSMENT: PAIN_FUNCTIONAL_ASSESSMENT: NONE - DENIES PAIN

## 2023-10-20 NOTE — ED PROVIDER NOTES
abdomen or pelvis. 3. Interval performance of a sigmoid colectomy with a colostomy at the left mid abdomen. Colonic diverticulosis is again noted. 4. Cholelithiasis. 5. Small hiatal hernia. ED BEDSIDE ULTRASOUND:   Performed by ED Physician - none    LABS:  Labs Reviewed   LACTIC ACID - Abnormal; Notable for the following components:       Result Value    Lactic Acid 3.1 (*)     All other components within normal limits   URINALYSIS - Abnormal; Notable for the following components:    Ketones, Urine 1+ (*)     All other components within normal limits   BASIC METABOLIC PANEL - Abnormal; Notable for the following components:    Glucose 125 (*)     Bun/Cre Ratio 23 (*)     All other components within normal limits   CBC WITH AUTO DIFFERENTIAL - Abnormal; Notable for the following components:    WBC 14.0 (*)     Neutrophils % 86 (*)     Lymphocytes % 8 (*)     Eosinophils % 0 (*)     Neutrophils Absolute 12.03 (*)     Lymphocytes Absolute 1.05 (*)     All other components within normal limits   MICROSCOPIC URINALYSIS - Abnormal; Notable for the following components:    Amorphous, UA 1+ (*)     All other components within normal limits   COVID-19, RAPID   RAPID INFLUENZA A/B ANTIGENS   LIPASE   HEPATIC FUNCTION PANEL        All other labs were within normal range or not returned as of this dictation.     EMERGENCY DEPARTMENT COURSE and DIFFERENTIAL DIAGNOSIS/MDM:   Vitals:    Vitals:    10/19/23 2350 10/20/23 0000 10/20/23 0100 10/20/23 0400   BP: 136/72 128/77 125/80 132/75   Pulse:    62   Resp:       Temp:       TempSrc:       SpO2: 97% 92% 96% 98%       Medications   ondansetron (ZOFRAN) injection 4 mg (4 mg IntraVENous Given 10/19/23 2016)   sodium chloride 0.9 % bolus 1,000 mL (1,000 mLs IntraVENous New Bag 10/19/23 2018)   iopamidol (ISOVUE-370) 76 % injection 75 mL (75 mLs IntraVENous Given 10/19/23 2130)       MDM    77-year-old presenting for evaluation for abdominal pain with nausea and vomiting and

## 2023-10-20 NOTE — DISCHARGE INSTRUCTIONS
Thank you for trusting us  with your care today. You may use tylenol or ibuprofen as needed for pain. Schedule follow-up with primary care in 1-4 days. Return to the ER immediately with the development of any new, persistent, or worsening symptoms.     Read discharge paperwork     Drink plenty of fluids

## 2023-10-24 DIAGNOSIS — Z79.01 CHRONIC ANTICOAGULATION: ICD-10-CM

## 2023-10-24 DIAGNOSIS — I48.0 PAF (PAROXYSMAL ATRIAL FIBRILLATION) (HCC): ICD-10-CM

## 2023-10-24 DIAGNOSIS — I10 ESSENTIAL HYPERTENSION: ICD-10-CM

## 2023-10-24 DIAGNOSIS — I11.9 HYPERTENSIVE HEART DISEASE WITHOUT HEART FAILURE: ICD-10-CM

## 2023-10-24 DIAGNOSIS — Z79.899 ON AMIODARONE THERAPY: ICD-10-CM

## 2023-10-24 DIAGNOSIS — I42.8 NICM (NONISCHEMIC CARDIOMYOPATHY) (HCC): ICD-10-CM

## 2023-10-24 RX ORDER — LOSARTAN POTASSIUM 25 MG/1
12.5 TABLET ORAL DAILY
Qty: 45 TABLET | Refills: 3 | Status: SHIPPED | OUTPATIENT
Start: 2023-10-24

## 2023-11-10 ENCOUNTER — OFFICE VISIT (OUTPATIENT)
Dept: CARDIOLOGY | Age: 63
End: 2023-11-10
Payer: MEDICAID

## 2023-11-10 VITALS
RESPIRATION RATE: 18 BRPM | BODY MASS INDEX: 29.26 KG/M2 | OXYGEN SATURATION: 100 % | DIASTOLIC BLOOD PRESSURE: 77 MMHG | SYSTOLIC BLOOD PRESSURE: 129 MMHG | HEART RATE: 55 BPM | WEIGHT: 209 LBS | HEIGHT: 71 IN

## 2023-11-10 DIAGNOSIS — E78.2 MIXED HYPERLIPIDEMIA: ICD-10-CM

## 2023-11-10 DIAGNOSIS — I48.0 PAF (PAROXYSMAL ATRIAL FIBRILLATION) (HCC): Primary | ICD-10-CM

## 2023-11-10 DIAGNOSIS — I10 ESSENTIAL HYPERTENSION: ICD-10-CM

## 2023-11-10 DIAGNOSIS — Z79.01 CHRONIC ANTICOAGULATION: ICD-10-CM

## 2023-11-10 DIAGNOSIS — Z86.79 S/P ABLATION OF ATRIAL FIBRILLATION: ICD-10-CM

## 2023-11-10 DIAGNOSIS — I42.8 NICM (NONISCHEMIC CARDIOMYOPATHY) (HCC): ICD-10-CM

## 2023-11-10 DIAGNOSIS — Z79.899 ENCOUNTER FOR MONITORING FLECAINIDE THERAPY: ICD-10-CM

## 2023-11-10 DIAGNOSIS — Z98.890 S/P ABLATION OF ATRIAL FIBRILLATION: ICD-10-CM

## 2023-11-10 DIAGNOSIS — I11.9 HYPERTENSIVE HEART DISEASE WITHOUT HEART FAILURE: ICD-10-CM

## 2023-11-10 DIAGNOSIS — Z51.81 ENCOUNTER FOR MONITORING FLECAINIDE THERAPY: ICD-10-CM

## 2023-11-10 DIAGNOSIS — E66.9 CLASS 1 OBESITY WITH BODY MASS INDEX (BMI) OF 30.0 TO 30.9 IN ADULT, UNSPECIFIED OBESITY TYPE, UNSPECIFIED WHETHER SERIOUS COMORBIDITY PRESENT: ICD-10-CM

## 2023-11-10 PROCEDURE — 1123F ACP DISCUSS/DSCN MKR DOCD: CPT | Performed by: INTERNAL MEDICINE

## 2023-11-10 PROCEDURE — 99214 OFFICE O/P EST MOD 30 MIN: CPT | Performed by: INTERNAL MEDICINE

## 2023-11-10 PROCEDURE — 3078F DIAST BP <80 MM HG: CPT | Performed by: INTERNAL MEDICINE

## 2023-11-10 PROCEDURE — G8484 FLU IMMUNIZE NO ADMIN: HCPCS | Performed by: INTERNAL MEDICINE

## 2023-11-10 PROCEDURE — 3074F SYST BP LT 130 MM HG: CPT | Performed by: INTERNAL MEDICINE

## 2023-11-10 PROCEDURE — 3017F COLORECTAL CA SCREEN DOC REV: CPT | Performed by: INTERNAL MEDICINE

## 2023-11-10 PROCEDURE — G8417 CALC BMI ABV UP PARAM F/U: HCPCS | Performed by: INTERNAL MEDICINE

## 2023-11-10 PROCEDURE — G8427 DOCREV CUR MEDS BY ELIG CLIN: HCPCS | Performed by: INTERNAL MEDICINE

## 2023-11-10 PROCEDURE — 1036F TOBACCO NON-USER: CPT | Performed by: INTERNAL MEDICINE

## 2023-11-10 PROCEDURE — 93000 ELECTROCARDIOGRAM COMPLETE: CPT | Performed by: INTERNAL MEDICINE

## 2023-11-10 RX ORDER — METOPROLOL SUCCINATE 25 MG/1
12.5 TABLET, EXTENDED RELEASE ORAL EVERY MORNING
Qty: 90 TABLET | Refills: 3 | Status: SHIPPED | OUTPATIENT
Start: 2023-11-10

## 2023-11-10 NOTE — PATIENT INSTRUCTIONS
SURVEY:    You may be receiving a survey from [employer] regarding your visit today. Please complete the survey to enable us to provide the highest quality of care to you and your family. If you cannot score us a very good on any question, please call the office to discuss how we could have made your experience a very good one. Thank you.

## 2023-11-10 NOTE — PROGRESS NOTES
Bella Flanagan am scribing for and in the presence of Lucrecia Lange MD, F.A.C.C. Patient: Rosalinda Calzada  : 1960  Date of Admission: (Not on file)  Today's Date: 11/10/2023    REASON FOR CONSULTATION: Atrial Fibrillation (HX:PAF, NICM, HTN, HLD, obese PT is here for 6 month follow up he states he is doing well he was in ER 10/19 for Ileus he is doing well now, occasional lightheaded Denies:CP, sob, palp)    Dear Molina Dumont - SUNITA    HPI: I had the pleasure of seeing Rosalinda Calzada in consultation today. As you know, Mr. Allie Joshua is a 70 y.o. male who was admitted on 2020 with new onset atrial fibrillation with RVR leading to cardiac consultation and his most recent work-up. Mr. Allie Joshua has history of intermittent palpitations and history suggestive of obstructive sleep apnea syndrome. He reported having history of borderline hypertension. He denied any history of diabetes or dyslipidemia. He is lifelong non-smoker. With regard to his family history, he reported that his father and mother had a heart attack in their old age. His echo showed ejection fraction of 40%. No regional wall motion abnormalities. There is some shadowing in the apex but I think this is origin of the papillary muscles. He is anticoagulated anyway. Lexiscan stress test showed low ejection fraction on gated SPECT with ejection fraction being 42% but no perfusion abnormalities favoring nonischemic cardiomyopathy. Stress test done 2020: EF 42%  Largely normal myocardial perfusion imaging with soft tissue artifact, but without significant evidence of myocardial ischemia or infarction. Echo done 2020: EF 40% Mildly increased left ventricular wall thickness with a normal left ventricular cavity size. Moderate global hypokinesis with no regional abnormalities. The left atrium is moderately dilated (34-39) with a left atrial volume index of 34 ml/m2.  Mild mitral and tricuspid

## 2024-03-03 ENCOUNTER — APPOINTMENT (OUTPATIENT)
Dept: CT IMAGING | Age: 64
DRG: 069 | End: 2024-03-03
Payer: MEDICARE

## 2024-03-03 ENCOUNTER — HOSPITAL ENCOUNTER (INPATIENT)
Age: 64
LOS: 1 days | Discharge: HOME | DRG: 069 | End: 2024-03-04
Attending: EMERGENCY MEDICINE | Admitting: INTERNAL MEDICINE
Payer: MEDICARE

## 2024-03-03 DIAGNOSIS — R41.82 ALTERED MENTAL STATUS, UNSPECIFIED ALTERED MENTAL STATUS TYPE: ICD-10-CM

## 2024-03-03 DIAGNOSIS — R55 NEAR SYNCOPE: ICD-10-CM

## 2024-03-03 DIAGNOSIS — R47.89 WORD FINDING DIFFICULTY: Primary | ICD-10-CM

## 2024-03-03 DIAGNOSIS — G45.9 TIA (TRANSIENT ISCHEMIC ATTACK): ICD-10-CM

## 2024-03-03 DIAGNOSIS — I48.20 CHRONIC ATRIAL FIBRILLATION (HCC): ICD-10-CM

## 2024-03-03 DIAGNOSIS — R00.1 BRADYCARDIA: ICD-10-CM

## 2024-03-03 LAB
ALBUMIN SERPL-MCNC: 4.4 G/DL (ref 3.5–5.2)
ALBUMIN/GLOB SERPL: 1.3 {RATIO} (ref 1–2.5)
ALP SERPL-CCNC: 132 U/L (ref 40–129)
ALT SERPL-CCNC: 36 U/L (ref 5–41)
ANION GAP SERPL CALCULATED.3IONS-SCNC: 13 MMOL/L (ref 9–17)
AST SERPL-CCNC: 28 U/L
BASOPHILS # BLD: 0.08 K/UL (ref 0–0.2)
BASOPHILS NFR BLD: 1 % (ref 0–2)
BILIRUB SERPL-MCNC: 0.3 MG/DL (ref 0.3–1.2)
BUN SERPL-MCNC: 21 MG/DL (ref 8–23)
BUN/CREAT SERPL: 19 (ref 9–20)
CALCIUM SERPL-MCNC: 9.5 MG/DL (ref 8.6–10.4)
CHLORIDE SERPL-SCNC: 101 MMOL/L (ref 98–107)
CO2 SERPL-SCNC: 24 MMOL/L (ref 20–31)
CREAT SERPL-MCNC: 1.1 MG/DL (ref 0.7–1.2)
EOSINOPHIL # BLD: 0.26 K/UL (ref 0–0.44)
EOSINOPHILS RELATIVE PERCENT: 2 % (ref 1–4)
ERYTHROCYTE [DISTWIDTH] IN BLOOD BY AUTOMATED COUNT: 12.6 % (ref 11.8–14.4)
ETHANOL PERCENT: <0.01 %
ETHANOLAMINE SERPL-MCNC: <10 MG/DL
GFR SERPL CREATININE-BSD FRML MDRD: >60 ML/MIN/1.73M2
GLUCOSE SERPL-MCNC: 127 MG/DL (ref 70–99)
HCT VFR BLD AUTO: 46 % (ref 40.7–50.3)
HGB BLD-MCNC: 15 G/DL (ref 13–17)
IMM GRANULOCYTES # BLD AUTO: 0.05 K/UL (ref 0–0.3)
IMM GRANULOCYTES NFR BLD: 1 %
LYMPHOCYTES NFR BLD: 4.26 K/UL (ref 1.1–3.7)
LYMPHOCYTES RELATIVE PERCENT: 39 % (ref 24–43)
MAGNESIUM SERPL-MCNC: 2.1 MG/DL (ref 1.6–2.6)
MCH RBC QN AUTO: 30.1 PG (ref 25.2–33.5)
MCHC RBC AUTO-ENTMCNC: 32.6 G/DL (ref 28.4–34.8)
MCV RBC AUTO: 92.4 FL (ref 82.6–102.9)
MONOCYTES NFR BLD: 0.99 K/UL (ref 0.1–1.2)
MONOCYTES NFR BLD: 9 % (ref 3–12)
NEUTROPHILS NFR BLD: 48 % (ref 36–65)
NEUTS SEG NFR BLD: 5.33 K/UL (ref 1.5–8.1)
NRBC BLD-RTO: 0 PER 100 WBC
PLATELET # BLD AUTO: 315 K/UL (ref 138–453)
PMV BLD AUTO: 9.2 FL (ref 8.1–13.5)
POTASSIUM SERPL-SCNC: 4.1 MMOL/L (ref 3.7–5.3)
PROT SERPL-MCNC: 7.8 G/DL (ref 6.4–8.3)
RBC # BLD AUTO: 4.98 M/UL (ref 4.21–5.77)
SODIUM SERPL-SCNC: 138 MMOL/L (ref 135–144)
TROPONIN I SERPL HS-MCNC: 8 NG/L (ref 0–22)
WBC OTHER # BLD: 11 K/UL (ref 3.5–11.3)

## 2024-03-03 PROCEDURE — 80053 COMPREHEN METABOLIC PANEL: CPT

## 2024-03-03 PROCEDURE — 93005 ELECTROCARDIOGRAM TRACING: CPT | Performed by: EMERGENCY MEDICINE

## 2024-03-03 PROCEDURE — 70450 CT HEAD/BRAIN W/O DYE: CPT

## 2024-03-03 PROCEDURE — 84484 ASSAY OF TROPONIN QUANT: CPT

## 2024-03-03 PROCEDURE — 1200000000 HC SEMI PRIVATE

## 2024-03-03 PROCEDURE — G0480 DRUG TEST DEF 1-7 CLASSES: HCPCS

## 2024-03-03 PROCEDURE — 83735 ASSAY OF MAGNESIUM: CPT

## 2024-03-03 PROCEDURE — 85025 COMPLETE CBC W/AUTO DIFF WBC: CPT

## 2024-03-03 PROCEDURE — 99285 EMERGENCY DEPT VISIT HI MDM: CPT

## 2024-03-04 ENCOUNTER — APPOINTMENT (OUTPATIENT)
Age: 64
DRG: 069 | End: 2024-03-04
Payer: MEDICARE

## 2024-03-04 VITALS
BODY MASS INDEX: 29.48 KG/M2 | SYSTOLIC BLOOD PRESSURE: 117 MMHG | TEMPERATURE: 98.4 F | HEIGHT: 71 IN | RESPIRATION RATE: 18 BRPM | DIASTOLIC BLOOD PRESSURE: 83 MMHG | HEART RATE: 70 BPM | WEIGHT: 210.54 LBS | OXYGEN SATURATION: 95 %

## 2024-03-04 LAB
AMPHET UR QL SCN: NEGATIVE
ANION GAP SERPL CALCULATED.3IONS-SCNC: 10 MMOL/L (ref 9–17)
BACTERIA URNS QL MICRO: ABNORMAL
BARBITURATES UR QL SCN: NEGATIVE
BASOPHILS # BLD: 0.06 K/UL (ref 0–0.2)
BASOPHILS NFR BLD: 1 % (ref 0–2)
BENZODIAZ UR QL: NEGATIVE
BILIRUB UR QL STRIP: NEGATIVE
BUN SERPL-MCNC: 25 MG/DL (ref 8–23)
BUN/CREAT SERPL: 21 (ref 9–20)
BUPRENORPHINE UR QL: NEGATIVE
CALCIUM SERPL-MCNC: 8.7 MG/DL (ref 8.6–10.4)
CANNABINOIDS UR QL SCN: POSITIVE
CHLORIDE SERPL-SCNC: 105 MMOL/L (ref 98–107)
CLARITY UR: CLEAR
CO2 SERPL-SCNC: 23 MMOL/L (ref 20–31)
COCAINE UR QL SCN: NEGATIVE
COLOR UR: YELLOW
CREAT SERPL-MCNC: 1.2 MG/DL (ref 0.7–1.2)
ECHO AO ROOT DIAM: 3.2 CM
ECHO AO ROOT DIAM: 4.4 CM
ECHO AO ROOT DIAM: 4.4 CM
ECHO AV ACCELERATION TIME: 64.92 MS
ECHO AV CUSP MM: 2.5 CM
ECHO AV MEAN GRADIENT: 3 MMHG
ECHO AV MEAN VELOCITY: 0.8 M/S
ECHO AV PEAK VELOCITY: 1.1 M/S
ECHO AV VTI: 24.6 CM
ECHO BSA: 2.19 M2
ECHO EST RA PRESSURE: 3 MMHG
ECHO LA DIAMETER INDEX: 2.45 CM/M2
ECHO LA DIAMETER: 5.3 CM
ECHO LA MAJOR AXIS: 7 CM
ECHO LA VOL BP: 87 ML (ref 18–58)
ECHO LA VOL MOD A2C: 88 ML (ref 18–58)
ECHO LA VOL MOD A4C: 84 ML (ref 18–58)
ECHO LA VOL/BSA BIPLANE: 40 ML/M2 (ref 16–34)
ECHO LA VOLUME AREA LENGTH: 91 ML
ECHO LA VOLUME INDEX AREA LENGTH: 42 ML/M2 (ref 16–34)
ECHO LA VOLUME INDEX MOD A2C: 41 ML/M2 (ref 16–34)
ECHO LA VOLUME INDEX MOD A4C: 39 ML/M2 (ref 16–34)
ECHO LV E' LATERAL VELOCITY: 8 CM/S
ECHO LV EDV A2C: 68 ML
ECHO LV EDV A4C: 85 ML
ECHO LV EDV BP: 78 ML (ref 67–155)
ECHO LV EDV INDEX A4C: 39 ML/M2
ECHO LV EDV INDEX BP: 36 ML/M2
ECHO LV EDV NDEX A2C: 31 ML/M2
ECHO LV EJECTION FRACTION BIPLANE: 60 % (ref 55–100)
ECHO LV ESV A2C: 27 ML
ECHO LV ESV A4C: 33 ML
ECHO LV ESV BP: 31 ML (ref 22–58)
ECHO LV ESV INDEX A2C: 13 ML/M2
ECHO LV ESV INDEX A4C: 15 ML/M2
ECHO LV ESV INDEX BP: 14 ML/M2
ECHO LV FRACTIONAL SHORTENING: 29 % (ref 28–44)
ECHO LV INTERNAL DIMENSION DIASTOLE INDEX: 2.36 CM/M2
ECHO LV INTERNAL DIMENSION DIASTOLIC: 5.1 CM (ref 4.2–5.9)
ECHO LV INTERNAL DIMENSION SYSTOLIC INDEX: 1.67 CM/M2
ECHO LV INTERNAL DIMENSION SYSTOLIC: 3.6 CM
ECHO LV IVSD: 1.2 CM (ref 0.6–1)
ECHO LV IVSS: 1.7 CM
ECHO LV MASS 2D: 241.2 G (ref 88–224)
ECHO LV MASS INDEX 2D: 111.7 G/M2 (ref 49–115)
ECHO LV POSTERIOR WALL DIASTOLIC: 1.2 CM (ref 0.6–1)
ECHO LV POSTERIOR WALL SYSTOLIC: 1.6 CM
ECHO LV RELATIVE WALL THICKNESS RATIO: 0.47
ECHO LVOT AV VTI INDEX: 1.24
ECHO LVOT MEAN GRADIENT: 2 MMHG
ECHO LVOT VTI: 30.5 CM
ECHO MV A VELOCITY: 0.71 M/S
ECHO MV E DECELERATION TIME (DT): 274.4 MS
ECHO MV E VELOCITY: 0.62 M/S
ECHO MV E/A RATIO: 0.87
ECHO MV E/E' LATERAL: 7.75
ECHO PV MAX VELOCITY: 0.8 M/S
ECHO RIGHT VENTRICULAR SYSTOLIC PRESSURE (RVSP): 29 MMHG
ECHO RV INTERNAL DIMENSION: 4.2 CM
ECHO TV REGURGITANT MAX VELOCITY: 2.54 M/S
EKG ATRIAL RATE: 57 BPM
EKG ATRIAL RATE: 75 BPM
EKG P AXIS: 19 DEGREES
EKG P-R INTERVAL: 196 MS
EKG Q-T INTERVAL: 402 MS
EKG Q-T INTERVAL: 460 MS
EKG QRS DURATION: 86 MS
EKG QRS DURATION: 96 MS
EKG QTC CALCULATION (BAZETT): 447 MS
EKG QTC CALCULATION (BAZETT): 448 MS
EKG R AXIS: 28 DEGREES
EKG R AXIS: 38 DEGREES
EKG T AXIS: 41 DEGREES
EKG T AXIS: 54 DEGREES
EKG VENTRICULAR RATE: 57 BPM
EKG VENTRICULAR RATE: 75 BPM
EOSINOPHIL # BLD: 0.03 K/UL (ref 0–0.44)
EOSINOPHILS RELATIVE PERCENT: 0 % (ref 1–4)
EPI CELLS #/AREA URNS HPF: ABNORMAL /HPF (ref 0–5)
ERYTHROCYTE [DISTWIDTH] IN BLOOD BY AUTOMATED COUNT: 12.7 % (ref 11.8–14.4)
FENTANYL UR QL: NEGATIVE
GFR SERPL CREATININE-BSD FRML MDRD: >60 ML/MIN/1.73M2
GLUCOSE SERPL-MCNC: 141 MG/DL (ref 70–99)
GLUCOSE UR STRIP-MCNC: NEGATIVE MG/DL
HCT VFR BLD AUTO: 37.2 % (ref 40.7–50.3)
HGB BLD-MCNC: 12.3 G/DL (ref 13–17)
HGB UR QL STRIP.AUTO: NEGATIVE
IMM GRANULOCYTES # BLD AUTO: 0.03 K/UL (ref 0–0.3)
IMM GRANULOCYTES NFR BLD: 0 %
KETONES UR STRIP-MCNC: ABNORMAL MG/DL
LEUKOCYTE ESTERASE UR QL STRIP: NEGATIVE
LYMPHOCYTES NFR BLD: 1.53 K/UL (ref 1.1–3.7)
LYMPHOCYTES RELATIVE PERCENT: 16 % (ref 24–43)
MCH RBC QN AUTO: 30.1 PG (ref 25.2–33.5)
MCHC RBC AUTO-ENTMCNC: 33.1 G/DL (ref 28.4–34.8)
MCV RBC AUTO: 91 FL (ref 82.6–102.9)
METHADONE UR QL: NEGATIVE
MONOCYTES NFR BLD: 0.66 K/UL (ref 0.1–1.2)
MONOCYTES NFR BLD: 7 % (ref 3–12)
MUCOUS THREADS URNS QL MICRO: ABNORMAL
NEUTROPHILS NFR BLD: 76 % (ref 36–65)
NEUTS SEG NFR BLD: 7.09 K/UL (ref 1.5–8.1)
NITRITE UR QL STRIP: NEGATIVE
NRBC BLD-RTO: 0 PER 100 WBC
OPIATES UR QL SCN: NEGATIVE
OXYCODONE UR QL SCN: NEGATIVE
PCP UR QL SCN: NEGATIVE
PH UR STRIP: 6 [PH] (ref 5–9)
PLATELET # BLD AUTO: 236 K/UL (ref 138–453)
PMV BLD AUTO: 9.3 FL (ref 8.1–13.5)
POTASSIUM SERPL-SCNC: 4.2 MMOL/L (ref 3.7–5.3)
PROT UR STRIP-MCNC: NEGATIVE MG/DL
RBC # BLD AUTO: 4.09 M/UL (ref 4.21–5.77)
RBC #/AREA URNS HPF: ABNORMAL /HPF (ref 0–2)
SODIUM SERPL-SCNC: 138 MMOL/L (ref 135–144)
SP GR UR STRIP: >1.03 (ref 1.01–1.02)
TEST INFORMATION: ABNORMAL
UROBILINOGEN UR STRIP-ACNC: NORMAL EU/DL (ref 0–1)
WBC #/AREA URNS HPF: ABNORMAL /HPF (ref 0–5)
WBC OTHER # BLD: 9.4 K/UL (ref 3.5–11.3)

## 2024-03-04 PROCEDURE — 80048 BASIC METABOLIC PNL TOTAL CA: CPT

## 2024-03-04 PROCEDURE — C8929 TTE W OR WO FOL WCON,DOPPLER: HCPCS

## 2024-03-04 PROCEDURE — 97166 OT EVAL MOD COMPLEX 45 MIN: CPT

## 2024-03-04 PROCEDURE — 81001 URINALYSIS AUTO W/SCOPE: CPT

## 2024-03-04 PROCEDURE — 36415 COLL VENOUS BLD VENIPUNCTURE: CPT

## 2024-03-04 PROCEDURE — 97116 GAIT TRAINING THERAPY: CPT

## 2024-03-04 PROCEDURE — 93010 ELECTROCARDIOGRAM REPORT: CPT | Performed by: INTERNAL MEDICINE

## 2024-03-04 PROCEDURE — 99222 1ST HOSP IP/OBS MODERATE 55: CPT | Performed by: PHYSICIAN ASSISTANT

## 2024-03-04 PROCEDURE — 6370000000 HC RX 637 (ALT 250 FOR IP)

## 2024-03-04 PROCEDURE — 97110 THERAPEUTIC EXERCISES: CPT

## 2024-03-04 PROCEDURE — 94761 N-INVAS EAR/PLS OXIMETRY MLT: CPT

## 2024-03-04 PROCEDURE — 93010 ELECTROCARDIOGRAM REPORT: CPT | Performed by: FAMILY MEDICINE

## 2024-03-04 PROCEDURE — 2580000003 HC RX 258

## 2024-03-04 PROCEDURE — 93005 ELECTROCARDIOGRAM TRACING: CPT | Performed by: NURSE PRACTITIONER

## 2024-03-04 PROCEDURE — 85025 COMPLETE CBC W/AUTO DIFF WBC: CPT

## 2024-03-04 PROCEDURE — 97162 PT EVAL MOD COMPLEX 30 MIN: CPT

## 2024-03-04 PROCEDURE — 80307 DRUG TEST PRSMV CHEM ANLYZR: CPT

## 2024-03-04 PROCEDURE — 87086 URINE CULTURE/COLONY COUNT: CPT

## 2024-03-04 RX ORDER — SODIUM CHLORIDE 9 MG/ML
INJECTION, SOLUTION INTRAVENOUS PRN
Status: DISCONTINUED | OUTPATIENT
Start: 2024-03-04 | End: 2024-03-04 | Stop reason: HOSPADM

## 2024-03-04 RX ORDER — SODIUM CHLORIDE 0.9 % (FLUSH) 0.9 %
10 SYRINGE (ML) INJECTION EVERY 12 HOURS SCHEDULED
Status: DISCONTINUED | OUTPATIENT
Start: 2024-03-04 | End: 2024-03-04 | Stop reason: HOSPADM

## 2024-03-04 RX ORDER — POTASSIUM CHLORIDE 7.45 MG/ML
10 INJECTION INTRAVENOUS PRN
Status: DISCONTINUED | OUTPATIENT
Start: 2024-03-04 | End: 2024-03-04 | Stop reason: HOSPADM

## 2024-03-04 RX ORDER — METOPROLOL SUCCINATE 25 MG/1
12.5 TABLET, EXTENDED RELEASE ORAL EVERY MORNING
Status: DISCONTINUED | OUTPATIENT
Start: 2024-03-04 | End: 2024-03-04 | Stop reason: HOSPADM

## 2024-03-04 RX ORDER — ATORVASTATIN CALCIUM 20 MG/1
20 TABLET, FILM COATED ORAL DAILY
Status: DISCONTINUED | OUTPATIENT
Start: 2024-03-04 | End: 2024-03-04 | Stop reason: HOSPADM

## 2024-03-04 RX ORDER — ONDANSETRON 2 MG/ML
4 INJECTION INTRAMUSCULAR; INTRAVENOUS EVERY 6 HOURS PRN
Status: DISCONTINUED | OUTPATIENT
Start: 2024-03-04 | End: 2024-03-04 | Stop reason: HOSPADM

## 2024-03-04 RX ORDER — ACETAMINOPHEN 325 MG/1
650 TABLET ORAL EVERY 6 HOURS PRN
Status: DISCONTINUED | OUTPATIENT
Start: 2024-03-04 | End: 2024-03-04 | Stop reason: HOSPADM

## 2024-03-04 RX ORDER — LOSARTAN POTASSIUM 25 MG/1
12.5 TABLET ORAL DAILY
Status: DISCONTINUED | OUTPATIENT
Start: 2024-03-04 | End: 2024-03-04

## 2024-03-04 RX ORDER — ONDANSETRON 4 MG/1
4 TABLET, ORALLY DISINTEGRATING ORAL EVERY 8 HOURS PRN
Status: DISCONTINUED | OUTPATIENT
Start: 2024-03-04 | End: 2024-03-04 | Stop reason: HOSPADM

## 2024-03-04 RX ORDER — POLYETHYLENE GLYCOL 3350 17 G/17G
17 POWDER, FOR SOLUTION ORAL DAILY PRN
Status: DISCONTINUED | OUTPATIENT
Start: 2024-03-04 | End: 2024-03-04 | Stop reason: HOSPADM

## 2024-03-04 RX ORDER — POTASSIUM CHLORIDE 20 MEQ/1
40 TABLET, EXTENDED RELEASE ORAL PRN
Status: DISCONTINUED | OUTPATIENT
Start: 2024-03-04 | End: 2024-03-04 | Stop reason: HOSPADM

## 2024-03-04 RX ORDER — FLECAINIDE ACETATE 50 MG/1
50 TABLET ORAL 2 TIMES DAILY
Status: DISCONTINUED | OUTPATIENT
Start: 2024-03-04 | End: 2024-03-04 | Stop reason: HOSPADM

## 2024-03-04 RX ORDER — SODIUM CHLORIDE 0.9 % (FLUSH) 0.9 %
10 SYRINGE (ML) INJECTION PRN
Status: DISCONTINUED | OUTPATIENT
Start: 2024-03-04 | End: 2024-03-04 | Stop reason: HOSPADM

## 2024-03-04 RX ORDER — FLUTICASONE PROPIONATE 50 MCG
2 SPRAY, SUSPENSION (ML) NASAL DAILY PRN
Status: DISCONTINUED | OUTPATIENT
Start: 2024-03-04 | End: 2024-03-04 | Stop reason: HOSPADM

## 2024-03-04 RX ORDER — ACETAMINOPHEN 650 MG/1
650 SUPPOSITORY RECTAL EVERY 6 HOURS PRN
Status: DISCONTINUED | OUTPATIENT
Start: 2024-03-04 | End: 2024-03-04 | Stop reason: HOSPADM

## 2024-03-04 RX ORDER — FLUOXETINE HYDROCHLORIDE 20 MG/1
40 CAPSULE ORAL DAILY
Status: DISCONTINUED | OUTPATIENT
Start: 2024-03-04 | End: 2024-03-04 | Stop reason: HOSPADM

## 2024-03-04 RX ORDER — SODIUM CHLORIDE 9 MG/ML
INJECTION, SOLUTION INTRAVENOUS CONTINUOUS
Status: DISCONTINUED | OUTPATIENT
Start: 2024-03-04 | End: 2024-03-04

## 2024-03-04 RX ORDER — FAMOTIDINE 20 MG/1
20 TABLET, FILM COATED ORAL 2 TIMES DAILY
Status: DISCONTINUED | OUTPATIENT
Start: 2024-03-04 | End: 2024-03-04 | Stop reason: HOSPADM

## 2024-03-04 RX ADMIN — LOSARTAN POTASSIUM 12.5 MG: 25 TABLET, FILM COATED ORAL at 08:12

## 2024-03-04 RX ADMIN — RIVAROXABAN 20 MG: 20 TABLET, FILM COATED ORAL at 08:12

## 2024-03-04 RX ADMIN — SODIUM CHLORIDE: 9 INJECTION, SOLUTION INTRAVENOUS at 04:44

## 2024-03-04 RX ADMIN — FLUOXETINE 40 MG: 20 CAPSULE ORAL at 08:12

## 2024-03-04 RX ADMIN — FAMOTIDINE 20 MG: 20 TABLET, FILM COATED ORAL at 00:37

## 2024-03-04 RX ADMIN — FLECAINIDE ACETATE 50 MG: 50 TABLET ORAL at 00:37

## 2024-03-04 RX ADMIN — METOPROLOL SUCCINATE 12.5 MG: 25 TABLET, EXTENDED RELEASE ORAL at 08:12

## 2024-03-04 RX ADMIN — FLECAINIDE ACETATE 50 MG: 50 TABLET ORAL at 08:12

## 2024-03-04 RX ADMIN — ATORVASTATIN CALCIUM 20 MG: 20 TABLET, FILM COATED ORAL at 08:12

## 2024-03-04 RX ADMIN — FAMOTIDINE 20 MG: 20 TABLET, FILM COATED ORAL at 08:12

## 2024-03-04 ASSESSMENT — PAIN SCALES - GENERAL
PAINLEVEL_OUTOF10: 0
PAINLEVEL_OUTOF10: 0

## 2024-03-04 NOTE — PLAN OF CARE
Problem: Discharge Planning  Goal: Discharge to home or other facility with appropriate resources  Outcome: Progressing  Flowsheets (Taken 3/4/2024 0118)  Discharge to home or other facility with appropriate resources: Identify barriers to discharge with patient and caregiver     Problem: Pain  Goal: Verbalizes/displays adequate comfort level or baseline comfort level  Outcome: Progressing  Flowsheets (Taken 3/4/2024 0118)  Verbalizes/displays adequate comfort level or baseline comfort level:   Encourage patient to monitor pain and request assistance   Administer analgesics based on type and severity of pain and evaluate response   Consider cultural and social influences on pain and pain management   Assess pain using appropriate pain scale   Implement non-pharmacological measures as appropriate and evaluate response   Notify Licensed Independent Practitioner if interventions unsuccessful or patient reports new pain

## 2024-03-04 NOTE — PROGRESS NOTES
Patient admitted to [facility] Room 310. Received report from ANAYELI Lee. Patient brought up via wheelchair. Patient's belongings in tow. Patient transferred from wheelchair to bed independently. Weight obtained.    Belongings placed in closet. Navigator completed. Med list updated. Patient oriented to call light and room. Patient educated on medication to be given tonight and physician's orders to be completed. Patient stated understanding. Patient encouraged to ask questions. Patient denies of any questions at this time.    Vitals taken and documented. See flow sheet for details. Assessment completed and documented. Patient alert, oriented x4. Patient noted to be anxious. Speech clear. Lung sounds clear in upper bilateral lobes of lungs and right middle lobe of lung. Diminished lung sounds noted at bilateral bases of lungs. No cough noted. Abdomen round, soft, non tender to palpation. Noted colostomy in LUQ. Bowel sounds active in all four quadrants. No edema noted. Scattered abrasions and ecchymosis noted. Patient denies of pain, chest pain, numbness, tingling, or shortness of breath.     Telemetry placed on patient. Water pitcher filled. Provided patient with blanket and additional pillow. Turkey sandwich meal, pretzels and orange juice provided to patient. Medications given per orders. Patient denies any further needs or concerns at this time. Call light and over bed table in reach. Side rails up times two.

## 2024-03-04 NOTE — PLAN OF CARE
Problem: Discharge Planning  Goal: Discharge to home or other facility with appropriate resources  Outcome: Completed  Flowsheets (Taken 3/4/2024 1522)  Discharge to home or other facility with appropriate resources:   Identify barriers to discharge with patient and caregiver   Arrange for needed discharge resources and transportation as appropriate   Identify discharge learning needs (meds, wound care, etc)     Problem: Pain  Goal: Verbalizes/displays adequate comfort level or baseline comfort level  Outcome: Completed  Flowsheets (Taken 3/4/2024 1522)  Verbalizes/displays adequate comfort level or baseline comfort level:   Encourage patient to monitor pain and request assistance   Assess pain using appropriate pain scale   Administer analgesics based on type and severity of pain and evaluate response   Implement non-pharmacological measures as appropriate and evaluate response     Problem: Safety - Adult  Goal: Free from fall injury  Outcome: Completed  Flowsheets (Taken 3/4/2024 1522)  Free From Fall Injury: Instruct family/caregiver on patient safety

## 2024-03-04 NOTE — CONSULTS
the plan.      Sincerely,  Luann Nguyễn PA-C  [facility] Cardiology Specialist    [address] [place], [place] [address]  Phone: [phone], Fax: [phone]     I believe that the risk of significant morbidity and mortality related to the patient's current medical conditions are: Intermediate.      The documentation recorded by the scribe, accurately and completely reflects the services I personally performed and the decisions made by me. Luann Nguyễn PA-C March 4, 2024

## 2024-03-04 NOTE — ED PROVIDER NOTES
[facility]  EMERGENCY DEPARTMENT ENCOUNTER      Pt Name: Mayo Jean  MRN: [id number]  Birthdate 1960  Date of evaluation: 3/3/2024  Provider: Rodrick Olmstead MD    CHIEF COMPLAINT       Chief Complaint   Patient presents with    Dizziness     Onset 2115.States was laying in bed with sudden onset of dizziness and difficulty finding words. Pt c/o dry mouth. Pt denies chest pain/SOB. Pt currently on xarleto         HISTORY OF PRESENT ILLNESS      Mayo Jean is a 63 y.o. male who presents to the emergency department for evaluation of feeling strange.  Patient reports that he was laying in his bed when he started to feel the way \"but I have never felt before\" patient had a difficult time describing this.  States that he was having some difficulty finding words or expressing himself, though states that it is because his mouth is dry.    Denies any trauma.  Initial triage documentation states that the patient fell and hit his head with LOC, but triage nurse tells me that the patient later changed his story.  To me, patient denies any type of injury and is denying any pain complaints.  Specifically, denies any headache, vision change or change in strength or sensation.    Denies any recent fever or illness.  No chest pain.  No dyspnea.  No nausea or vomiting.    He is anticoagulated on Xarelto for history of atrial fibrillation.  Denies any bleeding complaints.      PAST MEDICAL HISTORY     Past Medical History:   Diagnosis Date    Abnormal patient-activated cardiac event monitor 02/22/2021    CAM 13 days 8 hrs Baseline sinus ave HR of 63 bpm rang between 47 adn 109 bpm  Rare PAC and PVC with 2 runs of ectopic atrial tachy longest fastest was 7 beats hr 110 bpm No VT runs no AFib    Chronic anticoagulation     xarelto    Colonic diverticular abscess 10/2021    Colostomy fistula (HCC)     GERD (gastroesophageal reflux disease)     H/O echocardiogram 03/08/2021    EF 45-50% Mild increased LV wallthickness

## 2024-03-04 NOTE — PROGRESS NOTES
Discharge education and instructions given to patient. All questions answered, understanding verbalized. Patient discharged, via ambulatory - declines wheelchair, to personal vehicle at this time. All belongings in hand.

## 2024-03-04 NOTE — PROGRESS NOTES
Physical Therapy  Facility/Department: [facility] MED SURG  Daily Treatment Note  NAME: Mayo Jean  : 1960  MRN: [id number]    Date of Service: 3/4/2024    Discharge Recommendations:  Continue to assess pending progress, Outpatient PT, Home independently        Patient Diagnosis(es): The primary encounter diagnosis was Word finding difficulty. Diagnoses of Altered mental status, unspecified altered mental status type, Chronic atrial fibrillation (HCC), TIA (transient ischemic attack), Near syncope, and Bradycardia were also pertinent to this visit.    Assessment   Assessment: Ther-ex: Seated BLE x15 and supine BLE x5 in all available planes of motion. Transfers: Sup x1. Bed Mobs: Ind. Gait: pt amb 130ft without AD, CGA x1, pt demoed mild unsteadiness that  would benefit from PT's suggestion of SC but pt declined wanting to use SC noting it would not help him out in his personal life, pt also demoed accelerated ward, narrowed NIKOLAS, decreased bilateral step length and lack of reciprocal arm swing. Pt noted dizziness throughout gait but stated it was a small amount.  Activity Tolerance: Patient tolerated treatment well     Plan    Physical Therapy Plan  General Plan: 2 times a day 7 days a week (1x/day on weekends and holidays)  Current Treatment Recommendations: Strengthening;Balance training;Functional mobility training;Transfer training;Endurance training;Gait training;Stair training;Neuromuscular re-education;Home exercise program;Safety education & training;Patient/Caregiver education & training;Equipment evaluation, education, & procurement;Therapeutic activities     Restrictions  Restrictions/Precautions  Restrictions/Precautions: General Precautions  Required Braces or Orthoses?: No     Subjective    Subjective  Subjective: Pt states he is feeling okay, notes he still feels a little dizzy, \"not quite clear.\"  Pain: denies pain complaint  Orientation  Overall Orientation Status: Within Functional

## 2024-03-04 NOTE — DISCHARGE SUMMARY
Discharge Summary    Mayo Jean  :  1960  MRN:  [id number]    Admit date:  3/3/2024      Discharge date: 3/4/2024     Admitting Physician:  Rc Singh MD    Discharge Diagnoses:    Principal Problem:    TIA (transient ischemic attack)  Active Problems:    Colostomy in place (HCC)    Primary hypertension    Chronic atrial fibrillation (HCC)  Resolved Problems:    * No resolved hospital problems. *      Hospital Course:   Mayo Jean is a 63 y.o. male admitted with TIA.  He presented with difficulty finding words.  He stated he was watching TV and felt like that show was playing out of sequence.  He reported difficulty trying to find appropriate words to explain this.  He complained of dry mouth which had resolved.  He complained of generalized headache.  He denied any visual difficulties or changes in vision.  He denied chest pain or shortness of breath.  He denied nausea vomiting.  He does have history of hypertension, nonischemic cardiomyopathy, MICHELLE and uses CPAP, PAF and is chronically on Xarelto.  CT head was negative.  Troponin was 8.  Cardiology was consulted echo completed.  Review of echo from last year was normal.  With normal EF.  Patient's heart rate is maintained between 64-81.  Initial blood pressure while in the ER was 199/over 98 however during his hospitalization patient's blood pressure has been maintaining between 135 systolically to 117.  After discussion and review with Dr. Singh the patient's losartan is stopped at this time plan will be to discharge home.  Will have him follow-up with primary care and cardiology as an outpatient.      Consultants:   Kerry MAURICIO, Cardiololgy    Procedures: none    Complications: none    Discharge Condition: fair    Exam:  GEN:    Awake, alert and oriented x3.   EYES:   EOMI, pupils equal   NECK: Supple. No lymphadenopathy.  No carotid bruit  CVS:     regular rate and rhythm, no audible murmur  PULM:  CTA, no wheezes, rales or rhonchi,

## 2024-03-04 NOTE — PROGRESS NOTES
Progress Note    SUBJECTIVE:    Patient seen for f/u of TIA (transient ischemic attack).  He sitting up in chair no distress.  NO further episodes of dizziness or syncope.  Talks appropiately.       ROS:   Constitutional: negative  for fevers, and negative for chills.  Respiratory: negative for shortness of breath, negative for cough, and negative for wheezing  Cardiovascular: negative for chest pain, and negative for palpitations  Gastrointestinal: negative for abdominal pain, negative for nausea,negative for vomiting, negative for diarrhea, and negative for constipation     All other systems were reviewed with the patient and are negative unless otherwise stated in HPI      OBJECTIVE:      Vitals:   Vitals:    03/04/24 0000   BP: 122/79   Pulse: 64   Resp: 20   Temp: 98.2 °F (36.8 °C)   SpO2: 95%     Weight - Scale: 95.5 kg (210 lb 9.6 oz)   Height: 180.3 cm (5' 11\")     Weight  Wt Readings from Last 3 Encounters:   03/04/24 95.5 kg (210 lb 9.6 oz)   11/10/23 94.8 kg (209 lb)   04/28/23 98.9 kg (218 lb)     Body mass index is 29.37 kg/m².    24HR INTAKE/OUTPUT:      Intake/Output Summary (Last 24 hours) at 3/4/2024 0741  Last data filed at 3/4/2024 0513  Gross per 24 hour   Intake 550 ml   Output --   Net 550 ml     -----------------------------------------------------------------  Exam:    GEN:    Awake, alert and oriented x3.   EYES:  EOMI, pupils equal   NECK: Supple. No lymphadenopathy.  No carotid bruit  CVS:    regular rate and rhythm, no audible murmur  PULM:  CTA, no wheezes, rales or rhonchi, no acute respiratory distress  ABD:    Bowels sounds normal.  Abdomen is soft.  No distention.  no tenderness to palpation.   EXT:   no edema bilaterally .  No calf tenderness.   NEURO: Moves all extremities.  Motor and sensory are grossly intact  SKIN:  No rashes.  No skin lesions.    -----------------------------------------------------------------    Diagnostic Data:      Complete Blood Count:   Recent Labs

## 2024-03-04 NOTE — H&P
History and Physical    Patient:  Mayo Jean  MRN: [id number]    Chief Complaint: Dysarthria    History Obtained From:  patient, electronic medical record    PCP: Rema Dorado APRN - NP    History of Present Illness:   The patient is a 63 y.o. male who presents with difficulty finding words.  Patient states he was watching TV and felt like the show was playing out of sequence.  He then had some difficulty trying to find appropriate words to explain this.  He is also complaining of dry mouth.  This issue has since resolved.  He is complaining of generalized headache.  He denies any visual difficulties or changes in strength or sensation.  He denies chest pain, shortness of breath, nausea, vomiting.  Past medical history includes colostomy, hypertension, hyperlipidemia, nonischemic cardiomyopathy, MICHELLE with CPAP use, paroxysmal atrial fibrillation with anticoagulation with Xarelto.  CT head negative for any acute intracranial abnormality, glucose 127, troponin 8.    Past Medical History:        Diagnosis Date    Abnormal patient-activated cardiac event monitor 02/22/2021    CAM 13 days 8 hrs Baseline sinus ave HR of 63 bpm rang between 47 adn 109 bpm  Rare PAC and PVC with 2 runs of ectopic atrial tachy longest fastest was 7 beats hr 110 bpm No VT runs no AFib    Chronic anticoagulation     xarelto    Colonic diverticular abscess 10/2021    Colostomy fistula (HCC)     GERD (gastroesophageal reflux disease)     H/O echocardiogram 03/08/2021    EF 45-50% Mild increased LV wallthickness LA is mod dilated 34-39 with LA volume index of 35 ml/m2 Mild mitral and tricuspid regurg Midl diastolic dysfunction seen Aortic root is mod dilated >4.5cm when corrected for body suface area    History of cardioversion     History of pneumonia     pt does not recall having this    Hx of bronchitis     Hyperlipidemia     Hypertension     Non-ischemic cardiomyopathy (HCC)     Dr. Swanson cardiology in [place], [place]    MICHELLE on CPAP

## 2024-03-04 NOTE — PROGRESS NOTES
Notified EMMANUEL Luciano regarding patient not urinating since before he arrived at the hospital. Bladder scan completed. Noted bladder scan to be 180. See orders.

## 2024-03-04 NOTE — DISCHARGE INSTRUCTIONS
Patient Instructions:   Activity: activity as tolerated  Diet: cardiac diet     Follow up:  Patient will be followed by Rema Dorado APRN - NP in 1-2 weeks

## 2024-03-04 NOTE — PROGRESS NOTES
Occupational Therapy  Facility/Department: [facility] MED SURG  Occupational Therapy Initial Assessment    Name: Mayo Jean  : 1960  MRN: [id number]  Date of Service: 3/4/2024    Discharge Recommendations:  OT Equipment Recommendations  Equipment Needed: No     Patient Diagnosis(es): The primary encounter diagnosis was Word finding difficulty. Diagnoses of Altered mental status, unspecified altered mental status type and Chronic atrial fibrillation (HCC) were also pertinent to this visit.  Past Medical History:  has a past medical history of Abnormal patient-activated cardiac event monitor, Chronic anticoagulation, Colonic diverticular abscess, Colostomy fistula (HCC), GERD (gastroesophageal reflux disease), H/O echocardiogram, History of cardioversion, History of pneumonia, Hx of bronchitis, Hyperlipidemia, Hypertension, Non-ischemic cardiomyopathy (HCC), MICHELLE on CPAP, Paroxysmal A-fib (HCC), Sleep apnea, Under care of team, and Under care of team.  Past Surgical History:  has a past surgical history that includes CT ABSCESS DRAINAGE (10/12/2021); Cardioversion (2021); Sigmoid Colectomy (N/A, 2021); and laparotomy (N/A, 2022).    Assessment   Assessment: Pt is 64 yo male presenting to [facility] d/t TIA. Pt demo (I) during functional mobility/transfers and ADL tasks. No furtehr indication for skilled OT at this time.  Prognosis: Good  No Skilled OT: Independent with functional mobility;Independent with ADL's  REQUIRES OT FOLLOW-UP: No        Plan   No further indication for skilled OT at this time.    Restrictions  Restrictions/Precautions  Restrictions/Precautions: General Precautions  Required Braces or Orthoses?: No    Subjective   General  Chart Reviewed: Yes  Patient assessed for rehabilitation services?: Yes  Family / Caregiver Present: No  Referring Practitioner: Ankita Villalobos, JASPREET - CNP  Diagnosis: TIA  Subjective  Subjective: Pt reports no pain at this time. Pt agreeable to OT

## 2024-03-04 NOTE — PROGRESS NOTES
Physical Therapy  Facility/Department: [facility] MED SURG  Physical Therapy Initial Assessment    Name: Mayo Jean  : 1960  MRN: [id number]  Date of Service: 3/4/2024    Discharge Recommendations:  Continue to assess pending progress, Outpatient PT, Home independently          Patient Diagnosis(es): The primary encounter diagnosis was Word finding difficulty. Diagnoses of Altered mental status, unspecified altered mental status type and Chronic atrial fibrillation (HCC) were also pertinent to this visit.  Past Medical History:  has a past medical history of Abnormal patient-activated cardiac event monitor, Chronic anticoagulation, Colonic diverticular abscess, Colostomy fistula (HCC), GERD (gastroesophageal reflux disease), H/O echocardiogram, History of cardioversion, History of pneumonia, Hx of bronchitis, Hyperlipidemia, Hypertension, Non-ischemic cardiomyopathy (HCC), MICHELLE on CPAP, Paroxysmal A-fib (HCC), Sleep apnea, Under care of team, and Under care of team.  Past Surgical History:  has a past surgical history that includes CT ABSCESS DRAINAGE (10/12/2021); Cardioversion (2021); Sigmoid Colectomy (N/A, 2021); and laparotomy (N/A, 2022).    Assessment   Body Structures, Functions, Activity Limitations Requiring Skilled Therapeutic Intervention: Decreased functional mobility ;Decreased strength;Decreased safe awareness;Decreased endurance;Decreased balance;Decreased posture  Assessment: Pt was referred for difficulty walking. Pt is CGA to min assist +1 without device. Pt may present with improved balance with use of st. cane and progress to least restrictive device. Progress as tolerated for endurance and balance.  Treatment Diagnosis: difficulty walking  Therapy Prognosis: Good  Decision Making: Medium Complexity  Barriers to Learning: none  Requires PT Follow-Up: Yes  Activity Tolerance  Activity Tolerance: Patient tolerated evaluation without incident     Plan   Physical Therapy

## 2024-03-04 NOTE — PROGRESS NOTES
Discharge planning assessment attempted x3 throughout this day.  Will attempt tomorrow and assist with discharge planning as appropriate.    LALO Coronel  3/4/2024

## 2024-03-04 NOTE — PROGRESS NOTES
Nutrition Assessment     Type and Reason for Visit: Initial    Nutrition Recommendations/Plan:   Continue current diet.      Malnutrition Assessment:  Malnutrition Status: No malnutrition    Nutrition Assessment:  Overweigh/obesity r/t excess energy intakes aeb BMI 29.37, Pt reports he would like to lose weight. Pt drives for [employer]. Pt encouraged 3 meals daily and PA. He drinks Dr Pepper, Pt encouraged to stop the pop and increase waater. Good PO and stable weight before admission.    Nutrition Related Findings:   appears well nourished Wound Type: None    Current Nutrition Therapies:    ADULT DIET; Regular    Anthropometric Measures:  Height: 180.3 cm (5' 11\")  Current Body Wt: 95.5 kg (210 lb 8.6 oz)   BMI: 29.4  Hematology:  Recent Labs     03/03/24 2155 03/04/24  0531   WBC 11.0 9.4   HGB 15.0 12.3*   HCT 46.0 37.2*     Chemistry:  Recent Labs     03/03/24 2155 03/04/24  0531    138   K 4.1 4.2    105   CO2 24 23   GLUCOSE 127* 141*   BUN 21 25*   CREATININE 1.1 1.2   MG 2.1  --    CALCIUM 9.5 8.7     Recent Labs     03/03/24  2155   PROT 7.8   LABALBU 4.4   AST 28   ALT 36   ALKPHOS 132*   BILITOT 0.3         Nutrition Diagnosis:   Overweight/Obese related to excessive energy intake as evidenced by BMI    Nutrition Interventions:   Food and/or Nutrient Delivery: Continue Current Diet  Nutrition Education/Counseling: Education initiated  Coordination of Nutrition Care: Continue to monitor while inpatient  Plan of Care discussed with: Patient    Goals:     Goals: Meet at least 75% of estimated needs       Nutrition Monitoring and Evaluation:   Behavioral-Environmental Outcomes: Readiness for Change  Food/Nutrient Intake Outcomes: Food and Nutrient Intake  Physical Signs/Symptoms Outcomes: Biochemical Data, Weight    Discharge Planning:    Continue current diet     CHUCKY CAGLE RD, LD  Contact: [phone]

## 2024-03-05 LAB
MICROORGANISM SPEC CULT: NO GROWTH
SPECIMEN DESCRIPTION: NORMAL

## 2024-03-30 ENCOUNTER — HOSPITAL ENCOUNTER (EMERGENCY)
Age: 64
Discharge: HOME | End: 2024-03-30
Attending: STUDENT IN AN ORGANIZED HEALTH CARE EDUCATION/TRAINING PROGRAM
Payer: MEDICARE

## 2024-03-30 VITALS
HEIGHT: 70 IN | OXYGEN SATURATION: 96 % | TEMPERATURE: 97.7 F | HEART RATE: 59 BPM | DIASTOLIC BLOOD PRESSURE: 99 MMHG | RESPIRATION RATE: 19 BRPM | SYSTOLIC BLOOD PRESSURE: 149 MMHG | BODY MASS INDEX: 30.21 KG/M2

## 2024-03-30 DIAGNOSIS — I49.9 IRREGULAR HEART RATE: Primary | ICD-10-CM

## 2024-03-30 LAB
ANION GAP SERPL CALCULATED.3IONS-SCNC: 10 MMOL/L (ref 9–17)
BASOPHILS # BLD: 0.06 K/UL (ref 0–0.2)
BASOPHILS NFR BLD: 1 % (ref 0–2)
BUN SERPL-MCNC: 24 MG/DL (ref 8–23)
BUN/CREAT SERPL: 27 (ref 9–20)
CALCIUM SERPL-MCNC: 9.3 MG/DL (ref 8.6–10.4)
CHLORIDE SERPL-SCNC: 102 MMOL/L (ref 98–107)
CO2 SERPL-SCNC: 25 MMOL/L (ref 20–31)
CREAT SERPL-MCNC: 0.9 MG/DL (ref 0.7–1.2)
EKG ATRIAL RATE: 62 BPM
EKG P AXIS: 29 DEGREES
EKG P-R INTERVAL: 194 MS
EKG Q-T INTERVAL: 448 MS
EKG QRS DURATION: 92 MS
EKG QTC CALCULATION (BAZETT): 454 MS
EKG R AXIS: 33 DEGREES
EKG T AXIS: 54 DEGREES
EKG VENTRICULAR RATE: 62 BPM
EOSINOPHIL # BLD: 0.23 K/UL (ref 0–0.44)
EOSINOPHILS RELATIVE PERCENT: 3 % (ref 1–4)
ERYTHROCYTE [DISTWIDTH] IN BLOOD BY AUTOMATED COUNT: 12.8 % (ref 11.8–14.4)
GFR SERPL CREATININE-BSD FRML MDRD: >90 ML/MIN/1.73M2
GLUCOSE SERPL-MCNC: 104 MG/DL (ref 70–99)
HCT VFR BLD AUTO: 42.4 % (ref 40.7–50.3)
HGB BLD-MCNC: 14.1 G/DL (ref 13–17)
IMM GRANULOCYTES # BLD AUTO: 0.04 K/UL (ref 0–0.3)
IMM GRANULOCYTES NFR BLD: 1 %
LYMPHOCYTES NFR BLD: 2.76 K/UL (ref 1.1–3.7)
LYMPHOCYTES RELATIVE PERCENT: 34 % (ref 24–43)
MAGNESIUM SERPL-MCNC: 2.2 MG/DL (ref 1.6–2.6)
MCH RBC QN AUTO: 29.9 PG (ref 25.2–33.5)
MCHC RBC AUTO-ENTMCNC: 33.3 G/DL (ref 28.4–34.8)
MCV RBC AUTO: 89.8 FL (ref 82.6–102.9)
MONOCYTES NFR BLD: 0.87 K/UL (ref 0.1–1.2)
MONOCYTES NFR BLD: 11 % (ref 3–12)
NEUTROPHILS NFR BLD: 50 % (ref 36–65)
NEUTS SEG NFR BLD: 4.21 K/UL (ref 1.5–8.1)
NRBC BLD-RTO: 0 PER 100 WBC
PLATELET # BLD AUTO: 263 K/UL (ref 138–453)
PMV BLD AUTO: 9 FL (ref 8.1–13.5)
POTASSIUM SERPL-SCNC: 4.3 MMOL/L (ref 3.7–5.3)
RBC # BLD AUTO: 4.72 M/UL (ref 4.21–5.77)
SODIUM SERPL-SCNC: 137 MMOL/L (ref 135–144)
TROPONIN I SERPL HS-MCNC: 8 NG/L (ref 0–22)
WBC OTHER # BLD: 8.2 K/UL (ref 3.5–11.3)

## 2024-03-30 PROCEDURE — 84484 ASSAY OF TROPONIN QUANT: CPT

## 2024-03-30 PROCEDURE — 99284 EMERGENCY DEPT VISIT MOD MDM: CPT

## 2024-03-30 PROCEDURE — 93010 ELECTROCARDIOGRAM REPORT: CPT | Performed by: INTERNAL MEDICINE

## 2024-03-30 PROCEDURE — 80048 BASIC METABOLIC PNL TOTAL CA: CPT

## 2024-03-30 PROCEDURE — 36415 COLL VENOUS BLD VENIPUNCTURE: CPT

## 2024-03-30 PROCEDURE — 85025 COMPLETE CBC W/AUTO DIFF WBC: CPT

## 2024-03-30 PROCEDURE — 83735 ASSAY OF MAGNESIUM: CPT

## 2024-03-30 PROCEDURE — 93005 ELECTROCARDIOGRAM TRACING: CPT | Performed by: STUDENT IN AN ORGANIZED HEALTH CARE EDUCATION/TRAINING PROGRAM

## 2024-03-30 ASSESSMENT — PAIN - FUNCTIONAL ASSESSMENT: PAIN_FUNCTIONAL_ASSESSMENT: NONE - DENIES PAIN

## 2024-03-30 NOTE — ED NOTES
Patient states he had some \"roaring\" in his left ear and was taking his blood pressure when his blood pressure notified him of an \"alert.\" Patient states he did get slightly lightheaded before arrival.

## 2024-04-09 ENCOUNTER — OFFICE VISIT (OUTPATIENT)
Dept: CARDIOLOGY | Age: 64
End: 2024-04-09
Payer: MEDICARE

## 2024-04-09 VITALS
SYSTOLIC BLOOD PRESSURE: 123 MMHG | RESPIRATION RATE: 18 BRPM | BODY MASS INDEX: 30.32 KG/M2 | OXYGEN SATURATION: 97 % | HEIGHT: 71 IN | DIASTOLIC BLOOD PRESSURE: 67 MMHG | WEIGHT: 216.6 LBS | HEART RATE: 64 BPM

## 2024-04-09 DIAGNOSIS — I49.3 PVC (PREMATURE VENTRICULAR CONTRACTION): ICD-10-CM

## 2024-04-09 DIAGNOSIS — Z86.79 S/P ABLATION OF ATRIAL FIBRILLATION: ICD-10-CM

## 2024-04-09 DIAGNOSIS — R42 DIZZINESS: ICD-10-CM

## 2024-04-09 DIAGNOSIS — Z01.818 PRE-OP EVALUATION: Primary | ICD-10-CM

## 2024-04-09 DIAGNOSIS — Z09 HOSPITAL DISCHARGE FOLLOW-UP: ICD-10-CM

## 2024-04-09 DIAGNOSIS — Z79.01 CHRONIC ANTICOAGULATION: ICD-10-CM

## 2024-04-09 DIAGNOSIS — Z79.899 ENCOUNTER FOR MONITORING FLECAINIDE THERAPY: ICD-10-CM

## 2024-04-09 DIAGNOSIS — Z98.890 S/P ABLATION OF ATRIAL FIBRILLATION: ICD-10-CM

## 2024-04-09 DIAGNOSIS — I42.8 NICM (NONISCHEMIC CARDIOMYOPATHY) (HCC): ICD-10-CM

## 2024-04-09 DIAGNOSIS — R94.31 ABNORMAL ELECTROCARDIOGRAM (ECG) (EKG): ICD-10-CM

## 2024-04-09 DIAGNOSIS — E78.2 MIXED HYPERLIPIDEMIA: ICD-10-CM

## 2024-04-09 DIAGNOSIS — Z51.81 ENCOUNTER FOR MONITORING FLECAINIDE THERAPY: ICD-10-CM

## 2024-04-09 DIAGNOSIS — I11.9 HYPERTENSIVE HEART DISEASE WITHOUT HEART FAILURE: ICD-10-CM

## 2024-04-09 DIAGNOSIS — I48.0 PAF (PAROXYSMAL ATRIAL FIBRILLATION) (HCC): ICD-10-CM

## 2024-04-09 DIAGNOSIS — R42 LIGHTHEADED: ICD-10-CM

## 2024-04-09 DIAGNOSIS — I10 ESSENTIAL HYPERTENSION: ICD-10-CM

## 2024-04-09 DIAGNOSIS — R00.2 PALPITATIONS: ICD-10-CM

## 2024-04-09 PROCEDURE — G8427 DOCREV CUR MEDS BY ELIG CLIN: HCPCS | Performed by: PHYSICIAN ASSISTANT

## 2024-04-09 PROCEDURE — 3017F COLORECTAL CA SCREEN DOC REV: CPT | Performed by: PHYSICIAN ASSISTANT

## 2024-04-09 PROCEDURE — 3074F SYST BP LT 130 MM HG: CPT | Performed by: PHYSICIAN ASSISTANT

## 2024-04-09 PROCEDURE — 3078F DIAST BP <80 MM HG: CPT | Performed by: PHYSICIAN ASSISTANT

## 2024-04-09 PROCEDURE — 1123F ACP DISCUSS/DSCN MKR DOCD: CPT | Performed by: PHYSICIAN ASSISTANT

## 2024-04-09 PROCEDURE — 99214 OFFICE O/P EST MOD 30 MIN: CPT | Performed by: PHYSICIAN ASSISTANT

## 2024-04-09 PROCEDURE — 1111F DSCHRG MED/CURRENT MED MERGE: CPT | Performed by: PHYSICIAN ASSISTANT

## 2024-04-09 PROCEDURE — G8417 CALC BMI ABV UP PARAM F/U: HCPCS | Performed by: PHYSICIAN ASSISTANT

## 2024-04-09 PROCEDURE — 1036F TOBACCO NON-USER: CPT | Performed by: PHYSICIAN ASSISTANT

## 2024-04-09 NOTE — PROGRESS NOTES
(Toprol XL) 25 mg 1/2 tab daily.   ACE Inibitor/ARB: Not indicated at this time.     Hyperlipidemia: Mixed, LDL done on 1/24/2023 was 140 mg/dL   Cholesterol Reduction Therapy: Continue Atorvastatin (Lipitor) 20 mg daily.    I also ordered a repeat lipid panel.      Finally, I recommended that he continue his current medications and follow up with you as previously scheduled. I discussed patient's symptoms and treatment plan with Dr Swanson, he was in agreement with the plan and follow up.      FOLLOW UP:   I told Mr. Jean to call my office if he had any problems, but otherwise I asked him to Return in about 4 weeks (around 5/7/2024). However, I would be happy to see him sooner should the need arise.    Sincerely,  Luann Nguyễn PA-C  [facility] Specialist    [address] [place], [place] [address]  Phone: [phone], Fax: [phone]     I believe that the risk of significant morbidity and mortality related to the patient's current medical conditions are: Intermediate.   35 minutes      April 9, 2024

## 2024-04-10 ENCOUNTER — HOSPITAL ENCOUNTER (OUTPATIENT)
Dept: NUCLEAR MEDICINE | Age: 64
Discharge: HOME | End: 2024-04-12
Payer: MEDICARE

## 2024-04-10 ENCOUNTER — HOSPITAL ENCOUNTER (OUTPATIENT)
Age: 64
Discharge: HOME | End: 2024-04-12
Payer: MEDICARE

## 2024-04-10 DIAGNOSIS — Z79.899 ENCOUNTER FOR MONITORING FLECAINIDE THERAPY: ICD-10-CM

## 2024-04-10 DIAGNOSIS — I42.8 NICM (NONISCHEMIC CARDIOMYOPATHY) (HCC): ICD-10-CM

## 2024-04-10 DIAGNOSIS — Z51.81 ENCOUNTER FOR MONITORING FLECAINIDE THERAPY: ICD-10-CM

## 2024-04-10 DIAGNOSIS — Z79.01 CHRONIC ANTICOAGULATION: ICD-10-CM

## 2024-04-10 DIAGNOSIS — I11.9 HYPERTENSIVE HEART DISEASE WITHOUT HEART FAILURE: ICD-10-CM

## 2024-04-10 DIAGNOSIS — I10 ESSENTIAL HYPERTENSION: ICD-10-CM

## 2024-04-10 DIAGNOSIS — Z01.818 PRE-OP EVALUATION: ICD-10-CM

## 2024-04-10 DIAGNOSIS — Z86.79 S/P ABLATION OF ATRIAL FIBRILLATION: ICD-10-CM

## 2024-04-10 DIAGNOSIS — Z98.890 S/P ABLATION OF ATRIAL FIBRILLATION: ICD-10-CM

## 2024-04-10 DIAGNOSIS — R94.31 ABNORMAL ELECTROCARDIOGRAM (ECG) (EKG): ICD-10-CM

## 2024-04-10 DIAGNOSIS — I48.0 PAF (PAROXYSMAL ATRIAL FIBRILLATION) (HCC): ICD-10-CM

## 2024-04-10 DIAGNOSIS — R42 LIGHTHEADED: ICD-10-CM

## 2024-04-10 DIAGNOSIS — E78.2 MIXED HYPERLIPIDEMIA: ICD-10-CM

## 2024-04-10 DIAGNOSIS — R42 DIZZINESS: ICD-10-CM

## 2024-04-10 LAB
NUC STRESS EJECTION FRACTION: 66 %
STRESS BASELINE DIAS BP: 80 MMHG
STRESS BASELINE HR: 61 BPM
STRESS BASELINE SYS BP: 142 MMHG
STRESS PEAK DIAS BP: 64 MMHG
STRESS PEAK SYS BP: 122 MMHG
STRESS PERCENT HR ACHIEVED: 57 %
STRESS POST PEAK HR: 88 BPM
STRESS RATE PRESSURE PRODUCT: NORMAL BPM*MMHG
STRESS STAGE RECOVERY 1 BP: NORMAL MMHG
STRESS STAGE RECOVERY 1 DURATION: 0 MIN:SEC
STRESS STAGE RECOVERY 1 HR: 88 BPM
STRESS STAGE RECOVERY 2 DURATION: 1 MIN:SEC
STRESS STAGE RECOVERY 2 HR: 83 BPM
STRESS STAGE RECOVERY 3 BP: NORMAL MMHG
STRESS STAGE RECOVERY 3 DURATION: 3 MIN:SEC
STRESS STAGE RECOVERY 3 HR: 75 BPM
STRESS STAGE RECOVERY 4 BP: NORMAL MMHG
STRESS STAGE RECOVERY 4 DURATION: 5 MIN:SEC
STRESS STAGE RECOVERY 4 HR: 74 BPM
STRESS TARGET HR: 155 BPM

## 2024-04-10 PROCEDURE — A9500 TC99M SESTAMIBI: HCPCS | Performed by: PHYSICIAN ASSISTANT

## 2024-04-10 PROCEDURE — 93243 EXT ECG>48HR<7D SCAN A/R: CPT

## 2024-04-10 PROCEDURE — 3430000000 HC RX DIAGNOSTIC RADIOPHARMACEUTICAL: Performed by: PHYSICIAN ASSISTANT

## 2024-04-10 PROCEDURE — 6360000002 HC RX W HCPCS: Performed by: FAMILY MEDICINE

## 2024-04-10 PROCEDURE — 93017 CV STRESS TEST TRACING ONLY: CPT

## 2024-04-10 RX ORDER — TETRAKIS(2-METHOXYISOBUTYLISOCYANIDE)COPPER(I) TETRAFLUOROBORATE 1 MG/ML
10 INJECTION, POWDER, LYOPHILIZED, FOR SOLUTION INTRAVENOUS
Status: COMPLETED | OUTPATIENT
Start: 2024-04-10 | End: 2024-04-10

## 2024-04-10 RX ORDER — REGADENOSON 0.08 MG/ML
0.4 INJECTION, SOLUTION INTRAVENOUS
Status: COMPLETED | OUTPATIENT
Start: 2024-04-10 | End: 2024-04-10

## 2024-04-10 RX ORDER — TETRAKIS(2-METHOXYISOBUTYLISOCYANIDE)COPPER(I) TETRAFLUOROBORATE 1 MG/ML
30 INJECTION, POWDER, LYOPHILIZED, FOR SOLUTION INTRAVENOUS
Status: COMPLETED | OUTPATIENT
Start: 2024-04-10 | End: 2024-04-10

## 2024-04-10 RX ADMIN — REGADENOSON 0.4 MG: 0.08 INJECTION, SOLUTION INTRAVENOUS at 11:37

## 2024-04-10 RX ADMIN — Medication 30 MILLICURIE: at 11:35

## 2024-04-10 RX ADMIN — Medication 10 MILLICURIE: at 10:10

## 2024-04-11 ENCOUNTER — TELEPHONE (OUTPATIENT)
Dept: CARDIOLOGY | Age: 64
End: 2024-04-11

## 2024-04-11 NOTE — TELEPHONE ENCOUNTER
----- Message from Luann Nguyễn PA-C sent at 4/11/2024  8:16 AM EDT -----  Please let them know their stress test looked good, it was low risk. Will discuss at follow up. He is cleared for surgery. Thanks.

## 2024-04-11 NOTE — RESULT ENCOUNTER NOTE
Please let them know their stress test looked good, it was low risk. Will discuss at follow up. He is cleared for surgery. Thanks.

## 2024-04-18 ENCOUNTER — TELEPHONE (OUTPATIENT)
Dept: CARDIOLOGY | Age: 64
End: 2024-04-18

## 2024-04-18 NOTE — TELEPHONE ENCOUNTER
----- Message from Luann Nguyễn PA-C sent at 4/18/2024  9:56 AM EDT -----  Please let them know that their CAM monitor was overall unremarkable. We will discuss at their follow up appointment.

## 2024-05-03 ENCOUNTER — HOSPITAL ENCOUNTER (EMERGENCY)
Age: 64
Discharge: HOME | End: 2024-05-03
Attending: STUDENT IN AN ORGANIZED HEALTH CARE EDUCATION/TRAINING PROGRAM
Payer: MEDICARE

## 2024-05-03 VITALS
OXYGEN SATURATION: 98 % | HEART RATE: 58 BPM | HEIGHT: 71 IN | DIASTOLIC BLOOD PRESSURE: 67 MMHG | BODY MASS INDEX: 27.58 KG/M2 | WEIGHT: 197 LBS | SYSTOLIC BLOOD PRESSURE: 131 MMHG | TEMPERATURE: 97.7 F | RESPIRATION RATE: 20 BRPM

## 2024-05-03 DIAGNOSIS — E86.0 DEHYDRATION: Primary | ICD-10-CM

## 2024-05-03 LAB
ALBUMIN SERPL-MCNC: 3.8 G/DL (ref 3.5–5.2)
ALBUMIN/GLOB SERPL: 1 {RATIO} (ref 1–2.5)
ALP SERPL-CCNC: 144 U/L (ref 40–129)
ALT SERPL-CCNC: 13 U/L (ref 5–41)
ANION GAP SERPL CALCULATED.3IONS-SCNC: 11 MMOL/L (ref 9–17)
AST SERPL-CCNC: 16 U/L
BASOPHILS # BLD: 0.13 K/UL (ref 0–0.2)
BASOPHILS NFR BLD: 1 % (ref 0–2)
BILIRUB DIRECT SERPL-MCNC: <0.1 MG/DL
BILIRUB INDIRECT SERPL-MCNC: ABNORMAL MG/DL (ref 0–1)
BILIRUB SERPL-MCNC: 0.3 MG/DL (ref 0.3–1.2)
BUN SERPL-MCNC: 29 MG/DL (ref 8–23)
BUN/CREAT SERPL: 18 (ref 9–20)
CALCIUM SERPL-MCNC: 9.4 MG/DL (ref 8.6–10.4)
CHLORIDE SERPL-SCNC: 103 MMOL/L (ref 98–107)
CO2 SERPL-SCNC: 24 MMOL/L (ref 20–31)
CREAT SERPL-MCNC: 1.6 MG/DL (ref 0.7–1.2)
EOSINOPHIL # BLD: 0.32 K/UL (ref 0–0.44)
EOSINOPHILS RELATIVE PERCENT: 3 % (ref 1–4)
ERYTHROCYTE [DISTWIDTH] IN BLOOD BY AUTOMATED COUNT: 13.7 % (ref 11.8–14.4)
GFR, ESTIMATED: 48 ML/MIN/1.73M2
GLUCOSE SERPL-MCNC: 115 MG/DL (ref 70–99)
HCT VFR BLD AUTO: 36.6 % (ref 40.7–50.3)
HGB BLD-MCNC: 11.6 G/DL (ref 13–17)
IMM GRANULOCYTES # BLD AUTO: 0.19 K/UL (ref 0–0.3)
IMM GRANULOCYTES NFR BLD: 2 %
LACTATE BLDV-SCNC: 2.1 MMOL/L (ref 0.5–2.2)
LIPASE SERPL-CCNC: 71 U/L (ref 13–60)
LYMPHOCYTES NFR BLD: 2.43 K/UL (ref 1.1–3.7)
LYMPHOCYTES RELATIVE PERCENT: 20 % (ref 24–43)
MAGNESIUM SERPL-MCNC: 2 MG/DL (ref 1.6–2.6)
MCH RBC QN AUTO: 29.7 PG (ref 25.2–33.5)
MCHC RBC AUTO-ENTMCNC: 31.7 G/DL (ref 28.4–34.8)
MCV RBC AUTO: 93.8 FL (ref 82.6–102.9)
MONOCYTES NFR BLD: 1.25 K/UL (ref 0.1–1.2)
MONOCYTES NFR BLD: 10 % (ref 3–12)
NEUTROPHILS NFR BLD: 64 % (ref 36–65)
NEUTS SEG NFR BLD: 7.97 K/UL (ref 1.5–8.1)
NRBC BLD-RTO: 0 PER 100 WBC
PLATELET # BLD AUTO: 730 K/UL (ref 138–453)
PMV BLD AUTO: 8.9 FL (ref 8.1–13.5)
POTASSIUM SERPL-SCNC: 4.3 MMOL/L (ref 3.7–5.3)
PROT SERPL-MCNC: 7.6 G/DL (ref 6.4–8.3)
RBC # BLD AUTO: 3.9 M/UL (ref 4.21–5.77)
SODIUM SERPL-SCNC: 138 MMOL/L (ref 135–144)
WBC OTHER # BLD: 12.3 K/UL (ref 3.5–11.3)

## 2024-05-03 PROCEDURE — 80048 BASIC METABOLIC PNL TOTAL CA: CPT

## 2024-05-03 PROCEDURE — 83605 ASSAY OF LACTIC ACID: CPT

## 2024-05-03 PROCEDURE — 80076 HEPATIC FUNCTION PANEL: CPT

## 2024-05-03 PROCEDURE — 83735 ASSAY OF MAGNESIUM: CPT

## 2024-05-03 PROCEDURE — 99284 EMERGENCY DEPT VISIT MOD MDM: CPT

## 2024-05-03 PROCEDURE — 2580000003 HC RX 258: Performed by: STUDENT IN AN ORGANIZED HEALTH CARE EDUCATION/TRAINING PROGRAM

## 2024-05-03 PROCEDURE — 83690 ASSAY OF LIPASE: CPT

## 2024-05-03 PROCEDURE — 96360 HYDRATION IV INFUSION INIT: CPT

## 2024-05-03 PROCEDURE — 85025 COMPLETE CBC W/AUTO DIFF WBC: CPT

## 2024-05-03 PROCEDURE — 93005 ELECTROCARDIOGRAM TRACING: CPT | Performed by: STUDENT IN AN ORGANIZED HEALTH CARE EDUCATION/TRAINING PROGRAM

## 2024-05-03 RX ORDER — AMIODARONE HYDROCHLORIDE 200 MG/1
200 TABLET ORAL 2 TIMES DAILY
COMMUNITY

## 2024-05-03 RX ORDER — 0.9 % SODIUM CHLORIDE 0.9 %
1000 INTRAVENOUS SOLUTION INTRAVENOUS ONCE
Status: COMPLETED | OUTPATIENT
Start: 2024-05-03 | End: 2024-05-03

## 2024-05-03 RX ORDER — OXYCODONE HYDROCHLORIDE 5 MG/1
5 TABLET ORAL EVERY 6 HOURS PRN
COMMUNITY
Start: 2024-05-02 | End: 2024-05-09

## 2024-05-03 RX ADMIN — SODIUM CHLORIDE 1000 ML: 9 INJECTION, SOLUTION INTRAVENOUS at 22:52

## 2024-05-03 RX ADMIN — SODIUM CHLORIDE 1000 ML: 9 INJECTION, SOLUTION INTRAVENOUS at 21:45

## 2024-05-03 ASSESSMENT — ENCOUNTER SYMPTOMS
VOMITING: 0
ABDOMINAL PAIN: 1
NAUSEA: 1
RESPIRATORY NEGATIVE: 1

## 2024-05-04 LAB
EKG ATRIAL RATE: 65 BPM
EKG P AXIS: 49 DEGREES
EKG P-R INTERVAL: 160 MS
EKG Q-T INTERVAL: 440 MS
EKG QRS DURATION: 84 MS
EKG QTC CALCULATION (BAZETT): 457 MS
EKG R AXIS: 17 DEGREES
EKG T AXIS: 44 DEGREES
EKG VENTRICULAR RATE: 65 BPM

## 2024-05-04 PROCEDURE — 93010 ELECTROCARDIOGRAM REPORT: CPT | Performed by: FAMILY MEDICINE

## 2024-05-04 NOTE — ED PROVIDER NOTES
[facility] ED  Emergency Department Encounter  Emergency Medicine Attending     Pt Name:Mayo Jean  MRN: [id number]  Birthdate 1960  Date of evaluation: 5/3/24  PCP:  Rema Dorado APRN - NP  Note Started: 10:53 PM EDT      CHIEF COMPLAINT       Chief Complaint   Patient presents with    Altered Mental Status     Patient states he has trouble concentrating and periods of confusion.  Patient is alert and oriented at this time.       HISTORY OF PRESENT ILLNESS  (Location/Symptom, Timing/Onset, Context/Setting, Quality, Duration, Modifying Factors, Severity.)      Mayo Jean is a 63 y.o. male who presents with feelings of difficulty with concentration and some confusion.  Patient states that he feels almost like he is floating or reading through space and time.  Patient states he was concerned that maybe dehydrated after he now has new ostomy colectomy and states that he has a similar symptoms that his surgeons were that he may have.  As such, patient came in for evaluation    PAST MEDICAL / SURGICAL / SOCIAL / FAMILY HISTORY      has a past medical history of Abnormal patient-activated cardiac event monitor, Chronic anticoagulation, Colonic diverticular abscess, Colostomy fistula (HCC), GERD (gastroesophageal reflux disease), H/O echocardiogram, History of cardioversion, History of pneumonia, Hx of bronchitis, Hyperlipidemia, Hypertension, Non-ischemic cardiomyopathy (HCC), MICHELLE on CPAP, Paroxysmal A-fib (HCC), Sleep apnea, Under care of team, and Under care of team.       has a past surgical history that includes CT ABSCESS DRAINAGE (10/12/2021); Cardioversion (01/2021); Sigmoid Colectomy (N/A, 11/22/2021); and laparotomy (N/A, 06/28/2022).      Social History     Socioeconomic History    Marital status: Single     Spouse name: Not on file    Number of children: Not on file    Years of education: Not on file    Highest education level: Not on file   Occupational History    Not on

## 2024-05-04 NOTE — DISCHARGE INSTRUCTIONS
Please increase the amount of fluid you are drinking to almost double your baseline given the new ostomy site and lack of colon.  This will help prevent future dehydration episodes in addition with ensure that you do not have any further kidney injury.  Please return to the emerged apartment if you have any new or worsening symptoms

## 2024-05-08 ENCOUNTER — TELEPHONE (OUTPATIENT)
Dept: CARDIOLOGY | Age: 64
End: 2024-05-08

## 2024-05-08 NOTE — TELEPHONE ENCOUNTER
Pt sitting BP is 98/70  78 and standing it drops to 78/56  78. Pt does have lightheaded/dizziness. He does have an appointment this Friday.

## 2024-05-09 ENCOUNTER — OFFICE VISIT (OUTPATIENT)
Dept: CARDIOLOGY | Age: 64
End: 2024-05-09
Payer: MEDICARE

## 2024-05-09 ENCOUNTER — APPOINTMENT (OUTPATIENT)
Dept: CT IMAGING | Age: 64
End: 2024-05-09
Payer: MEDICARE

## 2024-05-09 ENCOUNTER — HOSPITAL ENCOUNTER (EMERGENCY)
Age: 64
Discharge: HOME | End: 2024-05-09
Attending: STUDENT IN AN ORGANIZED HEALTH CARE EDUCATION/TRAINING PROGRAM
Payer: MEDICARE

## 2024-05-09 ENCOUNTER — APPOINTMENT (OUTPATIENT)
Dept: GENERAL RADIOLOGY | Age: 64
End: 2024-05-09
Payer: MEDICARE

## 2024-05-09 VITALS
OXYGEN SATURATION: 98 % | SYSTOLIC BLOOD PRESSURE: 101 MMHG | HEART RATE: 92 BPM | WEIGHT: 192.8 LBS | DIASTOLIC BLOOD PRESSURE: 63 MMHG | HEIGHT: 71 IN | RESPIRATION RATE: 20 BRPM | BODY MASS INDEX: 26.99 KG/M2

## 2024-05-09 VITALS
OXYGEN SATURATION: 100 % | DIASTOLIC BLOOD PRESSURE: 62 MMHG | SYSTOLIC BLOOD PRESSURE: 91 MMHG | RESPIRATION RATE: 18 BRPM | HEART RATE: 80 BPM

## 2024-05-09 DIAGNOSIS — I48.0 PAF (PAROXYSMAL ATRIAL FIBRILLATION) (HCC): Primary | ICD-10-CM

## 2024-05-09 DIAGNOSIS — R42 DIZZINESS: ICD-10-CM

## 2024-05-09 DIAGNOSIS — I95.1 ORTHOSTATIC HYPOTENSION: Primary | ICD-10-CM

## 2024-05-09 DIAGNOSIS — I42.8 NICM (NONISCHEMIC CARDIOMYOPATHY) (HCC): ICD-10-CM

## 2024-05-09 DIAGNOSIS — Z98.890 S/P ABLATION OF ATRIAL FIBRILLATION: ICD-10-CM

## 2024-05-09 DIAGNOSIS — Z86.79 S/P ABLATION OF ATRIAL FIBRILLATION: ICD-10-CM

## 2024-05-09 DIAGNOSIS — Z79.01 CHRONIC ANTICOAGULATION: ICD-10-CM

## 2024-05-09 DIAGNOSIS — E86.0 DEHYDRATION: ICD-10-CM

## 2024-05-09 LAB
ALBUMIN SERPL-MCNC: 4.3 G/DL (ref 3.5–5.2)
ALBUMIN/GLOB SERPL: 1 {RATIO} (ref 1–2.5)
ALP SERPL-CCNC: 155 U/L (ref 40–129)
ALT SERPL-CCNC: 16 U/L (ref 5–41)
ANION GAP SERPL CALCULATED.3IONS-SCNC: 18 MMOL/L (ref 9–17)
AST SERPL-CCNC: 18 U/L
BASOPHILS # BLD: 0.1 K/UL (ref 0–0.2)
BASOPHILS NFR BLD: 1 % (ref 0–2)
BILIRUB DIRECT SERPL-MCNC: <0.1 MG/DL
BILIRUB INDIRECT SERPL-MCNC: ABNORMAL MG/DL (ref 0–1)
BILIRUB SERPL-MCNC: 0.4 MG/DL (ref 0.3–1.2)
BUN SERPL-MCNC: 37 MG/DL (ref 8–23)
BUN/CREAT SERPL: 19 (ref 9–20)
CALCIUM SERPL-MCNC: 10.8 MG/DL (ref 8.6–10.4)
CHLORIDE SERPL-SCNC: 93 MMOL/L (ref 98–107)
CO2 SERPL-SCNC: 26 MMOL/L (ref 20–31)
CREAT SERPL-MCNC: 1.9 MG/DL (ref 0.7–1.2)
EOSINOPHIL # BLD: 0.12 K/UL (ref 0–0.44)
EOSINOPHILS RELATIVE PERCENT: 1 % (ref 1–4)
ERYTHROCYTE [DISTWIDTH] IN BLOOD BY AUTOMATED COUNT: 13.1 % (ref 11.8–14.4)
GFR, ESTIMATED: 39 ML/MIN/1.73M2
GLUCOSE SERPL-MCNC: 145 MG/DL (ref 70–99)
HCT VFR BLD AUTO: 40.4 % (ref 40.7–50.3)
HGB BLD-MCNC: 12.8 G/DL (ref 13–17)
IMM GRANULOCYTES # BLD AUTO: 0.06 K/UL (ref 0–0.3)
IMM GRANULOCYTES NFR BLD: 1 %
LIPASE SERPL-CCNC: 60 U/L (ref 13–60)
LYMPHOCYTES NFR BLD: 2.21 K/UL (ref 1.1–3.7)
LYMPHOCYTES RELATIVE PERCENT: 22 % (ref 24–43)
MCH RBC QN AUTO: 29.1 PG (ref 25.2–33.5)
MCHC RBC AUTO-ENTMCNC: 31.7 G/DL (ref 28.4–34.8)
MCV RBC AUTO: 91.8 FL (ref 82.6–102.9)
MONOCYTES NFR BLD: 0.94 K/UL (ref 0.1–1.2)
MONOCYTES NFR BLD: 9 % (ref 3–12)
NEUTROPHILS NFR BLD: 66 % (ref 36–65)
NEUTS SEG NFR BLD: 6.81 K/UL (ref 1.5–8.1)
NRBC BLD-RTO: 0 PER 100 WBC
PLATELET # BLD AUTO: 815 K/UL (ref 138–453)
PMV BLD AUTO: 8.8 FL (ref 8.1–13.5)
POTASSIUM SERPL-SCNC: 4.1 MMOL/L (ref 3.7–5.3)
PROT SERPL-MCNC: 8.6 G/DL (ref 6.4–8.3)
RBC # BLD AUTO: 4.4 M/UL (ref 4.21–5.77)
SODIUM SERPL-SCNC: 137 MMOL/L (ref 135–144)
TROPONIN I SERPL HS-MCNC: 14 NG/L (ref 0–22)
WBC OTHER # BLD: 10.2 K/UL (ref 3.5–11.3)

## 2024-05-09 PROCEDURE — 2580000003 HC RX 258: Performed by: STUDENT IN AN ORGANIZED HEALTH CARE EDUCATION/TRAINING PROGRAM

## 2024-05-09 PROCEDURE — 80048 BASIC METABOLIC PNL TOTAL CA: CPT

## 2024-05-09 PROCEDURE — 99285 EMERGENCY DEPT VISIT HI MDM: CPT

## 2024-05-09 PROCEDURE — 85025 COMPLETE CBC W/AUTO DIFF WBC: CPT

## 2024-05-09 PROCEDURE — 96374 THER/PROPH/DIAG INJ IV PUSH: CPT

## 2024-05-09 PROCEDURE — 71260 CT THORAX DX C+: CPT

## 2024-05-09 PROCEDURE — 2580000003 HC RX 258: Performed by: EMERGENCY MEDICINE

## 2024-05-09 PROCEDURE — 6360000004 HC RX CONTRAST MEDICATION: Performed by: STUDENT IN AN ORGANIZED HEALTH CARE EDUCATION/TRAINING PROGRAM

## 2024-05-09 PROCEDURE — 84484 ASSAY OF TROPONIN QUANT: CPT

## 2024-05-09 PROCEDURE — 6360000002 HC RX W HCPCS: Performed by: STUDENT IN AN ORGANIZED HEALTH CARE EDUCATION/TRAINING PROGRAM

## 2024-05-09 PROCEDURE — 3078F DIAST BP <80 MM HG: CPT | Performed by: INTERNAL MEDICINE

## 2024-05-09 PROCEDURE — 99214 OFFICE O/P EST MOD 30 MIN: CPT | Performed by: INTERNAL MEDICINE

## 2024-05-09 PROCEDURE — 1123F ACP DISCUSS/DSCN MKR DOCD: CPT | Performed by: INTERNAL MEDICINE

## 2024-05-09 PROCEDURE — 93005 ELECTROCARDIOGRAM TRACING: CPT | Performed by: STUDENT IN AN ORGANIZED HEALTH CARE EDUCATION/TRAINING PROGRAM

## 2024-05-09 PROCEDURE — G8427 DOCREV CUR MEDS BY ELIG CLIN: HCPCS | Performed by: INTERNAL MEDICINE

## 2024-05-09 PROCEDURE — 74177 CT ABD & PELVIS W/CONTRAST: CPT

## 2024-05-09 PROCEDURE — 96361 HYDRATE IV INFUSION ADD-ON: CPT

## 2024-05-09 PROCEDURE — 1036F TOBACCO NON-USER: CPT | Performed by: INTERNAL MEDICINE

## 2024-05-09 PROCEDURE — 71045 X-RAY EXAM CHEST 1 VIEW: CPT

## 2024-05-09 PROCEDURE — 83690 ASSAY OF LIPASE: CPT

## 2024-05-09 PROCEDURE — 3074F SYST BP LT 130 MM HG: CPT | Performed by: INTERNAL MEDICINE

## 2024-05-09 PROCEDURE — G8417 CALC BMI ABV UP PARAM F/U: HCPCS | Performed by: INTERNAL MEDICINE

## 2024-05-09 PROCEDURE — 80076 HEPATIC FUNCTION PANEL: CPT

## 2024-05-09 PROCEDURE — 3017F COLORECTAL CA SCREEN DOC REV: CPT | Performed by: INTERNAL MEDICINE

## 2024-05-09 RX ORDER — 0.9 % SODIUM CHLORIDE 0.9 %
1000 INTRAVENOUS SOLUTION INTRAVENOUS ONCE
Status: COMPLETED | OUTPATIENT
Start: 2024-05-09 | End: 2024-05-09

## 2024-05-09 RX ORDER — MIDODRINE HYDROCHLORIDE 5 MG/1
5 TABLET ORAL 2 TIMES DAILY
Qty: 90 TABLET | Refills: 3 | Status: SHIPPED | OUTPATIENT
Start: 2024-05-09

## 2024-05-09 RX ORDER — ONDANSETRON 2 MG/ML
4 INJECTION INTRAMUSCULAR; INTRAVENOUS ONCE
Status: COMPLETED | OUTPATIENT
Start: 2024-05-09 | End: 2024-05-09

## 2024-05-09 RX ADMIN — SODIUM CHLORIDE 1000 ML: 9 INJECTION, SOLUTION INTRAVENOUS at 18:40

## 2024-05-09 RX ADMIN — ONDANSETRON 4 MG: 2 INJECTION INTRAMUSCULAR; INTRAVENOUS at 18:40

## 2024-05-09 RX ADMIN — SODIUM CHLORIDE 1000 ML: 9 INJECTION, SOLUTION INTRAVENOUS at 20:36

## 2024-05-09 RX ADMIN — SODIUM CHLORIDE 1000 ML: 9 INJECTION, SOLUTION INTRAVENOUS at 21:40

## 2024-05-09 RX ADMIN — IOPAMIDOL 75 ML: 755 INJECTION, SOLUTION INTRAVENOUS at 18:54

## 2024-05-09 ASSESSMENT — ENCOUNTER SYMPTOMS
SHORTNESS OF BREATH: 0
NAUSEA: 0
CONSTIPATION: 0
SORE THROAT: 0
DIARRHEA: 0
ABDOMINAL PAIN: 0
RHINORRHEA: 0
VOMITING: 0
BACK PAIN: 0

## 2024-05-09 ASSESSMENT — PAIN - FUNCTIONAL ASSESSMENT: PAIN_FUNCTIONAL_ASSESSMENT: NONE - DENIES PAIN

## 2024-05-09 NOTE — PROGRESS NOTES
months as well as chest x-rays, pulmonary function tests, and eye exams at least yearly.  [id number] Score for Atrial Fibrillation Stroke Risk   Risk   Factors  Component Value   C  Yes 1   H HTN Yes 1   A2 Age >= 75 No,  (63 y.o.) 0   D DM No 0   S2 Prior Stroke/TIA Yes 2   V Vascular Disease No 0   A Age 65-74 Yes,  (63 y.o.) 1   Sc Sex male 0    [id number]  Score  2   Score last updated 6/25/21 9:33 AM EDT  Click here for a link to the UpToDate guideline \"Atrial Fibrillation: Anticoagulation therapy to prevent embolization  Disclaimer: Risk Score calculation is dependent on accuracy of patient problem list and past encounter diagnosis.  CHADS2-VASc score: 2/9 (2.2% stroke risk)   Anticoagulation: Continue Riveroxaban (Xarelto): 20 mg once daily with largest meal (typically dinner). Because of his atrial fibrillation, I also would also recommend that he continue with anticoagulation to decrease his risk of stoke but also reminded him to watch for signs of blood in his stool or black tarry stools and stop the medication immediately if this develops as this could be life threatening.     Nonischemic Cardiomyopathy/ Hypertensive Heart Disease Without Heart Failure: Echo done on 8/15/2022 showed an EF of 65%.   Currently euvolemic.  Discontinue low-dose Toprol-XL.  Start midodrine as above due to orthostatic hypotension and dehydration.  Counseled them extensively regarding adequate fluid hydration.  Repeat blood work after 1 week.      Hyperlipidemia: Mixed, LDL done on 1/24/2023 was 140 mg/dL   Cholesterol Reduction Therapy: Continue Atorvastatin (Lipitor) 20 mg daily.    Will repeat lipid panel on follow-up.  Patient does not have any history of coronary artery disease, stroke or diabetes.  His heart cath back in 2019 was normal.      Finally, I recommended that he continue his current medications and follow up with you as previously scheduled. I discussed patient's symptoms and treatment plan with Dr Swanson, he

## 2024-05-10 NOTE — ED NOTES
Attempted orthostatic vitals. Pt became hypotensive upon stnading. Dr. Montanez notified, second liter of fluids hung. This RN also assisted pt in changing his colostomy bag.

## 2024-05-10 NOTE — ED NOTES
This RN assisted to stand pt. Blood pressure remains hypotensive upon standing. Dr. Montanez notified 3rd liter of fluid started on pt. Pt states he does not feel the need to urinate yet. Bladder scan shows <20ml.

## 2024-05-10 NOTE — DISCHARGE INSTRUCTIONS
Repeat kidney function test in the next 4 to 5 days.  Make sure to increase your water intake.  Please call your surgeon's office tomorrow to ask them when the staples need to be removed and also regarding the CT scan findings.

## 2024-05-10 NOTE — ED PROVIDER NOTES
[facility] ED  Emergency Department Encounter  Emergency Medicine Resident     Pt Name:Mayo Jean  MRN: [id number]  Birthdate 1960  Date of evaluation: 5/9/24  PCP:  Rema Dorado APRN - NP  Note Started: 6:34 PM EDT      CHIEF COMPLAINT       Chief Complaint   Patient presents with    Dizziness     Ongoing, syncopal episode in [facility]. Post abdominal surgery (2 weeks ago), colostomy placed.        HISTORY OF PRESENT ILLNESS  (Location/Symptom, Timing/Onset, Context/Setting, Quality, Duration, Modifying Factors, Severity.)      Mayo Jean is a 63 y.o. male who presents with dizziness and lightheadedness.  Patient states that over the past 2 weeks he has had several episodes of dizziness and lightheadedness that feels like he is going to pass out.  States that he has thought many times of coming to the emergency department but thought that he will be able to \"push through it\".  Patient states that today he had several episodes of lightheadedness and then he had 1 episode where he attempted to stand up out of his recliner and had a syncopal event.  Patient endorses LOC but denies hitting his head.  States that he fell backwards into his recliner.  During these lightheaded episodes patient denies any chest pain, nausea/vomiting, diaphoresis, shortness of breath, back pain.  States that he has experienced this before in the past and was found to be dehydrated.  Patient has an extensive abdominal surgery history and has been following up at [facility].  States that he had bowel resection after a colovesicular fistula was formed due to diverticulitis.  States that he was recently seen at [facility] and had a reanastomosis of his large bowel to his rectum.  States that an ileostomy was formed at that time.  States that he does have some stool come out of his anus but mostly comes out of the ileostomy.  Patient denies any increased output out of his ileostomy.  Denies any 
  AST 18   Total Bilirubin 0.4   Bilirubin, Direct <0.1   Bilirubin, Indirect Can not be calculated   Total Protein 8.6(!)   Albumin/Globulin Ratio 1.0 [AR]   1915 XR CHEST PORTABLE [AR]   2018 Discussed results with the patient.  CT chest PE study is negative for any acute findings.  Chest x-ray is negative as well other than mild bronchiectasis.  On the CT abdomen pelvis patient does have different pockets of fluid but it is unlikely that they are abscesses as the patient does not have any pain or fever or elevated white count.  Those are likely postoperative seromas.  Patient had his surgery less than a month ago on April 19th.  He is supposed to follow-up with his surgeon on May 22nd.  [JI]   2213 Patient's orthostatics continue to be positive after 2 L of fluids so we will proceed with another liter of IV fluids and reevaluate. [JI]   2338 After 3 L of fluids patient's blood pressures have improved.  He was able to ambulate in the hallway without any difficulty.  He also drank fluids for us over here.  He feels better and would like to be discharged home.  Strongly encouraged him to increase his water intake to at least 60 to 80 ounces every day especially with the ileostomy bag in place. [JI]      ED Course User Index  [AR] Mario Lawson DO  [JI] Bernie Montanez DO         FINAL IMPRESSION      1. Orthostatic hypotension    2. Dizziness    3. Dehydration          DISPOSITION/PLAN   DISPOSITION Decision To Discharge 05/09/2024 11:40:29 PM      PATIENT REFERRED TO:  Rema Dorado, APRN - NP  [address]  [facility] [id number] [phone]    In 3 days        DISCHARGE MEDICATIONS:  New Prescriptions    No medications on file     Controlled Substances Monitoring:          No data to display                (Please note that portions of this note were completed with a voice recognition program.  Efforts were made to edit the dictations but occasionally words are mis-transcribed.)    Bernie Montanez DO

## 2024-05-11 LAB
EKG ATRIAL RATE: 83 BPM
EKG P AXIS: 35 DEGREES
EKG P-R INTERVAL: 156 MS
EKG Q-T INTERVAL: 406 MS
EKG QRS DURATION: 94 MS
EKG QTC CALCULATION (BAZETT): 477 MS
EKG R AXIS: 39 DEGREES
EKG T AXIS: 38 DEGREES
EKG VENTRICULAR RATE: 83 BPM

## 2024-05-11 PROCEDURE — 93010 ELECTROCARDIOGRAM REPORT: CPT | Performed by: INTERNAL MEDICINE

## 2024-05-16 ENCOUNTER — HOSPITAL ENCOUNTER (INPATIENT)
Age: 64
LOS: 3 days | Discharge: HOME | DRG: 683 | End: 2024-05-20
Attending: STUDENT IN AN ORGANIZED HEALTH CARE EDUCATION/TRAINING PROGRAM | Admitting: STUDENT IN AN ORGANIZED HEALTH CARE EDUCATION/TRAINING PROGRAM
Payer: MEDICARE

## 2024-05-16 ENCOUNTER — APPOINTMENT (OUTPATIENT)
Dept: CT IMAGING | Age: 64
DRG: 683 | End: 2024-05-16
Payer: MEDICARE

## 2024-05-16 DIAGNOSIS — N17.9 AKI (ACUTE KIDNEY INJURY) (HCC): Primary | ICD-10-CM

## 2024-05-16 DIAGNOSIS — R42 LIGHTHEADED: ICD-10-CM

## 2024-05-16 DIAGNOSIS — R55 SYNCOPE AND COLLAPSE: ICD-10-CM

## 2024-05-16 DIAGNOSIS — E86.0 DEHYDRATION: ICD-10-CM

## 2024-05-16 LAB
ALBUMIN SERPL-MCNC: 3.9 G/DL (ref 3.5–5.2)
ALBUMIN/GLOB SERPL: 1 {RATIO} (ref 1–2.5)
ALP SERPL-CCNC: 131 U/L (ref 40–129)
ALT SERPL-CCNC: 20 U/L (ref 5–41)
ANION GAP SERPL CALCULATED.3IONS-SCNC: 18 MMOL/L (ref 9–17)
AST SERPL-CCNC: 23 U/L
BASOPHILS # BLD: 0.08 K/UL (ref 0–0.2)
BASOPHILS NFR BLD: 1 % (ref 0–2)
BILIRUB SERPL-MCNC: 0.4 MG/DL (ref 0.3–1.2)
BUN SERPL-MCNC: 28 MG/DL (ref 8–23)
BUN/CREAT SERPL: 16 (ref 9–20)
CALCIUM SERPL-MCNC: 10 MG/DL (ref 8.6–10.4)
CHLORIDE SERPL-SCNC: 99 MMOL/L (ref 98–107)
CO2 SERPL-SCNC: 20 MMOL/L (ref 20–31)
CREAT SERPL-MCNC: 1.7 MG/DL (ref 0.7–1.2)
EOSINOPHIL # BLD: 0.17 K/UL (ref 0–0.44)
EOSINOPHILS RELATIVE PERCENT: 2 % (ref 1–4)
ERYTHROCYTE [DISTWIDTH] IN BLOOD BY AUTOMATED COUNT: 13.6 % (ref 11.8–14.4)
GFR, ESTIMATED: 44 ML/MIN/1.73M2
GLUCOSE SERPL-MCNC: 145 MG/DL (ref 70–99)
HCT VFR BLD AUTO: 38.8 % (ref 40.7–50.3)
HGB BLD-MCNC: 12.3 G/DL (ref 13–17)
IMM GRANULOCYTES # BLD AUTO: 0.05 K/UL (ref 0–0.3)
IMM GRANULOCYTES NFR BLD: 1 %
LYMPHOCYTES NFR BLD: 1.74 K/UL (ref 1.1–3.7)
LYMPHOCYTES RELATIVE PERCENT: 16 % (ref 24–43)
MCH RBC QN AUTO: 29.1 PG (ref 25.2–33.5)
MCHC RBC AUTO-ENTMCNC: 31.7 G/DL (ref 28.4–34.8)
MCV RBC AUTO: 91.7 FL (ref 82.6–102.9)
MONOCYTES NFR BLD: 0.74 K/UL (ref 0.1–1.2)
MONOCYTES NFR BLD: 7 % (ref 3–12)
NEUTROPHILS NFR BLD: 73 % (ref 36–65)
NEUTS SEG NFR BLD: 8.23 K/UL (ref 1.5–8.1)
NRBC BLD-RTO: 0 PER 100 WBC
PLATELET # BLD AUTO: 406 K/UL (ref 138–453)
PMV BLD AUTO: 9.2 FL (ref 8.1–13.5)
POTASSIUM SERPL-SCNC: 4 MMOL/L (ref 3.7–5.3)
PROT SERPL-MCNC: 7.9 G/DL (ref 6.4–8.3)
RBC # BLD AUTO: 4.23 M/UL (ref 4.21–5.77)
SODIUM SERPL-SCNC: 137 MMOL/L (ref 135–144)
WBC OTHER # BLD: 11 K/UL (ref 3.5–11.3)

## 2024-05-16 PROCEDURE — 96361 HYDRATE IV INFUSION ADD-ON: CPT

## 2024-05-16 PROCEDURE — 80053 COMPREHEN METABOLIC PANEL: CPT

## 2024-05-16 PROCEDURE — 99285 EMERGENCY DEPT VISIT HI MDM: CPT

## 2024-05-16 PROCEDURE — 85025 COMPLETE CBC W/AUTO DIFF WBC: CPT

## 2024-05-16 PROCEDURE — 84484 ASSAY OF TROPONIN QUANT: CPT

## 2024-05-16 PROCEDURE — 2580000003 HC RX 258: Performed by: STUDENT IN AN ORGANIZED HEALTH CARE EDUCATION/TRAINING PROGRAM

## 2024-05-16 PROCEDURE — 70450 CT HEAD/BRAIN W/O DYE: CPT

## 2024-05-16 RX ORDER — 0.9 % SODIUM CHLORIDE 0.9 %
1000 INTRAVENOUS SOLUTION INTRAVENOUS ONCE
Status: COMPLETED | OUTPATIENT
Start: 2024-05-16 | End: 2024-05-17

## 2024-05-16 RX ADMIN — SODIUM CHLORIDE 1000 ML: 9 INJECTION, SOLUTION INTRAVENOUS at 22:02

## 2024-05-16 ASSESSMENT — PAIN - FUNCTIONAL ASSESSMENT: PAIN_FUNCTIONAL_ASSESSMENT: NONE - DENIES PAIN

## 2024-05-16 ASSESSMENT — LIFESTYLE VARIABLES
HOW OFTEN DO YOU HAVE A DRINK CONTAINING ALCOHOL: NEVER
HOW MANY STANDARD DRINKS CONTAINING ALCOHOL DO YOU HAVE ON A TYPICAL DAY: PATIENT DOES NOT DRINK

## 2024-05-17 ENCOUNTER — APPOINTMENT (OUTPATIENT)
Dept: GENERAL RADIOLOGY | Age: 64
DRG: 683 | End: 2024-05-17
Payer: MEDICARE

## 2024-05-17 PROBLEM — D68.69 SECONDARY HYPERCOAGULABLE STATE (HCC): Status: ACTIVE | Noted: 2024-05-17

## 2024-05-17 PROBLEM — R55 SYNCOPE AND COLLAPSE: Status: ACTIVE | Noted: 2024-05-17

## 2024-05-17 PROBLEM — E44.0 MODERATE MALNUTRITION (HCC): Status: ACTIVE | Noted: 2024-05-17

## 2024-05-17 PROBLEM — Z79.899 ON AMIODARONE THERAPY: Status: ACTIVE | Noted: 2024-05-17

## 2024-05-17 PROBLEM — N17.9 AKI (ACUTE KIDNEY INJURY) (HCC): Status: ACTIVE | Noted: 2024-05-17

## 2024-05-17 PROBLEM — E86.0 DEHYDRATION: Status: ACTIVE | Noted: 2024-05-17

## 2024-05-17 PROBLEM — R42 LIGHTHEADED: Status: ACTIVE | Noted: 2024-05-17

## 2024-05-17 PROBLEM — I48.0 PAF (PAROXYSMAL ATRIAL FIBRILLATION) (HCC): Status: ACTIVE | Noted: 2020-12-04

## 2024-05-17 LAB
ANION GAP SERPL CALCULATED.3IONS-SCNC: 12 MMOL/L (ref 9–17)
BASOPHILS # BLD: 0.09 K/UL (ref 0–0.2)
BASOPHILS NFR BLD: 1 % (ref 0–2)
BUN SERPL-MCNC: 27 MG/DL (ref 8–23)
BUN/CREAT SERPL: 17 (ref 9–20)
CALCIUM SERPL-MCNC: 9.2 MG/DL (ref 8.6–10.4)
CHLORIDE SERPL-SCNC: 103 MMOL/L (ref 98–107)
CO2 SERPL-SCNC: 22 MMOL/L (ref 20–31)
CREAT SERPL-MCNC: 1.6 MG/DL (ref 0.7–1.2)
EKG ATRIAL RATE: 61 BPM
EKG ATRIAL RATE: 66 BPM
EKG P AXIS: 63 DEGREES
EKG P-R INTERVAL: 174 MS
EKG P-R INTERVAL: 190 MS
EKG Q-T INTERVAL: 462 MS
EKG Q-T INTERVAL: 480 MS
EKG QRS DURATION: 88 MS
EKG QRS DURATION: 92 MS
EKG QTC CALCULATION (BAZETT): 465 MS
EKG QTC CALCULATION (BAZETT): 503 MS
EKG R AXIS: -165 DEGREES
EKG R AXIS: 11 DEGREES
EKG T AXIS: 164 DEGREES
EKG T AXIS: 51 DEGREES
EKG VENTRICULAR RATE: 61 BPM
EKG VENTRICULAR RATE: 66 BPM
EOSINOPHIL # BLD: 0.45 K/UL (ref 0–0.44)
EOSINOPHILS RELATIVE PERCENT: 5 % (ref 1–4)
ERYTHROCYTE [DISTWIDTH] IN BLOOD BY AUTOMATED COUNT: 13.7 % (ref 11.8–14.4)
GFR, ESTIMATED: 48 ML/MIN/1.73M2
GLUCOSE SERPL-MCNC: 105 MG/DL (ref 70–99)
HCT VFR BLD AUTO: 35.1 % (ref 40.7–50.3)
HGB BLD-MCNC: 11.1 G/DL (ref 13–17)
IMM GRANULOCYTES # BLD AUTO: 0.06 K/UL (ref 0–0.3)
IMM GRANULOCYTES NFR BLD: 1 %
LYMPHOCYTES NFR BLD: 2.86 K/UL (ref 1.1–3.7)
LYMPHOCYTES RELATIVE PERCENT: 31 % (ref 24–43)
MCH RBC QN AUTO: 29.1 PG (ref 25.2–33.5)
MCHC RBC AUTO-ENTMCNC: 31.6 G/DL (ref 28.4–34.8)
MCV RBC AUTO: 91.9 FL (ref 82.6–102.9)
MONOCYTES NFR BLD: 0.82 K/UL (ref 0.1–1.2)
MONOCYTES NFR BLD: 9 % (ref 3–12)
NEUTROPHILS NFR BLD: 53 % (ref 36–65)
NEUTS SEG NFR BLD: 4.98 K/UL (ref 1.5–8.1)
NRBC BLD-RTO: 0 PER 100 WBC
PLATELET # BLD AUTO: 325 K/UL (ref 138–453)
PMV BLD AUTO: 9.2 FL (ref 8.1–13.5)
POTASSIUM SERPL-SCNC: 3.9 MMOL/L (ref 3.7–5.3)
RBC # BLD AUTO: 3.82 M/UL (ref 4.21–5.77)
SODIUM SERPL-SCNC: 137 MMOL/L (ref 135–144)
TROPONIN I SERPL HS-MCNC: 11 NG/L (ref 0–22)
TROPONIN I SERPL HS-MCNC: 13 NG/L (ref 0–22)
WBC OTHER # BLD: 9.3 K/UL (ref 3.5–11.3)

## 2024-05-17 PROCEDURE — 93010 ELECTROCARDIOGRAM REPORT: CPT | Performed by: INTERNAL MEDICINE

## 2024-05-17 PROCEDURE — 2500000003 HC RX 250 WO HCPCS: Performed by: SPECIALIST

## 2024-05-17 PROCEDURE — 99222 1ST HOSP IP/OBS MODERATE 55: CPT | Performed by: SPECIALIST

## 2024-05-17 PROCEDURE — 6360000002 HC RX W HCPCS: Performed by: STUDENT IN AN ORGANIZED HEALTH CARE EDUCATION/TRAINING PROGRAM

## 2024-05-17 PROCEDURE — 96365 THER/PROPH/DIAG IV INF INIT: CPT

## 2024-05-17 PROCEDURE — 99222 1ST HOSP IP/OBS MODERATE 55: CPT | Performed by: INTERNAL MEDICINE

## 2024-05-17 PROCEDURE — 94761 N-INVAS EAR/PLS OXIMETRY MLT: CPT

## 2024-05-17 PROCEDURE — 96375 TX/PRO/DX INJ NEW DRUG ADDON: CPT

## 2024-05-17 PROCEDURE — 2580000003 HC RX 258: Performed by: SPECIALIST

## 2024-05-17 PROCEDURE — 71045 X-RAY EXAM CHEST 1 VIEW: CPT

## 2024-05-17 PROCEDURE — 80048 BASIC METABOLIC PNL TOTAL CA: CPT

## 2024-05-17 PROCEDURE — 2580000003 HC RX 258

## 2024-05-17 PROCEDURE — 85025 COMPLETE CBC W/AUTO DIFF WBC: CPT

## 2024-05-17 PROCEDURE — 2580000003 HC RX 258: Performed by: STUDENT IN AN ORGANIZED HEALTH CARE EDUCATION/TRAINING PROGRAM

## 2024-05-17 PROCEDURE — 6370000000 HC RX 637 (ALT 250 FOR IP)

## 2024-05-17 PROCEDURE — 36415 COLL VENOUS BLD VENIPUNCTURE: CPT

## 2024-05-17 PROCEDURE — 1200000000 HC SEMI PRIVATE

## 2024-05-17 PROCEDURE — 93005 ELECTROCARDIOGRAM TRACING: CPT | Performed by: STUDENT IN AN ORGANIZED HEALTH CARE EDUCATION/TRAINING PROGRAM

## 2024-05-17 PROCEDURE — 93005 ELECTROCARDIOGRAM TRACING: CPT

## 2024-05-17 PROCEDURE — 6370000000 HC RX 637 (ALT 250 FOR IP): Performed by: STUDENT IN AN ORGANIZED HEALTH CARE EDUCATION/TRAINING PROGRAM

## 2024-05-17 RX ORDER — AMIODARONE HYDROCHLORIDE 200 MG/1
200 TABLET ORAL DAILY
Status: DISCONTINUED | OUTPATIENT
Start: 2024-05-17 | End: 2024-05-20 | Stop reason: HOSPADM

## 2024-05-17 RX ORDER — ACETAMINOPHEN 325 MG/1
650 TABLET ORAL EVERY 6 HOURS PRN
Status: DISCONTINUED | OUTPATIENT
Start: 2024-05-17 | End: 2024-05-20 | Stop reason: HOSPADM

## 2024-05-17 RX ORDER — POTASSIUM CHLORIDE 7.45 MG/ML
10 INJECTION INTRAVENOUS PRN
Status: DISCONTINUED | OUTPATIENT
Start: 2024-05-17 | End: 2024-05-20 | Stop reason: HOSPADM

## 2024-05-17 RX ORDER — DEXTROSE MONOHYDRATE, SODIUM CHLORIDE, AND POTASSIUM CHLORIDE 50; 1.49; 4.5 G/1000ML; G/1000ML; G/1000ML
INJECTION, SOLUTION INTRAVENOUS CONTINUOUS
Status: DISCONTINUED | OUTPATIENT
Start: 2024-05-17 | End: 2024-05-18

## 2024-05-17 RX ORDER — ATORVASTATIN CALCIUM 20 MG/1
20 TABLET, FILM COATED ORAL DAILY
Status: DISCONTINUED | OUTPATIENT
Start: 2024-05-17 | End: 2024-05-20 | Stop reason: HOSPADM

## 2024-05-17 RX ORDER — 0.9 % SODIUM CHLORIDE 0.9 %
1000 INTRAVENOUS SOLUTION INTRAVENOUS ONCE
Status: COMPLETED | OUTPATIENT
Start: 2024-05-17 | End: 2024-05-17

## 2024-05-17 RX ORDER — FLUOXETINE HYDROCHLORIDE 20 MG/1
20 CAPSULE ORAL DAILY
Status: DISCONTINUED | OUTPATIENT
Start: 2024-05-17 | End: 2024-05-20 | Stop reason: HOSPADM

## 2024-05-17 RX ORDER — DEXTROSE MONOHYDRATE, SODIUM CHLORIDE, AND POTASSIUM CHLORIDE 50; 1.49; 4.5 G/1000ML; G/1000ML; G/1000ML
INJECTION, SOLUTION INTRAVENOUS CONTINUOUS
Status: DISPENSED | OUTPATIENT
Start: 2024-05-17 | End: 2024-05-17

## 2024-05-17 RX ORDER — DEXTROSE MONOHYDRATE, SODIUM CHLORIDE, AND POTASSIUM CHLORIDE 50; 1.49; 4.5 G/1000ML; G/1000ML; G/1000ML
INJECTION, SOLUTION INTRAVENOUS CONTINUOUS
Status: ACTIVE | OUTPATIENT
Start: 2024-05-17 | End: 2024-05-17

## 2024-05-17 RX ORDER — POTASSIUM CHLORIDE 20 MEQ/1
40 TABLET, EXTENDED RELEASE ORAL PRN
Status: DISCONTINUED | OUTPATIENT
Start: 2024-05-17 | End: 2024-05-20 | Stop reason: HOSPADM

## 2024-05-17 RX ORDER — POLYETHYLENE GLYCOL 3350 17 G/17G
17 POWDER, FOR SOLUTION ORAL DAILY PRN
Status: DISCONTINUED | OUTPATIENT
Start: 2024-05-17 | End: 2024-05-20 | Stop reason: HOSPADM

## 2024-05-17 RX ORDER — ONDANSETRON 4 MG/1
4 TABLET, ORALLY DISINTEGRATING ORAL EVERY 8 HOURS PRN
Status: DISCONTINUED | OUTPATIENT
Start: 2024-05-17 | End: 2024-05-20 | Stop reason: HOSPADM

## 2024-05-17 RX ORDER — ONDANSETRON 2 MG/ML
4 INJECTION INTRAMUSCULAR; INTRAVENOUS ONCE
Status: COMPLETED | OUTPATIENT
Start: 2024-05-17 | End: 2024-05-17

## 2024-05-17 RX ORDER — DEXTROSE MONOHYDRATE, SODIUM CHLORIDE, AND POTASSIUM CHLORIDE 50; 1.49; 4.5 G/1000ML; G/1000ML; G/1000ML
INJECTION, SOLUTION INTRAVENOUS CONTINUOUS
Status: DISCONTINUED | OUTPATIENT
Start: 2024-05-17 | End: 2024-05-17

## 2024-05-17 RX ORDER — SODIUM CHLORIDE 0.9 % (FLUSH) 0.9 %
10 SYRINGE (ML) INJECTION EVERY 12 HOURS SCHEDULED
Status: DISCONTINUED | OUTPATIENT
Start: 2024-05-17 | End: 2024-05-20 | Stop reason: HOSPADM

## 2024-05-17 RX ORDER — ONDANSETRON 2 MG/ML
4 INJECTION INTRAMUSCULAR; INTRAVENOUS EVERY 6 HOURS PRN
Status: DISCONTINUED | OUTPATIENT
Start: 2024-05-17 | End: 2024-05-20 | Stop reason: HOSPADM

## 2024-05-17 RX ORDER — MIDODRINE HYDROCHLORIDE 5 MG/1
5 TABLET ORAL 2 TIMES DAILY WITH MEALS
Status: DISCONTINUED | OUTPATIENT
Start: 2024-05-17 | End: 2024-05-20 | Stop reason: HOSPADM

## 2024-05-17 RX ORDER — MULTIVITAMIN WITH IRON
1 TABLET ORAL DAILY
Status: DISCONTINUED | OUTPATIENT
Start: 2024-05-17 | End: 2024-05-20 | Stop reason: HOSPADM

## 2024-05-17 RX ORDER — ACETAMINOPHEN 650 MG/1
650 SUPPOSITORY RECTAL EVERY 6 HOURS PRN
Status: DISCONTINUED | OUTPATIENT
Start: 2024-05-17 | End: 2024-05-20 | Stop reason: HOSPADM

## 2024-05-17 RX ORDER — SODIUM CHLORIDE 9 MG/ML
INJECTION, SOLUTION INTRAVENOUS PRN
Status: DISCONTINUED | OUTPATIENT
Start: 2024-05-17 | End: 2024-05-20 | Stop reason: HOSPADM

## 2024-05-17 RX ORDER — MAGNESIUM SULFATE IN WATER 40 MG/ML
2000 INJECTION, SOLUTION INTRAVENOUS ONCE
Status: COMPLETED | OUTPATIENT
Start: 2024-05-17 | End: 2024-05-17

## 2024-05-17 RX ORDER — FLUOXETINE HYDROCHLORIDE 40 MG/1
40 CAPSULE ORAL DAILY
COMMUNITY
Start: 2024-05-14

## 2024-05-17 RX ORDER — SODIUM CHLORIDE 0.9 % (FLUSH) 0.9 %
10 SYRINGE (ML) INJECTION PRN
Status: DISCONTINUED | OUTPATIENT
Start: 2024-05-17 | End: 2024-05-20 | Stop reason: HOSPADM

## 2024-05-17 RX ORDER — SODIUM CHLORIDE 9 MG/ML
INJECTION, SOLUTION INTRAVENOUS CONTINUOUS
Status: DISCONTINUED | OUTPATIENT
Start: 2024-05-17 | End: 2024-05-17

## 2024-05-17 RX ADMIN — AMIODARONE HYDROCHLORIDE 200 MG: 200 TABLET ORAL at 08:15

## 2024-05-17 RX ADMIN — POTASSIUM CHLORIDE, DEXTROSE MONOHYDRATE AND SODIUM CHLORIDE: 150; 5; 450 INJECTION, SOLUTION INTRAVENOUS at 13:55

## 2024-05-17 RX ADMIN — POTASSIUM CHLORIDE, DEXTROSE MONOHYDRATE AND SODIUM CHLORIDE: 150; 5; 450 INJECTION, SOLUTION INTRAVENOUS at 18:40

## 2024-05-17 RX ADMIN — ONDANSETRON 4 MG: 2 INJECTION INTRAMUSCULAR; INTRAVENOUS at 00:45

## 2024-05-17 RX ADMIN — MULTIVITAMIN TABLET 1 TABLET: TABLET at 08:15

## 2024-05-17 RX ADMIN — SODIUM CHLORIDE: 9 INJECTION, SOLUTION INTRAVENOUS at 03:00

## 2024-05-17 RX ADMIN — SODIUM CHLORIDE: 9 INJECTION, SOLUTION INTRAVENOUS at 01:43

## 2024-05-17 RX ADMIN — FLUOXETINE HYDROCHLORIDE 20 MG: 20 CAPSULE ORAL at 08:15

## 2024-05-17 RX ADMIN — MIDODRINE HYDROCHLORIDE 5 MG: 5 TABLET ORAL at 17:35

## 2024-05-17 RX ADMIN — MIDODRINE HYDROCHLORIDE 5 MG: 5 TABLET ORAL at 08:15

## 2024-05-17 RX ADMIN — SODIUM CHLORIDE 1000 ML: 9 INJECTION, SOLUTION INTRAVENOUS at 10:48

## 2024-05-17 RX ADMIN — RIVAROXABAN 15 MG: 15 TABLET, FILM COATED ORAL at 08:16

## 2024-05-17 RX ADMIN — MAGNESIUM SULFATE HEPTAHYDRATE 2000 MG: 40 INJECTION, SOLUTION INTRAVENOUS at 00:39

## 2024-05-17 RX ADMIN — POTASSIUM CHLORIDE, DEXTROSE MONOHYDRATE AND SODIUM CHLORIDE: 150; 5; 450 INJECTION, SOLUTION INTRAVENOUS at 22:40

## 2024-05-17 NOTE — PROGRESS NOTES
Comprehensive Nutrition Assessment    Type and Reason for Visit:  Initial, Positive Nutrition Screen    Nutrition Recommendations/Plan:   Encourage drier meals and fluids between  Avoid high sugar fluids  Ileostomy education     Malnutrition Assessment:  Malnutrition Status:  At risk for malnutrition (Comment) (05/17/24 0830)    Context:  Acute Illness     Findings of the 6 clinical characteristics of malnutrition:  Energy Intake:  Mild decrease in energy intake (Comment)  Weight Loss:  No significant weight loss     Body Fat Loss:  No significant body fat loss     Muscle Mass Loss:  No significant muscle mass loss    Fluid Accumulation:  No significant fluid accumulation     Strength:  Not Performed    Nutrition Assessment:    Increased nutrient needs r/t acute injury or trauma, AEB residual healing needs from abdominal surgery and ileostomy formation.  Reports some increases in outputs pta. States he was told to avoid sugary beverages but did not confirm or deny that he has been using any pta (used to drink Dr. Pepper).  Discussed and provided with literature on ileostomy nutrition including drier meals, lesser sugars, fluid throughout day etc.  Is ordered Ensure, however if increased ileostomy outputs would recommend switching to glucerna or boost glucose control.    Nutrition Related Findings:    no edema, active b/s. ileostomy. Wound Type: Surgical Incision       Current Nutrition Intake & Therapies:    Average Meal Intake: Unable to assess (no PO records)  Average Supplements Intake: Unable to assess (no data)  ADULT DIET; Regular  ADULT ORAL NUTRITION SUPPLEMENT; Breakfast, Dinner; Standard High Calorie/High Protein Oral Supplement    Anthropometric Measures:  Height: 180.3 cm (5' 11\")  Ideal Body Weight (IBW): 172 lbs (78 kg)    Admission Body Weight: 86.2 kg (190 lb)  Current Body Weight: 86.2 kg (190 lb), 110.5 % IBW. Weight Source: Stated  Current BMI (kg/m2): 26.5  Usual Body Weight: 89.4 kg (197 lb)

## 2024-05-17 NOTE — H&P
History and Physical    Patient:  Mayo Jean  MRN: [id number]    Chief Complaint: Dehydration    History Obtained From:  patient, electronic medical record    PCP: Rema Dorado APRN - NP    History of Present Illness:   The patient is a 63 y.o. male who presents with lightheadedness and concerns for dehydration.  He states that his blood pressure has been running in the 80s and 90s at home.  Patient states that he has been putting out more in his ileostomy in the last 2 days.  He attempted to get up and going to the bathroom to drain it and fell.  He does not think he lost consciousness however he lives alone.  He is on Xarelto for A-fib.  Patient has had 3 ER visits since his ileostomy was placed on 4/19/2024 during an exploratory lap with takedown of his descending colostomy related to falls and dehydration.  He denies any abdominal pain.  He denies any nausea or vomiting or bloody output from his ostomy.  He has had poor oral intake.  He does have meals delivered to him once daily and he has been trying to make sure he eats that.  He states he has been trying to drink fluids.  He did see Dr. Swanson on May 9 and was started on midodrine for his symptoms and was placed on an event monitor.  Other past medical history includes atrial fibrillation with chronic anticoagulation on Xarelto, hypertension, hyperlipidemia, cardiomyopathy, and MICHELLE.  Workup today revealed BUN of 28, creatinine of 1.7-patient's baseline in March 0.9.  On his last 2 ED visits, he was noted to be 1.6 and 1.9.  Troponin 13 and 11, alk phos 131, hemoglobin 12.3, hematocrit 38.8.  CT head today showed no acute intracranial abnormality.  Patient did have a CT of his abdomen on his ED visit on 5 9 which showed fluid collections in the deep pelvis and along the inferior margin of the pancreatic tail concerning for abscesses as well as a 2.3 x 2.0 cm fluid collection with gas, deep within the midline abdominal incision possibly an abscess.

## 2024-05-17 NOTE — CARE COORDINATION
Case Management Assessment  Initial Evaluation    Date/Time of Evaluation: 5/17/2024 10:47 AM  Assessment Completed by: LALO Garcia    If patient is discharged prior to next notation, then this note serves as note for discharge by case management.    Patient Name: Mayo Jean                   YOB: 1960  Diagnosis: Syncope and collapse [R55]  Dehydration [E86.0]  Lightheaded [R42]  BRUCE (acute kidney injury) (HCC) [N17.9]                   Date / Time: 5/16/2024  9:31 PM    Patient Admission Status: Inpatient   Readmission Risk (Low < 19, Mod (19-27), High > 27): Readmission Risk Score: 15.6    Current PCP: Rema Dorado APRN - NP  PCP verified by CM? Yes    Chart Reviewed: Yes      History Provided by: Patient  Patient Orientation: Alert and Oriented, Person, Place, Situation, Self    Patient Cognition: Alert    Hospitalization in the last 30 days (Readmission):  Yes    If yes, Readmission Assessment in  Navigator will be completed.    Advance Directives:      Code Status: Full Code   Patient's Primary Decision Maker is: Legal Next of Kin      Discharge Planning:    Patient lives with: Alone Type of Home: Trailer/Mobile Home  Primary Care Giver: Self  Patient Support Systems include: Friends/Neighbors   Current Financial resources: Medicare  Current community resources: ECF/Home Care, Transportation  Current services prior to admission: C-pap, Durable Medical Equipment, Home Care            Current DME: Cpap, Walker            Type of Home Care services:  Nursing Services    ADLS  Prior functional level: Independent in ADLs/IADLs  Current functional level: Independent in ADLs/IADLs    PT AM-PAC:   /24  OT AM-PAC:   /24    Family can provide assistance at DC: No  Would you like Case Management to discuss the discharge plan with any other family members/significant others, and if so, who? No  Plans to Return to Present Housing: Yes  Other Identified Issues/Barriers to RETURNING to

## 2024-05-17 NOTE — PROGRESS NOTES
Pt in bed watching television. VS and assessment as charted. Pt is A&Ox4. Denies any pain. Pt also denies any other needs at this time and has call light within reach, will continue to monitor.

## 2024-05-17 NOTE — ED NOTES
Patient reports he feels a lot better after IV fluids, reports dizziness has subsided. Dr. Toribio aware

## 2024-05-17 NOTE — CONSULTS
complaints of left lower quadrant/suprapubic abdominal pain upon evaluation is found to have sigmoid diverticulitis with abscess.  The abscess was 4 x 4 cm and above the dome of the bladder.  He underwent aspiration of the abscess with interventional radiology with drainage of 15 to 20 mL of fluid.  He was treated with IV Cipro and Flagyl and had steady improvement.     He then came back to the ER on 10/26/2021 due to constipation again. He was transferred back to [facility] with pneumaturia secondary to colovesical fistula on 11/22/21. He underwent rectosigmoid colectomy with colostomy creation. Cystourethrogram was done on post op day five.       He then came to the ER on 12/6/2021 due to dehydration and again on 12/15/2021 due to wanting a wound check from his colectomy/colostomy placement.      CAM patch done on 4/21/2022: 7 days recorded. Baseline rhythm is sinus with average heart rate of 69 bpm, ranging between 53 and 111 bpm. Atrial tachycardia (AT) 20 episodes; longest 8 beats at average 139 bpm up to 142 bpm; fastest 7 beats at average 157 bpm up to 180 bpm. Premature atrial contractions 0.09%. Premature ventricular contractions 1.43%. No patient-activated events recorded. Overall no dangerous heart rate or rhythm abnormalities detected.    EKG done in office on 6/13/2022: Maintaining normal sinus rhythm     7/3/2022: S/p colectomy and colostomy admitted for elective reversal of colostomy.  Patient unable to have colostomy reversed due to significant pelvic adhesions with inability to dissect out rectal stump safely. Patient had postoperative atrial fibrillation with rapid ventricular response treated by cardiology.     EKG done in office (7/19/2022): Showed normal sinus rhythm with a HR of 65 bpm.    EKG done in office 9/1/2022- Sinus shalonda, other normal ECG    Echo done on 8/15/2022- EF 65% The left ventricular cavity size is within normal limits and the left ventricular wall thickness is mildly increased.  as previously scheduled.    Sincerely,  Irma Swanson MD, F.A.C.C.  [facility] Specialist    [address] [place], [place] [address]  Phone: [phone], Fax: [phone]     I believe that the risk of significant morbidity and mortality related to the patient's current medical conditions are: Intermediate.      The documentation recorded by the scribe, accurately and completely reflects the services I personally performed and the decisions made by me. Irma Swanson MD, F.A.C.C. May 17, 2024

## 2024-05-17 NOTE — ED PROVIDER NOTES
[facility]  EMERGENCY DEPARTMENT ENCOUNTER      Pt Name: Mayo Jean  MRN: [id number]  Birthdate 1960  Date of evaluation: 5/16/2024  Provider: Jey Toribio MD    CHIEF COMPLAINT       Chief Complaint   Patient presents with    Dehydration     Low blood pressure 80's diastolic, dizzy-     HISTORY OF PRESENT ILLNESS      Mayo Jean is a 63 y.o. male who presents to the emergency department for recurrent episode of lightheadedness feeling like he is going to pass out.  Today he states that he had large volume output from his ileostomy which was placed on 4/19/2024.  He reports that he tried to get up and go to drain this when he fell in the bathroom.  He is is unsure if he struck his head.  Patient is on Xarelto for history of A-fib.     Patient has been seen 3 times in ED for concerns for dehydration, lightheadedness and syncope since ileostomy was placed on 4/19.  He has had 2 falls related to these symptoms.  His last ED visit was on 5/9/2024 where they did a large workup including CT PE study which was negative and they also scan through the abdomen.  He has never had any abdominal pain complaints.  The CT scan on 5/9/2024 did show some fluid collections however patient without pain symptoms and without leukocytosis thought to be a seroma by previous physicians.    He was seen by his cardiologist Dr. Swanson on 5/9/2024 and was started on midodrine.  Patient states that he has been taking this medication and has not missed any doses aside from potentially tonight.     The surgery on 4/19/2024 was performed at [facility].  This was an ex lap with takedown of descending colostomy, mobilization of splenic flexure an dcolostomy closure with diverting loop ileostomy. Indicated staples may be removed 14 days after surgery.      REVIEW OF SYSTEMS       Review of Systems   Constitutional:  Negative for fever.   HENT:  Negative for congestion and rhinorrhea.    Eyes:  Negative for

## 2024-05-17 NOTE — ED TRIAGE NOTES
Pt c/o feeling dehydrated, ileostomy- fell on floor at home-on buttock and R side, denies hitting head or LOC, was dizzy before fall, Low blood pressure 80's diastolic, dizzy-

## 2024-05-17 NOTE — CONSULTS
Chief complaint lightheadedness and lethargy    Patient is a 63-year-old male with a extensive surgical history most recently underwent an open takedown of colostomy a low colorectal anastomosis with placement of a protective ileostomy since his surgery approximately a month ago patient has had difficulty maintaining his level of hydration given the large volume losses associated with the ileostomy he once again presents to the emergency room complaining of lightheadedness and lethargy which he presumes is due to dehydration it should be noted that over the last month the patient has been followed on an outpatient basis by cardiology and due to persistent hypotension was started on midodrine    Patient states that the plans are to reverse the ileostomy in approximately 2 more months patient has not been in contact with his surgeon at the [facility] given these intermittent episodes of evaluation for hypovolemia secondary to ileostomy losses patient denies fever or chills no frequency urgency or dysuria patient states that the visiting nutritionist has attempted to modify the patient's diet to diminish the output from the ileostomy    While in the emergency room other than an elevated BUN and creatinine his laboratories are otherwise stable I apparently was asked to see the patient because of his CT scan performed approximately 9 days ago which showed multiple fluid collections within the abdomen and within the incisional area    Past medical history  1.  Hypertension  2.  Hyperlipidemia  3.  Nonischemic cardiomyopathy  4.  Obstructive sleep apnea  5.  Paroxysmal A-fib  6.  Gastroesophageal reflux disease  7.  Chronic anticoagulation  8.  History of diverticular disease  9.  History of pneumonia    Past surgical history  1.  Sigmoid resection with end colostomy in November 2021  2.  Laparotomy in June 2022 with the intent of reversing his colostomy which was unsuccessful  3.  Abdominal exploration takedown of

## 2024-05-17 NOTE — PROGRESS NOTES
Patient laying in bed. Denies any pain or dizziness at rest. Lung sounds clear and heart sounds regular. Patient has healing midline incision and scab on LLQ. Ileostomy in place on RLQ. Vitals stable. Will continue to monitor.

## 2024-05-17 NOTE — PLAN OF CARE
Problem: Discharge Planning  Goal: Discharge to home or other facility with appropriate resources  Outcome: Progressing     Problem: Safety - Adult  Goal: Free from fall injury  Outcome: Progressing     Problem: Metabolic/Fluid and Electrolytes - Adult  Goal: Electrolytes maintained within normal limits  Reactivated     Problem: Metabolic/Fluid and Electrolytes - Adult  Goal: Hemodynamic stability and optimal renal function maintained  Reactivated     Problem: Metabolic/Fluid and Electrolytes - Adult  Goal: Glucose maintained within prescribed range  Reactivated     Problem: Hematologic - Adult  Goal: Maintains hematologic stability  Reactivated

## 2024-05-17 NOTE — PROGRESS NOTES
Patient was admitted to the floor at 0230 and is A&O x4. Patient transferred from the ER cart to the bed and tolerated fairly well with very mild dizziness that patient states \"It's not so much dizziness as I just feel a little off kilter at times.\" Patient is aware that bed alarm is set for his safety and was educated on using the call light.  Patient has healing abdominal wounds from a surgery approximately a month ago. All areas are closed and dry. Patient's ostomy site is clean dry and intact with a small amount stool in the bag.  Vitals and assessment are complete and navigator is in progress. Writer walked patient through the doctor's orders and the medications that would be administered tonight.  Medications given in ER were also reviewed. Writer encouraged patient to ask questions.   Personal belongings are with patient. Patient was oriented to the room and call light and is aware that the bed alarm is set. Call light and bedside table are within reach. Writer explained how the patient's day would possibly go tomorrow and that a care team would be rounding some time in the morning.

## 2024-05-17 NOTE — PROGRESS NOTES
[facility]          Department of Pharmacy   Pharmacy Renal Adjustment Note    Mayo Jean is a 63 y.o. male. Pharmacist assessment of renally cleared medications.    Recent Labs     05/16/24  2158 05/17/24  0545   CREATININE 1.7* 1.6*     Estimated Creatinine Clearance: 49 mL/min (A) (based on SCr of 1.6 mg/dL (H)).    Height:   Ht Readings from Last 1 Encounters:   05/16/24 1.803 m (5' 11\")     Weight:  Wt Readings from Last 1 Encounters:   05/16/24 86.2 kg (190 lb)       The following medication(s) have been adjusted based upon renal function:             Xarelto 20 mg daily reduced to Xarelto 15 mg daily for CrCl less than 50 ml/min.    Thank you,  Cara Montano, PharmD, 5/17/2024 7:28 AM

## 2024-05-18 LAB
ANION GAP SERPL CALCULATED.3IONS-SCNC: 10 MMOL/L (ref 9–17)
BASOPHILS # BLD: 0.05 K/UL (ref 0–0.2)
BASOPHILS NFR BLD: 1 % (ref 0–2)
BUN SERPL-MCNC: 21 MG/DL (ref 8–23)
BUN/CREAT SERPL: 19 (ref 9–20)
CALCIUM SERPL-MCNC: 8.4 MG/DL (ref 8.6–10.4)
CHLORIDE SERPL-SCNC: 106 MMOL/L (ref 98–107)
CHOLEST SERPL-MCNC: 110 MG/DL (ref 0–199)
CHOLESTEROL/HDL RATIO: 3
CO2 SERPL-SCNC: 22 MMOL/L (ref 20–31)
CREAT SERPL-MCNC: 1.1 MG/DL (ref 0.7–1.2)
EKG ATRIAL RATE: 59 BPM
EKG P AXIS: 21 DEGREES
EKG P-R INTERVAL: 192 MS
EKG Q-T INTERVAL: 452 MS
EKG QRS DURATION: 90 MS
EKG QTC CALCULATION (BAZETT): 447 MS
EKG R AXIS: 7 DEGREES
EKG T AXIS: 44 DEGREES
EKG VENTRICULAR RATE: 59 BPM
EOSINOPHIL # BLD: 0.53 K/UL (ref 0–0.44)
EOSINOPHILS RELATIVE PERCENT: 7 % (ref 1–4)
ERYTHROCYTE [DISTWIDTH] IN BLOOD BY AUTOMATED COUNT: 13.6 % (ref 11.8–14.4)
GFR, ESTIMATED: 74 ML/MIN/1.73M2
GLUCOSE SERPL-MCNC: 90 MG/DL (ref 70–99)
HCT VFR BLD AUTO: 31.5 % (ref 40.7–50.3)
HDLC SERPL-MCNC: 41 MG/DL
HGB BLD-MCNC: 9.9 G/DL (ref 13–17)
IMM GRANULOCYTES # BLD AUTO: 0.04 K/UL (ref 0–0.3)
IMM GRANULOCYTES NFR BLD: 1 %
LDLC SERPL CALC-MCNC: 34 MG/DL (ref 0–100)
LYMPHOCYTES NFR BLD: 2.01 K/UL (ref 1.1–3.7)
LYMPHOCYTES RELATIVE PERCENT: 27 % (ref 24–43)
MCH RBC QN AUTO: 29.1 PG (ref 25.2–33.5)
MCHC RBC AUTO-ENTMCNC: 31.4 G/DL (ref 28.4–34.8)
MCV RBC AUTO: 92.6 FL (ref 82.6–102.9)
MONOCYTES NFR BLD: 0.66 K/UL (ref 0.1–1.2)
MONOCYTES NFR BLD: 9 % (ref 3–12)
NEUTROPHILS NFR BLD: 55 % (ref 36–65)
NEUTS SEG NFR BLD: 4.04 K/UL (ref 1.5–8.1)
NRBC BLD-RTO: 0 PER 100 WBC
PLATELET # BLD AUTO: 296 K/UL (ref 138–453)
PMV BLD AUTO: 9.2 FL (ref 8.1–13.5)
POTASSIUM SERPL-SCNC: 4.2 MMOL/L (ref 3.7–5.3)
RBC # BLD AUTO: 3.4 M/UL (ref 4.21–5.77)
SODIUM SERPL-SCNC: 138 MMOL/L (ref 135–144)
TRIGL SERPL-MCNC: 173 MG/DL
VLDLC SERPL CALC-MCNC: 35 MG/DL
WBC OTHER # BLD: 7.3 K/UL (ref 3.5–11.3)

## 2024-05-18 PROCEDURE — 36415 COLL VENOUS BLD VENIPUNCTURE: CPT

## 2024-05-18 PROCEDURE — 93005 ELECTROCARDIOGRAM TRACING: CPT | Performed by: INTERNAL MEDICINE

## 2024-05-18 PROCEDURE — 6370000000 HC RX 637 (ALT 250 FOR IP): Performed by: STUDENT IN AN ORGANIZED HEALTH CARE EDUCATION/TRAINING PROGRAM

## 2024-05-18 PROCEDURE — 80061 LIPID PANEL: CPT

## 2024-05-18 PROCEDURE — 93010 ELECTROCARDIOGRAM REPORT: CPT | Performed by: INTERNAL MEDICINE

## 2024-05-18 PROCEDURE — 6360000002 HC RX W HCPCS

## 2024-05-18 PROCEDURE — 99232 SBSQ HOSP IP/OBS MODERATE 35: CPT | Performed by: SPECIALIST

## 2024-05-18 PROCEDURE — 94761 N-INVAS EAR/PLS OXIMETRY MLT: CPT

## 2024-05-18 PROCEDURE — 2580000003 HC RX 258: Performed by: SPECIALIST

## 2024-05-18 PROCEDURE — 80048 BASIC METABOLIC PNL TOTAL CA: CPT

## 2024-05-18 PROCEDURE — 2500000003 HC RX 250 WO HCPCS: Performed by: SPECIALIST

## 2024-05-18 PROCEDURE — 85025 COMPLETE CBC W/AUTO DIFF WBC: CPT

## 2024-05-18 PROCEDURE — 6370000000 HC RX 637 (ALT 250 FOR IP)

## 2024-05-18 PROCEDURE — 1200000000 HC SEMI PRIVATE

## 2024-05-18 RX ORDER — DEXTROSE MONOHYDRATE, SODIUM CHLORIDE, AND POTASSIUM CHLORIDE 50; 1.49; 4.5 G/1000ML; G/1000ML; G/1000ML
INJECTION, SOLUTION INTRAVENOUS CONTINUOUS
Status: DISCONTINUED | OUTPATIENT
Start: 2024-05-18 | End: 2024-05-19

## 2024-05-18 RX ORDER — CALCIUM CARBONATE 500 MG/1
500 TABLET, CHEWABLE ORAL 3 TIMES DAILY PRN
Status: DISCONTINUED | OUTPATIENT
Start: 2024-05-18 | End: 2024-05-20 | Stop reason: HOSPADM

## 2024-05-18 RX ADMIN — AMIODARONE HYDROCHLORIDE 200 MG: 200 TABLET ORAL at 08:36

## 2024-05-18 RX ADMIN — RIVAROXABAN 15 MG: 15 TABLET, FILM COATED ORAL at 08:35

## 2024-05-18 RX ADMIN — POTASSIUM CHLORIDE, DEXTROSE MONOHYDRATE AND SODIUM CHLORIDE: 150; 5; 450 INJECTION, SOLUTION INTRAVENOUS at 02:30

## 2024-05-18 RX ADMIN — FLUOXETINE HYDROCHLORIDE 20 MG: 20 CAPSULE ORAL at 08:35

## 2024-05-18 RX ADMIN — MULTIVITAMIN TABLET 1 TABLET: TABLET at 08:36

## 2024-05-18 RX ADMIN — MIDODRINE HYDROCHLORIDE 5 MG: 5 TABLET ORAL at 08:35

## 2024-05-18 RX ADMIN — ONDANSETRON 4 MG: 4 TABLET, ORALLY DISINTEGRATING ORAL at 02:33

## 2024-05-18 RX ADMIN — ATORVASTATIN CALCIUM 20 MG: 20 TABLET, FILM COATED ORAL at 19:55

## 2024-05-18 RX ADMIN — ONDANSETRON 4 MG: 2 INJECTION INTRAMUSCULAR; INTRAVENOUS at 17:58

## 2024-05-18 RX ADMIN — MIDODRINE HYDROCHLORIDE 5 MG: 5 TABLET ORAL at 17:51

## 2024-05-18 RX ADMIN — SODIUM CHLORIDE: 9 INJECTION, SOLUTION INTRAVENOUS at 18:45

## 2024-05-18 NOTE — PROGRESS NOTES
Pt to bathroom, urine output recorded. Ileostomy bag leaking again. New bag placed. Pt tolerated fairly well. Pt denies any other needs at this time. Will continue to monitor.

## 2024-05-18 NOTE — PLAN OF CARE
Problem: Discharge Planning  Goal: Discharge to home or other facility with appropriate resources  Outcome: Progressing     Problem: Safety - Adult  Goal: Free from fall injury  Outcome: Progressing     Problem: Metabolic/Fluid and Electrolytes - Adult  Goal: Electrolytes maintained within normal limits  Outcome: Progressing  Goal: Hemodynamic stability and optimal renal function maintained  Outcome: Progressing  Goal: Glucose maintained within prescribed range  Outcome: Progressing     Problem: Hematologic - Adult  Goal: Maintains hematologic stability  Outcome: Progressing     Problem: Nutrition Deficit:  Goal: Optimize nutritional status  Outcome: Progressing

## 2024-05-18 NOTE — PROGRESS NOTES
Patient ileostomy changed to one piece from his home supply. Stoma care done. Patient did himself. Tolerated well.

## 2024-05-18 NOTE — PROGRESS NOTES
Pt in bed watching television. VS and assessment as charted. Pt remains A&Ox4. Denies any pain. Nausea starting to improve since prn Zofran administered. Pt denies any needs or issues at this time and has call light within reach, will continue to monitor.

## 2024-05-18 NOTE — PROGRESS NOTES
Patient vitals and assessment completed, see flowsheet. Patient A&Ox4, breathing normal. Pt denies pain or any further needs, bed alarm on, call light within reach, will continue to monitor.

## 2024-05-18 NOTE — PROGRESS NOTES
Progress Note    SUBJECTIVE:  FU related to denies fever or chills.  Still feels pretty lightheaded.    OBJECTIVE:    Vitals:   TEMPERATURE:  Current - Temp: 96.9 °F (36.1 °C); Max - Temp  Av.5 °F (36.4 °C)  Min: 96.9 °F (36.1 °C)  Max: 98.1 °F (36.7 °C)  RESPIRATIONS RANGE: Resp  Av.5  Min: 18  Max: 20  PULSE RANGE: Pulse  Av.5  Min: 58  Max: 65  BLOOD PRESSURE RANGE:  Systolic (24hrs), Av , Min:103 , Max:126   ; Diastolic (24hrs), Av, Min:72, Max:80    PULSE OXIMETRY RANGE: SpO2  Av %  Min: 98 %  Max: 100 %  24HR INTAKE/OUTPUT:    Intake/Output Summary (Last 24 hours) at 2024 0427  Last data filed at 2024 0230  Gross per 24 hour   Intake 4891.79 ml   Output 2325 ml   Net 2566.79 ml     -----------------------------------------------------------------  Exam:  General: A & O x3 and alert  HEENT: Supple neck & negative  Heart: Regular  Lungs: clear to auscultation bilaterally & no retractions  Abdomen: Ostomy noted  Extremities:  No edema   Neuro: NonFocal     -----------------------------------------------------------------  Diagnostic Data:  Lab Results   Component Value Date    WBC 9.3 2024    HGB 11.1 (L) 2024     2024       Lab Results   Component Value Date    BUN 27 (H) 2024    CREATININE 1.6 (H) 2024     2024    K 3.9 2024    CALCIUM 9.2 2024     2024    CO2 22 2024    LABGLOM 48 (L) 2024       Lab Results   Component Value Date    WBCUA None 2024    RBCUA None 2024    LEUKOCYTESUR NEGATIVE 2024    GLUCOSEU NEGATIVE 2024    KETUA TRACE (A) 2024    PROTEINU NEGATIVE 2024    HGBUR NEGATIVE 2024    CASTUA NOT REPORTED 10/10/2021    BACTERIA TRACE (A) 2024    YEAST NOT REPORTED 10/10/2021       Lab Results   Component Value Date    TROPONINT NOT REPORTED 2020    PROBNP 1,438 (H) 2020       XR CHEST PORTABLE    Result Date:

## 2024-05-18 NOTE — PROGRESS NOTES
Ileostomy bag leaking. Two attempts made to change but continues to leak with multiple nurses in room. Nursing supervisor to floor. New bag applied, appears to have good seal. New gown, bed pad, and linens. I&O recorded. Replace IV fluids ordered per physician parameters. Pt denies any other needs at this time and has call light within reach.

## 2024-05-18 NOTE — PROGRESS NOTES
Hospital day #2  Patient is in slightly better spirits though he continues to complain of some dizziness upon standing  Vital signs are stable he is afebrile  Intake and output are adequate with good urine output  Laboratories electrolytes are within normal limits BUN and creatinine have corrected to normal range  Chest clear to auscultation percussion  Cardiovascular regular rate and rhythm  Abdomen is soft without tenderness no guarding or rebound ileostomy is pink and functional  Neurologically without focal findings    Assessment and plan patient's crystalloid resuscitation is progressing as 1 would hope we will decrease his ileostomy replacement to 1/2 cc/cc every 4 hours advance his diet and continue to monitor his clinical situation

## 2024-05-19 LAB
ANION GAP SERPL CALCULATED.3IONS-SCNC: 10 MMOL/L (ref 9–17)
BASOPHILS # BLD: 0.06 K/UL (ref 0–0.2)
BASOPHILS NFR BLD: 1 % (ref 0–2)
BUN SERPL-MCNC: 13 MG/DL (ref 8–23)
BUN/CREAT SERPL: 13 (ref 9–20)
CALCIUM SERPL-MCNC: 8.4 MG/DL (ref 8.6–10.4)
CHLORIDE SERPL-SCNC: 107 MMOL/L (ref 98–107)
CO2 SERPL-SCNC: 22 MMOL/L (ref 20–31)
CREAT SERPL-MCNC: 1 MG/DL (ref 0.7–1.2)
EOSINOPHIL # BLD: 0.48 K/UL (ref 0–0.44)
EOSINOPHILS RELATIVE PERCENT: 7 % (ref 1–4)
ERYTHROCYTE [DISTWIDTH] IN BLOOD BY AUTOMATED COUNT: 13.3 % (ref 11.8–14.4)
GFR, ESTIMATED: 84 ML/MIN/1.73M2
GLUCOSE SERPL-MCNC: 92 MG/DL (ref 70–99)
HCT VFR BLD AUTO: 32.3 % (ref 40.7–50.3)
HGB BLD-MCNC: 10.4 G/DL (ref 13–17)
IMM GRANULOCYTES # BLD AUTO: <0.03 K/UL (ref 0–0.3)
IMM GRANULOCYTES NFR BLD: 0 %
LYMPHOCYTES NFR BLD: 1.99 K/UL (ref 1.1–3.7)
LYMPHOCYTES RELATIVE PERCENT: 28 % (ref 24–43)
MCH RBC QN AUTO: 29.3 PG (ref 25.2–33.5)
MCHC RBC AUTO-ENTMCNC: 32.2 G/DL (ref 28.4–34.8)
MCV RBC AUTO: 91 FL (ref 82.6–102.9)
MONOCYTES NFR BLD: 0.69 K/UL (ref 0.1–1.2)
MONOCYTES NFR BLD: 10 % (ref 3–12)
NEUTROPHILS NFR BLD: 54 % (ref 36–65)
NEUTS SEG NFR BLD: 3.77 K/UL (ref 1.5–8.1)
NRBC BLD-RTO: 0 PER 100 WBC
PLATELET # BLD AUTO: 284 K/UL (ref 138–453)
PMV BLD AUTO: 9.1 FL (ref 8.1–13.5)
POTASSIUM SERPL-SCNC: 4 MMOL/L (ref 3.7–5.3)
RBC # BLD AUTO: 3.55 M/UL (ref 4.21–5.77)
SODIUM SERPL-SCNC: 139 MMOL/L (ref 135–144)
WBC OTHER # BLD: 7 K/UL (ref 3.5–11.3)

## 2024-05-19 PROCEDURE — 80048 BASIC METABOLIC PNL TOTAL CA: CPT

## 2024-05-19 PROCEDURE — 94761 N-INVAS EAR/PLS OXIMETRY MLT: CPT

## 2024-05-19 PROCEDURE — 99232 SBSQ HOSP IP/OBS MODERATE 35: CPT | Performed by: SPECIALIST

## 2024-05-19 PROCEDURE — 6370000000 HC RX 637 (ALT 250 FOR IP)

## 2024-05-19 PROCEDURE — 85025 COMPLETE CBC W/AUTO DIFF WBC: CPT

## 2024-05-19 PROCEDURE — 36415 COLL VENOUS BLD VENIPUNCTURE: CPT

## 2024-05-19 PROCEDURE — 6370000000 HC RX 637 (ALT 250 FOR IP): Performed by: INTERNAL MEDICINE

## 2024-05-19 PROCEDURE — 1200000000 HC SEMI PRIVATE

## 2024-05-19 PROCEDURE — 6360000002 HC RX W HCPCS

## 2024-05-19 RX ADMIN — MIDODRINE HYDROCHLORIDE 5 MG: 5 TABLET ORAL at 08:46

## 2024-05-19 RX ADMIN — ATORVASTATIN CALCIUM 20 MG: 20 TABLET, FILM COATED ORAL at 20:39

## 2024-05-19 RX ADMIN — FLUOXETINE HYDROCHLORIDE 20 MG: 20 CAPSULE ORAL at 08:45

## 2024-05-19 RX ADMIN — MULTIVITAMIN TABLET 1 TABLET: TABLET at 08:45

## 2024-05-19 RX ADMIN — ONDANSETRON 4 MG: 2 INJECTION INTRAMUSCULAR; INTRAVENOUS at 17:13

## 2024-05-19 RX ADMIN — MIDODRINE HYDROCHLORIDE 5 MG: 5 TABLET ORAL at 17:07

## 2024-05-19 RX ADMIN — RIVAROXABAN 20 MG: 20 TABLET, FILM COATED ORAL at 08:45

## 2024-05-19 RX ADMIN — ONDANSETRON 4 MG: 2 INJECTION INTRAMUSCULAR; INTRAVENOUS at 00:28

## 2024-05-19 RX ADMIN — AMIODARONE HYDROCHLORIDE 200 MG: 200 TABLET ORAL at 08:45

## 2024-05-19 ASSESSMENT — PAIN DESCRIPTION - FREQUENCY: FREQUENCY: INTERMITTENT

## 2024-05-19 ASSESSMENT — PAIN DESCRIPTION - ONSET: ONSET: GRADUAL

## 2024-05-19 ASSESSMENT — PAIN DESCRIPTION - PAIN TYPE: TYPE: SURGICAL PAIN

## 2024-05-19 ASSESSMENT — PAIN DESCRIPTION - LOCATION: LOCATION: ABDOMEN

## 2024-05-19 ASSESSMENT — PAIN SCALES - GENERAL: PAINLEVEL_OUTOF10: 2

## 2024-05-19 ASSESSMENT — PAIN DESCRIPTION - DESCRIPTORS: DESCRIPTORS: ACHING

## 2024-05-19 ASSESSMENT — PAIN DESCRIPTION - ORIENTATION: ORIENTATION: RIGHT

## 2024-05-19 ASSESSMENT — PAIN - FUNCTIONAL ASSESSMENT: PAIN_FUNCTIONAL_ASSESSMENT: ACTIVITIES ARE NOT PREVENTED

## 2024-05-19 NOTE — PLAN OF CARE
Problem: Discharge Planning  Goal: Discharge to home or other facility with appropriate resources  Outcome: Progressing     Problem: Safety - Adult  Goal: Free from fall injury  Outcome: Progressing     Problem: Metabolic/Fluid and Electrolytes - Adult  Goal: Electrolytes maintained within normal limits  Outcome: Progressing  Flowsheets (Taken 5/18/2024 2320)  Electrolytes maintained within normal limits:   Monitor labs and assess patient for signs and symptoms of electrolyte imbalances   Administer electrolyte replacement as ordered   Monitor response to electrolyte replacements, including repeat lab results as appropriate     Problem: Metabolic/Fluid and Electrolytes - Adult  Goal: Hemodynamic stability and optimal renal function maintained  Outcome: Progressing  Flowsheets (Taken 5/18/2024 2320)  Hemodynamic stability and optimal renal function maintained: Monitor labs and assess for signs and symptoms of volume excess or deficit     Problem: Hematologic - Adult  Goal: Maintains hematologic stability  Outcome: Progressing  Flowsheets (Taken 5/18/2024 2320)  Maintains hematologic stability:   Assess for signs and symptoms of bleeding or hemorrhage   Monitor labs for bleeding or clotting disorders     Problem: Gastrointestinal - Adult  Goal: Minimal or absence of nausea and vomiting  Outcome: Progressing  Flowsheets (Taken 5/18/2024 2320)  Minimal or absence of nausea and vomiting: Administer IV fluids as ordered to ensure adequate hydration     Problem: Gastrointestinal - Adult  Goal: Maintains or returns to baseline bowel function  Outcome: Progressing  Flowsheets (Taken 5/18/2024 2320)  Maintains or returns to baseline bowel function: Assess bowel function     Problem: Gastrointestinal - Adult  Goal: Establish and maintain optimal ostomy function  Outcome: Progressing  Flowsheets (Taken 5/18/2024 2320)  Establish and maintain optimal ostomy function:   Monitor output from ostomies   Administer IV fluids and

## 2024-05-19 NOTE — PROGRESS NOTES
Progress Note    SUBJECTIVE:  FU related to denies fever or chills.  Still feels pretty lightheaded.    OBJECTIVE:    Vitals:   TEMPERATURE:  Current - Temp: 98.2 °F (36.8 °C); Max - Temp  Av.6 °F (36.4 °C)  Min: 96.8 °F (36 °C)  Max: 98.2 °F (36.8 °C)  RESPIRATIONS RANGE: Resp  Av.3  Min: 16  Max: 18  PULSE RANGE: Pulse  Av  Min: 62  Max: 65  BLOOD PRESSURE RANGE:  Systolic (24hrs), Av , Min:125 , Max:134   ; Diastolic (24hrs), Av, Min:82, Max:90    PULSE OXIMETRY RANGE: SpO2  Av %  Min: 96 %  Max: 98 %  24HR INTAKE/OUTPUT:    Intake/Output Summary (Last 24 hours) at 2024 0756  Last data filed at 2024 0549  Gross per 24 hour   Intake 4238.35 ml   Output 4265 ml   Net -26.65 ml     -----------------------------------------------------------------  Exam:  General: A & O x3 and alert  HEENT: Supple neck & negative  Heart: Regular  Lungs: clear to auscultation bilaterally & no retractions  Abdomen: Ostomy noted  Extremities:  No edema   Neuro: NonFocal     -----------------------------------------------------------------  Diagnostic Data:  Lab Results   Component Value Date    WBC 7.0 2024    HGB 10.4 (L) 2024     2024       Lab Results   Component Value Date    BUN 13 2024    CREATININE 1.0 2024     2024    K 4.0 2024    CALCIUM 8.4 (L) 2024     2024    CO2 22 2024    LABGLOM 84 2024       Lab Results   Component Value Date    WBCUA None 2024    RBCUA None 2024    LEUKOCYTESUR NEGATIVE 2024    GLUCOSEU NEGATIVE 2024    KETUA TRACE (A) 2024    PROTEINU NEGATIVE 2024    HGBUR NEGATIVE 2024    CASTUA NOT REPORTED 10/10/2021    BACTERIA TRACE (A) 2024    YEAST NOT REPORTED 10/10/2021       Lab Results   Component Value Date    TROPONINT NOT REPORTED 2020    PROBNP 1,438 (H) 2020       XR CHEST PORTABLE    Result Date: 2024  EXAMINATION:

## 2024-05-19 NOTE — PROGRESS NOTES
Hospital day #3  Patient has no complaints issues with his ileostomy appliance have been resolved  Vital signs are stable he is afebrile  Chest clear  Cardiovascular regular rate and rhythm  Abdomen is soft without tenderness  Neurologically without focal findings    Laboratories within normal limits    Assessment and plan we will discontinue the ileostomy replacement and diminishes continuous IV fluids continue to monitor his oral intake and presumably discharge him home within the next 24 to 48 hours I would encourage the patient to call his surgeon at the [facility] to see if the surgeon would consider reversing his ileostomy on an expedited basis as opposed to waiting for an entire 3 months

## 2024-05-19 NOTE — PROGRESS NOTES
Writer at bedside at this time to perform shift assessment. Patient is lying in bed at this time. Vital signs taken and assessment completed at this time. Patient is alert and oriented and has complaint of surgical pain 2/10; patient states that he is comfortable at this time. Writer assisted patient with ileostomy care. Patient denies any further needs at this time. Call light within reach, care ongoing.

## 2024-05-19 NOTE — PROGRESS NOTES
Patient vitals and assessment completed, see flowsheet. Patient A&Ox4, breathing normal. Pt denies pain or any further needs, call light within reach, will continue to monitor.

## 2024-05-19 NOTE — PROGRESS NOTES
I&O recorded and weight obtained. Ileostomy output 250 ml over last four hours. IV fluids adjusted accordingly per order. Fresh water provided. Pt provided with extra blankets and denies any other needs at this time. Call light remains within reach.

## 2024-05-19 NOTE — PROGRESS NOTES
Patient in best watching television. Ileostomy had 300 ml output at this time. Replacement IV fluid with potassium rate changed to 37.5 ml/hr per Dr. Peraza order. Pt denies any other needs at this time and has call light within reach.

## 2024-05-20 VITALS
OXYGEN SATURATION: 98 % | WEIGHT: 196.87 LBS | HEART RATE: 63 BPM | DIASTOLIC BLOOD PRESSURE: 72 MMHG | HEIGHT: 71 IN | RESPIRATION RATE: 19 BRPM | TEMPERATURE: 98.5 F | BODY MASS INDEX: 27.56 KG/M2 | SYSTOLIC BLOOD PRESSURE: 109 MMHG

## 2024-05-20 LAB
ANION GAP SERPL CALCULATED.3IONS-SCNC: 11 MMOL/L (ref 9–17)
BASOPHILS # BLD: 0.06 K/UL (ref 0–0.2)
BASOPHILS NFR BLD: 1 % (ref 0–2)
BUN SERPL-MCNC: 13 MG/DL (ref 8–23)
BUN/CREAT SERPL: 12 (ref 9–20)
CALCIUM SERPL-MCNC: 8.9 MG/DL (ref 8.6–10.4)
CHLORIDE SERPL-SCNC: 104 MMOL/L (ref 98–107)
CO2 SERPL-SCNC: 22 MMOL/L (ref 20–31)
CREAT SERPL-MCNC: 1.1 MG/DL (ref 0.7–1.2)
EOSINOPHIL # BLD: 0.47 K/UL (ref 0–0.44)
EOSINOPHILS RELATIVE PERCENT: 6 % (ref 1–4)
ERYTHROCYTE [DISTWIDTH] IN BLOOD BY AUTOMATED COUNT: 13.6 % (ref 11.8–14.4)
GFR, ESTIMATED: 74 ML/MIN/1.73M2
GLUCOSE SERPL-MCNC: 92 MG/DL (ref 70–99)
HCT VFR BLD AUTO: 35.2 % (ref 40.7–50.3)
HGB BLD-MCNC: 10.8 G/DL (ref 13–17)
IMM GRANULOCYTES # BLD AUTO: 0.03 K/UL (ref 0–0.3)
IMM GRANULOCYTES NFR BLD: 0 %
LYMPHOCYTES NFR BLD: 2.05 K/UL (ref 1.1–3.7)
LYMPHOCYTES RELATIVE PERCENT: 28 % (ref 24–43)
MCH RBC QN AUTO: 28.8 PG (ref 25.2–33.5)
MCHC RBC AUTO-ENTMCNC: 30.7 G/DL (ref 28.4–34.8)
MCV RBC AUTO: 93.9 FL (ref 82.6–102.9)
MONOCYTES NFR BLD: 0.7 K/UL (ref 0.1–1.2)
MONOCYTES NFR BLD: 9 % (ref 3–12)
NEUTROPHILS NFR BLD: 56 % (ref 36–65)
NEUTS SEG NFR BLD: 4.14 K/UL (ref 1.5–8.1)
NRBC BLD-RTO: 0 PER 100 WBC
PLATELET # BLD AUTO: 278 K/UL (ref 138–453)
PMV BLD AUTO: 9.1 FL (ref 8.1–13.5)
POTASSIUM SERPL-SCNC: 4.2 MMOL/L (ref 3.7–5.3)
RBC # BLD AUTO: 3.75 M/UL (ref 4.21–5.77)
SODIUM SERPL-SCNC: 137 MMOL/L (ref 135–144)
WBC OTHER # BLD: 7.5 K/UL (ref 3.5–11.3)

## 2024-05-20 PROCEDURE — 6370000000 HC RX 637 (ALT 250 FOR IP)

## 2024-05-20 PROCEDURE — 80048 BASIC METABOLIC PNL TOTAL CA: CPT

## 2024-05-20 PROCEDURE — 6370000000 HC RX 637 (ALT 250 FOR IP): Performed by: INTERNAL MEDICINE

## 2024-05-20 PROCEDURE — 94761 N-INVAS EAR/PLS OXIMETRY MLT: CPT

## 2024-05-20 PROCEDURE — 36415 COLL VENOUS BLD VENIPUNCTURE: CPT

## 2024-05-20 PROCEDURE — 85025 COMPLETE CBC W/AUTO DIFF WBC: CPT

## 2024-05-20 RX ORDER — CALCIUM CARBONATE 500 MG/1
500 TABLET, CHEWABLE ORAL 3 TIMES DAILY PRN
COMMUNITY
Start: 2024-05-20 | End: 2024-06-19

## 2024-05-20 RX ADMIN — MULTIVITAMIN TABLET 1 TABLET: TABLET at 07:08

## 2024-05-20 RX ADMIN — FLUOXETINE HYDROCHLORIDE 20 MG: 20 CAPSULE ORAL at 07:08

## 2024-05-20 RX ADMIN — MIDODRINE HYDROCHLORIDE 5 MG: 5 TABLET ORAL at 07:08

## 2024-05-20 RX ADMIN — AMIODARONE HYDROCHLORIDE 200 MG: 200 TABLET ORAL at 07:08

## 2024-05-20 RX ADMIN — RIVAROXABAN 20 MG: 20 TABLET, FILM COATED ORAL at 08:37

## 2024-05-20 NOTE — PROGRESS NOTES
Progress Note    SUBJECTIVE:    Patient seen for f/u of BRUCE (acute kidney injury) (HCC).  He resting in bed no distress.   Tolerating diet well.  Pain improved.      ROS:   Constitutional: negative  for fevers, and negative for chills.  Respiratory: negative for shortness of breath, negative for cough, and negative for wheezing  Cardiovascular: negative for chest pain, and negative for palpitations  Gastrointestinal: negative for abdominal pain, negative for nausea,negative for vomiting, negative for diarrhea, and negative for constipation     All other systems were reviewed with the patient and are negative unless otherwise stated in HPI      OBJECTIVE:      Vitals:   Vitals:    05/20/24 0702   BP: 109/72   Pulse: 63   Resp: 19   Temp: 98.5 °F (36.9 °C)   SpO2: 98%     Weight - Scale: 89.3 kg (196 lb 13.9 oz)   Height: 180.3 cm (5' 11\")     Weight  Wt Readings from Last 3 Encounters:   05/20/24 89.3 kg (196 lb 13.9 oz)   05/09/24 87.5 kg (192 lb 12.8 oz)   05/03/24 89.4 kg (197 lb)     Body mass index is 27.46 kg/m².    24HR INTAKE/OUTPUT:      Intake/Output Summary (Last 24 hours) at 5/20/2024 0829  Last data filed at 5/20/2024 0709  Gross per 24 hour   Intake 2167.56 ml   Output 1900 ml   Net 267.56 ml     -----------------------------------------------------------------  Exam:    GEN:    Awake, alert and oriented x3.   EYES:  EOMI, pupils equal   NECK: Supple. No lymphadenopathy.  No carotid bruit  CVS:    regular rate and rhythm, no audible murmur  PULM:  CTA, no wheezes, rales or rhonchi, no acute respiratory distress  ABD:    Bowels sounds normal.  Abdomen is soft.  No distention.  no tenderness to palpation.  Healing incision to mid abdomen with no erythema or open areas.  Healing colostomy site with dry scab area. Ileostomy with liquid stool   EXT:   no edema bilaterally .  No calf tenderness.   NEURO: Moves all extremities.  Motor and sensory are grossly intact  SKIN:  No rashes.  No skin lesions.

## 2024-05-20 NOTE — PLAN OF CARE
Problem: Discharge Planning  Goal: Discharge to home or other facility with appropriate resources  5/20/2024 1025 by Sabrina Yeung RN  Outcome: Progressing  Flowsheets (Taken 5/20/2024 1025)  Discharge to home or other facility with appropriate resources:   Identify barriers to discharge with patient and caregiver   Identify discharge learning needs (meds, wound care, etc)   Arrange for needed discharge resources and transportation as appropriate     Problem: Safety - Adult  Goal: Free from fall injury  5/20/2024 1025 by Sabrina Yeung RN  Outcome: Progressing  Flowsheets (Taken 5/20/2024 1025)  Free From Fall Injury: Instruct family/caregiver on patient safety     Problem: Metabolic/Fluid and Electrolytes - Adult  Goal: Electrolytes maintained within normal limits  5/20/2024 1025 by Sabrina Yeung RN  Outcome: Progressing  Flowsheets (Taken 5/20/2024 1025)  Electrolytes maintained within normal limits:   Monitor labs and assess patient for signs and symptoms of electrolyte imbalances   Monitor response to electrolyte replacements, including repeat lab results as appropriate   Instruct patient on fluid and nutrition restrictions as appropriate   Administer electrolyte replacement as ordered     Problem: Metabolic/Fluid and Electrolytes - Adult  Goal: Hemodynamic stability and optimal renal function maintained  5/20/2024 1025 by Sabrina Yeung RN  Outcome: Progressing  Flowsheets (Taken 5/20/2024 1025)  Hemodynamic stability and optimal renal function maintained:   Monitor labs and assess for signs and symptoms of volume excess or deficit   Monitor intake, output and patient weight   Monitor urine specific gravity, serum osmolarity and serum sodium as indicated or ordered   Monitor response to interventions for patient's volume status, including labs, urine output, blood pressure (other measures as available)   Encourage oral intake as appropriate     Problem: Metabolic/Fluid and Electrolytes - Adult  Goal:

## 2024-05-20 NOTE — DISCHARGE INSTR - DIET

## 2024-05-20 NOTE — PLAN OF CARE
Problem: Discharge Planning  Goal: Discharge to home or other facility with appropriate resources  Outcome: Progressing  Flowsheets (Taken 5/20/2024 0104)  Discharge to home or other facility with appropriate resources:   Identify barriers to discharge with patient and caregiver   Identify discharge learning needs (meds, wound care, etc)   Arrange for needed discharge resources and transportation as appropriate     Problem: Safety - Adult  Goal: Free from fall injury  Outcome: Progressing  Flowsheets (Taken 5/20/2024 0104)  Free From Fall Injury: Instruct family/caregiver on patient safety     Problem: Metabolic/Fluid and Electrolytes - Adult  Goal: Electrolytes maintained within normal limits  Outcome: Progressing  Flowsheets (Taken 5/20/2024 0104)  Electrolytes maintained within normal limits:   Monitor labs and assess patient for signs and symptoms of electrolyte imbalances   Monitor response to electrolyte replacements, including repeat lab results as appropriate   Administer electrolyte replacement as ordered  Goal: Hemodynamic stability and optimal renal function maintained  Outcome: Progressing  Goal: Glucose maintained within prescribed range  Outcome: Progressing     Problem: Hematologic - Adult  Goal: Maintains hematologic stability  Outcome: Progressing  Flowsheets (Taken 5/20/2024 0104)  Maintains hematologic stability:   Assess for signs and symptoms of bleeding or hemorrhage   Monitor labs for bleeding or clotting disorders     Problem: Gastrointestinal - Adult  Goal: Minimal or absence of nausea and vomiting  Outcome: Progressing  Flowsheets (Taken 5/20/2024 0104)  Minimal or absence of nausea and vomiting: Administer IV fluids as ordered to ensure adequate hydration  Goal: Maintains or returns to baseline bowel function  Outcome: Progressing  Goal: Establish and maintain optimal ostomy function  Outcome: Progressing     Problem: Nutrition Deficit:  Goal: Optimize nutritional status  Outcome:  Progressing  Flowsheets (Taken 5/20/2024 0104)  Nutrient intake appropriate for improving, restoring, or maintaining nutritional needs:   Monitor oral intake, labs, and treatment plans   Provide specific nutrition education to patient or family as appropriate     Problem: Pain  Goal: Verbalizes/displays adequate comfort level or baseline comfort level  Outcome: Progressing  Flowsheets (Taken 5/20/2024 0104)  Verbalizes/displays adequate comfort level or baseline comfort level:   Encourage patient to monitor pain and request assistance   Administer analgesics based on type and severity of pain and evaluate response   Assess pain using appropriate pain scale   Implement non-pharmacological measures as appropriate and evaluate response

## 2024-05-20 NOTE — DISCHARGE INSTR - ACTIVITY
As tolerated   Skin Substitute Text: The defect edges were debeveled with a #15 scalpel blade.  Given the location of the defect, shape of the defect and the proximity to free margins a skin substitute graft was deemed most appropriate.  The graft material was trimmed to fit the size of the defect. The graft was then placed in the primary defect and oriented appropriately.

## 2024-05-20 NOTE — PROGRESS NOTES
Writer to bedside to complete morning assessment. Upon entry to room, pt laying in bed, respirations even and unlabored while on room air. Vitals obtained and assessment completed, see flow sheet for details. Morning medications given at this time. See MAR for details. Pt denies needs from writer at this time. Call light in reach. Care ongoing.

## 2024-05-20 NOTE — PROGRESS NOTES
IMM letter provided to patient.  Patient offered four hours to make informed decision regarding appeal process; patient agreeable to discharge.      Patient will be discharge today.  Inform [address] of discharge to resume services.    LALO Garcia     Yes

## 2024-05-20 NOTE — DISCHARGE SUMMARY
Discharge Summary    Mayo Jean  :  1960  MRN:  [id number]    Admit date:  2024      Discharge date: 2024     Admitting Physician:  Chaparro Amos MD    Discharge Diagnoses:    Principal Problem:    BRUCE (acute kidney injury) (HCC)  Active Problems:    PAF (paroxysmal atrial fibrillation) (HCC)    Primary hypertension    Chronic atrial fibrillation (HCC)    Severe malnutrition (HCC)    Secondary hypercoagulable state (HCC)    Dehydration    Lightheaded    Syncope and collapse    On amiodarone therapy  Resolved Problems:    * No resolved hospital problems. *      Hospital Course:   Mayo Jean is a 63 y.o. male admitted with BRUCE.  Presented with dehydration.  He complained of lightheadedness.  He reported his blood pressure at home was in the 80s and 90s.  He is status post ileostomy at [facility].  He reported that he attempted to get up to go to the bathroom to drain and fell.  He reported LOC and lives alone.  He is on Xarelto for history of atrial fibrillation.  Patient's had 3 emergency room visits since his ileostomy was placed on 2024 during an exploratory laparotomy and takedown of his descending colostomy.  He is scheduled to follow-up with his surgeon at [facility] this Wednesday, May 22.  He denied any pain.  He denied nausea vomiting.  He admitted to poor oral intake.  He does have meals delivered to him once a day and has been trying to make sure that he does eat those.  He does follow with Dr. Swanson and was started on midodrine on May 9 and placed on an event monitor.  During his initial workup his BUN was 28 and creatinine of 1.7 and is now back to normal.  Troponins were 13, 11.  Hemoglobin was 12.3.  CT head showed no acute process.  Patient did have a CT of his abdomen in the emergency room on  which showed fluid collections in the deep pelvis along the inferior margin of the pancreatic tail concerning for abscesses as well as a 2.3 x 2.0 fluid collection  up:  Patient will be followed by Rema Dorado APRN - NP in 1-2 weeks    CORE MEASURES on Discharge (if applicable)  ACE/ARB in CHF: NA  Statin in MI: NA  ASA in MI: NA  Statin in CVA: NA  Antiplatelet in CVA: NA    Total time spent on discharge services: 40 minutes    Including the following activities:  Evaluation and Management of patient  Discussion with patient and/or surrogate about current care plan  Coordination with Case Management and/or   Coordination of care with Consultants (if applicable)   Coordination of care with Receiving Facility Physician (if applicable)  Completion of DME forms (if applicable)  Preparation of Discharge Summary  Preparation of Medication Reconciliation  Preparation of Discharge Prescriptions    Signed:  JASPREET Bar - CNP, JASPREET, NP-C  5/20/2024, 10:02 AM

## 2024-05-20 NOTE — PROGRESS NOTES
Reviewed discharge instructions with patient.  Patient aware of need to  TUMS from pharmacy of choice.  Reviewed how to take medications and reviewed when each dose of his home medications are due.  Patient aware of date/time of follow up appointments and aware of need for repeat lab work to be drawn by [facility].  Instructed patient to eat a regular diet and to increase fluid intakes.  Educational handout on Acute Kidney Injury given to patient.   Questions answered.  Verbalizes understanding.  Copy of discharge instructions given to patient.

## 2024-05-20 NOTE — PROGRESS NOTES
Ostomy Referral Progress Note      NAME:  Mayo Jean  MEDICAL RECORD NUMBER:  [id number]  AGE: 63 y.o.   GENDER:  male  :  1960  TODAY'S DATE:  2024    Subjective     Mayo Jean is a 63 y.o. male referred by:   [] Physician  [x] Nursing  [] Other:     New    Surgeon     PAST MEDICAL HISTORY:        Diagnosis Date    Abnormal patient-activated cardiac event monitor 2021    CAM 13 days 8 hrs Baseline sinus ave HR of 63 bpm rang between 47 adn 109 bpm  Rare PAC and PVC with 2 runs of ectopic atrial tachy longest fastest was 7 beats hr 110 bpm No VT runs no AFib    Chronic anticoagulation     xarelto    Colonic diverticular abscess 10/2021    Colostomy fistula (HCC)     GERD (gastroesophageal reflux disease)     H/O echocardiogram 2021    EF 45-50% Mild increased LV wallthickness LA is mod dilated 34-39 with LA volume index of 35 ml/m2 Mild mitral and tricuspid regurg Midl diastolic dysfunction seen Aortic root is mod dilated >4.5cm when corrected for body suface area    History of cardioversion     History of pneumonia     pt does not recall having this    Hx of bronchitis     Hyperlipidemia     Hypertension     Non-ischemic cardiomyopathy (HCC)     Dr. Swanson cardiology in [place], [place]    MICHELLE on CPAP     Paroxysmal A-fib (HCC)     hx cardioversion    Sleep apnea     Home CPAP    Under care of team 2022    cardiology-Dr Swanson[place]-last visit 2022    Under care of team 2022    [facility]       MEDICATIONS:    No current facility-administered medications on file prior to encounter.     Current Outpatient Medications on File Prior to Encounter   Medication Sig Dispense Refill    FLUoxetine (PROZAC) 40 MG capsule Take 1 capsule by mouth daily      midodrine (PROAMATINE) 5 MG tablet Take 1 tablet by mouth in the morning and at bedtime 90 tablet 3    amiodarone (CORDARONE) 200 MG tablet Take 1

## 2024-05-22 ENCOUNTER — HOSPITAL ENCOUNTER (OUTPATIENT)
Age: 64
Setting detail: SPECIMEN
Discharge: HOME | End: 2024-05-22
Payer: MEDICARE

## 2024-05-22 LAB
ANION GAP SERPL CALCULATED.3IONS-SCNC: 11 MMOL/L (ref 9–17)
BUN SERPL-MCNC: 22 MG/DL (ref 8–23)
BUN/CREAT SERPL: 17 (ref 9–20)
CALCIUM SERPL-MCNC: 9.9 MG/DL (ref 8.6–10.4)
CHLORIDE SERPL-SCNC: 99 MMOL/L (ref 98–107)
CO2 SERPL-SCNC: 26 MMOL/L (ref 20–31)
CREAT SERPL-MCNC: 1.3 MG/DL (ref 0.7–1.2)
GFR, ESTIMATED: 61 ML/MIN/1.73M2
GLUCOSE SERPL-MCNC: 91 MG/DL (ref 70–99)
POTASSIUM SERPL-SCNC: 4 MMOL/L (ref 3.7–5.3)
SODIUM SERPL-SCNC: 136 MMOL/L (ref 135–144)

## 2024-05-22 PROCEDURE — 80048 BASIC METABOLIC PNL TOTAL CA: CPT

## 2024-05-23 ENCOUNTER — HOSPITAL ENCOUNTER (EMERGENCY)
Age: 64
Discharge: HOME | End: 2024-05-23
Payer: MEDICARE

## 2024-05-23 VITALS
WEIGHT: 193 LBS | HEART RATE: 65 BPM | DIASTOLIC BLOOD PRESSURE: 75 MMHG | SYSTOLIC BLOOD PRESSURE: 124 MMHG | BODY MASS INDEX: 27.02 KG/M2 | TEMPERATURE: 98 F | HEIGHT: 71 IN | RESPIRATION RATE: 20 BRPM | OXYGEN SATURATION: 96 %

## 2024-05-23 DIAGNOSIS — Z93.3 PRESENCE OF COLOSTOMY (HCC): ICD-10-CM

## 2024-05-23 DIAGNOSIS — E86.0 DEHYDRATION: Primary | ICD-10-CM

## 2024-05-23 DIAGNOSIS — I95.1 ORTHOSTATIC HYPOTENSION: ICD-10-CM

## 2024-05-23 DIAGNOSIS — N17.9 AKI (ACUTE KIDNEY INJURY) (HCC): ICD-10-CM

## 2024-05-23 LAB
ANION GAP SERPL CALCULATED.3IONS-SCNC: 16 MMOL/L (ref 9–17)
BASOPHILS # BLD: 0.08 K/UL (ref 0–0.2)
BASOPHILS NFR BLD: 1 % (ref 0–2)
BUN SERPL-MCNC: 34 MG/DL (ref 8–23)
BUN/CREAT SERPL: 18 (ref 9–20)
CALCIUM SERPL-MCNC: 10.6 MG/DL (ref 8.6–10.4)
CHLORIDE SERPL-SCNC: 97 MMOL/L (ref 98–107)
CO2 SERPL-SCNC: 26 MMOL/L (ref 20–31)
CREAT SERPL-MCNC: 1.9 MG/DL (ref 0.7–1.2)
EKG ATRIAL RATE: 70 BPM
EKG P AXIS: 44 DEGREES
EKG P-R INTERVAL: 184 MS
EKG Q-T INTERVAL: 434 MS
EKG QRS DURATION: 92 MS
EKG QTC CALCULATION (BAZETT): 468 MS
EKG R AXIS: 3 DEGREES
EKG T AXIS: 27 DEGREES
EKG VENTRICULAR RATE: 70 BPM
EOSINOPHIL # BLD: 0.29 K/UL (ref 0–0.44)
EOSINOPHILS RELATIVE PERCENT: 3 % (ref 1–4)
ERYTHROCYTE [DISTWIDTH] IN BLOOD BY AUTOMATED COUNT: 14 % (ref 11.8–14.4)
GFR, ESTIMATED: 39 ML/MIN/1.73M2
GLUCOSE SERPL-MCNC: 109 MG/DL (ref 70–99)
HCT VFR BLD AUTO: 37.2 % (ref 40.7–50.3)
HGB BLD-MCNC: 12.1 G/DL (ref 13–17)
IMM GRANULOCYTES # BLD AUTO: 0.05 K/UL (ref 0–0.3)
IMM GRANULOCYTES NFR BLD: 1 %
LYMPHOCYTES NFR BLD: 2.62 K/UL (ref 1.1–3.7)
LYMPHOCYTES RELATIVE PERCENT: 27 % (ref 24–43)
MCH RBC QN AUTO: 29.4 PG (ref 25.2–33.5)
MCHC RBC AUTO-ENTMCNC: 32.5 G/DL (ref 28.4–34.8)
MCV RBC AUTO: 90.5 FL (ref 82.6–102.9)
MONOCYTES NFR BLD: 1.08 K/UL (ref 0.1–1.2)
MONOCYTES NFR BLD: 11 % (ref 3–12)
NEUTROPHILS NFR BLD: 57 % (ref 36–65)
NEUTS SEG NFR BLD: 5.44 K/UL (ref 1.5–8.1)
NRBC BLD-RTO: 0 PER 100 WBC
PLATELET # BLD AUTO: 303 K/UL (ref 138–453)
PMV BLD AUTO: 9.4 FL (ref 8.1–13.5)
POTASSIUM SERPL-SCNC: 3.8 MMOL/L (ref 3.7–5.3)
RBC # BLD AUTO: 4.11 M/UL (ref 4.21–5.77)
SODIUM SERPL-SCNC: 139 MMOL/L (ref 135–144)
WBC OTHER # BLD: 9.6 K/UL (ref 3.5–11.3)

## 2024-05-23 PROCEDURE — 99284 EMERGENCY DEPT VISIT MOD MDM: CPT

## 2024-05-23 PROCEDURE — 2580000003 HC RX 258: Performed by: PHYSICIAN ASSISTANT

## 2024-05-23 PROCEDURE — 93005 ELECTROCARDIOGRAM TRACING: CPT

## 2024-05-23 PROCEDURE — 6370000000 HC RX 637 (ALT 250 FOR IP): Performed by: PHYSICIAN ASSISTANT

## 2024-05-23 PROCEDURE — 93010 ELECTROCARDIOGRAM REPORT: CPT | Performed by: FAMILY MEDICINE

## 2024-05-23 PROCEDURE — 80048 BASIC METABOLIC PNL TOTAL CA: CPT

## 2024-05-23 PROCEDURE — 85025 COMPLETE CBC W/AUTO DIFF WBC: CPT

## 2024-05-23 PROCEDURE — 96360 HYDRATION IV INFUSION INIT: CPT

## 2024-05-23 RX ORDER — LOPERAMIDE HYDROCHLORIDE 2 MG/1
2 CAPSULE ORAL ONCE
Status: COMPLETED | OUTPATIENT
Start: 2024-05-23 | End: 2024-05-23

## 2024-05-23 RX ORDER — 0.9 % SODIUM CHLORIDE 0.9 %
2000 INTRAVENOUS SOLUTION INTRAVENOUS ONCE
Status: COMPLETED | OUTPATIENT
Start: 2024-05-23 | End: 2024-05-23

## 2024-05-23 RX ORDER — MIDODRINE HYDROCHLORIDE 5 MG/1
5 TABLET ORAL ONCE
Status: COMPLETED | OUTPATIENT
Start: 2024-05-23 | End: 2024-05-23

## 2024-05-23 RX ADMIN — LOPERAMIDE HYDROCHLORIDE 2 MG: 2 CAPSULE ORAL at 17:45

## 2024-05-23 RX ADMIN — SODIUM CHLORIDE 2000 ML: 9 INJECTION, SOLUTION INTRAVENOUS at 17:45

## 2024-05-23 RX ADMIN — MIDODRINE HYDROCHLORIDE 5 MG: 5 TABLET ORAL at 17:45

## 2024-05-23 ASSESSMENT — ENCOUNTER SYMPTOMS
SHORTNESS OF BREATH: 0
COUGH: 0
DIARRHEA: 1

## 2024-05-23 ASSESSMENT — PAIN - FUNCTIONAL ASSESSMENT: PAIN_FUNCTIONAL_ASSESSMENT: NONE - DENIES PAIN

## 2024-05-23 NOTE — ED NOTES
Patient reporting symptoms have improved drastically. Patient assisted with standing and patient reports no longer feels dizzy.

## 2024-05-23 NOTE — ED NOTES
Orthostatic blood pressures completed, pt had slight dizziness with standing. COREY Carrasco aware of result and pt's dizziness.

## 2024-05-23 NOTE — PROGRESS NOTES
Physician Progress Note      PATIENT:               TIFFANY DUNN  CSN #:                  [id number]  :                       1960  ADMIT DATE:       2024 9:31 PM  DISCH DATE:        2024 4:00 PM  RESPONDING  PROVIDER #:        Chaparro Amos MD          QUERY TEXT:    Patient admitted with BRUCE. Noted documentation of Severe malnutrition in   Active Problems of H&P and At risk for malnutrition Dietitian PN. If possible,   please document in progress notes and discharge summary if you are evaluating   and /or treating any of the following:    The medical record reflects the following:  Risk Factors: Dehydration, BRUCE  Clinical Indicators: Discharge Summary, H&P-Active Problems-Severe   malnutrition, Patient Active Problem List Moderate malnutrition 2024, IM   PN  Nutrition status: Well developed, well-nourished with no   malnutrition,  Dietitian PN on -Malnutrition Status:  At risk for malnutrition,  Energy Intake:  Mild decrease in energy intake (Comment),  Weight Loss:  No significant weight loss  Body Fat Loss:  No significant body fat loss  Muscle Mass Loss:  No significant muscle mass loss  Fluid Accumulation:  No significant fluid accumulation   Strength:  Not Performed  Current BMI (kg/m2): 26.5  Treatment: Avoid high sugar fluids, Encourage drier meals and fluids between        Thank you,  Joaquina Bae [facility] CDS  Options provided:  -- Mild Malnutrition  -- Moderate Malnutrition  -- Severe Malnutrition  -- No Malnutrition  -- Other - I will add my own diagnosis  -- Disagree - Not applicable / Not valid  -- Disagree - Clinically unable to determine / Unknown  -- Refer to Clinical Documentation Reviewer    PROVIDER RESPONSE TEXT:    This patient has no malnutrition.    Query created by: Joaquina Bae on 2024 1:53 AM      Electronically signed by:  Chaparro Amos MD 2024 5:13 AM

## 2024-05-23 NOTE — DISCHARGE INSTRUCTIONS
Increase your hydration and fluid intake.  I recommend drinking a sports beverage daily such as body armor for electrolyte replacement.  Begin taking the Imodium as prescribed to try to slow the colostomy output to prevent further dehydration.

## 2024-05-23 NOTE — ED PROVIDER NOTES
and evaluated in the emergency department with dizziness and low blood pressure.  Patient has a history of low blood pressure on midodrine.  He received midodrine dosage here in the emergency department.  He has also been having high output from his colostomy causing dehydration and BRUCE.  He received 2 L of IV fluids and was able to drink sports beverage while here.  We discussed observation in the hospital with the patient desires discharge for oral rehydration and outpatient follow-up.           REASSESSMENT            FINAL IMPRESSION      1. Dehydration    2. BRUCE (acute kidney injury) (HCC)    3. Presence of colostomy (HCC)    4. Orthostatic hypotension          DISPOSITION/PLAN     DISPOSITION Decision To Discharge 05/23/2024 07:03:43 PM      PATIENT REFERRED TO:  Rema Dorado, APRN - NP  [address] [facility] [address] [phone]    Schedule an appointment as soon as possible for a visit         DISCHARGE MEDICATIONS:  New Prescriptions    No medications on file       (Please note that portions of this note were completed with a voice recognition program.  Efforts were made to edit the dictations but occasionally words are mis-transcribed.)    Fanny Carrasco PA-C (electronically signed)  Attending Emergency Physician           Fanny Carrasco PA-C  05/23/24 1910

## 2024-05-24 ENCOUNTER — OFFICE VISIT (OUTPATIENT)
Dept: CARDIOLOGY | Age: 64
End: 2024-05-24
Payer: MEDICARE

## 2024-05-24 VITALS
SYSTOLIC BLOOD PRESSURE: 126 MMHG | RESPIRATION RATE: 18 BRPM | HEART RATE: 76 BPM | OXYGEN SATURATION: 96 % | HEIGHT: 71 IN | DIASTOLIC BLOOD PRESSURE: 71 MMHG | WEIGHT: 195 LBS | BODY MASS INDEX: 27.3 KG/M2

## 2024-05-24 DIAGNOSIS — E78.2 MIXED HYPERLIPIDEMIA: ICD-10-CM

## 2024-05-24 DIAGNOSIS — Z86.79 S/P ABLATION OF ATRIAL FIBRILLATION: ICD-10-CM

## 2024-05-24 DIAGNOSIS — I48.0 PAF (PAROXYSMAL ATRIAL FIBRILLATION) (HCC): ICD-10-CM

## 2024-05-24 DIAGNOSIS — R42 LIGHTHEADED: Primary | ICD-10-CM

## 2024-05-24 DIAGNOSIS — Z98.890 S/P ABLATION OF ATRIAL FIBRILLATION: ICD-10-CM

## 2024-05-24 DIAGNOSIS — R42 DIZZINESS: ICD-10-CM

## 2024-05-24 DIAGNOSIS — Z79.899 ON AMIODARONE THERAPY: ICD-10-CM

## 2024-05-24 DIAGNOSIS — I42.8 NICM (NONISCHEMIC CARDIOMYOPATHY) (HCC): ICD-10-CM

## 2024-05-24 DIAGNOSIS — Z79.01 CHRONIC ANTICOAGULATION: ICD-10-CM

## 2024-05-24 DIAGNOSIS — I10 ESSENTIAL HYPERTENSION: ICD-10-CM

## 2024-05-24 PROCEDURE — G8417 CALC BMI ABV UP PARAM F/U: HCPCS | Performed by: PHYSICIAN ASSISTANT

## 2024-05-24 PROCEDURE — 1111F DSCHRG MED/CURRENT MED MERGE: CPT | Performed by: PHYSICIAN ASSISTANT

## 2024-05-24 PROCEDURE — G8427 DOCREV CUR MEDS BY ELIG CLIN: HCPCS | Performed by: PHYSICIAN ASSISTANT

## 2024-05-24 PROCEDURE — 99214 OFFICE O/P EST MOD 30 MIN: CPT | Performed by: PHYSICIAN ASSISTANT

## 2024-05-24 PROCEDURE — 3074F SYST BP LT 130 MM HG: CPT | Performed by: PHYSICIAN ASSISTANT

## 2024-05-24 PROCEDURE — 1036F TOBACCO NON-USER: CPT | Performed by: PHYSICIAN ASSISTANT

## 2024-05-24 PROCEDURE — 3017F COLORECTAL CA SCREEN DOC REV: CPT | Performed by: PHYSICIAN ASSISTANT

## 2024-05-24 PROCEDURE — 3078F DIAST BP <80 MM HG: CPT | Performed by: PHYSICIAN ASSISTANT

## 2024-05-24 PROCEDURE — 1123F ACP DISCUSS/DSCN MKR DOCD: CPT | Performed by: PHYSICIAN ASSISTANT

## 2024-05-24 RX ORDER — MIDODRINE HYDROCHLORIDE 10 MG/1
10 TABLET ORAL 3 TIMES DAILY
Qty: 270 TABLET | Refills: 3 | Status: SHIPPED | OUTPATIENT
Start: 2024-05-24

## 2024-05-24 NOTE — PROGRESS NOTES
from laying to sitting, sitting to standing and standing to walking. I also explained to him to help improve his symptoms he should include 3 g sodium diet, 1 or 2 L of sports drinks daily, knee-high compressions stockings.  Additional Testing List: None    Paroxysmal Atrial Fibrillation: Rhythm Control   S/P Successful Cardioversion on 1/14/2021.   [facility] done on 3/14/2023: 13 days and 20 hours recorded. Sinus rhythm with paroxysmal atrial fibrillation. Atrial fibrillation burden 0.34%.  The longest episode lasted for 35 minutes at a heart rate of 162 bpm. are PVCs, isolated, no ventricular runs.  Beta Blocker: Discontinued due to lightheaded/dizziness during hospital admission.  Anti-Arrhythmic: Continue amiodarone (Pacerone) 200 mg daily.  Monitoring: Since they will being maintained on Amiodarone, I told them that we will need to closely monitor them for potential side effects. These include monitoring LFTs and TSH at least every 6 months as well as chest x-rays, pulmonary function tests, and eye exams at least yearly.  [id number] Score for Atrial Fibrillation Stroke Risk   Risk   Factors  Component Value   C  Yes 1   H HTN Yes 1   A2 Age >= 75 No,  (63 y.o.) 0   D DM No 0   S2 Prior Stroke/TIA Yes 2   V Vascular Disease No 0   A Age 65-74 Yes,  (63 y.o.) 1   Sc Sex male 0    [id number]  Score  2   Score last updated 6/25/21 9:33 AM EDT  Click here for a link to the UpToDate guideline \"Atrial Fibrillation: Anticoagulation therapy to prevent embolization  Disclaimer: Risk Score calculation is dependent on accuracy of patient problem list and past encounter diagnosis.  CHADS2-VASc score: 2/9 (2.2% stroke risk)   Anticoagulation: Continue Riveroxaban (Xarelto): 20 mg once daily with largest meal (typically dinner). Because of his atrial fibrillation, I also would also recommend that he continue with anticoagulation to decrease his risk of stoke but also reminded him to watch for signs of blood in his

## 2024-06-07 ENCOUNTER — HOSPITAL ENCOUNTER (EMERGENCY)
Age: 64
Discharge: HOME | End: 2024-06-07
Payer: MEDICARE

## 2024-06-07 VITALS
WEIGHT: 187 LBS | DIASTOLIC BLOOD PRESSURE: 79 MMHG | OXYGEN SATURATION: 100 % | HEIGHT: 71 IN | BODY MASS INDEX: 26.18 KG/M2 | RESPIRATION RATE: 16 BRPM | HEART RATE: 71 BPM | SYSTOLIC BLOOD PRESSURE: 120 MMHG | TEMPERATURE: 97.7 F

## 2024-06-07 DIAGNOSIS — N17.9 AKI (ACUTE KIDNEY INJURY) (HCC): ICD-10-CM

## 2024-06-07 DIAGNOSIS — R11.0 NAUSEA: Primary | ICD-10-CM

## 2024-06-07 LAB
ALBUMIN SERPL-MCNC: 4.5 G/DL (ref 3.5–5.2)
ALBUMIN/GLOB SERPL: 1.3 {RATIO} (ref 1–2.5)
ALP SERPL-CCNC: 144 U/L (ref 40–129)
ALT SERPL-CCNC: 23 U/L (ref 5–41)
ANION GAP SERPL CALCULATED.3IONS-SCNC: 16 MMOL/L (ref 9–17)
AST SERPL-CCNC: 21 U/L
BASOPHILS # BLD: 0.05 K/UL (ref 0–0.2)
BASOPHILS NFR BLD: 1 % (ref 0–2)
BILIRUB SERPL-MCNC: 0.4 MG/DL (ref 0.3–1.2)
BUN SERPL-MCNC: 46 MG/DL (ref 8–23)
BUN/CREAT SERPL: 24 (ref 9–20)
CALCIUM SERPL-MCNC: 9.9 MG/DL (ref 8.6–10.4)
CHLORIDE SERPL-SCNC: 94 MMOL/L (ref 98–107)
CO2 SERPL-SCNC: 26 MMOL/L (ref 20–31)
CREAT SERPL-MCNC: 1.9 MG/DL (ref 0.7–1.2)
EOSINOPHIL # BLD: 0.19 K/UL (ref 0–0.44)
EOSINOPHILS RELATIVE PERCENT: 2 % (ref 1–4)
ERYTHROCYTE [DISTWIDTH] IN BLOOD BY AUTOMATED COUNT: 13.6 % (ref 11.8–14.4)
GFR, ESTIMATED: 39 ML/MIN/1.73M2
GLUCOSE SERPL-MCNC: 147 MG/DL (ref 70–99)
HCT VFR BLD AUTO: 40.5 % (ref 40.7–50.3)
HGB BLD-MCNC: 13.3 G/DL (ref 13–17)
IMM GRANULOCYTES # BLD AUTO: 0.05 K/UL (ref 0–0.3)
IMM GRANULOCYTES NFR BLD: 1 %
LYMPHOCYTES NFR BLD: 1.91 K/UL (ref 1.1–3.7)
LYMPHOCYTES RELATIVE PERCENT: 21 % (ref 24–43)
MCH RBC QN AUTO: 28.9 PG (ref 25.2–33.5)
MCHC RBC AUTO-ENTMCNC: 32.8 G/DL (ref 28.4–34.8)
MCV RBC AUTO: 87.9 FL (ref 82.6–102.9)
MONOCYTES NFR BLD: 0.83 K/UL (ref 0.1–1.2)
MONOCYTES NFR BLD: 9 % (ref 3–12)
NEUTROPHILS NFR BLD: 66 % (ref 36–65)
NEUTS SEG NFR BLD: 6.2 K/UL (ref 1.5–8.1)
NRBC BLD-RTO: 0 PER 100 WBC
PLATELET # BLD AUTO: 358 K/UL (ref 138–453)
PMV BLD AUTO: 9.6 FL (ref 8.1–13.5)
POTASSIUM SERPL-SCNC: 4 MMOL/L (ref 3.7–5.3)
PROT SERPL-MCNC: 8 G/DL (ref 6.4–8.3)
RBC # BLD AUTO: 4.61 M/UL (ref 4.21–5.77)
SODIUM SERPL-SCNC: 136 MMOL/L (ref 135–144)
WBC OTHER # BLD: 9.2 K/UL (ref 3.5–11.3)

## 2024-06-07 PROCEDURE — 6370000000 HC RX 637 (ALT 250 FOR IP): Performed by: PHYSICIAN ASSISTANT

## 2024-06-07 PROCEDURE — 96374 THER/PROPH/DIAG INJ IV PUSH: CPT

## 2024-06-07 PROCEDURE — 6360000002 HC RX W HCPCS: Performed by: PHYSICIAN ASSISTANT

## 2024-06-07 PROCEDURE — 85025 COMPLETE CBC W/AUTO DIFF WBC: CPT

## 2024-06-07 PROCEDURE — 2580000003 HC RX 258: Performed by: PHYSICIAN ASSISTANT

## 2024-06-07 PROCEDURE — 99284 EMERGENCY DEPT VISIT MOD MDM: CPT

## 2024-06-07 PROCEDURE — 80053 COMPREHEN METABOLIC PANEL: CPT

## 2024-06-07 RX ORDER — 0.9 % SODIUM CHLORIDE 0.9 %
1000 INTRAVENOUS SOLUTION INTRAVENOUS ONCE
Status: COMPLETED | OUTPATIENT
Start: 2024-06-07 | End: 2024-06-07

## 2024-06-07 RX ORDER — DIPHENOXYLATE HYDROCHLORIDE AND ATROPINE SULFATE 2.5; .025 MG/1; MG/1
1 TABLET ORAL ONCE
Status: DISCONTINUED | OUTPATIENT
Start: 2024-06-07 | End: 2024-06-07

## 2024-06-07 RX ORDER — ONDANSETRON 2 MG/ML
4 INJECTION INTRAMUSCULAR; INTRAVENOUS ONCE
Status: COMPLETED | OUTPATIENT
Start: 2024-06-07 | End: 2024-06-07

## 2024-06-07 RX ORDER — ONDANSETRON 4 MG/1
4 TABLET, ORALLY DISINTEGRATING ORAL 3 TIMES DAILY PRN
Qty: 21 TABLET | Refills: 0 | Status: SHIPPED | OUTPATIENT
Start: 2024-06-07

## 2024-06-07 RX ORDER — 0.9 % SODIUM CHLORIDE 0.9 %
2000 INTRAVENOUS SOLUTION INTRAVENOUS ONCE
Status: COMPLETED | OUTPATIENT
Start: 2024-06-07 | End: 2024-06-07

## 2024-06-07 RX ORDER — LOPERAMIDE HYDROCHLORIDE 2 MG/1
2 CAPSULE ORAL ONCE
Status: COMPLETED | OUTPATIENT
Start: 2024-06-07 | End: 2024-06-07

## 2024-06-07 RX ADMIN — SODIUM CHLORIDE 1000 ML: 9 INJECTION, SOLUTION INTRAVENOUS at 20:13

## 2024-06-07 RX ADMIN — ONDANSETRON 4 MG: 2 INJECTION INTRAMUSCULAR; INTRAVENOUS at 18:08

## 2024-06-07 RX ADMIN — LOPERAMIDE HYDROCHLORIDE 2 MG: 2 CAPSULE ORAL at 18:36

## 2024-06-07 RX ADMIN — SODIUM CHLORIDE 2000 ML: 9 INJECTION, SOLUTION INTRAVENOUS at 18:08

## 2024-06-07 ASSESSMENT — ENCOUNTER SYMPTOMS
COUGH: 0
NAUSEA: 1
SHORTNESS OF BREATH: 0
BACK PAIN: 0
DIARRHEA: 1
VOMITING: 0
ABDOMINAL PAIN: 0

## 2024-06-07 ASSESSMENT — PAIN - FUNCTIONAL ASSESSMENT: PAIN_FUNCTIONAL_ASSESSMENT: NONE - DENIES PAIN

## 2024-06-07 NOTE — DISCHARGE INSTRUCTIONS
Nausea medicine as needed.  Continue to use Imodium to slow the output of your colostomy.  You need to increase your hydration as you are losing excess fluid through your colostomy.  This has caused a worsening in your kidney numbers.

## 2024-06-07 NOTE — ED PROVIDER NOTES
[facility]  EMERGENCY DEPARTMENT ENCOUNTER      Pt Name: Mayo Jean  MRN: [id number]  Birthdate 1960  Date of evaluation: 6/7/2024  Provider: Fanny Carrasco PA-C    CHIEF COMPLAINT       Chief Complaint   Patient presents with    Nausea    GI Problem     Pt states stoma output more liquid and c/o nausea.          HISTORY OF PRESENT ILLNESS      Mayo Jean is a 63 y.o. male who presents to the emergency department due to nausea and loose stool.  Patient has a fairly new colostomy.  It has been determined to be a high output colostomy.  Patient has recently slowed the output by utilizing Imodium.  Today he noticed an increase in liquid output and he also began to feel nauseous.  This prompted him to come to the emergency department.  Patient states he is able to eat and drink.  He does not have any abdominal pain.  He has taken Imodium.  There are no other complaints or concerns.        REVIEW OF SYSTEMS       Review of Systems   Constitutional:  Negative for fever.   Respiratory:  Negative for cough and shortness of breath.    Cardiovascular:  Negative for chest pain.   Gastrointestinal:  Positive for diarrhea and nausea. Negative for abdominal pain and vomiting.   Musculoskeletal:  Negative for back pain.         PAST MEDICAL HISTORY     Past Medical History:   Diagnosis Date    Abnormal patient-activated cardiac event monitor 02/22/2021    CAM 13 days 8 hrs Baseline sinus ave HR of 63 bpm rang between 47 adn 109 bpm  Rare PAC and PVC with 2 runs of ectopic atrial tachy longest fastest was 7 beats hr 110 bpm No VT runs no AFib    Chronic anticoagulation     xarelto    Colonic diverticular abscess 10/2021    Colostomy fistula (HCC)     GERD (gastroesophageal reflux disease)     H/O echocardiogram 03/08/2021    EF 45-50% Mild increased LV wallthickness LA is mod dilated 34-39 with LA volume index of 35 ml/m2 Mild mitral and tricuspid regurg Midl diastolic dysfunction seen Aortic root is

## 2024-06-14 ENCOUNTER — OFFICE VISIT (OUTPATIENT)
Dept: CARDIOLOGY | Age: 64
End: 2024-06-14
Payer: MEDICARE

## 2024-06-14 VITALS
SYSTOLIC BLOOD PRESSURE: 80 MMHG | DIASTOLIC BLOOD PRESSURE: 58 MMHG | RESPIRATION RATE: 16 BRPM | HEIGHT: 71 IN | HEART RATE: 77 BPM | WEIGHT: 190 LBS | BODY MASS INDEX: 26.6 KG/M2

## 2024-06-14 DIAGNOSIS — R42 DIZZINESS: ICD-10-CM

## 2024-06-14 DIAGNOSIS — I42.8 NICM (NONISCHEMIC CARDIOMYOPATHY) (HCC): ICD-10-CM

## 2024-06-14 DIAGNOSIS — N28.9 ACUTE ON CHRONIC RENAL INSUFFICIENCY: ICD-10-CM

## 2024-06-14 DIAGNOSIS — I10 PRIMARY HYPERTENSION: ICD-10-CM

## 2024-06-14 DIAGNOSIS — E78.2 MIXED HYPERLIPIDEMIA: ICD-10-CM

## 2024-06-14 DIAGNOSIS — R42 LIGHTHEADEDNESS: ICD-10-CM

## 2024-06-14 DIAGNOSIS — I48.0 PAF (PAROXYSMAL ATRIAL FIBRILLATION) (HCC): ICD-10-CM

## 2024-06-14 DIAGNOSIS — I95.1 ORTHOSTATIC HYPOTENSION: Primary | ICD-10-CM

## 2024-06-14 DIAGNOSIS — K57.20 COLONIC DIVERTICULAR ABSCESS: ICD-10-CM

## 2024-06-14 DIAGNOSIS — N18.9 ACUTE ON CHRONIC RENAL INSUFFICIENCY: ICD-10-CM

## 2024-06-14 PROCEDURE — 99214 OFFICE O/P EST MOD 30 MIN: CPT | Performed by: INTERNAL MEDICINE

## 2024-06-14 RX ORDER — FLUDROCORTISONE ACETATE 0.1 MG/1
0.1 TABLET ORAL DAILY
Qty: 90 TABLET | Refills: 3 | Status: SHIPPED | OUTPATIENT
Start: 2024-06-14 | End: 2025-06-09

## 2024-06-14 NOTE — PROGRESS NOTES
Patient: Mayo Jean  : 1960  Today's Date: 2024    REASON FOR CONSULTATION: Atrial Fibrillation (HX:PAF, NICM, HTN, HLD, obese. ER on  for Nausea. Lightheadedness and dizziness on/off ever since he had his bowel surgery in 2024. Denies CP, SOB, palpitations. Increased midodrine 10 mg three times daily, did help a little with dizziness. )    Dear Rema Dorado APRN - NP    HPI: I had the pleasure of seeing Mayo Jean in consultation today. As you know, Mr. Jean is a 63 y.o. male who was admitted on 2020 with new onset atrial fibrillation with RVR leading to cardiac consultation and  work-up.    Mr. Jean has history of intermittent palpitations and history suggestive of obstructive sleep apnea syndrome. He reported having history of borderline hypertension.  He denied any history of diabetes or dyslipidemia.  He is lifelong non-smoker.      With regard to his family history, he reported that his father and mother had a heart attack in their old age.      His echo showed ejection fraction of 40%.  No regional wall motion abnormalities.  There is some shadowing in the apex but I think this is origin of the papillary muscles.  He is anticoagulated anyway.    Lexiscan stress test showed low ejection fraction on gated SPECT with ejection fraction being 42% but no perfusion abnormalities favoring nonischemic cardiomyopathy.    Stress test done 2020: EF 42%  Largely normal myocardial perfusion imaging with soft tissue artifact, but without significant evidence of myocardial ischemia or infarction.    Echo done 2020: EF 40% Mildly increased left ventricular wall thickness with a normal left ventricular cavity size. Moderate global hypokinesis with no regional abnormalities.The left atrium is moderately dilated (34-39) with a left atrial volume index of 34 ml/m2. Mild mitral and tricuspid regurgitation. Diastolic function cannot be properly assessed due to

## 2024-06-15 ENCOUNTER — APPOINTMENT (OUTPATIENT)
Dept: CT IMAGING | Age: 64
End: 2024-06-15
Payer: MEDICARE

## 2024-06-15 ENCOUNTER — HOSPITAL ENCOUNTER (EMERGENCY)
Age: 64
Discharge: HOME | End: 2024-06-15
Attending: EMERGENCY MEDICINE
Payer: MEDICARE

## 2024-06-15 VITALS
HEART RATE: 76 BPM | SYSTOLIC BLOOD PRESSURE: 126 MMHG | OXYGEN SATURATION: 100 % | RESPIRATION RATE: 16 BRPM | HEIGHT: 71 IN | TEMPERATURE: 97.4 F | WEIGHT: 190 LBS | BODY MASS INDEX: 26.6 KG/M2 | DIASTOLIC BLOOD PRESSURE: 83 MMHG

## 2024-06-15 DIAGNOSIS — I95.1 ORTHOSTATIC HYPOTENSION: ICD-10-CM

## 2024-06-15 DIAGNOSIS — R55 SYNCOPE AND COLLAPSE: Primary | ICD-10-CM

## 2024-06-15 LAB
ANION GAP SERPL CALCULATED.3IONS-SCNC: 14 MMOL/L (ref 9–17)
BASOPHILS # BLD: 0.06 K/UL (ref 0–0.2)
BASOPHILS NFR BLD: 1 % (ref 0–2)
BUN SERPL-MCNC: 42 MG/DL (ref 8–23)
BUN/CREAT SERPL: 19 (ref 9–20)
CALCIUM SERPL-MCNC: 9.9 MG/DL (ref 8.6–10.4)
CHLORIDE SERPL-SCNC: 98 MMOL/L (ref 98–107)
CO2 SERPL-SCNC: 26 MMOL/L (ref 20–31)
CREAT SERPL-MCNC: 2.2 MG/DL (ref 0.7–1.2)
EKG ATRIAL RATE: 61 BPM
EKG P AXIS: 33 DEGREES
EKG P-R INTERVAL: 170 MS
EKG Q-T INTERVAL: 460 MS
EKG QRS DURATION: 88 MS
EKG QTC CALCULATION (BAZETT): 463 MS
EKG R AXIS: 39 DEGREES
EKG T AXIS: 61 DEGREES
EKG VENTRICULAR RATE: 61 BPM
EOSINOPHIL # BLD: 0.17 K/UL (ref 0–0.44)
EOSINOPHILS RELATIVE PERCENT: 2 % (ref 1–4)
ERYTHROCYTE [DISTWIDTH] IN BLOOD BY AUTOMATED COUNT: 13.7 % (ref 11.8–14.4)
GFR, ESTIMATED: 32 ML/MIN/1.73M2
GLUCOSE SERPL-MCNC: 97 MG/DL (ref 70–99)
HCT VFR BLD AUTO: 40.3 % (ref 40.7–50.3)
HGB BLD-MCNC: 13 G/DL (ref 13–17)
IMM GRANULOCYTES # BLD AUTO: 0.05 K/UL (ref 0–0.3)
IMM GRANULOCYTES NFR BLD: 1 %
LYMPHOCYTES NFR BLD: 2.51 K/UL (ref 1.1–3.7)
LYMPHOCYTES RELATIVE PERCENT: 25 % (ref 24–43)
MCH RBC QN AUTO: 28.9 PG (ref 25.2–33.5)
MCHC RBC AUTO-ENTMCNC: 32.3 G/DL (ref 28.4–34.8)
MCV RBC AUTO: 89.6 FL (ref 82.6–102.9)
MONOCYTES NFR BLD: 0.97 K/UL (ref 0.1–1.2)
MONOCYTES NFR BLD: 10 % (ref 3–12)
NEUTROPHILS NFR BLD: 61 % (ref 36–65)
NEUTS SEG NFR BLD: 6.13 K/UL (ref 1.5–8.1)
NRBC BLD-RTO: 0 PER 100 WBC
PLATELET # BLD AUTO: 367 K/UL (ref 138–453)
PMV BLD AUTO: 9.4 FL (ref 8.1–13.5)
POTASSIUM SERPL-SCNC: 4.4 MMOL/L (ref 3.7–5.3)
RBC # BLD AUTO: 4.5 M/UL (ref 4.21–5.77)
SODIUM SERPL-SCNC: 138 MMOL/L (ref 135–144)
WBC OTHER # BLD: 9.9 K/UL (ref 3.5–11.3)

## 2024-06-15 PROCEDURE — 80048 BASIC METABOLIC PNL TOTAL CA: CPT

## 2024-06-15 PROCEDURE — 93010 ELECTROCARDIOGRAM REPORT: CPT | Performed by: FAMILY MEDICINE

## 2024-06-15 PROCEDURE — 93005 ELECTROCARDIOGRAM TRACING: CPT | Performed by: EMERGENCY MEDICINE

## 2024-06-15 PROCEDURE — 99284 EMERGENCY DEPT VISIT MOD MDM: CPT

## 2024-06-15 PROCEDURE — 96360 HYDRATION IV INFUSION INIT: CPT

## 2024-06-15 PROCEDURE — 85025 COMPLETE CBC W/AUTO DIFF WBC: CPT

## 2024-06-15 PROCEDURE — 70450 CT HEAD/BRAIN W/O DYE: CPT

## 2024-06-15 PROCEDURE — 2580000003 HC RX 258: Performed by: EMERGENCY MEDICINE

## 2024-06-15 PROCEDURE — 96361 HYDRATE IV INFUSION ADD-ON: CPT

## 2024-06-15 RX ORDER — 0.9 % SODIUM CHLORIDE 0.9 %
1000 INTRAVENOUS SOLUTION INTRAVENOUS ONCE
Status: COMPLETED | OUTPATIENT
Start: 2024-06-15 | End: 2024-06-15

## 2024-06-15 RX ORDER — 0.9 % SODIUM CHLORIDE 0.9 %
500 INTRAVENOUS SOLUTION INTRAVENOUS ONCE
Status: COMPLETED | OUTPATIENT
Start: 2024-06-15 | End: 2024-06-15

## 2024-06-15 RX ADMIN — SODIUM CHLORIDE 1000 ML: 9 INJECTION, SOLUTION INTRAVENOUS at 20:15

## 2024-06-15 RX ADMIN — SODIUM CHLORIDE 500 ML: 9 INJECTION, SOLUTION INTRAVENOUS at 22:11

## 2024-06-15 ASSESSMENT — PAIN - FUNCTIONAL ASSESSMENT
PAIN_FUNCTIONAL_ASSESSMENT: NONE - DENIES PAIN
PAIN_FUNCTIONAL_ASSESSMENT: NONE - DENIES PAIN

## 2024-06-15 NOTE — ED PROVIDER NOTES
HPI:  6/15/24,   Time: 7:55 PM EDT         Mayo Jean is a 63 y.o. male presenting to the ED for states he passed out standing up and hit his head against the wall and then slumped over, beginning just prior to arrival ago.  The complaint has been constant, moderate in severity, and worsened by nothing.  Spontaneous resolution of symptoms with supine position.  No fever chills or fatigue and no chest pain or difficulty breathing or palpitations no black or bloody stool patient does have ileostomy and feels like he is losing a lot of liquid through his ostomy.  No black or bloody stools or hematemesis    ROS:   Pertinent positives and negatives are stated within HPI, all other systems reviewed and are negative.  --------------------------------------------- PAST HISTORY ---------------------------------------------  Past Medical History:  has a past medical history of Abnormal patient-activated cardiac event monitor, Chronic anticoagulation, Colonic diverticular abscess, Colostomy fistula (HCC), GERD (gastroesophageal reflux disease), H/O echocardiogram, History of cardioversion, History of pneumonia, Hx of bronchitis, Hyperlipidemia, Hypertension, Non-ischemic cardiomyopathy (HCC), MICHELLE on CPAP, Paroxysmal A-fib (HCC), Sleep apnea, Under care of team, and Under care of team.    Past Surgical History:  has a past surgical history that includes CT ABSCESS DRAINAGE (10/12/2021); Cardioversion (01/2021); Sigmoid Colectomy (N/A, 11/22/2021); and laparotomy (N/A, 06/28/2022).    Social History:  reports that he has never smoked. He has never used smokeless tobacco. He reports that he does not currently use alcohol. He reports that he does not use drugs.    Family History: family history includes Cancer in his father and mother; Coronary Art Dis in his father and mother.     The patient’s home medications have been reviewed.    Allergies: Cipro [ciprofloxacin hcl] and Pcn

## 2024-06-16 PROBLEM — E86.0 DEHYDRATION: Status: RESOLVED | Noted: 2024-05-17 | Resolved: 2024-06-16

## 2024-06-28 ENCOUNTER — HOSPITAL ENCOUNTER (OUTPATIENT)
Age: 64
End: 2024-06-28
Payer: MEDICARE

## 2024-06-28 VITALS
SYSTOLIC BLOOD PRESSURE: 126 MMHG | HEIGHT: 71 IN | BODY MASS INDEX: 26.6 KG/M2 | DIASTOLIC BLOOD PRESSURE: 83 MMHG | WEIGHT: 190 LBS

## 2024-06-28 DIAGNOSIS — I10 PRIMARY HYPERTENSION: ICD-10-CM

## 2024-06-28 DIAGNOSIS — I48.0 PAF (PAROXYSMAL ATRIAL FIBRILLATION) (HCC): ICD-10-CM

## 2024-06-28 DIAGNOSIS — E78.2 MIXED HYPERLIPIDEMIA: ICD-10-CM

## 2024-06-28 DIAGNOSIS — K57.20 COLONIC DIVERTICULAR ABSCESS: ICD-10-CM

## 2024-06-28 LAB
ECHO AO ASC DIAM: 3.8 CM
ECHO AO ASCENDING AORTA INDEX: 1.84 CM/M2
ECHO AO ROOT DIAM: 4.6 CM
ECHO AO ROOT INDEX: 2.23 CM/M2
ECHO AV CUSP MM: 2.6 CM
ECHO AV MEAN GRADIENT: 3 MMHG
ECHO AV MEAN VELOCITY: 0.7 M/S
ECHO AV PEAK GRADIENT: 5 MMHG
ECHO AV PEAK VELOCITY: 1.1 M/S
ECHO AV VELOCITY RATIO: 0.91
ECHO AV VTI: 23.7 CM
ECHO BSA: 2.08 M2
ECHO LA AREA 2C: 17.5 CM2
ECHO LA AREA 4C: 14 CM2
ECHO LA MAJOR AXIS: 4.8 CM
ECHO LA MINOR AXIS: 5.4 CM
ECHO LA VOL BP: 42 ML (ref 18–58)
ECHO LA VOL MOD A2C: 47 ML (ref 18–58)
ECHO LA VOL MOD A4C: 34 ML (ref 18–58)
ECHO LA VOL/BSA BIPLANE: 20 ML/M2 (ref 16–34)
ECHO LA VOLUME INDEX MOD A2C: 23 ML/M2 (ref 16–34)
ECHO LA VOLUME INDEX MOD A4C: 17 ML/M2 (ref 16–34)
ECHO LV E' LATERAL VELOCITY: 10 CM/S
ECHO LV EDV A2C: 50 ML
ECHO LV EDV A4C: 67 ML
ECHO LV EDV INDEX A4C: 33 ML/M2
ECHO LV EDV NDEX A2C: 24 ML/M2
ECHO LV EJECTION FRACTION A2C: 52 %
ECHO LV EJECTION FRACTION A4C: 58 %
ECHO LV EJECTION FRACTION BIPLANE: 55 % (ref 55–100)
ECHO LV ESV A2C: 24 ML
ECHO LV ESV A4C: 28 ML
ECHO LV ESV INDEX A2C: 12 ML/M2
ECHO LV ESV INDEX A4C: 14 ML/M2
ECHO LV FRACTIONAL SHORTENING: 34 % (ref 28–44)
ECHO LV INTERNAL DIMENSION DIASTOLE INDEX: 1.99 CM/M2
ECHO LV INTERNAL DIMENSION DIASTOLIC: 4.1 CM (ref 4.2–5.9)
ECHO LV INTERNAL DIMENSION SYSTOLIC INDEX: 1.31 CM/M2
ECHO LV INTERNAL DIMENSION SYSTOLIC: 2.7 CM
ECHO LV IVSD: 0.9 CM (ref 0.6–1)
ECHO LV MASS 2D: 114.1 G (ref 88–224)
ECHO LV MASS INDEX 2D: 55.4 G/M2 (ref 49–115)
ECHO LV POSTERIOR WALL DIASTOLIC: 0.9 CM (ref 0.6–1)
ECHO LV RELATIVE WALL THICKNESS RATIO: 0.44
ECHO LVOT AV VTI INDEX: 0.89
ECHO LVOT MEAN GRADIENT: 2 MMHG
ECHO LVOT PEAK GRADIENT: 4 MMHG
ECHO LVOT PEAK VELOCITY: 1 M/S
ECHO LVOT VTI: 21.2 CM
ECHO MV A VELOCITY: 0.66 M/S
ECHO MV E DECELERATION TIME (DT): 275 MS
ECHO MV E VELOCITY: 0.47 M/S
ECHO MV E/A RATIO: 0.71
ECHO MV E/E' LATERAL: 4.7
ECHO PV MAX VELOCITY: 1.1 M/S
ECHO PV PEAK GRADIENT: 5 MMHG
ECHO TV REGURGITANT MAX VELOCITY: 2.17 M/S
ECHO TV REGURGITANT PEAK GRADIENT: 19 MMHG

## 2024-06-28 PROCEDURE — 93306 TTE W/DOPPLER COMPLETE: CPT | Performed by: FAMILY MEDICINE

## 2024-06-28 PROCEDURE — 93306 TTE W/DOPPLER COMPLETE: CPT

## 2024-07-01 ENCOUNTER — TELEPHONE (OUTPATIENT)
Dept: CARDIOLOGY | Age: 64
End: 2024-07-01

## 2024-07-01 NOTE — TELEPHONE ENCOUNTER
----- Message from Irma Swanson MD sent at 6/30/2024  6:12 PM EDT -----  Echo didn't change from before.

## 2024-07-13 NOTE — ED PROVIDER NOTES
[facility] ED  Emergency Department Encounter  Emergency Medicine Attending     Pt Name:Mayo Jean  MRN: [id number]  Birthdate 1960  Date of evaluation: 3/30/24  PCP:  Rema Dorado APRN - NP  Note Started: 2:03 AM EDT      CHIEF COMPLAINT       Chief Complaint   Patient presents with    Irregular Heart Beat     Patient states that his BP monitor said he had an irregular heart rhythm. Hx of Afib with a cardioversion several years ago       HISTORY OF PRESENT ILLNESS  (Location/Symptom, Timing/Onset, Context/Setting, Quality, Duration, Modifying Factors, Severity.)      Mayo Jean is a 63 y.o. male who presents with concern for irregular heart rate.  Patient does have a history of A-fib is on Xarelto, flecainide and metoprolol for treatment and takes them daily.  Patient states he just felt like his heart was skipping a beat or bounding and as such wanted come in for direct evaluation.  Patient denies any chest pain, shortness of breath or any other concurrent symptoms    PAST MEDICAL / SURGICAL / SOCIAL / FAMILY HISTORY      has a past medical history of Abnormal patient-activated cardiac event monitor, Chronic anticoagulation, Colonic diverticular abscess, Colostomy fistula (HCC), GERD (gastroesophageal reflux disease), H/O echocardiogram, History of cardioversion, History of pneumonia, Hx of bronchitis, Hyperlipidemia, Hypertension, Non-ischemic cardiomyopathy (HCC), MICHELLE on CPAP, Paroxysmal A-fib (HCC), Sleep apnea, Under care of team, and Under care of team.       has a past surgical history that includes CT ABSCESS DRAINAGE (10/12/2021); Cardioversion (01/2021); Sigmoid Colectomy (N/A, 11/22/2021); and laparotomy (N/A, 06/28/2022).      Social History     Socioeconomic History    Marital status: Single     Spouse name: Not on file    Number of children: Not on file    Years of education: Not on file    Highest education level: Not on file   Occupational History    Not on file 
Department of Anesthesiology  Preprocedure Note       Name:  Jaye Roa   Age:  62 y.o.  :  1964                                          MRN:  [id number]         Date:  2023      Surgeon: Lincoln Mcardle):  Kenn Barker MD    Procedure: Procedure(s):  RIGHT TKA (POSS SEMI CONST)    Medications prior to admission:   Prior to Admission medications    Medication Sig Start Date End Date Taking? Authorizing Provider   meloxicam (MOBIC) 15 MG tablet Take 1 tablet by mouth daily   Yes Historical Provider, MD   amLODIPine (NORVASC) 5 MG tablet Take 1 tablet by mouth daily   Yes Historical Provider, MD   furosemide (LASIX) 20 MG tablet Take 1 tablet by mouth daily   Yes Historical Provider, MD   vitamin D (CHOLECALCIFEROL) 125 MCG (5000 UT) CAPS capsule Take 1 capsule by mouth 2 times daily   Yes Historical Provider, MD   calcium carbonate 600 MG TABS tablet Take 1 tablet by mouth 2 times daily   Yes Historical Provider, MD   B Complex Vitamins (VITAMIN B COMPLEX) TABS Take 1 tablet by mouth daily   Yes Historical Provider, MD   Magnesium 400 MG TABS Take 1 capsule by mouth daily   Yes Historical Provider, MD   Ferrous Sulfate (IRON) 325 (65 Fe) MG TABS Take 1 tablet by mouth daily   Yes Historical Provider, MD   acetaminophen (TYLENOL) 500 MG tablet Take 1 tablet by mouth every 6 hours as needed for Pain   Yes Historical Provider, MD   clindamycin (CLEOCIN) 150 MG capsule Take 1 capsule by mouth as needed Takes 4 tablets before going to dentist   Yes Historical Provider, MD   ondansetron (ZOFRAN-ODT) 8 MG TBDP disintegrating tablet Place 1 tablet under the tongue every 8 hours as needed for Nausea or Vomiting 23   VIC Wells CNP   oxyCODONE-acetaminophen (PERCOCET) 5-325 MG per tablet Take 1 tablet by mouth every 4-6 hours as needed for Pain for up to 8 days.  Max Daily Amount: 6 tablets 23  VIC Wells CNP       Current medications:    Current
 (normal spontaneous vaginal delivery)

## 2024-08-08 RX ORDER — ATORVASTATIN CALCIUM 20 MG/1
TABLET, FILM COATED ORAL
Qty: 90 TABLET | Refills: 2 | Status: SHIPPED | OUTPATIENT
Start: 2024-08-08

## 2024-08-14 DIAGNOSIS — Z79.01 CHRONIC ANTICOAGULATION: ICD-10-CM

## 2024-08-14 DIAGNOSIS — I48.0 PAF (PAROXYSMAL ATRIAL FIBRILLATION) (HCC): Primary | ICD-10-CM

## 2024-09-20 ENCOUNTER — OFFICE VISIT (OUTPATIENT)
Dept: NEPHROLOGY | Age: 64
End: 2024-09-20
Payer: MEDICARE

## 2024-09-20 VITALS
SYSTOLIC BLOOD PRESSURE: 147 MMHG | DIASTOLIC BLOOD PRESSURE: 90 MMHG | WEIGHT: 202.7 LBS | RESPIRATION RATE: 18 BRPM | HEART RATE: 75 BPM | BODY MASS INDEX: 28.38 KG/M2 | TEMPERATURE: 97.6 F | HEIGHT: 71 IN

## 2024-09-20 DIAGNOSIS — E86.1 HYPOTENSION DUE TO HYPOVOLEMIA: ICD-10-CM

## 2024-09-20 DIAGNOSIS — N17.9 AKI (ACUTE KIDNEY INJURY) (HCC): Primary | ICD-10-CM

## 2024-09-20 DIAGNOSIS — N18.31 CHRONIC KIDNEY DISEASE, STAGE 3A (HCC): ICD-10-CM

## 2024-09-20 PROCEDURE — 3017F COLORECTAL CA SCREEN DOC REV: CPT | Performed by: INTERNAL MEDICINE

## 2024-09-20 PROCEDURE — G8428 CUR MEDS NOT DOCUMENT: HCPCS | Performed by: INTERNAL MEDICINE

## 2024-09-20 PROCEDURE — 99213 OFFICE O/P EST LOW 20 MIN: CPT | Performed by: INTERNAL MEDICINE

## 2024-09-20 PROCEDURE — 3077F SYST BP >= 140 MM HG: CPT | Performed by: INTERNAL MEDICINE

## 2024-09-20 PROCEDURE — 1123F ACP DISCUSS/DSCN MKR DOCD: CPT | Performed by: INTERNAL MEDICINE

## 2024-09-20 PROCEDURE — 99204 OFFICE O/P NEW MOD 45 MIN: CPT | Performed by: INTERNAL MEDICINE

## 2024-09-20 PROCEDURE — 3080F DIAST BP >= 90 MM HG: CPT | Performed by: INTERNAL MEDICINE

## 2024-09-20 PROCEDURE — 1036F TOBACCO NON-USER: CPT | Performed by: INTERNAL MEDICINE

## 2024-09-20 PROCEDURE — G8417 CALC BMI ABV UP PARAM F/U: HCPCS | Performed by: INTERNAL MEDICINE

## 2024-11-01 ENCOUNTER — HOSPITAL ENCOUNTER (OUTPATIENT)
Age: 64
Discharge: HOME | End: 2024-11-01
Payer: MEDICARE

## 2024-11-01 ENCOUNTER — OFFICE VISIT (OUTPATIENT)
Dept: CARDIOLOGY | Age: 64
End: 2024-11-01

## 2024-11-01 ENCOUNTER — HOSPITAL ENCOUNTER (OUTPATIENT)
Age: 64
Discharge: HOME | End: 2024-11-03
Payer: MEDICARE

## 2024-11-01 VITALS
HEART RATE: 57 BPM | OXYGEN SATURATION: 99 % | RESPIRATION RATE: 18 BRPM | WEIGHT: 208.6 LBS | HEIGHT: 71 IN | DIASTOLIC BLOOD PRESSURE: 89 MMHG | SYSTOLIC BLOOD PRESSURE: 153 MMHG | BODY MASS INDEX: 29.2 KG/M2

## 2024-11-01 DIAGNOSIS — I95.1 ORTHOSTATIC HYPOTENSION: ICD-10-CM

## 2024-11-01 DIAGNOSIS — N18.31 CHRONIC KIDNEY DISEASE, STAGE 3A (HCC): ICD-10-CM

## 2024-11-01 DIAGNOSIS — Z86.79 S/P ABLATION OF ATRIAL FIBRILLATION: ICD-10-CM

## 2024-11-01 DIAGNOSIS — N28.9 ACUTE ON CHRONIC RENAL INSUFFICIENCY: ICD-10-CM

## 2024-11-01 DIAGNOSIS — I48.0 PAF (PAROXYSMAL ATRIAL FIBRILLATION) (HCC): ICD-10-CM

## 2024-11-01 DIAGNOSIS — Z79.01 CHRONIC ANTICOAGULATION: ICD-10-CM

## 2024-11-01 DIAGNOSIS — Z79.899 ON AMIODARONE THERAPY: ICD-10-CM

## 2024-11-01 DIAGNOSIS — I95.1 ORTHOSTATIC HYPOTENSION: Primary | ICD-10-CM

## 2024-11-01 DIAGNOSIS — E78.2 MIXED HYPERLIPIDEMIA: ICD-10-CM

## 2024-11-01 DIAGNOSIS — I42.8 NICM (NONISCHEMIC CARDIOMYOPATHY) (HCC): ICD-10-CM

## 2024-11-01 DIAGNOSIS — N18.9 ACUTE ON CHRONIC RENAL INSUFFICIENCY: ICD-10-CM

## 2024-11-01 DIAGNOSIS — E86.1 HYPOTENSION DUE TO HYPOVOLEMIA: ICD-10-CM

## 2024-11-01 DIAGNOSIS — N17.9 AKI (ACUTE KIDNEY INJURY) (HCC): ICD-10-CM

## 2024-11-01 DIAGNOSIS — Z98.890 S/P ABLATION OF ATRIAL FIBRILLATION: ICD-10-CM

## 2024-11-01 LAB
ANION GAP SERPL CALCULATED.3IONS-SCNC: 13 MMOL/L (ref 9–16)
BILIRUB UR QL STRIP: NEGATIVE
BUN SERPL-MCNC: 17 MG/DL (ref 8–23)
BUN/CREAT SERPL: 15 (ref 9–20)
CALCIUM SERPL-MCNC: 9.4 MG/DL (ref 8.6–10.4)
CHLORIDE SERPL-SCNC: 102 MMOL/L (ref 98–107)
CLARITY UR: CLEAR
CO2 SERPL-SCNC: 26 MMOL/L (ref 20–31)
COLOR UR: YELLOW
CREAT SERPL-MCNC: 1.1 MG/DL (ref 0.7–1.2)
CREAT UR-MCNC: 69.3 MG/DL (ref 39–259)
ECHO BSA: 2.18 M2
GFR, ESTIMATED: 75 ML/MIN/1.73M2
GLUCOSE SERPL-MCNC: 97 MG/DL (ref 74–99)
GLUCOSE UR STRIP-MCNC: NEGATIVE MG/DL
HGB UR QL STRIP.AUTO: NEGATIVE
KETONES UR STRIP-MCNC: NEGATIVE MG/DL
LEUKOCYTE ESTERASE UR QL STRIP: NEGATIVE
NITRITE UR QL STRIP: NEGATIVE
PH UR STRIP: 7 [PH] (ref 5–9)
POTASSIUM SERPL-SCNC: 3.7 MMOL/L (ref 3.7–5.3)
PROT UR STRIP-MCNC: NEGATIVE MG/DL
SODIUM SERPL-SCNC: 141 MMOL/L (ref 136–145)
SP GR UR STRIP: 1.01 (ref 1.01–1.02)
TOTAL PROTEIN, URINE: 9 MG/DL
TSH SERPL DL<=0.05 MIU/L-ACNC: 1.06 UIU/ML (ref 0.27–4.2)
URINE TOTAL PROTEIN CREATININE RATIO: 0.13 (ref 0–0.2)
UROBILINOGEN UR STRIP-ACNC: NORMAL EU/DL (ref 0–1)

## 2024-11-01 PROCEDURE — 81003 URINALYSIS AUTO W/O SCOPE: CPT

## 2024-11-01 PROCEDURE — 80048 BASIC METABOLIC PNL TOTAL CA: CPT

## 2024-11-01 PROCEDURE — 93243 EXT ECG>48HR<7D SCAN A/R: CPT

## 2024-11-01 PROCEDURE — 84156 ASSAY OF PROTEIN URINE: CPT

## 2024-11-01 PROCEDURE — 82570 ASSAY OF URINE CREATININE: CPT

## 2024-11-01 PROCEDURE — 36415 COLL VENOUS BLD VENIPUNCTURE: CPT

## 2024-11-01 PROCEDURE — 84443 ASSAY THYROID STIM HORMONE: CPT

## 2024-11-01 RX ORDER — FLUDROCORTISONE ACETATE 0.1 MG/1
TABLET ORAL DAILY
COMMUNITY
Start: 2024-10-14

## 2024-11-01 NOTE — PATIENT INSTRUCTIONS
Florinef- Take the medication every other day for 2 weeks then discontinue. Please call if any worsening in his lightheaded and dizziness.       SURVEY:    You may be receiving a survey from [employer] regarding your visit today.    Please complete the survey to enable us to provide the highest quality of care to you and your family.    If you cannot score us a very good on any question, please call the office to discuss how we could have made your experience a very good one.    Thank you.

## 2024-11-01 NOTE — PROGRESS NOTES
discussed stopping florinef at his last appointment, will have him take every other day for 2 weeks then discontinue.  Continue Midodrine 10 mg three times daily. He takes the midodrine in the morning then as needed throughout the rest of the day.  Nonpharmacologic counseling: Because of his condition, I reminded him to try and keep himself well-hydrated and to take extra time when moving from laying to sitting, sitting to standing and standing to walking. I also explained to him to help improve his symptoms he should include 3 g sodium diet, 1 or 2 L of sports drinks daily, knee-high compressions stockings.  Additional Testing List: None    Acute on Chronic Renal Insufficiency: Creatinine: 1.26 with GFR 63    Paroxysmal Atrial Fibrillation: Rhythm Control :S/P Successful Cardioversion on 1/14/2021. [facility] done on 3/14/2023: 13 days and 20 hours recorded. Sinus rhythm with paroxysmal atrial fibrillation. Atrial fibrillation burden 0.34%.  The longest episode lasted for 35 minutes at a heart rate of 162 bpm. are PVCs, isolated, no ventricular runs.  Beta Blocker: not indicated at this time (during hospital admission this was discontinued)  Anti-Arrhythmic: Stopped after surgery at [facility].  Monitoring: Since they will being maintained on Amiodarone, I told them that we will need to closely monitor them for potential side effects. These include monitoring LFTs and TSH at least every 6 months as well as chest x-rays, pulmonary function tests, and eye exams at least yearly.  [id number] Score for Atrial Fibrillation Stroke Risk   Risk   Factors  Component Value   C  Yes 1   H HTN Yes 1   A2 Age >= 75 No,  (63 y.o.) 0   D DM No 0   S2 Prior Stroke/TIA Yes 2   V Vascular Disease No 0   A Age 65-74 Yes,  (63 y.o.) 1   Sc Sex male 0    [id number]  Score  2   Score last updated 6/25/21 9:33 AM EDT  Click here for a link to the UpToDate guideline \"Atrial Fibrillation: Anticoagulation therapy to prevent

## 2024-11-04 ENCOUNTER — TELEPHONE (OUTPATIENT)
Dept: CARDIOLOGY | Age: 64
End: 2024-11-04

## 2024-11-04 NOTE — TELEPHONE ENCOUNTER
----- Message from Luann Nguyễn PA-C sent at 11/1/2024  3:06 PM EDT -----  Please have him increase Xarelto to 20 mg daily, GFR is now improved.

## 2024-11-04 NOTE — TELEPHONE ENCOUNTER
----- Message from Luann Nguyễn PA-C sent at 11/4/2024  9:08 AM EST -----  Please notify patient that their lab results are normal.   Please continue current treatment and follow up.

## 2024-11-13 DIAGNOSIS — I48.0 PAF (PAROXYSMAL ATRIAL FIBRILLATION) (HCC): ICD-10-CM

## 2024-11-13 DIAGNOSIS — Z79.01 CHRONIC ANTICOAGULATION: ICD-10-CM

## 2024-11-13 NOTE — TELEPHONE ENCOUNTER
Rx refill, please sign. Increased dose per your result note from 11/4 due to his GFR being improved.     Thanks!

## 2024-11-15 LAB — ECHO BSA: 2.18 M2

## 2024-11-18 LAB — ECHO BSA: 2.18 M2

## 2024-11-19 ENCOUNTER — TELEPHONE (OUTPATIENT)
Dept: CARDIOLOGY | Age: 64
End: 2024-11-19

## 2024-11-19 NOTE — TELEPHONE ENCOUNTER
----- Message from Luann Nguyễn PA-C sent at 11/19/2024 12:34 PM EST -----  Please let them know that their CAM monitor was overall unremarkable. We will discuss at their follow up appointment.

## 2025-01-23 ENCOUNTER — HOSPITAL ENCOUNTER (EMERGENCY)
Age: 65
Discharge: HOME | End: 2025-01-23
Attending: STUDENT IN AN ORGANIZED HEALTH CARE EDUCATION/TRAINING PROGRAM
Payer: MEDICARE

## 2025-01-23 VITALS
DIASTOLIC BLOOD PRESSURE: 118 MMHG | HEART RATE: 80 BPM | SYSTOLIC BLOOD PRESSURE: 136 MMHG | RESPIRATION RATE: 16 BRPM | TEMPERATURE: 97.5 F | OXYGEN SATURATION: 98 %

## 2025-01-23 DIAGNOSIS — R42 LIGHTHEADEDNESS: ICD-10-CM

## 2025-01-23 DIAGNOSIS — R19.7 DIARRHEA, UNSPECIFIED TYPE: Primary | ICD-10-CM

## 2025-01-23 LAB
ALBUMIN SERPL-MCNC: 3.9 G/DL (ref 3.5–5.2)
ALBUMIN/GLOB SERPL: NORMAL {RATIO} (ref 1–2.5)
ALP SERPL-CCNC: 124 U/L (ref 40–129)
ALT SERPL-CCNC: 12 U/L (ref 10–50)
ANION GAP SERPL CALCULATED.3IONS-SCNC: 5 MMOL/L (ref 9–16)
AST SERPL-CCNC: 19 U/L (ref 10–50)
BASOPHILS # BLD: 0.06 K/UL (ref 0–0.2)
BASOPHILS NFR BLD: 1 % (ref 0–2)
BILIRUB DIRECT SERPL-MCNC: <0.2 MG/DL (ref 0–0.3)
BILIRUB INDIRECT SERPL-MCNC: NORMAL MG/DL (ref 0–1)
BILIRUB SERPL-MCNC: 0.3 MG/DL (ref 0–1.2)
BUN SERPL-MCNC: 19 MG/DL (ref 8–23)
BUN/CREAT SERPL: 17 (ref 9–20)
CALCIUM SERPL-MCNC: 9.3 MG/DL (ref 8.6–10.4)
CHLORIDE SERPL-SCNC: 104 MMOL/L (ref 98–107)
CO2 SERPL-SCNC: 27 MMOL/L (ref 20–31)
CREAT SERPL-MCNC: 1.1 MG/DL (ref 0.7–1.2)
EOSINOPHIL # BLD: 0.25 K/UL (ref 0–0.44)
EOSINOPHILS RELATIVE PERCENT: 3 % (ref 1–4)
ERYTHROCYTE [DISTWIDTH] IN BLOOD BY AUTOMATED COUNT: 14 % (ref 11.8–14.4)
FLUAV AG SPEC QL: NEGATIVE
FLUBV AG SPEC QL: NEGATIVE
GFR, ESTIMATED: ABNORMAL ML/MIN/1.73M2
GLUCOSE SERPL-MCNC: 96 MG/DL (ref 74–99)
HCT VFR BLD AUTO: 36.1 % (ref 40.7–50.3)
HGB BLD-MCNC: 11.5 G/DL (ref 13–17)
IMM GRANULOCYTES # BLD AUTO: <0.03 K/UL (ref 0–0.3)
IMM GRANULOCYTES NFR BLD: 0 %
LYMPHOCYTES NFR BLD: 2.32 K/UL (ref 1.1–3.7)
LYMPHOCYTES RELATIVE PERCENT: 27 % (ref 24–43)
MCH RBC QN AUTO: 27.7 PG (ref 25.2–33.5)
MCHC RBC AUTO-ENTMCNC: 31.9 G/DL (ref 28.4–34.8)
MCV RBC AUTO: 87 FL (ref 82.6–102.9)
MONOCYTES NFR BLD: 0.84 K/UL (ref 0.1–1.2)
MONOCYTES NFR BLD: 10 % (ref 3–12)
NEUTROPHILS NFR BLD: 59 % (ref 36–65)
NEUTS SEG NFR BLD: 5.27 K/UL (ref 1.5–8.1)
NRBC BLD-RTO: 0 PER 100 WBC
PLATELET # BLD AUTO: 341 K/UL (ref 138–453)
PMV BLD AUTO: 9.2 FL (ref 8.1–13.5)
POTASSIUM SERPL-SCNC: 4.2 MMOL/L (ref 3.7–5.3)
PROT SERPL-MCNC: 6.7 G/DL (ref 6.6–8.7)
RBC # BLD AUTO: 4.15 M/UL (ref 4.21–5.77)
SARS-COV-2 RDRP RESP QL NAA+PROBE: NOT DETECTED
SODIUM SERPL-SCNC: 136 MMOL/L (ref 136–145)
SPECIMEN DESCRIPTION: NORMAL
TROPONIN I SERPL HS-MCNC: 8 NG/L (ref 0–22)
WBC OTHER # BLD: 8.8 K/UL (ref 3.5–11.3)

## 2025-01-23 PROCEDURE — 80076 HEPATIC FUNCTION PANEL: CPT

## 2025-01-23 PROCEDURE — 80048 BASIC METABOLIC PNL TOTAL CA: CPT

## 2025-01-23 PROCEDURE — 99284 EMERGENCY DEPT VISIT MOD MDM: CPT

## 2025-01-23 PROCEDURE — 85025 COMPLETE CBC W/AUTO DIFF WBC: CPT

## 2025-01-23 PROCEDURE — 87635 SARS-COV-2 COVID-19 AMP PRB: CPT

## 2025-01-23 PROCEDURE — 87804 INFLUENZA ASSAY W/OPTIC: CPT

## 2025-01-23 PROCEDURE — 93005 ELECTROCARDIOGRAM TRACING: CPT | Performed by: STUDENT IN AN ORGANIZED HEALTH CARE EDUCATION/TRAINING PROGRAM

## 2025-01-23 PROCEDURE — 84484 ASSAY OF TROPONIN QUANT: CPT

## 2025-01-24 LAB
EKG ATRIAL RATE: 67 BPM
EKG P AXIS: 31 DEGREES
EKG P-R INTERVAL: 176 MS
EKG Q-T INTERVAL: 436 MS
EKG QRS DURATION: 82 MS
EKG QTC CALCULATION (BAZETT): 460 MS
EKG R AXIS: 16 DEGREES
EKG T AXIS: 57 DEGREES
EKG VENTRICULAR RATE: 67 BPM

## 2025-01-24 PROCEDURE — 93010 ELECTROCARDIOGRAM REPORT: CPT | Performed by: INTERNAL MEDICINE

## 2025-01-24 NOTE — ED PROVIDER NOTES
[facility] EMERGENCY DEPARTMENT     Pt Name: Mayo Jean  MRN: [id number]  Birthdate 1960  Date of evaluation: 1/23/2025  Physician: Kyle Tatum MD      Note to patient: The 21st Century Cures Act requires that medical notes like this one to be available to patients in the interest of transparency. Please be advised, this is a medical document. It is intended as oabd-tt-bcll communication. It is written in medical language and may contain abbreviations and verbiage that may be unfamiliar. It may appear to read blunt or direct. Medical documents are intended to carry relevant medical information, facts as evident and the clinical opinion of the practitioner.     CHIEF COMPLAINT       Chief Complaint   Patient presents with    Diarrhea     Pt states he has had diarrhea, chills, and some dizzy spells.      HISTORY OF PRESENT ILLNESS   Mayo Jean is a 64 y.o. male with PMHx of paroxysmal atrial fibrillation on Xarelto and several abdominal surgeries who presents to the emergency department for evaluation of watery diarrhea, subjective chills and dizzy spells.  Patient states that yesterday he had approximately 10 episodes of watery nonbloody diarrhea.  States that this has been improving and he had 5 episodes today.  States that he experienced some intermittent chills today.  He denies any fevers.  Denies any nausea, vomiting or any abdominal pain.  States that when he got in his truck he felt dizzy for a few seconds.  Described as lightheaded.  Patient has tinnitus and he states that during the dizzy spell the ringing got worse in the left ear for few seconds.  At time evaluation patient is asymptomatic.  Denies any chest pain, shortness of breath.  Denies any syncope    The patient has no other acute complaints at this time.    A 10-point ROS was performed and negative unless otherwise stated in HPI  PASTMEDICAL HISTORY     Past Medical History:   Diagnosis Date    Abnormal patient-activated cardiac

## 2025-02-05 ENCOUNTER — HOSPITAL ENCOUNTER (EMERGENCY)
Age: 65
Discharge: HOME | End: 2025-02-05
Attending: EMERGENCY MEDICINE
Payer: MEDICARE

## 2025-02-05 ENCOUNTER — APPOINTMENT (OUTPATIENT)
Dept: GENERAL RADIOLOGY | Age: 65
End: 2025-02-05
Payer: MEDICARE

## 2025-02-05 VITALS
RESPIRATION RATE: 20 BRPM | HEART RATE: 75 BPM | TEMPERATURE: 100.7 F | OXYGEN SATURATION: 94 % | DIASTOLIC BLOOD PRESSURE: 77 MMHG | SYSTOLIC BLOOD PRESSURE: 146 MMHG

## 2025-02-05 DIAGNOSIS — J10.1 INFLUENZA A: Primary | ICD-10-CM

## 2025-02-05 LAB
ANION GAP SERPL CALCULATED.3IONS-SCNC: 11 MMOL/L (ref 9–16)
BASOPHILS # BLD: 0.06 K/UL (ref 0–0.2)
BASOPHILS NFR BLD: 1 % (ref 0–2)
BUN SERPL-MCNC: 18 MG/DL (ref 8–23)
BUN/CREAT SERPL: 14 (ref 9–20)
CALCIUM SERPL-MCNC: 8.5 MG/DL (ref 8.6–10.4)
CHLORIDE SERPL-SCNC: 101 MMOL/L (ref 98–107)
CO2 SERPL-SCNC: 25 MMOL/L (ref 20–31)
CREAT SERPL-MCNC: 1.3 MG/DL (ref 0.7–1.2)
EKG ATRIAL RATE: 76 BPM
EKG P AXIS: 32 DEGREES
EKG P-R INTERVAL: 162 MS
EKG Q-T INTERVAL: 376 MS
EKG QRS DURATION: 88 MS
EKG QTC CALCULATION (BAZETT): 423 MS
EKG R AXIS: 39 DEGREES
EKG T AXIS: 46 DEGREES
EKG VENTRICULAR RATE: 76 BPM
EOSINOPHIL # BLD: 0.04 K/UL (ref 0–0.44)
EOSINOPHILS RELATIVE PERCENT: 1 % (ref 1–4)
ERYTHROCYTE [DISTWIDTH] IN BLOOD BY AUTOMATED COUNT: 14.7 % (ref 11.8–14.4)
FLUAV AG SPEC QL: POSITIVE
FLUBV AG SPEC QL: NEGATIVE
GFR, ESTIMATED: 61 ML/MIN/1.73M2
GLUCOSE SERPL-MCNC: 97 MG/DL (ref 74–99)
HCT VFR BLD AUTO: 42 % (ref 40.7–50.3)
HGB BLD-MCNC: 12.8 G/DL (ref 13–17)
IMM GRANULOCYTES # BLD AUTO: 0.03 K/UL (ref 0–0.3)
IMM GRANULOCYTES NFR BLD: 0 %
LYMPHOCYTES NFR BLD: 0.9 K/UL (ref 1.1–3.7)
LYMPHOCYTES RELATIVE PERCENT: 13 % (ref 24–43)
MCH RBC QN AUTO: 27.6 PG (ref 25.2–33.5)
MCHC RBC AUTO-ENTMCNC: 30.5 G/DL (ref 28.4–34.8)
MCV RBC AUTO: 90.7 FL (ref 82.6–102.9)
MONOCYTES NFR BLD: 1.02 K/UL (ref 0.1–1.2)
MONOCYTES NFR BLD: 15 % (ref 3–12)
NEUTROPHILS NFR BLD: 70 % (ref 36–65)
NEUTS SEG NFR BLD: 4.85 K/UL (ref 1.5–8.1)
NRBC BLD-RTO: 0 PER 100 WBC
PLATELET # BLD AUTO: 241 K/UL (ref 138–453)
PMV BLD AUTO: 9.2 FL (ref 8.1–13.5)
POTASSIUM SERPL-SCNC: 4.1 MMOL/L (ref 3.7–5.3)
RBC # BLD AUTO: 4.63 M/UL (ref 4.21–5.77)
SARS-COV-2 RDRP RESP QL NAA+PROBE: NOT DETECTED
SODIUM SERPL-SCNC: 137 MMOL/L (ref 136–145)
SPECIMEN DESCRIPTION: NORMAL
WBC OTHER # BLD: 6.9 K/UL (ref 3.5–11.3)

## 2025-02-05 PROCEDURE — 71046 X-RAY EXAM CHEST 2 VIEWS: CPT

## 2025-02-05 PROCEDURE — 93005 ELECTROCARDIOGRAM TRACING: CPT | Performed by: EMERGENCY MEDICINE

## 2025-02-05 PROCEDURE — 6370000000 HC RX 637 (ALT 250 FOR IP): Performed by: EMERGENCY MEDICINE

## 2025-02-05 PROCEDURE — 87635 SARS-COV-2 COVID-19 AMP PRB: CPT

## 2025-02-05 PROCEDURE — 99285 EMERGENCY DEPT VISIT HI MDM: CPT

## 2025-02-05 PROCEDURE — 80048 BASIC METABOLIC PNL TOTAL CA: CPT

## 2025-02-05 PROCEDURE — 85025 COMPLETE CBC W/AUTO DIFF WBC: CPT

## 2025-02-05 PROCEDURE — 93010 ELECTROCARDIOGRAM REPORT: CPT | Performed by: INTERNAL MEDICINE

## 2025-02-05 PROCEDURE — 87804 INFLUENZA ASSAY W/OPTIC: CPT

## 2025-02-05 RX ORDER — OSELTAMIVIR PHOSPHATE 75 MG/1
75 CAPSULE ORAL 2 TIMES DAILY
Qty: 10 CAPSULE | Refills: 0 | Status: SHIPPED | OUTPATIENT
Start: 2025-02-05 | End: 2025-02-10

## 2025-02-05 RX ORDER — ONDANSETRON 4 MG/1
4 TABLET, ORALLY DISINTEGRATING ORAL 3 TIMES DAILY PRN
Qty: 21 TABLET | Refills: 0 | Status: SHIPPED | OUTPATIENT
Start: 2025-02-05

## 2025-02-05 RX ORDER — ACETAMINOPHEN 500 MG
1000 TABLET ORAL ONCE
Status: COMPLETED | OUTPATIENT
Start: 2025-02-05 | End: 2025-02-05

## 2025-02-05 RX ADMIN — ACETAMINOPHEN 1000 MG: 500 TABLET ORAL at 16:22

## 2025-02-05 ASSESSMENT — PAIN DESCRIPTION - FREQUENCY: FREQUENCY: CONTINUOUS

## 2025-02-05 ASSESSMENT — PAIN DESCRIPTION - LOCATION: LOCATION: GENERALIZED

## 2025-02-05 ASSESSMENT — PAIN SCALES - GENERAL: PAINLEVEL_OUTOF10: 6

## 2025-02-05 ASSESSMENT — PAIN DESCRIPTION - DESCRIPTORS: DESCRIPTORS: ACHING;SORE

## 2025-02-05 NOTE — ED PROVIDER NOTES
outpatient follow-up and return if symptoms worsen or change.    CRITICAL CARE:       PROCEDURES:    Procedures         DATA FOR LAB AND RADIOLOGY TESTS ORDERED BELOW ARE REVIEWED BY THE ED CLINICIAN:    RADIOLOGY: All x-rays, CT, MRI, and formal ultrasound images (except ED bedside ultrasound) are read by the radiologist, see reports below, unless otherwise noted in MDM or here.  Reports below are reviewed by myself.  XR CHEST (2 VW)   Final Result   Bibasilar atelectasis.             LABS: Lab orders shown below, the results are reviewed by myself, and all abnormals are listed below.  Labs Reviewed   RAPID INFLUENZA A/B ANTIGENS - Abnormal; Notable for the following components:       Result Value    Flu A Antigen POSITIVE (*)     All other components within normal limits   CBC WITH AUTO DIFFERENTIAL - Abnormal; Notable for the following components:    Hemoglobin 12.8 (*)     RDW 14.7 (*)     Neutrophils % 70 (*)     Lymphocytes % 13 (*)     Monocytes % 15 (*)     Lymphocytes Absolute 0.90 (*)     All other components within normal limits   BASIC METABOLIC PANEL - Abnormal; Notable for the following components:    Creatinine 1.3 (*)     Calcium 8.5 (*)     All other components within normal limits   COVID-19, RAPID   SPECIMEN REJECTION       Vitals Reviewed:    Vitals:    02/05/25 1715 02/05/25 1730 02/05/25 1745 02/05/25 1800   BP: (!) 140/81 (!) 143/88 (!) 142/78 (!) 146/77   Pulse: 81 76 76 75   Resp: 21 21 20 20   Temp:       TempSrc:       SpO2: 93% 95% 95% 94%     MEDICATIONS GIVEN TO PATIENT THIS ENCOUNTER:  Orders Placed This Encounter   Medications    acetaminophen (TYLENOL) tablet 1,000 mg    oseltamivir (TAMIFLU) 75 MG capsule     Sig: Take 1 capsule by mouth 2 times daily for 5 days     Dispense:  10 capsule     Refill:  0    ondansetron (ZOFRAN-ODT) 4 MG disintegrating tablet     Sig: Take 1 tablet by mouth 3 times daily as needed for Nausea or Vomiting     Dispense:  21 tablet     Refill:  0

## 2025-02-20 ENCOUNTER — HOSPITAL ENCOUNTER (EMERGENCY)
Age: 65
Discharge: HOME | End: 2025-02-20
Attending: STUDENT IN AN ORGANIZED HEALTH CARE EDUCATION/TRAINING PROGRAM
Payer: MEDICARE

## 2025-02-20 ENCOUNTER — HOSPITAL ENCOUNTER (OUTPATIENT)
Age: 65
Discharge: HOME | End: 2025-02-22
Payer: MEDICARE

## 2025-02-20 VITALS
HEIGHT: 71 IN | WEIGHT: 213 LBS | SYSTOLIC BLOOD PRESSURE: 165 MMHG | DIASTOLIC BLOOD PRESSURE: 93 MMHG | TEMPERATURE: 97.8 F | RESPIRATION RATE: 30 BRPM | BODY MASS INDEX: 29.82 KG/M2 | OXYGEN SATURATION: 96 % | HEART RATE: 65 BPM

## 2025-02-20 DIAGNOSIS — R00.2 PALPITATION: ICD-10-CM

## 2025-02-20 DIAGNOSIS — R00.2 PALPITATIONS: Primary | ICD-10-CM

## 2025-02-20 LAB
ALBUMIN SERPL-MCNC: 4.4 G/DL (ref 3.5–5.2)
ALBUMIN/GLOB SERPL: 1.3 {RATIO} (ref 1–2.5)
ALP SERPL-CCNC: 157 U/L (ref 40–129)
ALT SERPL-CCNC: 18 U/L (ref 10–50)
ANION GAP SERPL CALCULATED.3IONS-SCNC: 13 MMOL/L (ref 9–16)
AST SERPL-CCNC: 22 U/L (ref 10–50)
BASOPHILS # BLD: 0.1 K/UL (ref 0–0.2)
BASOPHILS NFR BLD: 1 % (ref 0–2)
BILIRUB SERPL-MCNC: 0.4 MG/DL (ref 0–1.2)
BUN SERPL-MCNC: 20 MG/DL (ref 8–23)
BUN/CREAT SERPL: 18 (ref 9–20)
CALCIUM SERPL-MCNC: 9.4 MG/DL (ref 8.6–10.4)
CHLORIDE SERPL-SCNC: 102 MMOL/L (ref 98–107)
CO2 SERPL-SCNC: 26 MMOL/L (ref 20–31)
CREAT SERPL-MCNC: 1.1 MG/DL (ref 0.7–1.2)
ECHO BSA: 2.2 M2
EOSINOPHIL # BLD: 0.24 K/UL (ref 0–0.44)
EOSINOPHILS RELATIVE PERCENT: 3 % (ref 1–4)
ERYTHROCYTE [DISTWIDTH] IN BLOOD BY AUTOMATED COUNT: 14.2 % (ref 11.8–14.4)
GFR, ESTIMATED: 74 ML/MIN/1.73M2
GLUCOSE SERPL-MCNC: 100 MG/DL (ref 74–99)
HCT VFR BLD AUTO: 39.2 % (ref 40.7–50.3)
HGB BLD-MCNC: 12.5 G/DL (ref 13–17)
IMM GRANULOCYTES # BLD AUTO: 0.03 K/UL (ref 0–0.3)
IMM GRANULOCYTES NFR BLD: 0 %
LYMPHOCYTES NFR BLD: 2.79 K/UL (ref 1.1–3.7)
LYMPHOCYTES RELATIVE PERCENT: 30 % (ref 24–43)
MCH RBC QN AUTO: 27.8 PG (ref 25.2–33.5)
MCHC RBC AUTO-ENTMCNC: 31.9 G/DL (ref 28.4–34.8)
MCV RBC AUTO: 87.1 FL (ref 82.6–102.9)
MONOCYTES NFR BLD: 0.91 K/UL (ref 0.1–1.2)
MONOCYTES NFR BLD: 10 % (ref 3–12)
NEUTROPHILS NFR BLD: 56 % (ref 36–65)
NEUTS SEG NFR BLD: 5.22 K/UL (ref 1.5–8.1)
NRBC BLD-RTO: 0 PER 100 WBC
PLATELET # BLD AUTO: 405 K/UL (ref 138–453)
PMV BLD AUTO: 9 FL (ref 8.1–13.5)
POTASSIUM SERPL-SCNC: 3.2 MMOL/L (ref 3.7–5.3)
PROT SERPL-MCNC: 7.6 G/DL (ref 6.6–8.7)
RBC # BLD AUTO: 4.5 M/UL (ref 4.21–5.77)
SODIUM SERPL-SCNC: 141 MMOL/L (ref 136–145)
TROPONIN I SERPL HS-MCNC: 11 NG/L (ref 0–22)
TROPONIN I SERPL HS-MCNC: 12 NG/L (ref 0–22)
WBC OTHER # BLD: 9.3 K/UL (ref 3.5–11.3)

## 2025-02-20 PROCEDURE — 85025 COMPLETE CBC W/AUTO DIFF WBC: CPT

## 2025-02-20 PROCEDURE — 80053 COMPREHEN METABOLIC PANEL: CPT

## 2025-02-20 PROCEDURE — 93005 ELECTROCARDIOGRAM TRACING: CPT | Performed by: STUDENT IN AN ORGANIZED HEALTH CARE EDUCATION/TRAINING PROGRAM

## 2025-02-20 PROCEDURE — 99284 EMERGENCY DEPT VISIT MOD MDM: CPT

## 2025-02-20 PROCEDURE — 93243 EXT ECG>48HR<7D SCAN A/R: CPT

## 2025-02-20 PROCEDURE — 6370000000 HC RX 637 (ALT 250 FOR IP): Performed by: STUDENT IN AN ORGANIZED HEALTH CARE EDUCATION/TRAINING PROGRAM

## 2025-02-20 PROCEDURE — 84484 ASSAY OF TROPONIN QUANT: CPT

## 2025-02-20 RX ORDER — POTASSIUM CHLORIDE 1500 MG/1
40 TABLET, EXTENDED RELEASE ORAL ONCE
Status: COMPLETED | OUTPATIENT
Start: 2025-02-20 | End: 2025-02-20

## 2025-02-20 RX ORDER — CALCIUM CARBONATE 500 MG/1
500 TABLET, CHEWABLE ORAL ONCE
Status: COMPLETED | OUTPATIENT
Start: 2025-02-20 | End: 2025-02-20

## 2025-02-20 RX ADMIN — ANTACID TABLETS 500 MG: 500 TABLET, CHEWABLE ORAL at 02:03

## 2025-02-20 RX ADMIN — POTASSIUM CHLORIDE 40 MEQ: 1500 TABLET, EXTENDED RELEASE ORAL at 01:00

## 2025-02-20 ASSESSMENT — PAIN - FUNCTIONAL ASSESSMENT: PAIN_FUNCTIONAL_ASSESSMENT: NONE - DENIES PAIN

## 2025-02-20 NOTE — ED PROVIDER NOTES
[facility] EMERGENCY DEPARTMENT     Pt Name: Mayo Jean  MRN: [id number]  Birthdate 1960  Date of evaluation: 2/20/2025  Physician: Kyle Tatum MD      Note to patient: The 21st Century Cures Act requires that medical notes like this one to be available to patients in the interest of transparency. Please be advised, this is a medical document. It is intended as iczb-sf-nely communication. It is written in medical language and may contain abbreviations and verbiage that may be unfamiliar. It may appear to read blunt or direct. Medical documents are intended to carry relevant medical information, facts as evident and the clinical opinion of the practitioner.     CHIEF COMPLAINT       Chief Complaint   Patient presents with    Palpitations     HISTORY OF PRESENT ILLNESS   Mayo Jean is a 64 y.o. male with PMHx of nonischemic cardiomyopathy and paroxysmal atrial fibrillation on Xarelto  who presents to the emergency department for evaluation of palpitations.  Patient reports that he was lying down ready to go to bed approximately 40 minutes ago when he felt like he was having extra beats in his chest.  States it felt like when he was in atrial fibrillation and first diagnosed back in 2020.    Per chart review, patient follows with cardiologist Dr. Swanson.  He was originally admitted on 12/4/2020 with new onset atrial fibrillation with RVR.  Patient has had multiple stress tests, Holter monitor and echo which have essentially been unremarkable.  Last echo was in June which showed EF 55% no wall motion with mild mitral and tricuspid regurg.    Patient denies any chest pain or shortness of breath.  Patient reports that he is on Xarelto and has not missed any doses    The patient has no other acute complaints at this time.    A 10-point ROS was performed and negative unless otherwise stated in HPI  PASTMEDICAL HISTORY     Past Medical History:   Diagnosis Date    Abnormal patient-activated cardiac event  monitor 02/22/2021    CAM 13 days 8 hrs Baseline sinus ave HR of 63 bpm rang between 47 adn 109 bpm  Rare PAC and PVC with 2 runs of ectopic atrial tachy longest fastest was 7 beats hr 110 bpm No VT runs no AFib    Chronic anticoagulation     xarelto    Colonic diverticular abscess 10/2021    Colostomy fistula (HCC)     GERD (gastroesophageal reflux disease)     H/O echocardiogram 03/08/2021    EF 45-50% Mild increased LV wallthickness LA is mod dilated 34-39 with LA volume index of 35 ml/m2 Mild mitral and tricuspid regurg Midl diastolic dysfunction seen Aortic root is mod dilated >4.5cm when corrected for body suface area    History of cardioversion     History of pneumonia     pt does not recall having this    Hx of bronchitis     Hyperlipidemia     Hypertension     Non-ischemic cardiomyopathy ([facility])     Dr. Swanson cardiology in [place], [place]    MICHELLE on CPAP     Paroxysmal A-fib ([facility])     hx cardioversion    Sleep apnea     Home CPAP    Under care of team 06/21/2022    cardiology-Dr Swanson-[place]-last visit june 2022    Under care of team 06/21/2022    [facility]       Patient Active Problem List   Diagnosis Code    PAF (paroxysmal atrial fibrillation) ([facility]) I48.0    Primary hypertension I10    Colonic diverticular abscess K57.20    Chronic atrial fibrillation ([facility]) I48.20    Hyperlipidemia E78.5    Chronic anticoagulation Z79.01    Sigmoid diverticulitis K57.32    Duodenal diverticulum K57.10    Colovesical fistula N32.1    Severe malnutrition E43    Colostomy in place ([facility]) Z93.3    TIA (transient ischemic attack) G45.9    BRUCE (acute kidney injury) N17.9    Moderate malnutrition E44.0    Secondary hypercoagulable state D68.69    Lightheaded R42    Syncope and collapse R55    On amiodarone therapy Z79.899     SURGICAL HISTORY       Past Surgical History:   Procedure Laterality Date    CARDIOVERSION  01/2021    CT ABSCESS DRAINAGE  10/12/2021    CT ABSCESS DRAIN SUBCUTANEOUS 10/12/2021

## 2025-02-20 NOTE — ED TRIAGE NOTES
Mode of arrival (squad #, walk in, police, etc) : walk in        Chief complaint(s): palpatations        Arrival Note (brief scenario, treatment PTA, etc).: onset approx 30 min PTA, currently prescribed Xarelto, compliant. Denies CP and/or SOB. H/o afib, states controlled. BP elevated.        C= \"Have you ever felt that you should Cut down on your drinking?\"  No  A= \"Have people Annoyed you by criticizing your drinking?\"  No  G= \"Have you ever felt bad or Guilty about your drinking?\"  No  E= \"Have you ever had a drink as an Eye-opener first thing in the morning to steady your nerves or to help a hangover?\"  No      Deferred []      Reason for deferring: N/A    *If yes to two or more: probable alcohol abuse.*

## 2025-02-21 LAB
EKG ATRIAL RATE: 64 BPM
EKG P AXIS: 30 DEGREES
EKG P-R INTERVAL: 178 MS
EKG Q-T INTERVAL: 424 MS
EKG QRS DURATION: 88 MS
EKG QTC CALCULATION (BAZETT): 437 MS
EKG R AXIS: 17 DEGREES
EKG T AXIS: 54 DEGREES
EKG VENTRICULAR RATE: 64 BPM

## 2025-02-21 PROCEDURE — 93010 ELECTROCARDIOGRAM REPORT: CPT | Performed by: FAMILY MEDICINE

## 2025-02-28 LAB — ECHO BSA: 2.2 M2

## 2025-05-02 ENCOUNTER — OFFICE VISIT (OUTPATIENT)
Dept: CARDIOLOGY | Age: 65
End: 2025-05-02

## 2025-05-02 ENCOUNTER — HOSPITAL ENCOUNTER (OUTPATIENT)
Age: 65
Discharge: HOME | End: 2025-05-02
Payer: MEDICARE

## 2025-05-02 VITALS
WEIGHT: 209.6 LBS | BODY MASS INDEX: 29.34 KG/M2 | OXYGEN SATURATION: 98 % | DIASTOLIC BLOOD PRESSURE: 80 MMHG | RESPIRATION RATE: 18 BRPM | HEIGHT: 71 IN | HEART RATE: 75 BPM | SYSTOLIC BLOOD PRESSURE: 136 MMHG

## 2025-05-02 DIAGNOSIS — N18.9 ACUTE ON CHRONIC RENAL INSUFFICIENCY: ICD-10-CM

## 2025-05-02 DIAGNOSIS — R42 DIZZINESS: ICD-10-CM

## 2025-05-02 DIAGNOSIS — I10 ESSENTIAL HYPERTENSION: ICD-10-CM

## 2025-05-02 DIAGNOSIS — I95.1 ORTHOSTATIC HYPOTENSION: ICD-10-CM

## 2025-05-02 DIAGNOSIS — Z86.79 S/P ABLATION OF ATRIAL FIBRILLATION: ICD-10-CM

## 2025-05-02 DIAGNOSIS — N28.9 ACUTE ON CHRONIC RENAL INSUFFICIENCY: ICD-10-CM

## 2025-05-02 DIAGNOSIS — Z98.890 S/P ABLATION OF ATRIAL FIBRILLATION: ICD-10-CM

## 2025-05-02 DIAGNOSIS — I42.8 NICM (NONISCHEMIC CARDIOMYOPATHY) (HCC): ICD-10-CM

## 2025-05-02 DIAGNOSIS — R42 LIGHTHEADED: ICD-10-CM

## 2025-05-02 DIAGNOSIS — E78.2 MIXED HYPERLIPIDEMIA: ICD-10-CM

## 2025-05-02 DIAGNOSIS — N17.9 AKI (ACUTE KIDNEY INJURY): ICD-10-CM

## 2025-05-02 DIAGNOSIS — Z79.01 CHRONIC ANTICOAGULATION: ICD-10-CM

## 2025-05-02 DIAGNOSIS — I48.0 PAF (PAROXYSMAL ATRIAL FIBRILLATION) (HCC): ICD-10-CM

## 2025-05-02 DIAGNOSIS — I95.1 ORTHOSTATIC HYPOTENSION: Primary | ICD-10-CM

## 2025-05-02 DIAGNOSIS — Z79.899 ON AMIODARONE THERAPY: ICD-10-CM

## 2025-05-02 LAB
ANION GAP SERPL CALCULATED.3IONS-SCNC: 11 MMOL/L (ref 9–16)
BNP SERPL-MCNC: 76 PG/ML (ref 0–125)
BUN SERPL-MCNC: 16 MG/DL (ref 8–23)
BUN/CREAT SERPL: 15 (ref 9–20)
CALCIUM SERPL-MCNC: 8.9 MG/DL (ref 8.6–10.4)
CHLORIDE SERPL-SCNC: 102 MMOL/L (ref 98–107)
CHOLEST SERPL-MCNC: 168 MG/DL (ref 0–199)
CHOLESTEROL/HDL RATIO: 4.1
CO2 SERPL-SCNC: 26 MMOL/L (ref 20–31)
CREAT SERPL-MCNC: 1.1 MG/DL (ref 0.7–1.2)
ERYTHROCYTE [DISTWIDTH] IN BLOOD BY AUTOMATED COUNT: 14.5 % (ref 11.8–14.4)
GFR, ESTIMATED: 73 ML/MIN/1.73M2
GLUCOSE SERPL-MCNC: 126 MG/DL (ref 74–99)
HCT VFR BLD AUTO: 40.2 % (ref 40.7–50.3)
HDLC SERPL-MCNC: 41 MG/DL
HGB BLD-MCNC: 12.8 G/DL (ref 13–17)
LDLC SERPL CALC-MCNC: 73 MG/DL (ref 0–100)
MCH RBC QN AUTO: 27.5 PG (ref 25.2–33.5)
MCHC RBC AUTO-ENTMCNC: 31.8 G/DL (ref 28.4–34.8)
MCV RBC AUTO: 86.5 FL (ref 82.6–102.9)
NRBC BLD-RTO: 0 PER 100 WBC
PLATELET # BLD AUTO: 341 K/UL (ref 138–453)
PMV BLD AUTO: 9.1 FL (ref 8.1–13.5)
POTASSIUM SERPL-SCNC: 3.5 MMOL/L (ref 3.7–5.3)
RBC # BLD AUTO: 4.65 M/UL (ref 4.21–5.77)
SODIUM SERPL-SCNC: 139 MMOL/L (ref 136–145)
TRIGL SERPL-MCNC: 270 MG/DL
VLDLC SERPL CALC-MCNC: 54 MG/DL (ref 1–30)
WBC OTHER # BLD: 9.5 K/UL (ref 3.5–11.3)

## 2025-05-02 PROCEDURE — 80048 BASIC METABOLIC PNL TOTAL CA: CPT

## 2025-05-02 PROCEDURE — 80061 LIPID PANEL: CPT

## 2025-05-02 PROCEDURE — 36415 COLL VENOUS BLD VENIPUNCTURE: CPT

## 2025-05-02 PROCEDURE — 83880 ASSAY OF NATRIURETIC PEPTIDE: CPT

## 2025-05-02 PROCEDURE — 85027 COMPLETE CBC AUTOMATED: CPT

## 2025-05-02 NOTE — PROGRESS NOTES
I, Luci Rodriguez am scribing for and in the presence of Irma Swanson MD, F.A.C.C..    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 He does have occasional lightheaded denies any falls and palp at times Denies:CP, sob, )    Dear Rema Dorado, JASPREET - NP    HPI: I had the pleasure of seeing Mayo Jean in consultation today. As you know, Mr. Jean is a 64 y.o. male who was admitted on 2020 with new onset atrial fibrillation with RVR leading to cardiac consultation and  work-up.    Mr. Jean has history of intermittent palpitations and history suggestive of obstructive sleep apnea syndrome. He reported having history of borderline hypertension.  He denied any history of diabetes or dyslipidemia.  He is lifelong non-smoker.      With regard to his family history, he reported that his father and mother had a heart attack in their old age.      His echo showed ejection fraction of 40%.  No regional wall motion abnormalities.  There is some shadowing in the apex but I think this is origin of the papillary muscles.  He is anticoagulated anyway.    Lexiscan stress test showed low ejection fraction on gated SPECT with ejection fraction being 42% but no perfusion abnormalities favoring nonischemic cardiomyopathy.    Stress test done 2020: EF 42%  Largely normal myocardial perfusion imaging with soft tissue artifact, but without significant evidence of myocardial ischemia or infarction.    Echo done 2020: EF 40% Mildly increased left ventricular wall thickness with a normal left ventricular cavity size. Moderate global hypokinesis with no regional abnormalities.The left atrium is moderately dilated (34-39) with a left atrial volume index of 34 ml/m2. Mild mitral and tricuspid regurgitation. Diastolic function cannot be

## 2025-05-04 ENCOUNTER — RESULTS FOLLOW-UP (OUTPATIENT)
Dept: CARDIOLOGY | Age: 65
End: 2025-05-04

## 2025-05-05 NOTE — TELEPHONE ENCOUNTER
----- Message from Dr. Irma Swanson MD sent at 5/4/2025 10:09 PM EDT -----  Blood work is stable.  Continue current therapy and follow-up.  Please call with questions and/or concerns.  Thank you

## 2025-05-13 RX ORDER — ATORVASTATIN CALCIUM 20 MG/1
20 TABLET, FILM COATED ORAL DAILY
Qty: 90 TABLET | Refills: 3 | Status: SHIPPED | OUTPATIENT
Start: 2025-05-13

## 2025-05-26 ENCOUNTER — HOSPITAL ENCOUNTER (EMERGENCY)
Age: 65
Discharge: HOME | End: 2025-05-27
Attending: EMERGENCY MEDICINE
Payer: MEDICARE

## 2025-05-26 DIAGNOSIS — Z01.30 BLOOD PRESSURE CHECK: Primary | ICD-10-CM

## 2025-05-26 PROCEDURE — 99283 EMERGENCY DEPT VISIT LOW MDM: CPT

## 2025-05-26 ASSESSMENT — PAIN - FUNCTIONAL ASSESSMENT: PAIN_FUNCTIONAL_ASSESSMENT: NONE - DENIES PAIN

## 2025-05-27 VITALS
RESPIRATION RATE: 29 BRPM | DIASTOLIC BLOOD PRESSURE: 94 MMHG | SYSTOLIC BLOOD PRESSURE: 148 MMHG | HEART RATE: 74 BPM | BODY MASS INDEX: 29.82 KG/M2 | TEMPERATURE: 98.9 F | OXYGEN SATURATION: 99 % | WEIGHT: 213 LBS | HEIGHT: 71 IN

## 2025-05-27 LAB
EKG ATRIAL RATE: 74 BPM
EKG P AXIS: 65 DEGREES
EKG P-R INTERVAL: 168 MS
EKG Q-T INTERVAL: 406 MS
EKG QRS DURATION: 84 MS
EKG QTC CALCULATION (BAZETT): 450 MS
EKG R AXIS: 15 DEGREES
EKG T AXIS: 51 DEGREES
EKG VENTRICULAR RATE: 74 BPM

## 2025-05-27 PROCEDURE — 93005 ELECTROCARDIOGRAM TRACING: CPT | Performed by: EMERGENCY MEDICINE

## 2025-05-27 PROCEDURE — 93010 ELECTROCARDIOGRAM REPORT: CPT | Performed by: INTERNAL MEDICINE

## 2025-05-27 NOTE — ED PROVIDER NOTES
[facility] EMERGENCY DEPARTMENT  EMERGENCY DEPARTMENT ENCOUNTER      Pt Name: Mayo Jean  MRN: [id number]  Birthdate 1960  Date of evaluation: 5/26/2025  Provider: Noemy Spence MD    CHIEF COMPLAINT       Chief Complaint   Patient presents with    Illness     Patient concerned for possible high blood pressure and irregular heart rate after changing a car tire. Patient denies chest pain, shortness of breath. Patient endorses headache         HISTORY OF PRESENT ILLNESS   (Location/Symptom, Timing/Onset, Context/Setting, Quality, Duration, Modifying Factors, Severity)  Note limiting factors.   Mayo Jean is a 64 y.o. male who presents to the emergency department      Very pleasant 64-year-old gentleman presented Emergency Department for evaluation concerned that he may have elevated blood pressure.  While changing a tire on his car patient states he felt palpitations and after standing had spots in front of his or his eyes.  States he has had the symptoms before when his blood pressure has been elevated.  Patient has been compliant with his medications.  Denied any headache.  No focal neurological deficits.  No chest pain shortness of breath or other acute concerns.  Patient is resting comfortably at this time        Nursing Notes were reviewed.    REVIEW OF SYSTEMS    (2-9 systems for level 4, 10 or more for level 5)     Review of Systems   All other systems reviewed and are negative.      Except as noted above the remainder of the review of systems was reviewed and negative.       PAST MEDICAL HISTORY     Past Medical History:   Diagnosis Date    Abnormal patient-activated cardiac event monitor 02/22/2021    CAM 13 days 8 hrs Baseline sinus ave HR of 63 bpm rang between 47 adn 109 bpm  Rare PAC and PVC with 2 runs of ectopic atrial tachy longest fastest was 7 beats hr 110 bpm No VT runs no AFib    Chronic anticoagulation     xarelto    Colonic diverticular abscess 10/2021    Colostomy fistula (HCC)

## 2025-05-27 NOTE — DISCHARGE INSTRUCTIONS
Measure blood pressure at home once daily.  Make sure that you been resting for at least 10 minutes.  Measure blood pressure about the same time each day.  Record and take your primary care provider in 1 to 2 weeks for reevaluation continue current medications as prescribed.  Please seek medical attention immediately should you develop any chest pain difficulty breathing fainting spells or any other acute concerns

## 2025-06-09 ENCOUNTER — APPOINTMENT (OUTPATIENT)
Dept: GENERAL RADIOLOGY | Age: 65
End: 2025-06-09
Payer: MEDICARE

## 2025-06-09 ENCOUNTER — HOSPITAL ENCOUNTER (EMERGENCY)
Age: 65
Discharge: HOME | End: 2025-06-09
Attending: STUDENT IN AN ORGANIZED HEALTH CARE EDUCATION/TRAINING PROGRAM
Payer: MEDICARE

## 2025-06-09 VITALS
HEART RATE: 77 BPM | BODY MASS INDEX: 29.4 KG/M2 | WEIGHT: 210 LBS | RESPIRATION RATE: 16 BRPM | DIASTOLIC BLOOD PRESSURE: 98 MMHG | SYSTOLIC BLOOD PRESSURE: 151 MMHG | TEMPERATURE: 98 F | HEIGHT: 71 IN | OXYGEN SATURATION: 98 %

## 2025-06-09 DIAGNOSIS — S63.502A SPRAIN OF LEFT WRIST, INITIAL ENCOUNTER: Primary | ICD-10-CM

## 2025-06-09 PROCEDURE — 6370000000 HC RX 637 (ALT 250 FOR IP): Performed by: STUDENT IN AN ORGANIZED HEALTH CARE EDUCATION/TRAINING PROGRAM

## 2025-06-09 PROCEDURE — 73110 X-RAY EXAM OF WRIST: CPT

## 2025-06-09 PROCEDURE — 99283 EMERGENCY DEPT VISIT LOW MDM: CPT

## 2025-06-09 RX ORDER — ACETAMINOPHEN 500 MG
1000 TABLET ORAL ONCE
Status: COMPLETED | OUTPATIENT
Start: 2025-06-09 | End: 2025-06-09

## 2025-06-09 RX ADMIN — ACETAMINOPHEN 1000 MG: 500 TABLET ORAL at 23:09

## 2025-06-09 ASSESSMENT — PAIN DESCRIPTION - ORIENTATION: ORIENTATION: LEFT

## 2025-06-09 ASSESSMENT — PAIN SCALES - GENERAL: PAINLEVEL_OUTOF10: 3

## 2025-06-09 ASSESSMENT — PAIN DESCRIPTION - LOCATION: LOCATION: WRIST

## 2025-06-09 ASSESSMENT — PAIN - FUNCTIONAL ASSESSMENT: PAIN_FUNCTIONAL_ASSESSMENT: 0-10

## 2025-06-09 ASSESSMENT — PAIN DESCRIPTION - DESCRIPTORS: DESCRIPTORS: ACHING

## 2025-06-10 NOTE — DISCHARGE INSTRUCTIONS
Your x-ray imaging did not show any evidence of fracture at this time.  You likely sprained your wrist.  Please wear the brace throughout the day for support and comfort.    You may take Tylenol 500 to 1000 mg every 6 hours as needed for pain control.    If you are still having pain symptoms in 1 week please follow with your primary care physician for reevaluation and potentially repeat imaging.    You are on a blood thinner therefore if you develop symptoms of headache, vision changes, facial droop or strokelike symptoms you should return to the emergency department immediately for reevaluation.  At this time you indicated that you did not hit your head and declined CT imaging of the head.

## 2025-06-10 NOTE — ED PROVIDER NOTES
acute fracture or dislocation.               LABS:  Labs Reviewed - No data to display    All other labs were within normal range or not returned as of this dictation.    EMERGENCY DEPARTMENT COURSE and DIFFERENTIAL DIAGNOSIS/MDM:     Medical Decision Making  Fall onto outstretched arm.  Only tenderness at the base of the distal radius.  X-ray imaging negative at this site.  No scaphoid tenderness.  Will place in wrist splint for supportive care.  Counseled for repeat imaging in 1 week if symptoms persistent.  Patient does describe a slight headache since waiting around in the emergency department.  He is adamant he did not strike his head during this fall he landed forward and stopped himself with the wrist.  Counseled patient about obtaining head CT given he is on blood thinners however he states that he did not hit his head and does not wish to have CT imaging performed at this time.  Counseled in regards to worsening symptoms and return precautions should headache worsen, he develop any neurologic deficit sign or stroke etc. to obtain repeat imaging and to return to the ED immediately.    Amount and/or Complexity of Data Reviewed  Radiology:  Decision-making details documented in ED Course.    Risk  OTC drugs.               REASSESSMENT     ED Course as of 06/10/25 0248   Mon Jun 09, 2025   2300 XR WRIST LEFT (MIN 3 VIEWS)  IMPRESSION:  1. No acute fracture or dislocation.   [CP]      ED Course User Index  [CP] Jey Toribio MD         CRITICAL CARE TIME     Total Critical Care time was 0 minutes, excluding separately reportable procedures.  There was a high probability of clinically significant/life threatening deterioration in the patient's condition which required my urgent intervention.      PROCEDURES:  Unless otherwise noted below, none     Procedures    FINAL IMPRESSION      1. Sprain of left wrist, initial encounter          DISPOSITION/PLAN     DISPOSITION Decision To Discharge 06/09/2025

## 2025-06-16 DIAGNOSIS — Z79.899 ON AMIODARONE THERAPY: ICD-10-CM

## 2025-06-16 DIAGNOSIS — I42.8 NICM (NONISCHEMIC CARDIOMYOPATHY) (HCC): ICD-10-CM

## 2025-06-16 DIAGNOSIS — Z79.01 CHRONIC ANTICOAGULATION: ICD-10-CM

## 2025-06-16 DIAGNOSIS — I48.0 PAF (PAROXYSMAL ATRIAL FIBRILLATION) (HCC): ICD-10-CM

## 2025-06-16 DIAGNOSIS — E78.2 MIXED HYPERLIPIDEMIA: ICD-10-CM

## 2025-06-16 DIAGNOSIS — N18.9 ACUTE ON CHRONIC RENAL INSUFFICIENCY: ICD-10-CM

## 2025-06-16 DIAGNOSIS — N28.9 ACUTE ON CHRONIC RENAL INSUFFICIENCY: ICD-10-CM

## 2025-06-16 DIAGNOSIS — Z86.79 S/P ABLATION OF ATRIAL FIBRILLATION: ICD-10-CM

## 2025-06-16 DIAGNOSIS — Z98.890 S/P ABLATION OF ATRIAL FIBRILLATION: ICD-10-CM

## 2025-06-16 DIAGNOSIS — I95.1 ORTHOSTATIC HYPOTENSION: ICD-10-CM

## 2025-06-16 RX ORDER — FLUDROCORTISONE ACETATE 0.1 MG/1
TABLET ORAL DAILY
Qty: 90 TABLET | Refills: 3 | Status: SHIPPED | OUTPATIENT
Start: 2025-06-16

## 2025-06-17 ENCOUNTER — OFFICE VISIT (OUTPATIENT)
Dept: NEPHROLOGY | Age: 65
End: 2025-06-17
Payer: MEDICARE

## 2025-06-17 VITALS
DIASTOLIC BLOOD PRESSURE: 74 MMHG | BODY MASS INDEX: 28.91 KG/M2 | TEMPERATURE: 97.2 F | HEART RATE: 80 BPM | WEIGHT: 206.5 LBS | RESPIRATION RATE: 18 BRPM | HEIGHT: 71 IN | SYSTOLIC BLOOD PRESSURE: 118 MMHG

## 2025-06-17 DIAGNOSIS — E87.6 HYPOKALEMIA: ICD-10-CM

## 2025-06-17 DIAGNOSIS — N18.2 CKD (CHRONIC KIDNEY DISEASE), STAGE II: Primary | ICD-10-CM

## 2025-06-17 PROCEDURE — 99213 OFFICE O/P EST LOW 20 MIN: CPT | Performed by: INTERNAL MEDICINE

## 2025-06-17 PROCEDURE — 1036F TOBACCO NON-USER: CPT | Performed by: INTERNAL MEDICINE

## 2025-06-17 PROCEDURE — 3078F DIAST BP <80 MM HG: CPT | Performed by: INTERNAL MEDICINE

## 2025-06-17 PROCEDURE — G8427 DOCREV CUR MEDS BY ELIG CLIN: HCPCS | Performed by: INTERNAL MEDICINE

## 2025-06-17 PROCEDURE — 3017F COLORECTAL CA SCREEN DOC REV: CPT | Performed by: INTERNAL MEDICINE

## 2025-06-17 PROCEDURE — 1159F MED LIST DOCD IN RCRD: CPT | Performed by: INTERNAL MEDICINE

## 2025-06-17 PROCEDURE — 3074F SYST BP LT 130 MM HG: CPT | Performed by: INTERNAL MEDICINE

## 2025-06-17 PROCEDURE — G8417 CALC BMI ABV UP PARAM F/U: HCPCS | Performed by: INTERNAL MEDICINE

## 2025-06-17 PROCEDURE — 1123F ACP DISCUSS/DSCN MKR DOCD: CPT | Performed by: INTERNAL MEDICINE

## 2025-06-17 RX ORDER — BUPROPION HYDROCHLORIDE 150 MG/1
150 TABLET ORAL DAILY
COMMUNITY

## 2025-06-17 NOTE — PROGRESS NOTES
[facility] PHYSICIANS [facility], [facility] KIDNEY AND HYPERTENSION  [address] [address]  Dept: [phone]  Office Progress Note  6/17/2025 3:07 PM      Pt Name:    Mayo Jean  YOB: 1960  Primary Care Physician:  Rema Dorado, APRN - NP     Chief Complaint:   Chief Complaint   Patient presents with    Follow-up     Patient here for BRUCE. Denies any symptoms at this time.         Background Information/Interval History:   The patient is a 64 y.o. white  malepatient with  ileostomy done in April 2024 with subsequently removal last year in sept 2024.  This was done for diverticulitis complications. Patient reports he  was losing a lot of fluids when  ileostomy was created.  He is doing better now. No diarrhea or loose stools. Has hx atrial fibrillation. Works for [employer].     Was last seen in Sept 2024. Was supposed to see min 2 months but did not keep appt. Here for follow-up. Doing okay. No urinary c/o. Feels well. Bp is reported to be stable usually. Did not bring any readings.      Past History:  Past Medical History:   Diagnosis Date    Abnormal patient-activated cardiac event monitor 02/22/2021    CAM 13 days 8 hrs Baseline sinus ave HR of 63 bpm rang between 47 adn 109 bpm  Rare PAC and PVC with 2 runs of ectopic atrial tachy longest fastest was 7 beats hr 110 bpm No VT runs no AFib    Chronic anticoagulation     xarelto    Colonic diverticular abscess 10/2021    Colostomy fistula (HCC)     GERD (gastroesophageal reflux disease)     H/O echocardiogram 03/08/2021    EF 45-50% Mild increased LV wallthickness LA is mod dilated 34-39 with LA volume index of 35 ml/m2 Mild mitral and tricuspid regurg Midl diastolic dysfunction seen Aortic root is mod dilated >4.5cm when corrected for body suface area    History of cardioversion     History of pneumonia     pt does not recall having this    Hx of bronchitis

## (undated) DEVICE — SUTURE ETHLN SZ 3-0 L18IN NONABSORBABLE BLK L24MM PS-1 3/8 1663G

## (undated) DEVICE — 3M™ IOBAN™ 2 ANTIMICROBIAL INCISE DRAPE 6651EZ: Brand: IOBAN™ 2

## (undated) DEVICE — SUTURE VCRL SZ 3-0 L27IN ABSRB UD L26MM SH 1/2 CIR J416H

## (undated) DEVICE — INSTRUMENT REPROC SEAL/DIVIDE OPEN LIGASURE IMPACT CRV LG JAW NANO-COAT 18CM

## (undated) DEVICE — PAD,ABDOMINAL,5"X9",ST,LF,25/BX: Brand: MEDLINE INDUSTRIES, INC.

## (undated) DEVICE — COVER LT HNDL BLU PLAS

## (undated) DEVICE — PACK SURG ABD SVMMC

## (undated) DEVICE — PAD GEN USE BORDERED ADH 14IN 2IN AND 12IN 4IN GZ UNIV ST

## (undated) DEVICE — RESERVOIR,SUCTION,100CC,SILICONE: Brand: MEDLINE

## (undated) DEVICE — SUTURE PERMAHAND SZ 4-0 L18IN NONABSORBABLE BLK L26MM SH C014D

## (undated) DEVICE — SOLUTION SCRB 4OZ 4% CHG H2O AIDED FOR PREOPERATIVE SKIN

## (undated) DEVICE — PAD,NON-ADHERENT,3X8,STERILE,LF,1/PK: Brand: MEDLINE

## (undated) DEVICE — GLOVE SURG SZ 65 THK91MIL LTX FREE SYN POLYISOPRENE

## (undated) DEVICE — UNIT DRNAGE PLEUR CAV SGL COLL SUCT CTRL REGULATED STR CONN

## (undated) DEVICE — GLOVE SURG SZ 6 THK91MIL LTX FREE SYN POLYISOPRENE ANTI

## (undated) DEVICE — 450 ML BOTTLE OF 0.05% CHLORHEXIDINE GLUCONATE IN 99.95% STERILE WATER FOR IRRIGATION, USP AND APPLICATOR.: Brand: IRRISEPT ANTIMICROBIAL WOUND LAVAGE

## (undated) DEVICE — DECANTER FLD 9IN ST BG FOR ASEP TRNSF OF FLD

## (undated) DEVICE — DRAIN WND SIL FLAT RADIOPAQUE 10MM FULL FLUTED

## (undated) DEVICE — TOTAL TRAY, 16FR 10ML SIL FOLEY, URN: Brand: MEDLINE

## (undated) DEVICE — LEGGINGS, PAIR, 31X48, STERILE: Brand: MEDLINE

## (undated) DEVICE — COVER OR TBL W40XL90IN ABSRB STD AND GRIPPY BK SAHARA

## (undated) DEVICE — SPONGE LAP W18XL18IN WHT COT 4 PLY FLD STRUNG RADPQ DISP ST

## (undated) DEVICE — Z DISCONTINUED USE 2716239 STAPLER INT STPL 51MM CUT LN L40MM STD TISS CRV CUT CR40B

## (undated) DEVICE — PACK PROCEDURE SURG FACILITY SPEC GI BWL SPT SVMMC

## (undated) DEVICE — SWAB CULT CLR BLU PLAS RAYON LIQ AMIES AERB ANAERB FRIC CAP

## (undated) DEVICE — DRAPE, SLUSH XL, 44X66, STERILE: Brand: MEDLINE

## (undated) DEVICE — GAUZE,SPONGE,FLUFF,6"X6.75",STRL,5/TRAY: Brand: MEDLINE

## (undated) DEVICE — GLOVE ORANGE PI 8   MSG9080

## (undated) DEVICE — GOWN,AURORA,NONREINFORCED,LARGE: Brand: MEDLINE

## (undated) DEVICE — SYRINGE, LUER LOCK, 10ML: Brand: MEDLINE

## (undated) DEVICE — SUTURE PERMAHAND SZ 3-0 L18IN NONABSORBABLE BLK L26MM SH C013D

## (undated) DEVICE — SUTURE NONABSORBABLE MONOFILAMENT 3-0 PS-1 18 IN BLK ETHILON 1663H

## (undated) DEVICE — NEPTUNE E-SEP 165MM SUCTION SLEEVE: Brand: NEPTUNE E-SEP

## (undated) DEVICE — SUTURE PROL SZ 2-0 L30IN NONABSORBABLE BLU L26MM SH 1/2 CIR 8833H

## (undated) DEVICE — GLOVE ORANGE PI 7   MSG9070

## (undated) DEVICE — RELOAD STPL H2X4.7XL60MM THCK TISS GRN B FRM AUTO RET PIN

## (undated) DEVICE — DRAPE,REIN 53X77,STERILE: Brand: MEDLINE

## (undated) DEVICE — SUTURE PDS II SZ 0 L60IN ABSRB VLT L65MM TP-1 1/2 CIR Z991G

## (undated) DEVICE — NEPTUNE E-SEP SMOKE EVACUATION PENCIL, COATED, 70MM BLADE, PUSH BUTTON SWITCH: Brand: NEPTUNE E-SEP

## (undated) DEVICE — APPLICATOR MEDICATED 26 CC SOLUTION HI LT ORNG CHLORAPREP

## (undated) DEVICE — NEEDLE HYPO 25GA L1.5IN BLU POLYPR HUB S STL REG BVL STR

## (undated) DEVICE — BLADE CLIPPER GEN PURP NS

## (undated) DEVICE — PREMIUM DRY TRAY LF: Brand: MEDLINE INDUSTRIES, INC.

## (undated) DEVICE — CONTAINER,SPECIMEN,4OZ,OR STRL: Brand: MEDLINE

## (undated) DEVICE — GOWN,SIRUS,NONRNF,SETINSLV,XL,20/CS: Brand: MEDLINE

## (undated) DEVICE — COLLECTOR SPEC RAYON LIQ STUART DBL FOR THRT VAG SKIN WND

## (undated) DEVICE — CLIP INT L ORNG TI TRNSVRS GRV CHEVRON SHP W/ PRECIS TIP TO

## (undated) DEVICE — GAUZE,PACKING STRIP,IODOFORM,1/2"X5YD,ST: Brand: CURAD

## (undated) DEVICE — PROTECTOR ULN NRV PUR FOAM HK LOOP STRP ANATOMICALLY

## (undated) DEVICE — TOWEL,OR,DSP,ST,NATURAL,DLX,4/PK,20PK/CS: Brand: MEDLINE

## (undated) DEVICE — Device: Brand: SENSURA MIO

## (undated) DEVICE — SUTURE COAT VCRL SZ 4-0 L18IN ABSRB VLT SH-1 L22MM 1/2 CIR J771D

## (undated) DEVICE — TUBING, SUCTION, 9/32" X 20', STRAIGHT: Brand: MEDLINE INDUSTRIES, INC.

## (undated) DEVICE — DRESSING TRNSPAR W5XL4.5IN FLM SHT SEMIPERMEABLE WIND

## (undated) DEVICE — AGENT HEMSTAT 3GM OXIDIZED REGENERATED CELOS ABSRB FOR CONT (ORDER MULTIPLES OF 5EA)

## (undated) DEVICE — MITT SURG PREP L ADH DISPOSABLE

## (undated) DEVICE — DRAPE,LAP,CHOLE,W/TROUGHS,STERILE: Brand: MEDLINE

## (undated) DEVICE — DRAPE,UNDRBUT,WHT GRAD PCH,CAPPORT,20/CS: Brand: MEDLINE